# Patient Record
Sex: FEMALE | Race: WHITE | NOT HISPANIC OR LATINO | Employment: OTHER | URBAN - METROPOLITAN AREA
[De-identification: names, ages, dates, MRNs, and addresses within clinical notes are randomized per-mention and may not be internally consistent; named-entity substitution may affect disease eponyms.]

---

## 2017-01-09 ENCOUNTER — HOSPITAL ENCOUNTER (OUTPATIENT)
Dept: LAB | Facility: MEDICAL CENTER | Age: 72
End: 2017-01-09
Attending: INTERNAL MEDICINE
Payer: MEDICARE

## 2017-01-09 ENCOUNTER — OFFICE VISIT (OUTPATIENT)
Dept: CARDIOLOGY | Facility: MEDICAL CENTER | Age: 72
End: 2017-01-09
Payer: MEDICARE

## 2017-01-09 VITALS
WEIGHT: 140 LBS | OXYGEN SATURATION: 96 % | HEART RATE: 74 BPM | SYSTOLIC BLOOD PRESSURE: 140 MMHG | BODY MASS INDEX: 21.92 KG/M2 | DIASTOLIC BLOOD PRESSURE: 82 MMHG

## 2017-01-09 DIAGNOSIS — D64.89 ANEMIA DUE TO OTHER CAUSE: ICD-10-CM

## 2017-01-09 DIAGNOSIS — N18.4 CKD (CHRONIC KIDNEY DISEASE), STAGE IV (HCC): ICD-10-CM

## 2017-01-09 DIAGNOSIS — J42 CHRONIC BRONCHITIS, UNSPECIFIED CHRONIC BRONCHITIS TYPE (HCC): ICD-10-CM

## 2017-01-09 DIAGNOSIS — I34.0 SEVERE MITRAL REGURGITATION: ICD-10-CM

## 2017-01-09 DIAGNOSIS — I73.9 PAD (PERIPHERAL ARTERY DISEASE) (HCC): ICD-10-CM

## 2017-01-09 LAB
ERYTHROCYTE [DISTWIDTH] IN BLOOD BY AUTOMATED COUNT: 60.6 FL (ref 35.9–50)
HCT VFR BLD AUTO: 37.5 % (ref 37–47)
HGB BLD-MCNC: 12.2 G/DL (ref 12–16)
MCH RBC QN AUTO: 29.2 PG (ref 27–33)
MCHC RBC AUTO-ENTMCNC: 32.5 G/DL (ref 33.6–35)
MCV RBC AUTO: 89.7 FL (ref 81.4–97.8)
PLATELET # BLD AUTO: 170 K/UL (ref 164–446)
PMV BLD AUTO: 11.3 FL (ref 9–12.9)
RBC # BLD AUTO: 4.18 M/UL (ref 4.2–5.4)
WBC # BLD AUTO: 7.1 K/UL (ref 4.8–10.8)

## 2017-01-09 PROCEDURE — G8419 CALC BMI OUT NRM PARAM NOF/U: HCPCS | Performed by: INTERNAL MEDICINE

## 2017-01-09 PROCEDURE — 99214 OFFICE O/P EST MOD 30 MIN: CPT | Performed by: INTERNAL MEDICINE

## 2017-01-09 PROCEDURE — 36415 COLL VENOUS BLD VENIPUNCTURE: CPT

## 2017-01-09 PROCEDURE — 85027 COMPLETE CBC AUTOMATED: CPT

## 2017-01-09 PROCEDURE — 3017F COLORECTAL CA SCREEN DOC REV: CPT | Performed by: INTERNAL MEDICINE

## 2017-01-09 PROCEDURE — G8427 DOCREV CUR MEDS BY ELIG CLIN: HCPCS | Performed by: INTERNAL MEDICINE

## 2017-01-09 PROCEDURE — 4004F PT TOBACCO SCREEN RCVD TLK: CPT | Performed by: INTERNAL MEDICINE

## 2017-01-09 NOTE — MR AVS SNAPSHOT
Kristyn Gillespie   2017 2:30 PM   Office Visit   MRN: [id number]    Department:  [facility]   Dept Phone:  [phone]    Description:  Female : 1945   Provider:  Triston Abdul M.D.           Reason for Visit     Follow-Up  here to go over medications      Allergies as of 2017     No Known Allergies      You were diagnosed with     CKD (chronic kidney disease), stage IV ([facility])   [934690]       Chronic bronchitis, unspecified chronic bronchitis type ([facility])   [5691854]       PAD (peripheral artery disease) ([facility])   [122699]       Severe mitral regurgitation   [451164]       Anemia due to other cause   [1886582]         Vital Signs     Blood Pressure Pulse Weight Oxygen Saturation Smoking Status       140/82 mmHg 74 63.504 kg (140 lb) 96% Current Every Day Smoker       Basic Information     Date Of Birth Sex Race Ethnicity Preferred Language    1945 Female White Non- English      Your appointments     2017 10:00 AM   Follow Up Visit with Saulo Garza M.D.   [facility] ([address] [place] [address])    [address] [place] [address], [address]  [facility] [address]   [phone]           You will be receiving a confirmation call a few days before your appointment from our automated call confirmation system.            2017  4:40 PM   Established Patient with Rosa LACEY M.D.   [facility] (--)    [address] [facility] [address] [phone]           You will be receiving a confirmation call a few days before your appointment from our automated call confirmation system.            Mar 20, 2017  3:00 PM   FOLLOW UP with Triston Abdul M.D.   [facility] (--)    [address] [facility] [address] [phone]              Problem List              ICD-10-CM Priority Class Noted - Resolved    Essential hypertension I10 Low  2009 - Present    Peripheral edema R60.9   9/18/2009 - Present    Migraines, neuralgic G44.009   9/18/2009 - Present    Osteoarthritis M19.90   9/18/2009 - Present    PAD (peripheral artery disease)-saw a vascular surgeon several years ago who indicated stents would be needed I73.9   10/21/2009 - Present    Tobacco dependence F17.200 Low  11/12/2015 - Present    HLD (hyperlipidemia) E78.5   11/12/2015 - Present    Vitamin D deficiency E55.9   11/12/2015 - Present    Renal insufficiency N28.9   8/5/2016 - Present    GIB (gastrointestinal bleeding) K92.2 Medium  11/14/2016 - Present    Acute blood loss anemia D62 High  11/14/2016 - Present    Acute on chronic renal failure (HCC) N17.9, N18.9 Medium  11/14/2016 - Present    Metabolic acidosis E87.2 Medium  11/14/2016 - Present    COPD (chronic obstructive pulmonary disease) ([facility]) J44.9 Low  11/15/2016 - Present    Chronic respiratory failure with hypoxia ([facility]) J96.11 Medium  11/15/2016 - Present    Acute pulmonary edema ([facility]) J81.0 Medium  11/15/2016 - Present    Anemia D64.9   11/16/2016 - Present    Respiratory failure ([facility]) J96.90 High  11/16/2016 - Present    Anemia D64.9 Medium  11/16/2016 - Present    CKD (chronic kidney disease), stage IV ([facility]) N18.4 Low  11/16/2016 - Present    ELSY (acute kidney injury) ([facility]) N17.9 High  11/16/2016 - Present    HLD (hyperlipidemia) E78.5 Low  11/16/2016 - Present    Gastritis K29.70 Low  11/16/2016 - Present    Non-rheumatic mitral regurgitation I34.0   11/30/2016 - Present      Health Maintenance        Date Due Completion Dates    IMM DTaP/Tdap/Td Vaccine (1 - Tdap) 1/23/1963 ---    PAP SMEAR 1/23/1965 ---    IMM ZOSTER VACCINE 1/23/2004 ---    IMM PNEUMOCOCCAL 65+ (ADULT) HIGH/HIGHEST RISK SERIES (2 of 2 - PPSV23) 1/7/2016 11/12/2015    MAMMOGRAM 6/18/2016 6/18/2015 (Declined)    Override on 6/18/2015: Patient Declined    BONE DENSITY 5/11/2020 5/11/2015 (Declined)    Override on 5/11/2015: Patient Declined    COLONOSCOPY 9/1/2024 9/1/2014  (Done)    Override on 9/1/2014: Done (at [facility])            Current Immunizations     13-VALENT PCV PREVNAR 11/12/2015    Influenza Vaccine Adult HD 11/17/2016  5:37 AM, 11/12/2015      Below and/or attached are the medications your provider expects you to take. Review all of your home medications and newly ordered medications with your provider and/or pharmacist. Follow medication instructions as directed by your provider and/or pharmacist. Please keep your medication list with you and share with your provider. Update the information when medications are discontinued, doses are changed, or new medications (including over-the-counter products) are added; and carry medication information at all times in the event of emergency situations     Allergies:  No Known Allergies          Medications  Valid as of: January 09, 2017 -  3:07 PM    Generic Name Brand Name Tablet Size Instructions for use    Albuterol Sulfate (Aero Soln) albuterol 108 (90 BASE) MCG/ACT Inhale 2 Puffs by mouth every 6 hours as needed for Shortness of Breath.        Ascorbic Acid (Tab) VITAMIN C 500 MG Take 1 Tab by mouth 3 times a day.        Calcium Carbonate Antacid (Chew Tab) TUMS 500 MG Take 1 Tab by mouth 2 Times a Day.        Cyanocobalamin (Tab) B-12 100 MCG Take 100 mcg by mouth every day.        Ergocalciferol (Cap) DRISDOL 21844 UNITS Take 1 Cap by mouth every 7 days.        Ferrous Sulfate (Tab) ferrous sulfate 325 (65 FE) MG Take 1 Tab by mouth 3 times a day, with meals.        Furosemide (Tab) LASIX 40 MG Take 1 Tab by mouth 2 Times a Day.        HydrALAZINE HCl (Tab) APRESOLINE 100 MG Take 1 Tab by mouth every 8 hours.        Ipratropium-Albuterol (Solution) DUONEB 0.5-2.5 (3) MG/3ML 3 mL by Nebulization route every 6 hours as needed for Shortness of Breath.        Isosorbide Mononitrate (TABLET SR 24 HR) IMDUR 120 MG Take 1 Tab by mouth every day.        Lovastatin (Tab) MEVACOR 10 MG Take 1 Tab by mouth every day.         Multiple Vitamin (Tab) THERAGRAN  Take 1 Tab by mouth every day.        Omeprazole (CAPSULE DELAYED RELEASE) PRILOSEC 40 MG Take 1 Cap by mouth 2 Times a Day for 60 days.        Sodium Bicarbonate (Tab) SODIUM BICARBONATE 650 MG Take 1 Tab by mouth 3 times a day.        TraMADol HCl (Tab) ULTRAM 50 MG Take 1 Tab by mouth every 8 hours as needed.        .                 Medicines prescribed today were sent to:     [address] [address]    Phone: [phone] Fax: [phone]    Open 24 Hours?: No      Medication refill instructions:       If your prescription bottle indicates you have medication refills left, it is not necessary to call your provider’s office. Please contact your pharmacy and they will refill your medication.    If your prescription bottle indicates you do not have any refills left, you may request refills at any time through one of the following ways: The online [employer] system (except Urgent Care), by calling your provider’s office, or by asking your pharmacy to contact your provider’s office with a refill request. Medication refills are processed only during regular business hours and may not be available until the next business day. Your provider may request additional information or to have a follow-up visit with you prior to refilling your medication.   *Please Note: Medication refills are assigned a new Rx number when refilled electronically. Your pharmacy may indicate that no refills were authorized even though a new prescription for the same medication is available at the pharmacy. Please request the medicine by name with the pharmacy before contacting your provider for a refill.        Your To Do List     Future Labs/Procedures Complete By Expires    CBC WITHOUT DIFFERENTIAL  As directed 7/12/2017    ECHOCARDIOGRAM COMP W/O CONT  As directed 1/9/2018         [employer] Status: Patient Declined

## 2017-01-10 ENCOUNTER — TELEPHONE (OUTPATIENT)
Dept: CARDIOLOGY | Facility: MEDICAL CENTER | Age: 72
End: 2017-01-10

## 2017-01-10 DIAGNOSIS — I34.0 NON-RHEUMATIC MITRAL REGURGITATION: ICD-10-CM

## 2017-01-10 DIAGNOSIS — N18.4 CKD (CHRONIC KIDNEY DISEASE), STAGE IV (HCC): ICD-10-CM

## 2017-01-10 DIAGNOSIS — I10 ESSENTIAL HYPERTENSION: ICD-10-CM

## 2017-01-10 DIAGNOSIS — J81.0 ACUTE PULMONARY EDEMA (HCC): ICD-10-CM

## 2017-01-10 NOTE — PROGRESS NOTES
Subjective:   Kristyn Gillespie is a 71 y.o. female who presents today for hospital follow-up after diagnosis of severe mitral regurgitation. She was admitted for severe symptomatic anemia and noted to have incidental severe mitral regurgitation. Transesophageal echocardiogram showed a flail leaflet and severe MR. She is relatively asymptomatic although very inactive due to her other chronic medical conditions. Her ejection fraction was preserved. She feels well, much better than during her hospital stay. She is ambulatory.    Denies any other cardiovascular symptoms including chest pain, shortness of breath, dyspnea on exertion, lightheadedness, syncope or presyncope, lower extremity edema, PND, orthopnea or palpitations.      Past Medical History   Diagnosis Date   • Unspecified essential hypertension 9/18/2009   • Peripheral edema 9/18/2009   • Tobacco use disorder 9/18/2009   • Migraines, neuralgic 9/18/2009   • COPD (chronic obstructive pulmonary disease) ([facility]) 9/18/2009   • Epilepsy ([facility]) 9/18/2009   • Osteoarthritis 9/18/2009   • Renal insufficiency 8/5/2016   • Anemia 11/11/2016     Past Surgical History   Procedure Laterality Date   • Abdominal hysterectomy total       endometriosis   • Gastroscopy with biopsy  11/15/2016     Procedure: GASTROSCOPY WITH BIOPSY;  Surgeon: Guzman Olson M.D.;  Location: ENDOSCOPY [facility];  Service:      Family History   Problem Relation Age of Onset   • Cancer Mother      breast   • Heart Disease Father      History   Smoking status   • Current Every Day Smoker -- 1.00 packs/day for 40 years   • Types: Cigarettes   Smokeless tobacco   • Never Used     No Known Allergies  Outpatient Encounter Prescriptions as of 1/9/2017   Medication Sig Dispense Refill   • furosemide (LASIX) 40 MG Tab Take 1 Tab by mouth 2 Times a Day. 60 Tab 0   • vitamin D, Ergocalciferol, (DRISDOL) 07028 UNITS Cap capsule Take 1 Cap by mouth every 7 days. 8 Cap 0   • ascorbic acid (VITAMIN C)  500 MG tablet Take 1 Tab by mouth 3 times a day. 90 Tab 3   • omeprazole (PRILOSEC) 40 MG delayed-release capsule Take 1 Cap by mouth 2 Times a Day for 60 days. 120 Cap 0   • multivitamin (THERAGRAN) Tab Take 1 Tab by mouth every day. 90 Tab 0   • calcium carbonate (TUMS) 500 MG Chew Tab Take 1 Tab by mouth 2 Times a Day. 60 Tab 3   • sodium bicarbonate (SODIUM BICARBONATE) 650 MG Tab Take 1 Tab by mouth 3 times a day. 90 Tab 3   • isosorbide mononitrate SR (IMDUR) 120 MG CR tablet Take 1 Tab by mouth every day. 30 Tab 0   • hydrALAZINE (APRESOLINE) 100 MG tablet Take 1 Tab by mouth every 8 hours. 90 Tab 3   • cyanocobalamin 100 MCG Tab Take 100 mcg by mouth every day. 30 Tab 3   • ferrous sulfate 325 (65 FE) MG tablet Take 1 Tab by mouth 3 times a day, with meals. 90 Tab 3   • ipratropium-albuterol (DUONEB) 0.5-2.5 (3) MG/3ML nebulizer solution 3 mL by Nebulization route every 6 hours as needed for Shortness of Breath. 120 Vial 3   • tramadol (ULTRAM) 50 MG Tab Take 1 Tab by mouth every 8 hours as needed. 50 Tab 0   • albuterol (PROVENTIL HFA) 108 (90 BASE) MCG/ACT Aero Soln inhalation aerosol Inhale 2 Puffs by mouth every 6 hours as needed for Shortness of Breath. 8.5 g 1   • lovastatin (MEVACOR) 10 MG tablet Take 1 Tab by mouth every day. 90 Tab 1     No facility-administered encounter medications on file as of 1/9/2017.     Review of Systems   All other systems reviewed and are negative.       Objective:   /82 mmHg  Pulse 74  Wt 63.504 kg (140 lb)  SpO2 96%    Physical Exam   Constitutional: She is oriented to person, place, and time. She appears well-developed and well-nourished. No distress.   Elderly, nasal cannula oxygen   HENT:   Head: Normocephalic and atraumatic.   Mouth/Throat: Oropharynx is clear and moist. No oropharyngeal exudate.   Eyes: Conjunctivae are normal. Pupils are equal, round, and reactive to light. No scleral icterus.   Neck: Normal range of motion. Neck supple. No JVD present. No  thyromegaly present.   Cardiovascular: Normal rate, regular rhythm and intact distal pulses.  Exam reveals no gallop and no friction rub.    Murmur (harsh 4/6 systolic murmur at the apex) heard.  Pulses:       Carotid pulses are 2+ on the right side, and 2+ on the left side.       Radial pulses are 2+ on the right side, and 2+ on the left side.        Popliteal pulses are 2+ on the right side, and 2+ on the left side.        Dorsalis pedis pulses are 2+ on the right side, and 2+ on the left side.        Posterior tibial pulses are 2+ on the right side, and 2+ on the left side.   Pulmonary/Chest: Effort normal and breath sounds normal. She has no wheezes. She has no rales.   Abdominal: Soft. Bowel sounds are normal. She exhibits no distension. There is no tenderness.   Musculoskeletal: She exhibits no edema or tenderness.   Neurological: She is alert and oriented to person, place, and time. No cranial nerve deficit.   Skin: Skin is warm and dry. No rash noted. She is not diaphoretic. No erythema.   Psychiatric: She has a normal mood and affect. Her behavior is normal.   Vitals reviewed.    Transesophageal echo CONCLUSIONS (11/18/2016):  Severe eccentric mitral regurgitation (Coanda jet) related to anterior   leaflet prolapse at the A2/P1 junction with as evidenced by systolic   flow reversal in the right-sided pulmonary veins.    Normal left ventricular size, wall thickness, and systolic function.   Small PFO seen without significant flow.      Assessment:     1. CKD (chronic kidney disease), stage IV ([facility])     2. Chronic bronchitis, unspecified chronic bronchitis type ([facility])     3. PAD (peripheral artery disease)-saw a vascular surgeon several years ago who indicated stents would be needed     4. Severe mitral regurgitation  ECHOCARDIOGRAM COMP W/O CONT   5. Anemia due to other cause  CBC WITHOUT DIFFERENTIAL       Medical Decision Making:  Today's Assessment / Status / Plan:     Her severe mitral regurgitation will  require surgical repair. At this time as she is asymptomatic and does not have any significant pulmonary hypertension or other indications for asymptomatic MR repair, she would like to wait. She is much interested in mitral clip, I recommend we initiate evaluation for this now so that it is ready to go should she develop refractory symptoms. We discussed that the prolapse is not likely to resolve on its own.    1. Refer for consideration of mitral clip  2. Repeat echocardiogram in 3-4 months with a follow-up visit in the interim  3. Suggested cardiac thoracic surgical evaluation in [place] and she has declined at this time. Her daughter is present.

## 2017-01-10 NOTE — TELEPHONE ENCOUNTER
Dr. Abdul saw patient in clinic yesterday.  He is requesting a referral to [facility] for Mitral valve clip consult.   Referral made to see Dr. Michael Awad at [place].  epi

## 2017-01-11 NOTE — TELEPHONE ENCOUNTER
"Rico Demian AYON - Referral to [place] >','<< Less Detail',event)\" href=\"javascript:;\">More Detail >>     Referral to [place]       Demian NANY Rico       Sent: Tue January 10, 2017  3:52 PM       To: KHURRAM Thurman.P.N.              Message        The referral tot [place], has been sent to the [place] [facility] Office.       The contact number is [phone]      Noted    "

## 2017-01-13 ENCOUNTER — APPOINTMENT (OUTPATIENT)
Dept: NEPHROLOGY | Facility: MEDICAL CENTER | Age: 72
End: 2017-01-13
Payer: MEDICARE

## 2017-01-13 ENCOUNTER — TELEPHONE (OUTPATIENT)
Dept: CARDIOLOGY | Facility: MEDICAL CENTER | Age: 72
End: 2017-01-13

## 2017-01-14 NOTE — TELEPHONE ENCOUNTER
----- Message from Triston Abdul M.D. sent at 1/12/2017  2:30 PM PST -----  CBC recovering well as expected.    ----- Message -----     From: Davon Messer L.P.N.     Sent: 1/10/2017   5:37 PM       To: Triston Abdul M.D.    F/u 3/20/17 with TW        Patients daughter is notified of CBC results and thankful for the call. epi

## 2017-01-27 ENCOUNTER — OFFICE VISIT (OUTPATIENT)
Dept: NEPHROLOGY | Facility: MEDICAL CENTER | Age: 72
End: 2017-01-27
Payer: MEDICARE

## 2017-01-27 VITALS
SYSTOLIC BLOOD PRESSURE: 164 MMHG | OXYGEN SATURATION: 90 % | DIASTOLIC BLOOD PRESSURE: 64 MMHG | HEIGHT: 61 IN | RESPIRATION RATE: 16 BRPM | HEART RATE: 82 BPM | BODY MASS INDEX: 26.43 KG/M2 | TEMPERATURE: 97.9 F | WEIGHT: 140 LBS

## 2017-01-27 DIAGNOSIS — N18.4 CKD (CHRONIC KIDNEY DISEASE), STAGE IV (HCC): ICD-10-CM

## 2017-01-27 DIAGNOSIS — I10 ESSENTIAL HYPERTENSION: ICD-10-CM

## 2017-01-27 PROCEDURE — 4040F PNEUMOC VAC/ADMIN/RCVD: CPT | Performed by: INTERNAL MEDICINE

## 2017-01-27 PROCEDURE — 99213 OFFICE O/P EST LOW 20 MIN: CPT | Performed by: INTERNAL MEDICINE

## 2017-01-27 PROCEDURE — 3014F SCREEN MAMMO DOC REV: CPT | Performed by: INTERNAL MEDICINE

## 2017-01-27 PROCEDURE — G8420 CALC BMI NORM PARAMETERS: HCPCS | Performed by: INTERNAL MEDICINE

## 2017-01-27 PROCEDURE — 3017F COLORECTAL CA SCREEN DOC REV: CPT | Performed by: INTERNAL MEDICINE

## 2017-01-27 PROCEDURE — G8482 FLU IMMUNIZE ORDER/ADMIN: HCPCS | Performed by: INTERNAL MEDICINE

## 2017-01-27 PROCEDURE — G8432 DEP SCR NOT DOC, RNG: HCPCS | Performed by: INTERNAL MEDICINE

## 2017-01-27 PROCEDURE — 4004F PT TOBACCO SCREEN RCVD TLK: CPT | Performed by: INTERNAL MEDICINE

## 2017-01-27 PROCEDURE — 1101F PT FALLS ASSESS-DOCD LE1/YR: CPT | Performed by: INTERNAL MEDICINE

## 2017-01-27 NOTE — MR AVS SNAPSHOT
"        Kristyn Gillespie   2017 4:30 PM   Office Visit   MRN: [id number]    Department:  [facility]   Dept Phone:  [phone]    Description:  Female : 1945   Provider:  Saulo Garza M.D.           Reason for Visit     Follow-Up           Allergies as of 2017     No Known Allergies      You were diagnosed with     CKD (chronic kidney disease), stage IV (CMS-HCC)   [209585]       Essential hypertension   [2074253]         Vital Signs     Blood Pressure Pulse Temperature Respirations Height Weight    164/64 mmHg 82 36.6 °C (97.9 °F) 16 1.549 m (5' 0.98\") 63.504 kg (140 lb)    Body Mass Index Oxygen Saturation Smoking Status             26.47 kg/m2 90% Current Every Day Smoker         Basic Information     Date Of Birth Sex Race Ethnicity Preferred Language    1945 Female White Non- English      Your appointments     2017 10:00 AM   Established Patient with ALDEN Mai   [facility] (--)    [address] [facility] [address] [phone]           You will be receiving a confirmation call a few days before your appointment from our automated call confirmation system.            Mar 03, 2017  2:15 PM   Follow Up Visit with Saulo Garza M.D.   [facility] ([address])    [address] [place] [address], [address]  [facility] [id number] [phone]           You will be receiving a confirmation call a few days before your appointment from our automated call confirmation system.            Mar 20, 2017  3:00 PM   FOLLOW UP with Triston Abdul M.D.   [facility] ([facility]    [address] [facility] [address] [phone]              Problem List              ICD-10-CM Priority Class Noted - Resolved    Essential hypertension I10 Low  2009 - Present    Peripheral edema R60.9   2009 - Present    Migraines, neuralgic G44.009   " 9/18/2009 - Present    Osteoarthritis M19.90   9/18/2009 - Present    PAD (peripheral artery disease)-saw a vascular surgeon several years ago who indicated stents would be needed I73.9   10/21/2009 - Present    Tobacco dependence F17.200 Low  11/12/2015 - Present    HLD (hyperlipidemia) E78.5   11/12/2015 - Present    Vitamin D deficiency E55.9   11/12/2015 - Present    Renal insufficiency N28.9   8/5/2016 - Present    GIB (gastrointestinal bleeding) K92.2 Medium  11/14/2016 - Present    Acute blood loss anemia D62 High  11/14/2016 - Present    Acute on chronic renal failure (CMS-HCC) N17.9, N18.9 Medium  11/14/2016 - Present    Metabolic acidosis E87.2 Medium  11/14/2016 - Present    COPD (chronic obstructive pulmonary disease) (CMS-HCC) J44.9 Low  11/15/2016 - Present    Chronic respiratory failure with hypoxia (CMS-HCC) J96.11 Medium  11/15/2016 - Present    Acute pulmonary edema (CMS-HCC) J81.0 Medium  11/15/2016 - Present    Anemia D64.9   11/16/2016 - Present    Respiratory failure (CMS-HCC) J96.90 High  11/16/2016 - Present    Anemia D64.9 Medium  11/16/2016 - Present    CKD (chronic kidney disease), stage IV (CMS-HCC) N18.4 Low  11/16/2016 - Present    ELSY (acute kidney injury) (CMS-HCC) N17.9 High  11/16/2016 - Present    HLD (hyperlipidemia) E78.5 Low  11/16/2016 - Present    Gastritis K29.70 Low  11/16/2016 - Present    Non-rheumatic mitral regurgitation I34.0   11/30/2016 - Present      Health Maintenance        Date Due Completion Dates    IMM DTaP/Tdap/Td Vaccine (1 - Tdap) 1/23/1963 ---    PAP SMEAR 1/23/1965 ---    IMM ZOSTER VACCINE 1/23/2004 ---    IMM PNEUMOCOCCAL 65+ (ADULT) HIGH/HIGHEST RISK SERIES (2 of 2 - PPSV23) 1/7/2016 11/12/2015    MAMMOGRAM 6/18/2016 6/18/2015 (Declined)    Override on 6/18/2015: Patient Declined    BONE DENSITY 5/11/2020 5/11/2015 (Declined)    Override on 5/11/2015: Patient Declined    COLONOSCOPY 9/1/2024 9/1/2014 (Done)    Override on 9/1/2014: Done (at [facility],  ervin rodriguez)            Current Immunizations     13-VALENT PCV PREVNAR 11/12/2015    Influenza Vaccine Adult HD 11/17/2016  5:37 AM, 11/12/2015      Below and/or attached are the medications your provider expects you to take. Review all of your home medications and newly ordered medications with your provider and/or pharmacist. Follow medication instructions as directed by your provider and/or pharmacist. Please keep your medication list with you and share with your provider. Update the information when medications are discontinued, doses are changed, or new medications (including over-the-counter products) are added; and carry medication information at all times in the event of emergency situations     Allergies:  No Known Allergies          Medications  Valid as of: January 27, 2017 -  4:52 PM    Generic Name Brand Name Tablet Size Instructions for use    Albuterol Sulfate (Aero Soln) albuterol 108 (90 BASE) MCG/ACT Inhale 2 Puffs by mouth every 6 hours as needed for Shortness of Breath.        Ascorbic Acid (Tab) VITAMIN C 500 MG Take 1 Tab by mouth 3 times a day.        Calcium Carbonate Antacid (Chew Tab) TUMS 500 MG Take 1 Tab by mouth 2 Times a Day.        Cyanocobalamin (Tab) B-12 100 MCG Take 100 mcg by mouth every day.        Ergocalciferol (Cap) DRISDOL 06558 UNITS Take 1 Cap by mouth every 7 days.        Ferrous Sulfate (Tab) ferrous sulfate 325 (65 FE) MG Take 1 Tab by mouth 3 times a day, with meals.        Furosemide (Tab) LASIX 40 MG Take 1 Tab by mouth 2 Times a Day.        HydrALAZINE HCl (Tab) APRESOLINE 100 MG Take 1 Tab by mouth every 8 hours.        Ipratropium-Albuterol (Solution) DUONEB 0.5-2.5 (3) MG/3ML 3 mL by Nebulization route every 6 hours as needed for Shortness of Breath.        Isosorbide Mononitrate (TABLET SR 24 HR) IMDUR 120 MG Take 1 Tab by mouth every day.        Lovastatin (Tab) MEVACOR 10 MG Take 1 Tab by mouth every day.        Multiple Vitamin (Tab) THERAGRAN  Take 1 Tab by  mouth every day.        Sodium Bicarbonate (Tab) SODIUM BICARBONATE 650 MG Take 1 Tab by mouth 3 times a day.        TraMADol HCl (Tab) ULTRAM 50 MG Take 1 Tab by mouth every 8 hours as needed.        .                 Medicines prescribed today were sent to:     [facility] [address] [place] [address] [address] [facility]    [address] [place] [address] [place] [address]    Phone: [phone] Fax: [phone]    Open 24 Hours?: No      Medication refill instructions:       If your prescription bottle indicates you have medication refills left, it is not necessary to call your provider’s office. Please contact your pharmacy and they will refill your medication.    If your prescription bottle indicates you do not have any refills left, you may request refills at any time through one of the following ways: The online [employer] system (except Urgent Care), by calling your provider’s office, or by asking your pharmacy to contact your provider’s office with a refill request. Medication refills are processed only during regular business hours and may not be available until the next business day. Your provider may request additional information or to have a follow-up visit with you prior to refilling your medication.   *Please Note: Medication refills are assigned a new Rx number when refilled electronically. Your pharmacy may indicate that no refills were authorized even though a new prescription for the same medication is available at the pharmacy. Please request the medicine by name with the pharmacy before contacting your provider for a refill.        Your To Do List     Future Labs/Procedures Complete By Expires    BASIC METABOLIC PANEL  As directed 7/27/2017    CBC WITHOUT DIFFERENTIAL  As directed 7/27/2017    MICROALBUMIN CREAT RATIO URINE  As directed 7/29/2017    PTH INTACT (PTH ONLY)  As directed 1/28/2018    US-RENAL  As directed 7/27/2017    VITAMIN D,25 HYDROXY  As directed 7/30/2017         [employer] Access Code: Activation  code not generated  Current MyChart Status: Active

## 2017-01-30 ENCOUNTER — HOSPITAL ENCOUNTER (OUTPATIENT)
Dept: RADIOLOGY | Facility: MEDICAL CENTER | Age: 72
End: 2017-01-30
Attending: INTERNAL MEDICINE
Payer: MEDICARE

## 2017-01-30 DIAGNOSIS — N18.4 CKD (CHRONIC KIDNEY DISEASE), STAGE IV (HCC): ICD-10-CM

## 2017-01-30 PROCEDURE — 76775 US EXAM ABDO BACK WALL LIM: CPT

## 2017-02-06 RX ORDER — FUROSEMIDE 40 MG/1
TABLET ORAL
Qty: 60 TAB | Refills: 0 | Status: SHIPPED | OUTPATIENT
Start: 2017-02-06 | End: 2017-03-25 | Stop reason: SDUPTHER

## 2017-02-08 DIAGNOSIS — E78.00 PURE HYPERCHOLESTEROLEMIA: ICD-10-CM

## 2017-02-08 RX ORDER — TRAMADOL HYDROCHLORIDE 50 MG/1
50 TABLET ORAL EVERY 8 HOURS PRN
Qty: 50 TAB | Refills: 0 | OUTPATIENT
Start: 2017-02-08

## 2017-02-08 RX ORDER — FERROUS SULFATE 325(65) MG
325 TABLET ORAL
Qty: 90 TAB | Refills: 0 | Status: SHIPPED | OUTPATIENT
Start: 2017-02-08 | End: 2018-10-21

## 2017-02-08 RX ORDER — LOVASTATIN 10 MG/1
10 TABLET ORAL DAILY
Qty: 90 TAB | Refills: 0 | Status: SHIPPED | OUTPATIENT
Start: 2017-02-08 | End: 2017-06-12 | Stop reason: SDUPTHER

## 2017-02-08 RX ORDER — ERGOCALCIFEROL 1.25 MG/1
50000 CAPSULE ORAL
Qty: 8 CAP | Refills: 0 | Status: SHIPPED | OUTPATIENT
Start: 2017-02-08 | End: 2017-05-29 | Stop reason: SDUPTHER

## 2017-02-08 RX ORDER — ISOSORBIDE MONONITRATE 120 MG/1
120 TABLET, EXTENDED RELEASE ORAL DAILY
Qty: 90 TAB | Refills: 0 | Status: SHIPPED | OUTPATIENT
Start: 2017-02-08 | End: 2017-05-31 | Stop reason: SDUPTHER

## 2017-02-09 NOTE — TELEPHONE ENCOUNTER
Notified Pt's daughter that Rx were fill and sent to [facility] pharmacy, notified her that tramadol will not be filled with out and OV. Daughter stated she will call back or come in to make an appointment.

## 2017-03-03 ENCOUNTER — APPOINTMENT (OUTPATIENT)
Dept: NEPHROLOGY | Facility: MEDICAL CENTER | Age: 72
End: 2017-03-03
Payer: MEDICARE

## 2017-03-10 ASSESSMENT — ENCOUNTER SYMPTOMS
CHILLS: 0
PALPITATIONS: 0
FEVER: 0

## 2017-03-10 NOTE — PROGRESS NOTES
"Subjective:      Kristyn Gillespie is a 72 y.o. female who presents with CKD IV, Renal cysts Follow-Up            HPI  72 year old with CKD IV, previously seen in Hospital in November 2016. The patient has a baseline serum creatinine around 2.5, however when seen in the hospital peaked to 3. Previously he had not seen a nephrologist. We manage the patient conservatively in the hospital, however recommended close renal follow-up. She is here for close renal follow-up. Review of chart noted that previously she has had renal cysts. Seems to be doing relatively well otherwise. No uremic symptoms. Taking her medications as prescribed. Blood pressure as documented was elevated, however she does not at this point have any home readings.    Review of Systems   Constitutional: Negative for fever and chills.   Cardiovascular: Negative for chest pain and palpitations.          Objective:     /64 mmHg  Pulse 82  Temp(Src) 36.6 °C (97.9 °F)  Resp 16  Ht 1.549 m (5' 0.98\")  Wt 63.504 kg (140 lb)  BMI 26.47 kg/m2  SpO2 90%     Physical Exam   Constitutional: She is oriented to person, place, and time. She appears well-developed and well-nourished.   Cardiovascular: Normal rate and regular rhythm.    Pulmonary/Chest: Effort normal and breath sounds normal.   Neurological: She is alert and oriented to person, place, and time.   Skin: Skin is warm and dry.   Psychiatric: She has a normal mood and affect. Her behavior is normal.               Assessment/Plan:     1. CKD (chronic kidney disease), stage IV (CMS-[facility])  Check baseline chronic kidney disease labs, including renal ultrasound given her history of renal cysts in the past. Suggest referral to kidney education. Information is provided. We will follow-up the patient and discuss a treatment plan.  - BASIC METABOLIC PANEL; Future  - CBC WITHOUT DIFFERENTIAL; Future  - PTH INTACT (PTH ONLY); Future  - VITAMIN D,25 HYDROXY; Future  - US-RENAL; Future  - MICROALBUMIN " CREAT RATIO URINE; Future    2. Essential hypertension  Her blood pressure is elevated today, will ask her to check home readings. If it remains elevated she'll need titration of her antihypertensives.

## 2017-03-14 ENCOUNTER — HOSPITAL ENCOUNTER (OUTPATIENT)
Dept: LAB | Facility: MEDICAL CENTER | Age: 72
End: 2017-03-14
Attending: INTERNAL MEDICINE
Payer: MEDICARE

## 2017-03-14 DIAGNOSIS — N18.4 CKD (CHRONIC KIDNEY DISEASE), STAGE IV (HCC): ICD-10-CM

## 2017-03-14 LAB
25(OH)D3 SERPL-MCNC: 53 NG/ML (ref 30–100)
ANION GAP SERPL CALC-SCNC: 10 MMOL/L (ref 0–11.9)
BUN SERPL-MCNC: 68 MG/DL (ref 8–22)
CALCIUM SERPL-MCNC: 9.8 MG/DL (ref 8.5–10.5)
CHLORIDE SERPL-SCNC: 102 MMOL/L (ref 96–112)
CO2 SERPL-SCNC: 25 MMOL/L (ref 20–33)
CREAT SERPL-MCNC: 2.71 MG/DL (ref 0.5–1.4)
ERYTHROCYTE [DISTWIDTH] IN BLOOD BY AUTOMATED COUNT: 48.1 FL (ref 35.9–50)
GLUCOSE SERPL-MCNC: 93 MG/DL (ref 65–99)
HCT VFR BLD AUTO: 34.4 % (ref 37–47)
HGB BLD-MCNC: 11.5 G/DL (ref 12–16)
MCH RBC QN AUTO: 31.5 PG (ref 27–33)
MCHC RBC AUTO-ENTMCNC: 33.4 G/DL (ref 33.6–35)
MCV RBC AUTO: 94.2 FL (ref 81.4–97.8)
PLATELET # BLD AUTO: 159 K/UL (ref 164–446)
PMV BLD AUTO: 11.9 FL (ref 9–12.9)
POTASSIUM SERPL-SCNC: 4.7 MMOL/L (ref 3.6–5.5)
PTH-INTACT SERPL-MCNC: 301.1 PG/ML (ref 14–72)
RBC # BLD AUTO: 3.65 M/UL (ref 4.2–5.4)
SODIUM SERPL-SCNC: 137 MMOL/L (ref 135–145)
WBC # BLD AUTO: 5.7 K/UL (ref 4.8–10.8)

## 2017-03-14 PROCEDURE — 85027 COMPLETE CBC AUTOMATED: CPT

## 2017-03-14 PROCEDURE — 82306 VITAMIN D 25 HYDROXY: CPT

## 2017-03-14 PROCEDURE — 36415 COLL VENOUS BLD VENIPUNCTURE: CPT

## 2017-03-14 PROCEDURE — 80048 BASIC METABOLIC PNL TOTAL CA: CPT

## 2017-03-14 PROCEDURE — 83970 ASSAY OF PARATHORMONE: CPT

## 2017-03-15 ENCOUNTER — HOSPITAL ENCOUNTER (OUTPATIENT)
Facility: MEDICAL CENTER | Age: 72
End: 2017-03-15
Attending: INTERNAL MEDICINE
Payer: MEDICARE

## 2017-03-15 LAB
CREAT UR-MCNC: 34.1 MG/DL
MICROALBUMIN UR-MCNC: 6.2 MG/DL
MICROALBUMIN/CREAT UR: 182 MG/G (ref 0–30)

## 2017-03-15 PROCEDURE — 82570 ASSAY OF URINE CREATININE: CPT

## 2017-03-15 PROCEDURE — 82043 UR ALBUMIN QUANTITATIVE: CPT

## 2017-03-20 ENCOUNTER — OFFICE VISIT (OUTPATIENT)
Dept: CARDIOLOGY | Facility: PHYSICIAN GROUP | Age: 72
End: 2017-03-20
Payer: MEDICARE

## 2017-03-20 VITALS
HEIGHT: 67 IN | SYSTOLIC BLOOD PRESSURE: 106 MMHG | HEART RATE: 79 BPM | DIASTOLIC BLOOD PRESSURE: 70 MMHG | BODY MASS INDEX: 22.91 KG/M2 | WEIGHT: 146 LBS | OXYGEN SATURATION: 87 %

## 2017-03-20 DIAGNOSIS — I10 ESSENTIAL HYPERTENSION: ICD-10-CM

## 2017-03-20 DIAGNOSIS — G44.019 EPISODIC CLUSTER HEADACHE, NOT INTRACTABLE: ICD-10-CM

## 2017-03-20 DIAGNOSIS — I34.0 SEVERE MITRAL REGURGITATION: ICD-10-CM

## 2017-03-20 DIAGNOSIS — J42 CHRONIC BRONCHITIS, UNSPECIFIED CHRONIC BRONCHITIS TYPE (HCC): ICD-10-CM

## 2017-03-20 DIAGNOSIS — I73.9 PAD (PERIPHERAL ARTERY DISEASE) (HCC): ICD-10-CM

## 2017-03-20 DIAGNOSIS — N18.4 CKD (CHRONIC KIDNEY DISEASE), STAGE IV (HCC): ICD-10-CM

## 2017-03-20 DIAGNOSIS — M19.90 OSTEOARTHRITIS, UNSPECIFIED OSTEOARTHRITIS TYPE, UNSPECIFIED SITE: ICD-10-CM

## 2017-03-20 DIAGNOSIS — E78.00 PURE HYPERCHOLESTEROLEMIA: ICD-10-CM

## 2017-03-20 PROCEDURE — 4040F PNEUMOC VAC/ADMIN/RCVD: CPT | Performed by: INTERNAL MEDICINE

## 2017-03-20 PROCEDURE — G8482 FLU IMMUNIZE ORDER/ADMIN: HCPCS | Performed by: INTERNAL MEDICINE

## 2017-03-20 PROCEDURE — 3017F COLORECTAL CA SCREEN DOC REV: CPT | Performed by: INTERNAL MEDICINE

## 2017-03-20 PROCEDURE — 3014F SCREEN MAMMO DOC REV: CPT | Performed by: INTERNAL MEDICINE

## 2017-03-20 PROCEDURE — G8432 DEP SCR NOT DOC, RNG: HCPCS | Performed by: INTERNAL MEDICINE

## 2017-03-20 PROCEDURE — 99214 OFFICE O/P EST MOD 30 MIN: CPT | Performed by: INTERNAL MEDICINE

## 2017-03-20 PROCEDURE — G8419 CALC BMI OUT NRM PARAM NOF/U: HCPCS | Performed by: INTERNAL MEDICINE

## 2017-03-20 PROCEDURE — 4004F PT TOBACCO SCREEN RCVD TLK: CPT | Performed by: INTERNAL MEDICINE

## 2017-03-20 PROCEDURE — 1101F PT FALLS ASSESS-DOCD LE1/YR: CPT | Performed by: INTERNAL MEDICINE

## 2017-03-20 RX ORDER — TRAMADOL HYDROCHLORIDE 50 MG/1
50 TABLET ORAL EVERY 8 HOURS PRN
Qty: 30 TAB | Refills: 0 | Status: SHIPPED | OUTPATIENT
Start: 2017-03-20 | End: 2017-05-31 | Stop reason: SDUPTHER

## 2017-03-20 NOTE — PROGRESS NOTES
Subjective:   Kristyn Gillespie is a 72 y.o. female who presents today follow-up after diagnosis of severe mitral regurgitation. She was admitted for severe symptomatic anemia and noted to have incidental severe mitral regurgitation. Transesophageal echocardiogram showed a flail leaflet and severe MR. She is relatively asymptomatic although very inactive due to her other chronic medical conditions. Her ejection fraction was preserved. She feels well, much better than during her hospital stay. She is ambulatory.    Referred to [place] for Mitral Clip and h=per her report has been accepted. She is delaying due to illness of a family member which is reasonable as she remains minimally symptomatic.    Denies any other cardiovascular symptoms including chest pain, shortness of breath, dyspnea on exertion, lightheadedness, syncope or presyncope, lower extremity edema, PND, orthopnea or palpitations.      Past Medical History   Diagnosis Date   • Unspecified essential hypertension 9/18/2009   • Peripheral edema 9/18/2009   • Tobacco use disorder 9/18/2009   • Migraines, neuralgic 9/18/2009   • COPD (chronic obstructive pulmonary disease) (CMS-HCC) 9/18/2009   • Epilepsy (CMS-HCC) 9/18/2009   • Osteoarthritis 9/18/2009   • Renal insufficiency 8/5/2016   • Anemia 11/11/2016     Past Surgical History   Procedure Laterality Date   • Abdominal hysterectomy total       endometriosis   • Gastroscopy with biopsy  11/15/2016     Procedure: GASTROSCOPY WITH BIOPSY;  Surgeon: Guzman Olson M.D.;  Location: ENDOSCOPY [facility];  Service:      Family History   Problem Relation Age of Onset   • Cancer Mother      breast   • Heart Disease Father      History   Smoking status   • Current Every Day Smoker -- 1.00 packs/day for 40 years   • Types: Cigarettes   Smokeless tobacco   • Never Used     No Known Allergies  Outpatient Encounter Prescriptions as of 3/20/2017   Medication Sig Dispense Refill   • tramadol (ULTRAM) 50 MG Tab  "Take 1 Tab by mouth every 8 hours as needed. 30 Tab 0   • lovastatin (MEVACOR) 10 MG tablet Take 1 Tab by mouth every day. 90 Tab 0   • vitamin D, Ergocalciferol, (DRISDOL) 06826 UNITS Cap capsule Take 1 Cap by mouth every 7 days. 8 Cap 0   • isosorbide mononitrate (IMDUR) 120 MG CR tablet Take 1 Tab by mouth every day. 90 Tab 0   • ferrous sulfate 325 (65 FE) MG tablet Take 1 Tab by mouth 3 times a day, with meals. 90 Tab 0   • furosemide (LASIX) 40 MG Tab TAKE ONE TABLET BY MOUTH TWICE DAILY 60 Tab 0   • ascorbic acid (VITAMIN C) 500 MG tablet Take 1 Tab by mouth 3 times a day. 90 Tab 3   • multivitamin (THERAGRAN) Tab Take 1 Tab by mouth every day. 90 Tab 0   • calcium carbonate (TUMS) 500 MG Chew Tab Take 1 Tab by mouth 2 Times a Day. 60 Tab 3   • sodium bicarbonate (SODIUM BICARBONATE) 650 MG Tab Take 1 Tab by mouth 3 times a day. 90 Tab 3   • hydrALAZINE (APRESOLINE) 100 MG tablet Take 1 Tab by mouth every 8 hours. 90 Tab 3   • cyanocobalamin 100 MCG Tab Take 100 mcg by mouth every day. 30 Tab 3   • ipratropium-albuterol (DUONEB) 0.5-2.5 (3) MG/3ML nebulizer solution 3 mL by Nebulization route every 6 hours as needed for Shortness of Breath. 120 Vial 3   • albuterol (PROVENTIL HFA) 108 (90 BASE) MCG/ACT Aero Soln inhalation aerosol Inhale 2 Puffs by mouth every 6 hours as needed for Shortness of Breath. 8.5 g 1   • [DISCONTINUED] tramadol (ULTRAM) 50 MG Tab Take 1 Tab by mouth every 8 hours as needed. 50 Tab 0     No facility-administered encounter medications on file as of 3/20/2017.     Review of Systems   All other systems reviewed and are negative.       Objective:   /70 mmHg  Pulse 79  Ht 1.702 m (5' 7\")  Wt 66.225 kg (146 lb)  BMI 22.86 kg/m2  SpO2 87%    Physical Exam   Constitutional: She is oriented to person, place, and time. She appears well-developed and well-nourished. No distress.   Elderly, nasal cannula oxygen   HENT:   Head: Normocephalic and atraumatic.   Mouth/Throat: Oropharynx " is clear and moist. No oropharyngeal exudate.   Eyes: Conjunctivae are normal. Pupils are equal, round, and reactive to light. No scleral icterus.   Neck: Normal range of motion. Neck supple. No JVD present. No thyromegaly present.   Cardiovascular: Normal rate, regular rhythm and intact distal pulses.  Exam reveals no gallop and no friction rub.    Murmur (harsh 4/6 systolic murmur at the apex) heard.  Pulses:       Carotid pulses are 2+ on the right side, and 2+ on the left side.       Radial pulses are 2+ on the right side, and 2+ on the left side.        Popliteal pulses are 2+ on the right side, and 2+ on the left side.        Dorsalis pedis pulses are 2+ on the right side, and 2+ on the left side.        Posterior tibial pulses are 2+ on the right side, and 2+ on the left side.   Pulmonary/Chest: Effort normal and breath sounds normal. She has no wheezes. She has no rales.   Abdominal: Soft. Bowel sounds are normal. She exhibits no distension. There is no tenderness.   Musculoskeletal: She exhibits no edema or tenderness.   Neurological: She is alert and oriented to person, place, and time. No cranial nerve deficit.   Skin: Skin is warm and dry. No rash noted. She is not diaphoretic. No erythema.   Psychiatric: She has a normal mood and affect. Her behavior is normal.   Vitals reviewed.    Transesophageal echo CONCLUSIONS (11/18/2016):  Severe eccentric mitral regurgitation (Coanda jet) related to anterior   leaflet prolapse at the A2/P1 junction with as evidenced by systolic   flow reversal in the right-sided pulmonary veins.    Normal left ventricular size, wall thickness, and systolic function.   Small PFO seen without significant flow.      Assessment:     1. Severe mitral regurgitation     2. Episodic cluster headache, not intractable  tramadol (ULTRAM) 50 MG Tab   3. Osteoarthritis, unspecified osteoarthritis type, unspecified site  tramadol (ULTRAM) 50 MG Tab   4. Pure hypercholesterolemia     5. PAD  (peripheral artery disease)-saw a vascular surgeon several years ago who indicated stents would be needed     6. CKD (chronic kidney disease), stage IV (CMS-[facility])     7. Chronic bronchitis, unspecified chronic bronchitis type (CMS-[facility])     8. Essential hypertension         Medical Decision Making:  Today's Assessment / Status / Plan:     Feeling well. One time pain medication refill and she will then f/u with Dr. Miguel for further refills. Narcotic check completed and only one prescribe identified.     1. Continue to mitral clip    2. Continue other medical therapy    FU6M

## 2017-03-28 RX ORDER — FUROSEMIDE 40 MG/1
TABLET ORAL
Qty: 60 TAB | Refills: 0 | Status: SHIPPED | OUTPATIENT
Start: 2017-03-28 | End: 2017-06-12 | Stop reason: SDUPTHER

## 2017-05-12 ENCOUNTER — HOSPITAL ENCOUNTER (OUTPATIENT)
Dept: LAB | Facility: MEDICAL CENTER | Age: 72
End: 2017-05-12
Attending: INTERNAL MEDICINE
Payer: MEDICARE

## 2017-05-12 LAB
ANION GAP SERPL CALC-SCNC: 8 MMOL/L (ref 0–11.9)
BASOPHILS # BLD AUTO: 1.1 % (ref 0–1.8)
BASOPHILS # BLD: 0.06 K/UL (ref 0–0.12)
BUN SERPL-MCNC: 73 MG/DL (ref 8–22)
CALCIUM SERPL-MCNC: 9.3 MG/DL (ref 8.5–10.5)
CHLORIDE SERPL-SCNC: 110 MMOL/L (ref 96–112)
CO2 SERPL-SCNC: 19 MMOL/L (ref 20–33)
CREAT SERPL-MCNC: 3.14 MG/DL (ref 0.5–1.4)
EOSINOPHIL # BLD AUTO: 0.28 K/UL (ref 0–0.51)
EOSINOPHIL NFR BLD: 5.3 % (ref 0–6.9)
ERYTHROCYTE [DISTWIDTH] IN BLOOD BY AUTOMATED COUNT: 45.9 FL (ref 35.9–50)
GFR SERPL CREATININE-BSD FRML MDRD: 15 ML/MIN/1.73 M 2
GLUCOSE SERPL-MCNC: 84 MG/DL (ref 65–99)
HCT VFR BLD AUTO: 35.1 % (ref 37–47)
HGB BLD-MCNC: 11.2 G/DL (ref 12–16)
IMM GRANULOCYTES # BLD AUTO: 0.01 K/UL (ref 0–0.11)
IMM GRANULOCYTES NFR BLD AUTO: 0.2 % (ref 0–0.9)
LYMPHOCYTES # BLD AUTO: 1 K/UL (ref 1–4.8)
LYMPHOCYTES NFR BLD: 19 % (ref 22–41)
MCH RBC QN AUTO: 30.9 PG (ref 27–33)
MCHC RBC AUTO-ENTMCNC: 31.9 G/DL (ref 33.6–35)
MCV RBC AUTO: 97 FL (ref 81.4–97.8)
MONOCYTES # BLD AUTO: 0.57 K/UL (ref 0–0.85)
MONOCYTES NFR BLD AUTO: 10.8 % (ref 0–13.4)
NEUTROPHILS # BLD AUTO: 3.34 K/UL (ref 2–7.15)
NEUTROPHILS NFR BLD: 63.6 % (ref 44–72)
NRBC # BLD AUTO: 0 K/UL
NRBC BLD AUTO-RTO: 0 /100 WBC
PLATELET # BLD AUTO: 160 K/UL (ref 164–446)
PMV BLD AUTO: 12.4 FL (ref 9–12.9)
POTASSIUM SERPL-SCNC: 5.7 MMOL/L (ref 3.6–5.5)
RBC # BLD AUTO: 3.62 M/UL (ref 4.2–5.4)
SODIUM SERPL-SCNC: 137 MMOL/L (ref 135–145)
WBC # BLD AUTO: 5.3 K/UL (ref 4.8–10.8)

## 2017-05-12 PROCEDURE — 85025 COMPLETE CBC W/AUTO DIFF WBC: CPT

## 2017-05-12 PROCEDURE — 36415 COLL VENOUS BLD VENIPUNCTURE: CPT

## 2017-05-12 PROCEDURE — 80048 BASIC METABOLIC PNL TOTAL CA: CPT

## 2017-05-24 ENCOUNTER — TELEPHONE (OUTPATIENT)
Dept: MEDICAL GROUP | Facility: PHYSICIAN GROUP | Age: 72
End: 2017-05-24

## 2017-05-24 NOTE — TELEPHONE ENCOUNTER
1. Caller Name: Dr. Pope                   Call Back Number: [phone]    2. Message: Pt was seen at [place] and her lab results showed low potassium. They would like for you to order another lab test to check this value please.     3. Patient approves office to leave a detailed voicemail/MyChart message: yes

## 2017-05-25 ENCOUNTER — HOSPITAL ENCOUNTER (OUTPATIENT)
Dept: LAB | Facility: MEDICAL CENTER | Age: 72
End: 2017-05-25
Attending: INTERNAL MEDICINE
Payer: MEDICARE

## 2017-05-25 LAB
ANION GAP SERPL CALC-SCNC: 11 MMOL/L (ref 0–11.9)
BUN SERPL-MCNC: 79 MG/DL (ref 8–22)
CALCIUM SERPL-MCNC: 9.2 MG/DL (ref 8.5–10.5)
CHLORIDE SERPL-SCNC: 111 MMOL/L (ref 96–112)
CO2 SERPL-SCNC: 17 MMOL/L (ref 20–33)
CREAT SERPL-MCNC: 2.64 MG/DL (ref 0.5–1.4)
GFR SERPL CREATININE-BSD FRML MDRD: 18 ML/MIN/1.73 M 2
GLUCOSE SERPL-MCNC: 119 MG/DL (ref 65–99)
POTASSIUM SERPL-SCNC: 5 MMOL/L (ref 3.6–5.5)
SODIUM SERPL-SCNC: 139 MMOL/L (ref 135–145)

## 2017-05-25 PROCEDURE — 36415 COLL VENOUS BLD VENIPUNCTURE: CPT

## 2017-05-25 PROCEDURE — 80048 BASIC METABOLIC PNL TOTAL CA: CPT

## 2017-05-26 ENCOUNTER — TELEPHONE (OUTPATIENT)
Dept: CARDIOLOGY | Facility: MEDICAL CENTER | Age: 72
End: 2017-05-26

## 2017-05-26 NOTE — TELEPHONE ENCOUNTER
----- Message from Evelyn Buchanan sent at 5/26/2017 11:01 AM PDT -----  Regarding: [place] @ [employer] calling with critical lab results   TW/Jan,    [place] @ [employer] is calling with critical lab results. She can be reached at [phone] for call back     Labs reviewed by  and faxed to nephrologistHuan willson

## 2017-05-26 NOTE — TELEPHONE ENCOUNTER
Please inform patient that the potassium is now normal, she needs to keep follow up with Dr Garza, nephrology.

## 2017-05-31 ENCOUNTER — TELEPHONE (OUTPATIENT)
Dept: NEPHROLOGY | Facility: MEDICAL CENTER | Age: 72
End: 2017-05-31

## 2017-05-31 ENCOUNTER — OFFICE VISIT (OUTPATIENT)
Dept: MEDICAL GROUP | Facility: PHYSICIAN GROUP | Age: 72
End: 2017-05-31
Payer: MEDICARE

## 2017-05-31 VITALS
TEMPERATURE: 98.6 F | OXYGEN SATURATION: 96 % | HEIGHT: 67 IN | HEART RATE: 76 BPM | DIASTOLIC BLOOD PRESSURE: 82 MMHG | BODY MASS INDEX: 22.91 KG/M2 | RESPIRATION RATE: 16 BRPM | WEIGHT: 146 LBS | SYSTOLIC BLOOD PRESSURE: 126 MMHG

## 2017-05-31 DIAGNOSIS — D64.9 ANEMIA, UNSPECIFIED TYPE: ICD-10-CM

## 2017-05-31 DIAGNOSIS — D62 ACUTE BLOOD LOSS ANEMIA: ICD-10-CM

## 2017-05-31 DIAGNOSIS — I34.0 NON-RHEUMATIC MITRAL REGURGITATION: ICD-10-CM

## 2017-05-31 DIAGNOSIS — G44.019 EPISODIC CLUSTER HEADACHE, NOT INTRACTABLE: ICD-10-CM

## 2017-05-31 DIAGNOSIS — N18.4 CKD (CHRONIC KIDNEY DISEASE), STAGE IV (HCC): ICD-10-CM

## 2017-05-31 DIAGNOSIS — N28.9 RENAL INSUFFICIENCY: ICD-10-CM

## 2017-05-31 DIAGNOSIS — M19.90 OSTEOARTHRITIS, UNSPECIFIED OSTEOARTHRITIS TYPE, UNSPECIFIED SITE: ICD-10-CM

## 2017-05-31 PROCEDURE — 3014F SCREEN MAMMO DOC REV: CPT | Performed by: INTERNAL MEDICINE

## 2017-05-31 PROCEDURE — G8420 CALC BMI NORM PARAMETERS: HCPCS | Performed by: INTERNAL MEDICINE

## 2017-05-31 PROCEDURE — 4040F PNEUMOC VAC/ADMIN/RCVD: CPT | Performed by: INTERNAL MEDICINE

## 2017-05-31 PROCEDURE — G8432 DEP SCR NOT DOC, RNG: HCPCS | Performed by: INTERNAL MEDICINE

## 2017-05-31 PROCEDURE — 99214 OFFICE O/P EST MOD 30 MIN: CPT | Performed by: INTERNAL MEDICINE

## 2017-05-31 PROCEDURE — 1101F PT FALLS ASSESS-DOCD LE1/YR: CPT | Performed by: INTERNAL MEDICINE

## 2017-05-31 PROCEDURE — 4004F PT TOBACCO SCREEN RCVD TLK: CPT | Performed by: INTERNAL MEDICINE

## 2017-05-31 RX ORDER — ISOSORBIDE MONONITRATE 120 MG/1
120 TABLET, EXTENDED RELEASE ORAL DAILY
Qty: 90 TAB | Refills: 3 | Status: SHIPPED | OUTPATIENT
Start: 2017-05-31 | End: 2018-08-02 | Stop reason: SDUPTHER

## 2017-05-31 RX ORDER — TRAMADOL HYDROCHLORIDE 50 MG/1
50 TABLET ORAL EVERY 8 HOURS PRN
Qty: 90 TAB | Refills: 0 | Status: ON HOLD | OUTPATIENT
Start: 2017-05-31 | End: 2017-12-08

## 2017-05-31 ASSESSMENT — PATIENT HEALTH QUESTIONNAIRE - PHQ9: CLINICAL INTERPRETATION OF PHQ2 SCORE: 0

## 2017-05-31 NOTE — ASSESSMENT & PLAN NOTE
Patient has seen Dr Flores x 1 but has not scheduled follow up, trouble with daughter being able to take her to the appointments. Missed 2 appointments during the winter

## 2017-05-31 NOTE — PROGRESS NOTES
Chief Complaint   Patient presents with   • Follow-Up     [facility]       HISTORY OF PRESENT ILLNESS: Patient is a 72 y.o. female established patient who presents today to discuss the medical issues below.    Non-rheumatic mitral regurgitation  Patient had mitral surgery at [facility], states she feels about the same.  No chest pain or sob.      Renal insufficiency  Patient has seen Dr Sandra lynn 1 but has not scheduled follow up, trouble with daughter being able to take her to the appointments. Missed 2 appointments during the winter    Acute blood loss anemia  Patient reports she has not been seen by GI since her discharge in November. No clear etiology was found. She has not had a colonoscopy recently and continues to decline. She has not noticed any particular blood in her stool she is continuing iron and does have black stools. She has not had additional iron workup and has not seen nephrology either. Patient states she feels at baseline no change in energy, no chest pain palpitations no edema.    CKD (chronic kidney disease), stage IV (CMS-HCC)  Patient has missed a couple of follow-up appointments with nephrology.      Patient Active Problem List    Diagnosis Date Noted   • Respiratory failure (CMS-[facility]) 11/16/2016     Priority: High   • CKD (chronic kidney disease), stage IV (CMS-[facility]) 11/16/2016     Priority: High   • ELSY (acute kidney injury) (CMS-[facility]) 11/16/2016     Priority: High   • Acute blood loss anemia 11/14/2016     Priority: High   • Anemia 11/16/2016     Priority: Medium   • Chronic respiratory failure with hypoxia (CMS-[facility]) 11/15/2016     Priority: Medium   • Acute pulmonary edema (CMS-[facility]) 11/15/2016     Priority: Medium   • GIB (gastrointestinal bleeding) 11/14/2016     Priority: Medium   • Acute on chronic renal failure (CMS-[facility]) 11/14/2016     Priority: Medium   • Metabolic acidosis 11/14/2016     Priority: Medium   • HLD (hyperlipidemia) 11/16/2016     Priority: Low   • Gastritis  11/16/2016     Priority: Low   • COPD (chronic obstructive pulmonary disease) (CMS-[facility]) 11/15/2016     Priority: Low   • Tobacco dependence 11/12/2015     Priority: Low   • Essential hypertension 09/18/2009     Priority: Low   • Non-rheumatic mitral regurgitation 11/30/2016   • Anemia 11/16/2016   • Renal insufficiency 08/05/2016   • HLD (hyperlipidemia) 11/12/2015   • Vitamin D deficiency 11/12/2015   • PAD (peripheral artery disease)-saw a vascular surgeon several years ago who indicated stents would be needed 10/21/2009   • Peripheral edema 09/18/2009   • Migraines, neuralgic 09/18/2009   • Osteoarthritis 09/18/2009       Allergies:Review of patient's allergies indicates no known allergies.    Current Outpatient Prescriptions   Medication Sig Dispense Refill   • tramadol (ULTRAM) 50 MG Tab Take 1 Tab by mouth every 8 hours as needed. 90 Tab 0   • furosemide (LASIX) 40 MG Tab TAKE ONE TABLET BY MOUTH TWICE DAILY 60 Tab 0   • vitamin D, Ergocalciferol, (DRISDOL) 15077 UNITS Cap capsule Take 1 Cap by mouth every 7 days. 8 Cap 0   • ascorbic acid (VITAMIN C) 500 MG tablet Take 1 Tab by mouth 3 times a day. 90 Tab 3   • calcium carbonate (TUMS) 500 MG Chew Tab Take 1 Tab by mouth 2 Times a Day. 60 Tab 3   • lovastatin (MEVACOR) 10 MG tablet Take 1 Tab by mouth every day. 90 Tab 0   • isosorbide mononitrate (IMDUR) 120 MG CR tablet Take 1 Tab by mouth every day. 90 Tab 0   • ferrous sulfate 325 (65 FE) MG tablet Take 1 Tab by mouth 3 times a day, with meals. 90 Tab 0   • multivitamin (THERAGRAN) Tab Take 1 Tab by mouth every day. 90 Tab 0   • sodium bicarbonate (SODIUM BICARBONATE) 650 MG Tab Take 1 Tab by mouth 3 times a day. 90 Tab 3   • hydrALAZINE (APRESOLINE) 100 MG tablet Take 1 Tab by mouth every 8 hours. 90 Tab 3   • cyanocobalamin 100 MCG Tab Take 100 mcg by mouth every day. 30 Tab 3   • ipratropium-albuterol (DUONEB) 0.5-2.5 (3) MG/3ML nebulizer solution 3 mL by Nebulization route every 6 hours as needed for  "Shortness of Breath. 120 Vial 3   • albuterol (PROVENTIL HFA) 108 (90 BASE) MCG/ACT Aero Soln inhalation aerosol Inhale 2 Puffs by mouth every 6 hours as needed for Shortness of Breath. 8.5 g 1     No current facility-administered medications for this visit.         Past Medical History   Diagnosis Date   • Unspecified essential hypertension 2009   • Peripheral edema 2009   • Tobacco use disorder 2009   • Migraines, neuralgic 2009   • COPD (chronic obstructive pulmonary disease) (CMS-HCC) 2009   • Epilepsy (CMS-HCC) 2009   • Osteoarthritis 2009   • Renal insufficiency 2016   • Anemia 2016       Social History   Substance Use Topics   • Smoking status: Current Every Day Smoker -- 1.00 packs/day for 40 years     Types: Cigarettes   • Smokeless tobacco: Never Used   • Alcohol Use: No       Family Status   Relation Status Death Age   • Mother     • Father     • Sister Alive      lumpectomy     Family History   Problem Relation Age of Onset   • Cancer Mother      breast   • Heart Disease Father        ROS:    Respiratory: Negative for cough, sputum production, wheezing.    Cardiovascular: Negative for chest pain, palpitations, orthopnea, dyspnea with exertion or edema.   Gastrointestinal: Negative for GI upset, nausea, vomiting, abdominal pain, constipation or diarrhea.   Genitourinary: Negative for dysuria, urgency, hesitancy or frequency.       Exam:    Blood pressure 126/82, pulse 76, temperature 37 °C (98.6 °F), resp. rate 16, height 1.702 m (5' 7.01\"), weight 66.225 kg (146 lb), SpO2 96 %.  General:  Well nourished, well developed female in NAD.  HENT: Normocephalic, bilateral TMs are intact, nasal and oral mucosa with no lesions,   Neck: Supple without bruit. Thyroid is not enlarged.  Pulmonary: Clear to ausculation and percussion.  Normal effort. No rales, rhonchi, or wheezing.  Cardiovascular: Regular rate and rhythm distant 1/6 systolic ejection " murmur  Abdomen: Normal bowel sounds soft and nontender no palpable liver spleen bladder mass.  Extremities: No LE edema noted.  Neuro: Grossly nonfocal.  Psych: Alert and oriented to person, place, and time. Appropriate mood and conversation.    LABS: Results reviewed and discussed with the patient, questions answered.      This dictation was created using voice recognition software. I have made reasonable attempts to correct errors, however, errors of grammar and content may exist.          Assessment/Plan:    1. Non-rheumatic mitral regurgitation  Status post surgical intervention clinically stable following with cardiology    2. Renal insufficiency  GFR improved substantially since labs postop. Discussed with daughter and patient renal insufficiency at baseline with a GFR around 18 recommend early follow-up with nephrology as we've missed 2 appointments in working on the mitral valve. Reviewed again a complete avoidance of nonsteroidal anti-inflammatories. Potassium has normalized she is slightly acidotic.  - COMP METABOLIC PANEL; Future  - REFERRAL TO NEPHROLOGY    3. Episodic cluster headache, not intractable  Reviewed absolute contraindication to nonsteroidals continuing on tramadol refill written no evidence of adverse reaction or abuse  - tramadol (ULTRAM) 50 MG Tab; Take 1 Tab by mouth every 8 hours as needed.  Dispense: 90 Tab; Refill: 0    4. Osteoarthritis, unspecified osteoarthritis type, unspecified site  Requiring management by pain meds as above  - tramadol (ULTRAM) 50 MG Tab; Take 1 Tab by mouth every 8 hours as needed.  Dispense: 90 Tab; Refill: 0    5. Anemia, unspecified type  No CBC done recently her H&H has been stable in the 11 and 35 range. No recent iron reticulocyte count evaluations. We'll go ahead and schedule that recheck stool cards. Differential continues to include GI blood loss with iron deficiency anemia versus anemia of chronic disease.  - CBC WITH DIFFERENTIAL; Future  -  IRON/TOTAL IRON BIND; Future  - RETICULOCYTES COUNT; Future  - FERRITIN; Future    6. Acute blood loss anemia  At hospitalization in November quite positive stools reviewed with daughter and mom. She has not seen GI since discharge in November. Check stool cards. Patient is still very reticent to undergo colonoscopy. Will hold off on referral back to nephrology pending above    7. CKD (chronic kidney disease), stage IV (CMS-HCC)  Need for fairly frequent follow-up with nephrology.    Patient was seen for  30 minutes face to face of which more than 50% of the time was spent in counseling and coordination of care regarding the above problems.

## 2017-05-31 NOTE — PATIENT INSTRUCTIONS
Keep follow-up plan with Dr. Olivarez for your heart    You're scheduled to see Dr. Hollingsworth for your heart here in September    Continue taking her iron  Submit another stool card  Labs and follow-up office visit in about 6-8 weeks    Make an appointment to see Dr. Poole for follow-up on renal.

## 2017-05-31 NOTE — TELEPHONE ENCOUNTER
1. Caller Name: Anahy Thurston-daughter                      Call Back Number: [phone]    2. Message: Pt daughter called and would like to know if you want more labs to be done before her appt in July. Please advise.     3. Patient approves office to leave a detailed voicemail/MyChart message: yes

## 2017-05-31 NOTE — ASSESSMENT & PLAN NOTE
Patient reports she has not been seen by GI since her discharge in November. No clear etiology was found. She has not had a colonoscopy recently and continues to decline. She has not noticed any particular blood in her stool she is continuing iron and does have black stools. She has not had additional iron workup and has not seen nephrology either. Patient states she feels at baseline no change in energy, no chest pain palpitations no edema.

## 2017-06-09 DIAGNOSIS — N18.4 CKD (CHRONIC KIDNEY DISEASE), STAGE IV (HCC): ICD-10-CM

## 2017-06-14 RX ORDER — LOVASTATIN 10 MG/1
TABLET ORAL
Qty: 90 TAB | Refills: 1 | Status: SHIPPED | OUTPATIENT
Start: 2017-06-14 | End: 2018-01-24 | Stop reason: SDUPTHER

## 2017-06-14 NOTE — TELEPHONE ENCOUNTER
Pt has had OV within the 12 month protocol and lipid panel is current. 6 month supply sent to pharmacy.   Lab Results   Component Value Date/Time    CHOLESTEROL, 10/05/2016 07:25 AM    LDL 58 10/05/2016 07:25 AM    HDL 34* 10/05/2016 07:25 AM    TRIGLYCERIDES 74 10/05/2016 07:25 AM       Lab Results   Component Value Date/Time    SODIUM 139 05/25/2017 10:46 AM    POTASSIUM 5.0 05/25/2017 10:46 AM    CHLORIDE 111 05/25/2017 10:46 AM    CO2 17* 05/25/2017 10:46 AM    GLUCOSE 119* 05/25/2017 10:46 AM    BUN 79* 05/25/2017 10:46 AM    CREATININE 2.64* 05/25/2017 10:46 AM     Lab Results   Component Value Date/Time    ALKALINE PHOSPHATASE 109* 11/17/2016 02:59 AM    AST(SGOT) 10* 11/17/2016 02:59 AM    ALT(SGPT) 6 11/17/2016 02:59 AM    TOTAL BILIRUBIN 0.5 11/17/2016 02:59 AM

## 2017-07-06 ENCOUNTER — HOSPITAL ENCOUNTER (OUTPATIENT)
Dept: LAB | Facility: MEDICAL CENTER | Age: 72
End: 2017-07-06
Attending: INTERNAL MEDICINE
Payer: MEDICARE

## 2017-07-06 DIAGNOSIS — N18.4 CKD (CHRONIC KIDNEY DISEASE), STAGE IV (HCC): ICD-10-CM

## 2017-07-06 LAB
25(OH)D3 SERPL-MCNC: 41 NG/ML (ref 30–100)
ANION GAP SERPL CALC-SCNC: 12 MMOL/L (ref 0–11.9)
BUN SERPL-MCNC: 68 MG/DL (ref 8–22)
CALCIUM SERPL-MCNC: 9.2 MG/DL (ref 8.5–10.5)
CHLORIDE SERPL-SCNC: 110 MMOL/L (ref 96–112)
CO2 SERPL-SCNC: 16 MMOL/L (ref 20–33)
CREAT SERPL-MCNC: 3.31 MG/DL (ref 0.5–1.4)
CREAT UR-MCNC: 77.6 MG/DL
ERYTHROCYTE [DISTWIDTH] IN BLOOD BY AUTOMATED COUNT: 51.5 FL (ref 35.9–50)
GFR SERPL CREATININE-BSD FRML MDRD: 14 ML/MIN/1.73 M 2
GLUCOSE SERPL-MCNC: 83 MG/DL (ref 65–99)
HCT VFR BLD AUTO: 34.5 % (ref 37–47)
HGB BLD-MCNC: 10.8 G/DL (ref 12–16)
MCH RBC QN AUTO: 31.7 PG (ref 27–33)
MCHC RBC AUTO-ENTMCNC: 31.3 G/DL (ref 33.6–35)
MCV RBC AUTO: 101.2 FL (ref 81.4–97.8)
MICROALBUMIN UR-MCNC: 12.1 MG/DL
MICROALBUMIN/CREAT UR: 156 MG/G (ref 0–30)
PLATELET # BLD AUTO: 189 K/UL (ref 164–446)
PMV BLD AUTO: 11.1 FL (ref 9–12.9)
POTASSIUM SERPL-SCNC: 5.2 MMOL/L (ref 3.6–5.5)
PTH-INTACT SERPL-MCNC: 388.6 PG/ML (ref 14–72)
RBC # BLD AUTO: 3.41 M/UL (ref 4.2–5.4)
SODIUM SERPL-SCNC: 138 MMOL/L (ref 135–145)
WBC # BLD AUTO: 6.5 K/UL (ref 4.8–10.8)

## 2017-07-06 PROCEDURE — 83970 ASSAY OF PARATHORMONE: CPT

## 2017-07-06 PROCEDURE — 80048 BASIC METABOLIC PNL TOTAL CA: CPT

## 2017-07-06 PROCEDURE — 82043 UR ALBUMIN QUANTITATIVE: CPT

## 2017-07-06 PROCEDURE — 82306 VITAMIN D 25 HYDROXY: CPT

## 2017-07-06 PROCEDURE — 85027 COMPLETE CBC AUTOMATED: CPT

## 2017-07-06 PROCEDURE — 36415 COLL VENOUS BLD VENIPUNCTURE: CPT

## 2017-07-06 PROCEDURE — 82570 ASSAY OF URINE CREATININE: CPT

## 2017-07-07 ENCOUNTER — OFFICE VISIT (OUTPATIENT)
Dept: NEPHROLOGY | Facility: MEDICAL CENTER | Age: 72
End: 2017-07-07
Payer: MEDICARE

## 2017-07-07 VITALS
DIASTOLIC BLOOD PRESSURE: 78 MMHG | HEIGHT: 67 IN | TEMPERATURE: 98.4 F | RESPIRATION RATE: 14 BRPM | HEART RATE: 70 BPM | WEIGHT: 150 LBS | BODY MASS INDEX: 23.54 KG/M2 | SYSTOLIC BLOOD PRESSURE: 140 MMHG

## 2017-07-07 DIAGNOSIS — D63.1 ANEMIA IN CKD (CHRONIC KIDNEY DISEASE): ICD-10-CM

## 2017-07-07 DIAGNOSIS — N18.5 STAGE 5 CHRONIC KIDNEY DISEASE (HCC): ICD-10-CM

## 2017-07-07 DIAGNOSIS — N18.9 ANEMIA IN CKD (CHRONIC KIDNEY DISEASE): ICD-10-CM

## 2017-07-07 DIAGNOSIS — I10 ESSENTIAL HYPERTENSION: ICD-10-CM

## 2017-07-07 PROCEDURE — 99214 OFFICE O/P EST MOD 30 MIN: CPT | Performed by: INTERNAL MEDICINE

## 2017-07-07 RX ORDER — SODIUM BICARBONATE 650 MG/1
650 TABLET ORAL 3 TIMES DAILY
Qty: 90 TAB | Refills: 3 | Status: ON HOLD | OUTPATIENT
Start: 2017-07-07 | End: 2017-12-08

## 2017-07-07 RX ORDER — CALCITRIOL 0.25 UG/1
0.25 CAPSULE, LIQUID FILLED ORAL DAILY
Qty: 90 CAP | Refills: 3 | Status: SHIPPED | OUTPATIENT
Start: 2017-07-07 | End: 2017-12-22 | Stop reason: SDUPTHER

## 2017-07-07 ASSESSMENT — ENCOUNTER SYMPTOMS
SHORTNESS OF BREATH: 0
VOMITING: 0
NAUSEA: 1
WEAKNESS: 1

## 2017-07-07 NOTE — PROGRESS NOTES
"Subjective:      Kristyn Gillespie is a 72 y.o. female who presents with CKD 5  Follow-Up            HPI  72 year old with CKD IV, previously seen in Hospital in November 2016. The patient has a baseline serum creatinine around 2.5, however when seen in the hospital peaked to 3. Previously he had not seen a nephrologist. We manage the patient conservatively in the hospital, however recommended close renal follow-up. She is here for close renal follow-up. Review of chart noted that previously she has had renal cysts. Seems to be doing relatively well otherwise. No uremic symptoms. Taking her medications as prescribed. Blood pressure as documented was elevated, however she does not at this point have any home readings.    1. CKD  5 - Cr up, having sx of uremia, d/w patient, interested in HD in [place], [place], which would be HD. Progressing rapidly  2. HTN - BP has been stable at home, usually 120-130s at home. Taking medications as prescribed  3. Secondary HPTH - PTH above goal, has been taking VIt D      Review of Systems   Constitutional: Positive for malaise/fatigue.   Respiratory: Negative for shortness of breath.    Cardiovascular: Negative for chest pain.   Gastrointestinal: Positive for nausea. Negative for vomiting.   Neurological: Positive for weakness.          Objective:     /78 mmHg  Pulse 70  Temp(Src) 36.9 °C (98.4 °F) (Temporal)  Resp 14  Ht 1.702 m (5' 7\")  Wt 68.04 kg (150 lb)  BMI 23.49 kg/m2     Physical Exam   Constitutional: She is oriented to person, place, and time. She appears well-developed and well-nourished.   HENT:   Head: Normocephalic and atraumatic.   Cardiovascular: Normal rate and regular rhythm.    Pulmonary/Chest: Effort normal and breath sounds normal.   Neurological: She is alert and oriented to person, place, and time.   Skin: Skin is warm and dry.   Psychiatric: She has a normal mood and affect. Her behavior is normal.               Assessment/Plan:     1. Stage 5 " chronic kidney disease (CMS-HCC)  Advanced. Refer to kidney education. Get AVF placement, referred. Suspect will need HD relatively shortly.    - BASIC METABOLIC PANEL; Future  - CBC WITHOUT DIFFERENTIAL; Future  - PTH INTACT (PTH ONLY); Future  - VITAMIN D,25 HYDROXY; Future  - MICROALBUMIN CREAT RATIO URINE; Future  - REFERRAL TO VASCULAR SURGERY    2. Essential hypertension  BP at goal      3. Anemia in CKD (chronic kidney disease)  Not at a level to tx    4. Secondary HPTH   Start rocaltrol 0.25

## 2017-07-07 NOTE — MR AVS SNAPSHOT
"        Kristyn Gillespie   2017 3:00 PM   Office Visit   MRN: [id number]    Department:  [facility]   Dept Phone:  [phone]    Description:  Female : 1945   Provider:  Saulo Garza M.D.           Reason for Visit     Follow-Up           Allergies as of 2017     No Known Allergies      You were diagnosed with     Stage 5 chronic kidney disease (CMS-HCC)   [2979764]       Essential hypertension   [4216436]       Anemia in CKD (chronic kidney disease)   [692120]         Vital Signs     Blood Pressure Pulse Temperature Respirations Height Weight    140/78 mmHg 70 36.9 °C (98.4 °F) (Temporal) 14 1.702 m (5' 7\") 68.04 kg (150 lb)    Body Mass Index Smoking Status                23.49 kg/m2 Current Every Day Smoker          Basic Information     Date Of Birth Sex Race Ethnicity Preferred Language    1945 Female White Non- English      Your appointments     Aug 18, 2017  9:00 AM   Follow Up Visit with Saulo Garza M.D.   [facility] ([address] [place] [address])    [address] [place] [address], [address]  [facility] [id number] [phone]           You will be receiving a confirmation call a few days before your appointment from our automated call confirmation system.            Sep 01, 2017 10:30 AM   Established Patient with Rosa LACEY M.D.   [facility] (--)    [address] [facility] [address] [phone]           You will be receiving a confirmation call a few days before your appointment from our automated call confirmation system.            Sep 21, 2017  2:20 PM   FOLLOW UP with Triston Abdul M.D.   [facility] (--)    [address] [facility] [address] [phone]              Problem List              ICD-10-CM Priority Class Noted - Resolved    Essential hypertension I10 Low  2009 - Present    Peripheral edema R60.9   2009 - Present   " Migraines, neuralgic G44.009   9/18/2009 - Present    Osteoarthritis M19.90   9/18/2009 - Present    PAD (peripheral artery disease)-saw a vascular surgeon several years ago who indicated stents would be needed I73.9   10/21/2009 - Present    Tobacco dependence F17.200 Low  11/12/2015 - Present    HLD (hyperlipidemia) E78.5   11/12/2015 - Present    Vitamin D deficiency E55.9   11/12/2015 - Present    Renal insufficiency N28.9   8/5/2016 - Present    GIB (gastrointestinal bleeding) K92.2 Medium  11/14/2016 - Present    Acute blood loss anemia D62 High  11/14/2016 - Present    Acute on chronic renal failure (CMS-HCC) N17.9, N18.9 Medium  11/14/2016 - Present    Metabolic acidosis E87.2 Medium  11/14/2016 - Present    COPD (chronic obstructive pulmonary disease) (CMS-HCC) J44.9 Low  11/15/2016 - Present    Chronic respiratory failure with hypoxia (CMS-HCC) J96.11 Medium  11/15/2016 - Present    Acute pulmonary edema (CMS-HCC) J81.0 Medium  11/15/2016 - Present    Anemia D64.9   11/16/2016 - Present    Respiratory failure (CMS-HCC) J96.90 High  11/16/2016 - Present    Anemia D64.9 Medium  11/16/2016 - Present    ELSY (acute kidney injury) (CMS-HCC) N17.9 High  11/16/2016 - Present    HLD (hyperlipidemia) E78.5 Low  11/16/2016 - Present    Gastritis K29.70 Low  11/16/2016 - Present    Non-rheumatic mitral regurgitation I34.0   11/30/2016 - Present    Stage 5 chronic kidney disease (CMS-HCC) N18.5   7/7/2017 - Present    Anemia in CKD (chronic kidney disease) N18.9, D63.1   7/7/2017 - Present      Health Maintenance        Date Due Completion Dates    IMM DTaP/Tdap/Td Vaccine (1 - Tdap) 1/23/1963 ---    PAP SMEAR 1/23/1965 ---    IMM ZOSTER VACCINE 1/23/2004 ---    IMM PNEUMOCOCCAL 65+ (ADULT) HIGH/HIGHEST RISK SERIES (2 of 2 - PPSV23) 1/7/2016 11/12/2015    MAMMOGRAM 6/18/2016 6/18/2015 (Declined)    Override on 6/18/2015: Patient Declined    IMM INFLUENZA (1) 9/1/2017 11/17/2016, 11/12/2015    BONE DENSITY 5/11/2020  5/11/2015 (Declined)    Override on 5/11/2015: Patient Declined    COLONOSCOPY 9/1/2024 9/1/2014 (Done)    Override on 9/1/2014: Done (at [facility])            Current Immunizations     13-VALENT PCV PREVNAR 11/12/2015    Influenza Vaccine Adult HD 11/17/2016  5:37 AM, 11/12/2015      Below and/or attached are the medications your provider expects you to take. Review all of your home medications and newly ordered medications with your provider and/or pharmacist. Follow medication instructions as directed by your provider and/or pharmacist. Please keep your medication list with you and share with your provider. Update the information when medications are discontinued, doses are changed, or new medications (including over-the-counter products) are added; and carry medication information at all times in the event of emergency situations     Allergies:  No Known Allergies          Medications  Valid as of: July 07, 2017 -  3:32 PM    Generic Name Brand Name Tablet Size Instructions for use    Albuterol Sulfate (Aero Soln) albuterol 108 (90 BASE) MCG/ACT Inhale 2 Puffs by mouth every 6 hours as needed for Shortness of Breath.        Ascorbic Acid (Tab) VITAMIN C 500 MG Take 1 Tab by mouth 3 times a day.        Calcitriol (Cap) ROCALTROL 0.25 MCG Take 1 Cap by mouth every day.        Calcium Carbonate Antacid (Chew Tab) TUMS 500 MG Take 1 Tab by mouth 2 Times a Day.        Cyanocobalamin (Tab) B-12 100 MCG Take 100 mcg by mouth every day.        Ergocalciferol (Cap) DRISDOL 50094 UNITS TAKE ONE CAPSULE BY MOUTH ONCE A WEEK        Ferrous Sulfate (Tab) ferrous sulfate 325 (65 FE) MG Take 1 Tab by mouth 3 times a day, with meals.        Furosemide (Tab) LASIX 40 MG TAKE ONE TABLET BY MOUTH TWICE DAILY        HydrALAZINE HCl (Tab) APRESOLINE 100 MG Take 1 Tab by mouth every 8 hours.        Ipratropium-Albuterol (Solution) DUONEB 0.5-2.5 (3) MG/3ML 3 mL by Nebulization route every 6 hours as needed for Shortness  of Breath.        Isosorbide Mononitrate (TABLET SR 24 HR) IMDUR 120 MG Take 1 Tab by mouth every day.        Lisinopril (Tab) PRINIVIL 5 MG TAKE ONE TABLET BY MOUTH ONCE DAILY        Lovastatin (Tab) MEVACOR 10 MG TAKE ONE TABLET BY MOUTH ONCE DAILY        Multiple Vitamin (Tab) THERAGRAN  Take 1 Tab by mouth every day.        Sodium Bicarbonate (Tab) SODIUM BICARBONATE 650 MG Take 1 Tab by mouth 3 times a day.        TraMADol HCl (Tab) ULTRAM 50 MG Take 1 Tab by mouth every 8 hours as needed.        .                 Medicines prescribed today were sent to:     [facility] [address] [place] [address] [address] [address] [address]    Phone: [phone] Fax: [phone]    Open 24 Hours?: No      Medication refill instructions:       If your prescription bottle indicates you have medication refills left, it is not necessary to call your provider’s office. Please contact your pharmacy and they will refill your medication.    If your prescription bottle indicates you do not have any refills left, you may request refills at any time through one of the following ways: The online KitLocate system (except Urgent Care), by calling your provider’s office, or by asking your pharmacy to contact your provider’s office with a refill request. Medication refills are processed only during regular business hours and may not be available until the next business day. Your provider may request additional information or to have a follow-up visit with you prior to refilling your medication.   *Please Note: Medication refills are assigned a new Rx number when refilled electronically. Your pharmacy may indicate that no refills were authorized even though a new prescription for the same medication is available at the pharmacy. Please request the medicine by name with the pharmacy before contacting your provider for a refill.        Your To Do List     Future Labs/Procedures Complete By Expires    BASIC METABOLIC  PANEL  As directed 1/4/2018    CBC WITHOUT DIFFERENTIAL  As directed 1/4/2018    MICROALBUMIN CREAT RATIO URINE  As directed 1/6/2018    PTH INTACT (PTH ONLY)  As directed 7/8/2018    VITAMIN D,25 HYDROXY  As directed 1/7/2018      Referral     A referral request has been sent to our patient care coordination department. Please allow 3-5 business days for us to process this request and contact you either by phone or mail. If you do not hear from us by the 5th business day, please call us at [phone].           Bubbleball Access Code: Activation code not generated  Current [employer] Status: Active          Quit Tobacco Information     Do you want to quit using tobacco?    Quitting tobacco decreases risks of cancer, heart and lung disease, increases life expectancy, improves sense of taste and smell, and increases spending money, among other benefits.    If you are thinking about quitting, we can help.  • [facility] Quit Tobacco Program: [phone]  o Program occurs weekly for four weeks and includes pharmacist consultation on products to support quitting smoking or chewing tobacco. A provider referral is needed for pharmacist consultation.  • Tobacco Users Help Hotline: 5-644-QUIT-NOW (558-4055) or https://[place].quitlogix.org/  o Free, confidential telephone and online coaching for [place] residents. Sessions are designed on a schedule that is convenient for you. Eligible clients receive free nicotine replacement therapy.  • Nationally: www.smokefree.gov  o Information and professional assistance to support both immediate and long-term needs as you become, and remain, a non-smoker. Smokefree.gov allows you to choose the help that best fits your needs.

## 2017-07-14 ENCOUNTER — TELEPHONE (OUTPATIENT)
Dept: NEPHROLOGY | Facility: MEDICAL CENTER | Age: 72
End: 2017-07-14

## 2017-07-14 NOTE — TELEPHONE ENCOUNTER
1. Caller Name: Anahy Thurston (daughter)                      Call Back Number: [phone]    2. Message: Pt daughter called and LM to let Dr Garza know that Ms. Gillespie has had increased fatigue and decreased appetite. She is requesting a call back to discuss.    3. Patient approves office to leave a detailed voicemail/MyChart message: no

## 2017-08-18 ENCOUNTER — OFFICE VISIT (OUTPATIENT)
Dept: NEPHROLOGY | Facility: MEDICAL CENTER | Age: 72
End: 2017-08-18
Payer: MEDICARE

## 2017-08-18 VITALS
HEIGHT: 67 IN | SYSTOLIC BLOOD PRESSURE: 130 MMHG | DIASTOLIC BLOOD PRESSURE: 74 MMHG | HEART RATE: 90 BPM | WEIGHT: 148 LBS | BODY MASS INDEX: 23.23 KG/M2 | RESPIRATION RATE: 14 BRPM | TEMPERATURE: 97.4 F

## 2017-08-18 DIAGNOSIS — I10 ESSENTIAL HYPERTENSION: ICD-10-CM

## 2017-08-18 DIAGNOSIS — N18.5 STAGE 5 CHRONIC KIDNEY DISEASE (HCC): ICD-10-CM

## 2017-08-18 DIAGNOSIS — N18.9 ANEMIA IN CKD (CHRONIC KIDNEY DISEASE): ICD-10-CM

## 2017-08-18 DIAGNOSIS — D63.1 ANEMIA IN CKD (CHRONIC KIDNEY DISEASE): ICD-10-CM

## 2017-08-18 PROCEDURE — 99214 OFFICE O/P EST MOD 30 MIN: CPT | Performed by: INTERNAL MEDICINE

## 2017-08-18 ASSESSMENT — ENCOUNTER SYMPTOMS
FEVER: 0
NAUSEA: 1
PALPITATIONS: 0
CHILLS: 0

## 2017-08-18 NOTE — PROGRESS NOTES
"Subjective:      Kristyn Gillespie is a 72 y.o. female who presents with CKD IV Follow-Up            HPI  72 year old with CKD IV, previously seen in Hospital in November 2016. The patient has a baseline serum creatinine around 2.5, however when seen in the hospital peaked to 3. Cr has been creeping up and having symptoms of uremia.    1. CKD  5 - patient's serum creatinine is creeping up. Creatinine is up to 3.31. Noting some nausea in the mornings. Occasional confusion. Overall feeling tired. Has been referred to vascular surgery, though not seen at this point.  2. HTN -blood pressures currently at goal. Taking medications as prescribed.  3. Secondary HPTH -taking Rocaltrol and vitamin D      Review of Systems   Constitutional: Positive for malaise/fatigue. Negative for fever and chills.   Cardiovascular: Negative for chest pain and palpitations.   Gastrointestinal: Positive for nausea.          Objective:     /74 mmHg  Pulse 90  Temp(Src) 36.3 °C (97.4 °F) (Temporal)  Resp 14  Ht 1.702 m (5' 7\")  Wt 67.132 kg (148 lb)  BMI 23.17 kg/m2     Physical Exam   Constitutional: She is oriented to person, place, and time. She appears well-developed and well-nourished.   HENT:   Head: Normocephalic and atraumatic.   Cardiovascular: Normal rate and regular rhythm.    Pulmonary/Chest: Effort normal and breath sounds normal.   Neurological: She is alert and oriented to person, place, and time.   Skin: Skin is warm and dry.   Psychiatric: She has a normal mood and affect. Her behavior is normal.               Assessment/Plan:     1. Anemia in CKD (chronic kidney disease)  Hemoglobin is 10.8, since the hemoglobin is greater than 10 will not start Epogen.    2. Stage 5 chronic kidney disease (CMS-HCC)  Patient's serum creatinine is progressive. She is having symptoms of uremia. Discussed at length with the patient with regards to the various modalities of treatment. The 2 modalities she is considering include " peritoneal dialysis and hemodialysis. Have called the perineal dialysis [facility] in [facility]. Have asked for expedited education. We'll contact vascular surgery to ensure that she has AV fistula placement.    - BASIC METABOLIC PANEL; Future  - CBC WITHOUT DIFFERENTIAL; Future  - PTH INTACT (PTH ONLY); Future  - VITAMIN D,25 HYDROXY; Future  - MICROALBUMIN CREAT RATIO URINE; Future    3. Essential hypertension  Continue to monitor blood pressure       -We'll have AV fistula placement  -Educated on PD versus HD  -Referred education  -I expect that the patient will be on dialysis in the next few months. Given her symptoms. She was educated about the symptoms

## 2017-08-18 NOTE — MR AVS SNAPSHOT
"        Kristyn Gillespie   2017 9:00 AM   Office Visit   MRN: [id number]    Department:  [facility]   Dept Phone:  [phone]    Description:  Female : 1945   Provider:  Saulo Garza M.D.           Reason for Visit     Follow-Up           Allergies as of 2017     No Known Allergies      You were diagnosed with     Anemia in CKD (chronic kidney disease)   [043135]       Stage 5 chronic kidney disease (CMS-[facility])   [3482394]       Essential hypertension   [1401012]         Vital Signs     Blood Pressure Pulse Temperature Respirations Height Weight    130/74 mmHg 90 36.3 °C (97.4 °F) (Temporal) 14 1.702 m (5' 7\") 67.132 kg (148 lb)    Body Mass Index Smoking Status                23.17 kg/m2 Current Every Day Smoker          Basic Information     Date Of Birth Sex Race Ethnicity Preferred Language    1945 Female White Non- English      Your appointments     Sep 01, 2017 10:30 AM   Established Patient with Rosa LACEY M.D.   [facility] (--)    [address] [facility] [address] [phone]           You will be receiving a confirmation call a few days before your appointment from our automated call confirmation system.            Sep 15, 2017  9:00 AM   Follow Up Visit with Saulo Garza M.D.   [facility] ([address])    [address] [place] [address], [address]  [facility] [id number] [phone]           You will be receiving a confirmation call a few days before your appointment from our automated call confirmation system.            Sep 21, 2017  2:20 PM   FOLLOW UP with Triston Abdul M.D.   [facility] (--)    [address] [facility] [address] [phone]              Problem List              ICD-10-CM Priority Class Noted - Resolved    Essential hypertension I10 Low  2009 - Present    Peripheral edema R60.9   2009 - Present   " Migraines, neuralgic G44.009   9/18/2009 - Present    Osteoarthritis M19.90   9/18/2009 - Present    PAD (peripheral artery disease)-saw a vascular surgeon several years ago who indicated stents would be needed I73.9   10/21/2009 - Present    Tobacco dependence F17.200 Low  11/12/2015 - Present    HLD (hyperlipidemia) E78.5   11/12/2015 - Present    Vitamin D deficiency E55.9   11/12/2015 - Present    Renal insufficiency N28.9   8/5/2016 - Present    GIB (gastrointestinal bleeding) K92.2 Medium  11/14/2016 - Present    Acute blood loss anemia D62 High  11/14/2016 - Present    Acute on chronic renal failure (CMS-HCC) N17.9, N18.9 Medium  11/14/2016 - Present    Metabolic acidosis E87.2 Medium  11/14/2016 - Present    COPD (chronic obstructive pulmonary disease) (CMS-HCC) J44.9 Low  11/15/2016 - Present    Chronic respiratory failure with hypoxia (CMS-HCC) J96.11 Medium  11/15/2016 - Present    Acute pulmonary edema (CMS-HCC) J81.0 Medium  11/15/2016 - Present    Anemia D64.9   11/16/2016 - Present    Respiratory failure (CMS-HCC) J96.90 High  11/16/2016 - Present    Anemia D64.9 Medium  11/16/2016 - Present    ELSY (acute kidney injury) (CMS-HCC) N17.9 High  11/16/2016 - Present    HLD (hyperlipidemia) E78.5 Low  11/16/2016 - Present    Gastritis K29.70 Low  11/16/2016 - Present    Non-rheumatic mitral regurgitation I34.0   11/30/2016 - Present    Anemia in CKD (chronic kidney disease) N18.9, D63.1   7/7/2017 - Present    Stage 5 chronic kidney disease (CMS-HCC) N18.5   8/18/2017 - Present      Health Maintenance        Date Due Completion Dates    IMM DTaP/Tdap/Td Vaccine (1 - Tdap) 1/23/1963 ---    PAP SMEAR 1/23/1965 ---    IMM ZOSTER VACCINE 1/23/2004 ---    IMM PNEUMOCOCCAL 65+ (ADULT) HIGH/HIGHEST RISK SERIES (2 of 2 - PPSV23) 1/7/2016 11/12/2015    MAMMOGRAM 6/18/2016 6/18/2015 (Declined)    Override on 6/18/2015: Patient Declined    IMM INFLUENZA (1) 9/1/2017 11/17/2016, 11/12/2015    BONE DENSITY 5/11/2020  5/11/2015 (Declined)    Override on 5/11/2015: Patient Declined    COLONOSCOPY 9/1/2024 9/1/2014 (Done)    Override on 9/1/2014: Done (at [facility])            Current Immunizations     13-VALENT PCV PREVNAR 11/12/2015    Influenza Vaccine Adult HD 11/17/2016  5:37 AM, 11/12/2015      Below and/or attached are the medications your provider expects you to take. Review all of your home medications and newly ordered medications with your provider and/or pharmacist. Follow medication instructions as directed by your provider and/or pharmacist. Please keep your medication list with you and share with your provider. Update the information when medications are discontinued, doses are changed, or new medications (including over-the-counter products) are added; and carry medication information at all times in the event of emergency situations     Allergies:  No Known Allergies          Medications  Valid as of: August 18, 2017 -  9:44 AM    Generic Name Brand Name Tablet Size Instructions for use    Albuterol Sulfate (Aero Soln) albuterol 108 (90 Base) MCG/ACT Inhale 2 Puffs by mouth every 6 hours as needed for Shortness of Breath.        Ascorbic Acid (Tab) VITAMIN C 500 MG Take 1 Tab by mouth 3 times a day.        Calcitriol (Cap) ROCALTROL 0.25 MCG Take 1 Cap by mouth every day.        Calcium Carbonate Antacid (Chew Tab) TUMS 500 MG Take 1 Tab by mouth 2 Times a Day.        Cyanocobalamin (Tab) B-12 100 MCG Take 100 mcg by mouth every day.        Ergocalciferol (Cap) DRISDOL 09611 units TAKE ONE CAPSULE BY MOUTH ONCE A WEEK        Ferrous Sulfate (Tab) ferrous sulfate 325 (65 Fe) MG Take 1 Tab by mouth 3 times a day, with meals.        Furosemide (Tab) LASIX 40 MG TAKE ONE TABLET BY MOUTH TWICE DAILY        HydrALAZINE HCl (Tab) APRESOLINE 100 MG Take 1 Tab by mouth every 8 hours.        Ipratropium-Albuterol (Solution) DUONEB 0.5-2.5 (3) MG/3ML 3 mL by Nebulization route every 6 hours as needed for  Shortness of Breath.        Isosorbide Mononitrate (TABLET SR 24 HR) IMDUR 120 MG Take 1 Tab by mouth every day.        Lisinopril (Tab) PRINIVIL 5 MG TAKE ONE TABLET BY MOUTH ONCE DAILY        Lovastatin (Tab) MEVACOR 10 MG TAKE ONE TABLET BY MOUTH ONCE DAILY        Multiple Vitamin (Tab) THERAGRAN  Take 1 Tab by mouth every day.        Sodium Bicarbonate (Tab) SODIUM BICARBONATE 650 MG Take 1 Tab by mouth 3 times a day.        TraMADol HCl (Tab) ULTRAM 50 MG Take 1 Tab by mouth every 8 hours as needed.        .                 Medicines prescribed today were sent to:     [facility] [address] [place] [address] [address] [address]    Phone: [phone] Fax: [phone]    Open 24 Hours?: No      Medication refill instructions:       If your prescription bottle indicates you have medication refills left, it is not necessary to call your provider’s office. Please contact your pharmacy and they will refill your medication.    If your prescription bottle indicates you do not have any refills left, you may request refills at any time through one of the following ways: The online Feeligo system (except Urgent Care), by calling your provider’s office, or by asking your pharmacy to contact your provider’s office with a refill request. Medication refills are processed only during regular business hours and may not be available until the next business day. Your provider may request additional information or to have a follow-up visit with you prior to refilling your medication.   *Please Note: Medication refills are assigned a new Rx number when refilled electronically. Your pharmacy may indicate that no refills were authorized even though a new prescription for the same medication is available at the pharmacy. Please request the medicine by name with the pharmacy before contacting your provider for a refill.        Your To Do List     Future Labs/Procedures Complete By Expires    BASIC  METABOLIC PANEL  As directed 2/15/2018    CBC WITHOUT DIFFERENTIAL  As directed 2/15/2018    MICROALBUMIN CREAT RATIO URINE  As directed 2/17/2018    PTH INTACT (PTH ONLY)  As directed 8/19/2018    VITAMIN D,25 HYDROXY  As directed 2/18/2018         MyChart Access Code: Activation code not generated  Current MyChart Status: Active          Quit Tobacco Information     Do you want to quit using tobacco?    Quitting tobacco decreases risks of cancer, heart and lung disease, increases life expectancy, improves sense of taste and smell, and increases spending money, among other benefits.    If you are thinking about quitting, we can help.  • [facility] Quit Tobacco Program: [phone]  o Program occurs weekly for four weeks and includes pharmacist consultation on products to support quitting smoking or chewing tobacco. A provider referral is needed for pharmacist consultation.  • Tobacco Users Help Hotline: 5-921-QUITNOW (212-3820) or https://[place].quitlogix.org/  o Free, confidential telephone and online coaching for [place] residents. Sessions are designed on a schedule that is convenient for you. Eligible clients receive free nicotine replacement therapy.  • Nationally: www.smokefree.gov  o Information and professional assistance to support both immediate and long-term needs as you become, and remain, a non-smoker. Smokefree.gov allows you to choose the help that best fits your needs.

## 2017-09-01 ENCOUNTER — OFFICE VISIT (OUTPATIENT)
Dept: MEDICAL GROUP | Facility: PHYSICIAN GROUP | Age: 72
End: 2017-09-01
Payer: MEDICARE

## 2017-09-01 VITALS
SYSTOLIC BLOOD PRESSURE: 148 MMHG | RESPIRATION RATE: 16 BRPM | WEIGHT: 150 LBS | HEIGHT: 62 IN | HEART RATE: 77 BPM | OXYGEN SATURATION: 90 % | BODY MASS INDEX: 27.6 KG/M2 | TEMPERATURE: 98 F | DIASTOLIC BLOOD PRESSURE: 80 MMHG

## 2017-09-01 DIAGNOSIS — N18.5 STAGE 5 CHRONIC KIDNEY DISEASE (HCC): ICD-10-CM

## 2017-09-01 DIAGNOSIS — F17.200 TOBACCO DEPENDENCE: ICD-10-CM

## 2017-09-01 DIAGNOSIS — D63.1 ANEMIA IN CKD (CHRONIC KIDNEY DISEASE): ICD-10-CM

## 2017-09-01 DIAGNOSIS — F17.201 TOBACCO ABUSE, IN REMISSION: ICD-10-CM

## 2017-09-01 DIAGNOSIS — D62 ACUTE BLOOD LOSS ANEMIA: ICD-10-CM

## 2017-09-01 DIAGNOSIS — Z23 NEEDS FLU SHOT: ICD-10-CM

## 2017-09-01 DIAGNOSIS — N18.9 ANEMIA IN CKD (CHRONIC KIDNEY DISEASE): ICD-10-CM

## 2017-09-01 DIAGNOSIS — J42 CHRONIC BRONCHITIS, UNSPECIFIED CHRONIC BRONCHITIS TYPE (HCC): ICD-10-CM

## 2017-09-01 PROCEDURE — 99214 OFFICE O/P EST MOD 30 MIN: CPT | Mod: 25 | Performed by: INTERNAL MEDICINE

## 2017-09-01 PROCEDURE — G0008 ADMIN INFLUENZA VIRUS VAC: HCPCS | Performed by: INTERNAL MEDICINE

## 2017-09-01 PROCEDURE — 90662 IIV NO PRSV INCREASED AG IM: CPT | Performed by: INTERNAL MEDICINE

## 2017-09-01 ASSESSMENT — PAIN SCALES - GENERAL: PAINLEVEL: NO PAIN

## 2017-09-01 NOTE — ASSESSMENT & PLAN NOTE
Patient has substantial smoking history, has not had screening CT.  she currently is not a smoker.

## 2017-09-01 NOTE — PROGRESS NOTES
Chief Complaint   Patient presents with   • Other     Dialysis        HISTORY OF PRESENT ILLNESS: Patient is a 72 y.o. female established patient who presents today to discuss the medical issues below.    Tobacco dependence  Patient has substantial smoking history, has not had screening CT.  she currently is not a smoker.    COPD (chronic obstructive pulmonary disease) (CMS-HCC)  Denying any complaints of cough chest pain shortness of breath. No night sweats.    Acute blood loss anemia  Currently following with Dr. Dang Khalil anticipating dialysis. She does have fatigue.    Anemia in CKD (chronic kidney disease)  Patient is currently following with nephrology. He has recommended dialysis. H&H repeat is remaining stable since hospitalization for acute blood loss.      Patient Active Problem List    Diagnosis Date Noted   • Stage 5 chronic kidney disease (CMS-HCC) 08/18/2017     Priority: High   • Anemia in CKD (chronic kidney disease) 07/07/2017     Priority: High   • Respiratory failure (CMS-HCC) 11/16/2016     Priority: High   • Chronic respiratory failure with hypoxia (CMS-HCC) 11/15/2016     Priority: Medium   • GIB (gastrointestinal bleeding) 11/14/2016     Priority: Medium   • Metabolic acidosis 11/14/2016     Priority: Medium   • HLD (hyperlipidemia) 11/16/2016     Priority: Low   • Gastritis 11/16/2016     Priority: Low   • COPD (chronic obstructive pulmonary disease) (CMS-HCC) 11/15/2016     Priority: Low   • Tobacco dependence 11/12/2015     Priority: Low   • Essential hypertension 09/18/2009     Priority: Low   • Non-rheumatic mitral regurgitation 11/30/2016   • HLD (hyperlipidemia) 11/12/2015   • Vitamin D deficiency 11/12/2015   • PAD (peripheral artery disease)-saw a vascular surgeon several years ago who indicated stents would be needed 10/21/2009   • Peripheral edema 09/18/2009   • Migraines, neuralgic 09/18/2009   • Osteoarthritis 09/18/2009       Allergies:Review of patient's allergies  indicates no known allergies.    Current Outpatient Prescriptions   Medication Sig Dispense Refill   • sodium bicarbonate (SODIUM BICARBONATE) 650 MG Tab Take 1 Tab by mouth 3 times a day. 90 Tab 3   • calcitRIOL (ROCALTROL) 0.25 MCG Cap Take 1 Cap by mouth every day. 90 Cap 3   • lisinopril (PRINIVIL) 5 MG Tab TAKE ONE TABLET BY MOUTH ONCE DAILY 90 Tab 1   • lovastatin (MEVACOR) 10 MG tablet TAKE ONE TABLET BY MOUTH ONCE DAILY 90 Tab 1   • furosemide (LASIX) 40 MG Tab TAKE ONE TABLET BY MOUTH TWICE DAILY 60 Tab 5   • vitamin D, Ergocalciferol, (DRISDOL) 18530 UNITS Cap capsule TAKE ONE CAPSULE BY MOUTH ONCE A WEEK 8 Cap 1   • isosorbide mononitrate (IMDUR) 120 MG CR tablet Take 1 Tab by mouth every day. 90 Tab 3   • tramadol (ULTRAM) 50 MG Tab Take 1 Tab by mouth every 8 hours as needed. 90 Tab 0   • ferrous sulfate 325 (65 FE) MG tablet Take 1 Tab by mouth 3 times a day, with meals. 90 Tab 0   • ascorbic acid (VITAMIN C) 500 MG tablet Take 1 Tab by mouth 3 times a day. 90 Tab 3   • multivitamin (THERAGRAN) Tab Take 1 Tab by mouth every day. 90 Tab 0   • calcium carbonate (TUMS) 500 MG Chew Tab Take 1 Tab by mouth 2 Times a Day. 60 Tab 3   • hydrALAZINE (APRESOLINE) 100 MG tablet Take 1 Tab by mouth every 8 hours. 90 Tab 3   • cyanocobalamin 100 MCG Tab Take 100 mcg by mouth every day. 30 Tab 3   • ipratropium-albuterol (DUONEB) 0.5-2.5 (3) MG/3ML nebulizer solution 3 mL by Nebulization route every 6 hours as needed for Shortness of Breath. 120 Vial 3   • albuterol (PROVENTIL HFA) 108 (90 BASE) MCG/ACT Aero Soln inhalation aerosol Inhale 2 Puffs by mouth every 6 hours as needed for Shortness of Breath. 8.5 g 1     No current facility-administered medications for this visit.          Past Medical History:   Diagnosis Date   • Anemia 11/11/2016   • Renal insufficiency 8/5/2016   • Unspecified essential hypertension 9/18/2009   • Peripheral edema 9/18/2009   • Tobacco use disorder 9/18/2009   • Migraines, neuralgic  "2009   • COPD (chronic obstructive pulmonary disease) (CMS-HCC) 2009   • Epilepsy (CMS-HCC) 2009   • Osteoarthritis 2009       Social History   Substance Use Topics   • Smoking status: Former Smoker     Packs/day: 1.00     Years: 40.00     Types: Cigarettes     Quit date: 2016   • Smokeless tobacco: Never Used   • Alcohol use No       Family Status   Relation Status   • Mother    • Father    • Sister Alive    lumpectomy     Family History   Problem Relation Age of Onset   • Cancer Mother      breast   • Heart Disease Father        ROS:    Respiratory: Negative for cough, sputum production, shortness of breath or wheezing.    Cardiovascular: Negative for chest pain, palpitations, or edema.   Gastrointestinal: Negative for GI upset, nausea, vomiting, abdominal pain, constipation or diarrhea.   Genitourinary: Negative for dysuria, urgency, hesitancy or frequency.       Exam:    Blood pressure 148/80, pulse 77, temperature 36.7 °C (98 °F), resp. rate 16, height 1.562 m (5' 1.5\"), weight 68 kg (150 lb), SpO2 90 %.  General:  Well nourished, well developed female in NAD.  Pulmonary: Clear to ausculation and percussion.  Normal effort. No rales, rhonchi, or wheezing.  Cardiovascular: Regular rate and rhythm without murmur.   Abdomen: Normal bowel sounds soft and nontender no palpable liver spleen bladder mass.  Extremities: No LE edema noted.  Neuro: Grossly nonfocal.  Psych: Alert and oriented to person, place, and time. Appropriate mood and conversation.    LABS: Results reviewed and discussed with the patient, questions answered.      This dictation was created using voice recognition software. I have made reasonable attempts to correct errors, however, errors of grammar and content may exist.          Assessment/Plan:    1. Tobacco abuse, in remission  Patient is currently not smoking however does have substantial pack history. Discussed lung cancer screening program referral " made patient agrees.  - REFERRAL TO LUNG CANCER SCREENING PROGRAM    2. Stage 5 chronic kidney disease (CMS-HCC)  Patient is established with nephrology. She has been recommended to start dialysis. She is continuing to consider peritoneal dialysis versus hemodialysis. Patient is being scheduled for fistula placement. Long discussion held regarding pros and cons etc. support monitor follow with nephrology.    3. Needs flu shot  - INFLUENZA VACCINE, HIGH DOSE (65+ ONLY)    4. Chronic bronchitis, unspecified chronic bronchitis type (CMS-[facility])  Good air motion chronic currently not smoking clinically with good air motion at baseline.    5. Acute blood loss anemia  No further evidence of bleeding    6. Anemia in CKD (chronic kidney disease)  Anemia now consistent with chronic disease following with nephrology.       Patient was seen for  25 minutes face to face of which more than 50% of the time was spent in counseling and coordination of care regarding the above problems.

## 2017-09-01 NOTE — ASSESSMENT & PLAN NOTE
Patient is currently following with nephrology. He has recommended dialysis. H&H repeat is remaining stable since hospitalization for acute blood loss.

## 2017-09-05 ENCOUNTER — TELEPHONE (OUTPATIENT)
Dept: HEMATOLOGY ONCOLOGY | Facility: MEDICAL CENTER | Age: 72
End: 2017-09-05

## 2017-09-05 NOTE — TELEPHONE ENCOUNTER
Received referral to lung cancer screening program.  Chart review to assess for lung cancer screening program eligibility.   1. Age 55-77 yrs of age? Yes 72 y.o.  2. 30 pack year hx of smoking, or greater? Yes 1  ppdx40 yrs=  40 pkyr hx  3. Current smoker or if quit, has pt quit within last 15 yrs?Yes  Quit 11/2016   4. Any signs or symptoms of lung cancer? None noted- COPD  5. Previous history of lung cancer? None noted  6. Chest CT within past 12 mos.? None noted  Patient does meet eligibility criteria. [facility] scheduling notified to schedule the shared decision making visit.

## 2017-09-11 ENCOUNTER — HOSPITAL ENCOUNTER (OUTPATIENT)
Dept: LAB | Facility: MEDICAL CENTER | Age: 72
End: 2017-09-11
Attending: INTERNAL MEDICINE
Payer: MEDICARE

## 2017-09-11 ENCOUNTER — TELEPHONE (OUTPATIENT)
Dept: HEMATOLOGY ONCOLOGY | Facility: MEDICAL CENTER | Age: 72
End: 2017-09-11

## 2017-09-11 DIAGNOSIS — N18.5 STAGE 5 CHRONIC KIDNEY DISEASE (HCC): ICD-10-CM

## 2017-09-11 LAB
25(OH)D3 SERPL-MCNC: 39 NG/ML (ref 30–100)
ANION GAP SERPL CALC-SCNC: 10 MMOL/L (ref 0–11.9)
BUN SERPL-MCNC: 52 MG/DL (ref 8–22)
CALCIUM SERPL-MCNC: 9.9 MG/DL (ref 8.5–10.5)
CHLORIDE SERPL-SCNC: 107 MMOL/L (ref 96–112)
CO2 SERPL-SCNC: 20 MMOL/L (ref 20–33)
CREAT SERPL-MCNC: 2.35 MG/DL (ref 0.5–1.4)
CREAT UR-MCNC: 71.8 MG/DL
ERYTHROCYTE [DISTWIDTH] IN BLOOD BY AUTOMATED COUNT: 43.9 FL (ref 35.9–50)
GFR SERPL CREATININE-BSD FRML MDRD: 20 ML/MIN/1.73 M 2
GLUCOSE SERPL-MCNC: 115 MG/DL (ref 65–99)
HCT VFR BLD AUTO: 39.4 % (ref 37–47)
HGB BLD-MCNC: 12.9 G/DL (ref 12–16)
MCH RBC QN AUTO: 30.8 PG (ref 27–33)
MCHC RBC AUTO-ENTMCNC: 32.7 G/DL (ref 33.6–35)
MCV RBC AUTO: 94 FL (ref 81.4–97.8)
MICROALBUMIN UR-MCNC: 43.1 MG/DL
MICROALBUMIN/CREAT UR: 600 MG/G (ref 0–30)
PLATELET # BLD AUTO: 169 K/UL (ref 164–446)
PMV BLD AUTO: 12.1 FL (ref 9–12.9)
POTASSIUM SERPL-SCNC: 4.3 MMOL/L (ref 3.6–5.5)
PTH-INTACT SERPL-MCNC: 224.8 PG/ML (ref 14–72)
RBC # BLD AUTO: 4.19 M/UL (ref 4.2–5.4)
SODIUM SERPL-SCNC: 137 MMOL/L (ref 135–145)
WBC # BLD AUTO: 6.1 K/UL (ref 4.8–10.8)

## 2017-09-11 PROCEDURE — 36415 COLL VENOUS BLD VENIPUNCTURE: CPT

## 2017-09-11 PROCEDURE — 82043 UR ALBUMIN QUANTITATIVE: CPT

## 2017-09-11 PROCEDURE — 85027 COMPLETE CBC AUTOMATED: CPT

## 2017-09-11 PROCEDURE — 80048 BASIC METABOLIC PNL TOTAL CA: CPT

## 2017-09-11 PROCEDURE — 83970 ASSAY OF PARATHORMONE: CPT

## 2017-09-11 PROCEDURE — 82570 ASSAY OF URINE CREATININE: CPT

## 2017-09-11 PROCEDURE — 82306 VITAMIN D 25 HYDROXY: CPT

## 2017-09-15 ENCOUNTER — OFFICE VISIT (OUTPATIENT)
Dept: NEPHROLOGY | Facility: MEDICAL CENTER | Age: 72
End: 2017-09-15
Payer: MEDICARE

## 2017-09-15 VITALS
DIASTOLIC BLOOD PRESSURE: 80 MMHG | HEART RATE: 76 BPM | SYSTOLIC BLOOD PRESSURE: 160 MMHG | BODY MASS INDEX: 28.07 KG/M2 | WEIGHT: 151 LBS | RESPIRATION RATE: 14 BRPM | OXYGEN SATURATION: 84 % | TEMPERATURE: 97.7 F

## 2017-09-15 DIAGNOSIS — N18.5 STAGE 5 CHRONIC KIDNEY DISEASE (HCC): ICD-10-CM

## 2017-09-15 DIAGNOSIS — I10 ESSENTIAL HYPERTENSION: ICD-10-CM

## 2017-09-15 DIAGNOSIS — N25.81 SECONDARY HYPERPARATHYROIDISM (HCC): ICD-10-CM

## 2017-09-15 DIAGNOSIS — R60.9 PERIPHERAL EDEMA: ICD-10-CM

## 2017-09-15 PROCEDURE — 99214 OFFICE O/P EST MOD 30 MIN: CPT | Performed by: INTERNAL MEDICINE

## 2017-09-15 ASSESSMENT — ENCOUNTER SYMPTOMS
FEVER: 0
CHILLS: 0
PALPITATIONS: 0

## 2017-09-15 NOTE — PROGRESS NOTES
Subjective:      Kristyn Gillespie is a 72 y.o. female who presents with Follow-Up            HPI  72 year old with CKD IV, previously seen in Hospital in November 2016. The patient has a baseline serum creatinine around 2.5, however when seen in the hospital peaked to 3.     1. CKD  5 - Cr down a bit, still slightly uremic with symptoms. Occasional nausea.   2. HTN -blood pressures a bit elevated, no HA, taking medications for BP, usually controlled  3. Secondary HPTH -taking Rocaltrol and vitamin D     Review of Systems   Constitutional: Negative for chills and fever.   Cardiovascular: Negative for chest pain and palpitations.   Genitourinary: Negative for dysuria and urgency.          Objective:     /80   Pulse 76   Temp 36.5 °C (97.7 °F)   Resp 14   Wt 68.5 kg (151 lb)   SpO2 (!) 84%   BMI 28.07 kg/m²      Physical Exam   Constitutional: She is oriented to person, place, and time. She appears well-developed and well-nourished.   Cardiovascular: Normal rate and regular rhythm.    Pulmonary/Chest: Effort normal and breath sounds normal.   Neurological: She is alert and oriented to person, place, and time.   Skin: Skin is warm and dry.   Psychiatric: She has a normal mood and affect. Her behavior is normal.               Assessment/Plan:     1. Stage 5 chronic kidney disease (CMS-HCC)  Await vascular surgery eval, refer to education    - BASIC METABOLIC PANEL; Future  - CBC WITHOUT DIFFERENTIAL; Future  - PTH INTACT (PTH ONLY); Future  - VITAMIN D,25 HYDROXY; Future  - MICROALBUMIN CREAT RATIO URINE; Future    2. Peripheral edema  Stable    3. Essential hypertension  Home BP better, measure at home and monitor    4. Secondary hyperparathyroidism (CMS-HCC)  Continue rocaltrol 0.25mcg

## 2017-09-21 NOTE — TELEPHONE ENCOUNTER
2nd attempt to schedule a shared decision making appointment with Citlaly Holder for the lung cancer screening. Was able to leave message on VM for pt to call to schedule  an appoinmtnet  Ref: Rosa Miguel Dx: Hx of Tobacco dependence

## 2017-10-05 ENCOUNTER — OFFICE VISIT (OUTPATIENT)
Dept: CARDIOLOGY | Facility: PHYSICIAN GROUP | Age: 72
End: 2017-10-05
Payer: MEDICARE

## 2017-10-05 VITALS
OXYGEN SATURATION: 95 % | HEIGHT: 62 IN | SYSTOLIC BLOOD PRESSURE: 150 MMHG | WEIGHT: 149 LBS | BODY MASS INDEX: 27.42 KG/M2 | DIASTOLIC BLOOD PRESSURE: 96 MMHG | HEART RATE: 74 BPM

## 2017-10-05 DIAGNOSIS — I10 ESSENTIAL HYPERTENSION: ICD-10-CM

## 2017-10-05 DIAGNOSIS — I34.0 MITRAL VALVE INSUFFICIENCY, UNSPECIFIED ETIOLOGY: ICD-10-CM

## 2017-10-05 DIAGNOSIS — J42 CHRONIC BRONCHITIS, UNSPECIFIED CHRONIC BRONCHITIS TYPE (HCC): ICD-10-CM

## 2017-10-05 DIAGNOSIS — F17.201 TOBACCO ABUSE, IN REMISSION: ICD-10-CM

## 2017-10-05 DIAGNOSIS — N18.5 STAGE 5 CHRONIC KIDNEY DISEASE (HCC): ICD-10-CM

## 2017-10-05 PROCEDURE — 99214 OFFICE O/P EST MOD 30 MIN: CPT | Performed by: INTERNAL MEDICINE

## 2017-10-05 NOTE — PROGRESS NOTES
Subjective:   Kristyn Gillespie is a 72 y.o. female who presents today follow-up after diagnosis of severe mitral regurgitation. She was admitted for severe symptomatic anemia and noted to have incidental severe mitral regurgitation. Transesophageal echocardiogram showed a flail leaflet and severe MR. She is relatively asymptomatic although very inactive due to her other chronic medical conditions. Her ejection fraction was preserved. She feels well, much better than during her hospital stay. She is ambulatory.    Returns for FU after MitraClip. Successful procedure with mild to moderate residual MR. Feeling better.    Denies any other cardiovascular symptoms including chest pain, shortness of breath, dyspnea on exertion, lightheadedness, syncope or presyncope, lower extremity edema, PND, orthopnea or palpitations.      Past Medical History:   Diagnosis Date   • Anemia 11/11/2016   • Renal insufficiency 8/5/2016   • Unspecified essential hypertension 9/18/2009   • Peripheral edema 9/18/2009   • Tobacco use disorder 9/18/2009   • Migraines, neuralgic 9/18/2009   • COPD (chronic obstructive pulmonary disease) (CMS-HCC) 9/18/2009   • Epilepsy (CMS-HCC) 9/18/2009   • Osteoarthritis 9/18/2009     Past Surgical History:   Procedure Laterality Date   • GASTROSCOPY WITH BIOPSY  11/15/2016    Procedure: GASTROSCOPY WITH BIOPSY;  Surgeon: Guzman Olson M.D.;  Location: [facility];  Service:    • ABDOMINAL HYSTERECTOMY TOTAL      endometriosis     Family History   Problem Relation Age of Onset   • Cancer Mother      breast   • Heart Disease Father      History   Smoking Status   • Former Smoker   • Packs/day: 1.00   • Years: 40.00   • Types: Cigarettes   • Quit date: 11/24/2016   Smokeless Tobacco   • Never Used     No Known Allergies  Outpatient Encounter Prescriptions as of 10/5/2017   Medication Sig Dispense Refill   • sodium bicarbonate (SODIUM BICARBONATE) 650 MG Tab Take 1 Tab by mouth 3 times a day.  "90 Tab 3   • calcitRIOL (ROCALTROL) 0.25 MCG Cap Take 1 Cap by mouth every day. 90 Cap 3   • lovastatin (MEVACOR) 10 MG tablet TAKE ONE TABLET BY MOUTH ONCE DAILY 90 Tab 1   • furosemide (LASIX) 40 MG Tab TAKE ONE TABLET BY MOUTH TWICE DAILY 60 Tab 5   • vitamin D, Ergocalciferol, (DRISDOL) 99668 UNITS Cap capsule TAKE ONE CAPSULE BY MOUTH ONCE A WEEK 8 Cap 1   • isosorbide mononitrate (IMDUR) 120 MG CR tablet Take 1 Tab by mouth every day. 90 Tab 3   • tramadol (ULTRAM) 50 MG Tab Take 1 Tab by mouth every 8 hours as needed. 90 Tab 0   • ferrous sulfate 325 (65 FE) MG tablet Take 1 Tab by mouth 3 times a day, with meals. 90 Tab 0   • ascorbic acid (VITAMIN C) 500 MG tablet Take 1 Tab by mouth 3 times a day. 90 Tab 3   • multivitamin (THERAGRAN) Tab Take 1 Tab by mouth every day. 90 Tab 0   • calcium carbonate (TUMS) 500 MG Chew Tab Take 1 Tab by mouth 2 Times a Day. 60 Tab 3   • hydrALAZINE (APRESOLINE) 100 MG tablet Take 1 Tab by mouth every 8 hours. 90 Tab 3   • cyanocobalamin 100 MCG Tab Take 100 mcg by mouth every day. 30 Tab 3   • ipratropium-albuterol (DUONEB) 0.5-2.5 (3) MG/3ML nebulizer solution 3 mL by Nebulization route every 6 hours as needed for Shortness of Breath. 120 Vial 3   • albuterol (PROVENTIL HFA) 108 (90 BASE) MCG/ACT Aero Soln inhalation aerosol Inhale 2 Puffs by mouth every 6 hours as needed for Shortness of Breath. 8.5 g 1   • lisinopril (PRINIVIL) 5 MG Tab TAKE ONE TABLET BY MOUTH ONCE DAILY 90 Tab 1     No facility-administered encounter medications on file as of 10/5/2017.      Review of Systems   All other systems reviewed and are negative.       Objective:   /96   Pulse 74   Ht 1.575 m (5' 2\")   Wt 67.6 kg (149 lb)   SpO2 95%   BMI 27.25 kg/m²     Physical Exam   Constitutional: She is oriented to person, place, and time. She appears well-developed and well-nourished. No distress.   Elderly, nasal cannula oxygen   HENT:   Head: Normocephalic and atraumatic.   Mouth/Throat: " Oropharynx is clear and moist. No oropharyngeal exudate.   Eyes: Conjunctivae are normal. Pupils are equal, round, and reactive to light. No scleral icterus.   Neck: Normal range of motion. Neck supple. No JVD present. No thyromegaly present.   Cardiovascular: Normal rate, regular rhythm and intact distal pulses.  Exam reveals no gallop and no friction rub.    Murmur (harsh 4/6 systolic murmur at the apex) heard.  Pulses:       Carotid pulses are 2+ on the right side, and 2+ on the left side.       Radial pulses are 2+ on the right side, and 2+ on the left side.        Popliteal pulses are 2+ on the right side, and 2+ on the left side.        Dorsalis pedis pulses are 2+ on the right side, and 2+ on the left side.        Posterior tibial pulses are 2+ on the right side, and 2+ on the left side.   Pulmonary/Chest: Effort normal and breath sounds normal. She has no wheezes. She has no rales.   Abdominal: Soft. Bowel sounds are normal. She exhibits no distension. There is no tenderness.   Musculoskeletal: She exhibits no edema or tenderness.   Neurological: She is alert and oriented to person, place, and time. No cranial nerve deficit.   Skin: Skin is warm and dry. No rash noted. She is not diaphoretic. No erythema.   Psychiatric: She has a normal mood and affect. Her behavior is normal.   Vitals reviewed.    Transesophageal echo CONCLUSIONS (11/18/2016):  Severe eccentric mitral regurgitation (Coanda jet) related to anterior   leaflet prolapse at the A2/P1 junction with as evidenced by systolic   flow reversal in the right-sided pulmonary veins.    Normal left ventricular size, wall thickness, and systolic function.   Small PFO seen without significant flow.      Assessment:     1. Mitral valve insufficiency s/p MitraClip 6/2017 Reuben     2. Essential hypertension     3. Tobacco abuse, in remission     4. Chronic bronchitis, unspecified chronic bronchitis type (CMS-HCC)     5. Stage 5 chronic kidney disease  (CMS-HCC)         Medical Decision Making:  Today's Assessment / Status / Plan:     Feeling well. Doing well post MitraClip. Routine FU 6M. Continue medical Rx.      FU6M

## 2017-10-12 ENCOUNTER — APPOINTMENT (OUTPATIENT)
Dept: HEMATOLOGY ONCOLOGY | Facility: MEDICAL CENTER | Age: 72
End: 2017-10-12
Payer: MEDICARE

## 2017-10-16 ENCOUNTER — TELEPHONE (OUTPATIENT)
Dept: NEPHROLOGY | Facility: MEDICAL CENTER | Age: 72
End: 2017-10-16

## 2017-10-16 NOTE — TELEPHONE ENCOUNTER
Pt called she was at the hospital she home now but she has a question about new medications they want her to take   Her number is [phone] thank you

## 2017-10-17 ENCOUNTER — OFFICE VISIT (OUTPATIENT)
Dept: HEMATOLOGY ONCOLOGY | Facility: MEDICAL CENTER | Age: 72
End: 2017-10-17
Payer: MEDICARE

## 2017-10-17 VITALS
RESPIRATION RATE: 14 BRPM | WEIGHT: 156.64 LBS | HEIGHT: 62 IN | BODY MASS INDEX: 28.82 KG/M2 | HEART RATE: 73 BPM | DIASTOLIC BLOOD PRESSURE: 64 MMHG | SYSTOLIC BLOOD PRESSURE: 122 MMHG | TEMPERATURE: 98.4 F | OXYGEN SATURATION: 90 %

## 2017-10-17 DIAGNOSIS — Z87.891 PERSONAL HISTORY OF TOBACCO USE, PRESENTING HAZARDS TO HEALTH: ICD-10-CM

## 2017-10-17 PROCEDURE — G0296 VISIT TO DETERM LDCT ELIG: HCPCS | Performed by: NURSE PRACTITIONER

## 2017-10-17 ASSESSMENT — PAIN SCALES - GENERAL: PAINLEVEL: 6=MODERATE PAIN

## 2017-10-17 ASSESSMENT — ENCOUNTER SYMPTOMS
COUGH: 1
WEIGHT LOSS: 0
HEMOPTYSIS: 0
SHORTNESS OF BREATH: 0
WHEEZING: 1
SPUTUM PRODUCTION: 0

## 2017-10-17 NOTE — PROGRESS NOTES
Subjective:      Kristyn Gillespie is a 72 y.o. female who presents for Lung Cancer Screening Program Prescreen (Ref: Vazquez Miguel Dx: Tobacco dependence), for lung cancer screening shared decision making visit.         HPI   Patient seen today for initial lung cancer screening visit. Patient referred by PCP, Dr. Rosa Miguel.     The patient meets eligibility criteria including age, smoking history (30+ pack years), if former smoker, quit in the last 15 years, and absence of signs or symptoms of lung cancer.    - Age - 72  - Smoking history - Patient has smoked for 40 years at an average of 1 ppd = 40 pack year smoking history.  - Current smoking status - Former smoker, quit 11/2016  - No symptoms of lung cancer and no previous history of lung cancer     She was hospitalized last week, at [facility], for exacerbation of CHF and COPD. Reports only chest x-rays, no chest CT completed.      No Known Allergies      Current Outpatient Prescriptions:   •  sodium bicarbonate (SODIUM BICARBONATE) 650 MG Tab, Take 1 Tab by mouth 3 times a day., Disp: 90 Tab, Rfl: 3  •  calcitRIOL (ROCALTROL) 0.25 MCG Cap, Take 1 Cap by mouth every day., Disp: 90 Cap, Rfl: 3  •  lovastatin (MEVACOR) 10 MG tablet, TAKE ONE TABLET BY MOUTH ONCE DAILY, Disp: 90 Tab, Rfl: 1  •  furosemide (LASIX) 40 MG Tab, TAKE ONE TABLET BY MOUTH TWICE DAILY, Disp: 60 Tab, Rfl: 5  •  vitamin D, Ergocalciferol, (DRISDOL) 34302 UNITS Cap capsule, TAKE ONE CAPSULE BY MOUTH ONCE A WEEK, Disp: 8 Cap, Rfl: 1  •  isosorbide mononitrate (IMDUR) 120 MG CR tablet, Take 1 Tab by mouth every day., Disp: 90 Tab, Rfl: 3  •  tramadol (ULTRAM) 50 MG Tab, Take 1 Tab by mouth every 8 hours as needed., Disp: 90 Tab, Rfl: 0  •  ferrous sulfate 325 (65 FE) MG tablet, Take 1 Tab by mouth 3 times a day, with meals., Disp: 90 Tab, Rfl: 0  •  ascorbic acid (VITAMIN C) 500 MG tablet, Take 1 Tab by mouth 3 times a day., Disp: 90 Tab, Rfl: 3  •  multivitamin (THERAGRAN) Tab, Take  "1 Tab by mouth every day., Disp: 90 Tab, Rfl: 0  •  calcium carbonate (TUMS) 500 MG Chew Tab, Take 1 Tab by mouth 2 Times a Day., Disp: 60 Tab, Rfl: 3  •  hydrALAZINE (APRESOLINE) 100 MG tablet, Take 1 Tab by mouth every 8 hours., Disp: 90 Tab, Rfl: 3  •  cyanocobalamin 100 MCG Tab, Take 100 mcg by mouth every day., Disp: 30 Tab, Rfl: 3  •  ipratropium-albuterol (DUONEB) 0.5-2.5 (3) MG/3ML nebulizer solution, 3 mL by Nebulization route every 6 hours as needed for Shortness of Breath., Disp: 120 Vial, Rfl: 3  •  albuterol (PROVENTIL HFA) 108 (90 BASE) MCG/ACT Aero Soln inhalation aerosol, Inhale 2 Puffs by mouth every 6 hours as needed for Shortness of Breath., Disp: 8.5 g, Rfl: 1  •  lisinopril (PRINIVIL) 5 MG Tab, TAKE ONE TABLET BY MOUTH ONCE DAILY, Disp: 90 Tab, Rfl: 1      Review of Systems   Constitutional: Positive for malaise/fatigue (Baeline fatigue). Negative for weight loss.   Respiratory: Positive for cough (Dry cough - intermittent) and wheezing (ATC & PRN inhalers). Negative for hemoptysis, sputum production and shortness of breath.         2L Continuous O2          Objective:     /64   Pulse 73   Temp 36.9 °C (98.4 °F)   Resp 14   Ht 1.575 m (5' 2\")   Wt 71.1 kg (156 lb 10.2 oz)   SpO2 90%   BMI 28.65 kg/m²      Physical Exam   Constitutional: She is oriented to person, place, and time. She appears well-developed and well-nourished. No distress.   Cardiovascular: Normal rate, regular rhythm and normal heart sounds.  Exam reveals no gallop and no friction rub.    No murmur heard.  Pulmonary/Chest: Effort normal. No respiratory distress. She has no wheezes.   Diminished throughout; Continuous O2 at 2 lpm   Musculoskeletal: Normal range of motion.   Neurological: She is alert and oriented to person, place, and time.   Skin: Skin is warm and dry. She is not diaphoretic.   Vitals reviewed.              Assessment/Plan:     1. Personal history of tobacco use, presenting hazards to health  " CT-LUNG CANCER-SCREENING       We conducted a shared decision-making process using a decision aid. We reviewed benefits and harms of screening, including false positives and potential need for additional diagnostic testing, the possibility of over diagnosis, and total radiation exposure.    We discussed the importance of adhering to annual LDCT screening. We also discussed the impact of comorbities on the patient's the ability or willingness to undergo diagnostic procedure(s) and treatment.    Counseling on the importance of maintaining cigarette smoking abstinence if former smoker; or the importance of smoking cessation if current smoker and, if appropriate, furnishing of information about tobacco cessation interventions.    Based on our discussion, we have decided to begin annual lung cancer screening starting now.

## 2017-10-30 ENCOUNTER — HOSPITAL ENCOUNTER (OUTPATIENT)
Dept: RADIOLOGY | Facility: MEDICAL CENTER | Age: 72
End: 2017-10-30
Attending: SURGERY
Payer: MEDICARE

## 2017-10-30 DIAGNOSIS — N18.5 CHRONIC KIDNEY DISEASE, STAGE V (HCC): ICD-10-CM

## 2017-10-30 PROCEDURE — 93930 UPPER EXTREMITY STUDY: CPT | Mod: 26 | Performed by: SURGERY

## 2017-10-30 PROCEDURE — 93930 UPPER EXTREMITY STUDY: CPT

## 2017-10-30 PROCEDURE — 93970 EXTREMITY STUDY: CPT | Mod: 26 | Performed by: SURGERY

## 2017-10-30 PROCEDURE — 93970 EXTREMITY STUDY: CPT

## 2017-11-10 ENCOUNTER — HOSPITAL ENCOUNTER (OUTPATIENT)
Dept: LAB | Facility: MEDICAL CENTER | Age: 72
End: 2017-11-10
Attending: INTERNAL MEDICINE
Payer: MEDICARE

## 2017-11-10 ENCOUNTER — HOSPITAL ENCOUNTER (OUTPATIENT)
Dept: RADIOLOGY | Facility: MEDICAL CENTER | Age: 72
End: 2017-11-10
Attending: INTERNAL MEDICINE
Payer: MEDICARE

## 2017-11-10 ENCOUNTER — OFFICE VISIT (OUTPATIENT)
Dept: NEPHROLOGY | Facility: MEDICAL CENTER | Age: 72
End: 2017-11-10
Payer: MEDICARE

## 2017-11-10 VITALS
WEIGHT: 152 LBS | OXYGEN SATURATION: 90 % | HEIGHT: 62 IN | SYSTOLIC BLOOD PRESSURE: 118 MMHG | HEART RATE: 64 BPM | TEMPERATURE: 98 F | DIASTOLIC BLOOD PRESSURE: 60 MMHG | BODY MASS INDEX: 27.97 KG/M2

## 2017-11-10 DIAGNOSIS — R06.02 SOB (SHORTNESS OF BREATH): ICD-10-CM

## 2017-11-10 DIAGNOSIS — N18.5 STAGE 5 CHRONIC KIDNEY DISEASE (HCC): ICD-10-CM

## 2017-11-10 DIAGNOSIS — N25.81 SECONDARY HYPERPARATHYROIDISM (HCC): ICD-10-CM

## 2017-11-10 DIAGNOSIS — D63.1 ANEMIA IN STAGE 5 CHRONIC KIDNEY DISEASE, NOT ON CHRONIC DIALYSIS (HCC): ICD-10-CM

## 2017-11-10 DIAGNOSIS — N18.5 ANEMIA IN STAGE 5 CHRONIC KIDNEY DISEASE, NOT ON CHRONIC DIALYSIS (HCC): ICD-10-CM

## 2017-11-10 LAB
25(OH)D3 SERPL-MCNC: 25 NG/ML (ref 30–100)
ANION GAP SERPL CALC-SCNC: 17 MMOL/L (ref 0–11.9)
BUN SERPL-MCNC: 78 MG/DL (ref 8–22)
CALCIUM SERPL-MCNC: 9.8 MG/DL (ref 8.5–10.5)
CHLORIDE SERPL-SCNC: 97 MMOL/L (ref 96–112)
CO2 SERPL-SCNC: 25 MMOL/L (ref 20–33)
CREAT SERPL-MCNC: 3.37 MG/DL (ref 0.5–1.4)
ERYTHROCYTE [DISTWIDTH] IN BLOOD BY AUTOMATED COUNT: 47.4 FL (ref 35.9–50)
FERRITIN SERPL-MCNC: 131 NG/ML (ref 10–291)
GFR SERPL CREATININE-BSD FRML MDRD: 13 ML/MIN/1.73 M 2
GLUCOSE SERPL-MCNC: 122 MG/DL (ref 65–99)
HCT VFR BLD AUTO: 38.4 % (ref 37–47)
HGB BLD-MCNC: 12.5 G/DL (ref 12–16)
MCH RBC QN AUTO: 30.4 PG (ref 27–33)
MCHC RBC AUTO-ENTMCNC: 32.6 G/DL (ref 33.6–35)
MCV RBC AUTO: 93.4 FL (ref 81.4–97.8)
PLATELET # BLD AUTO: 206 K/UL (ref 164–446)
PMV BLD AUTO: 12.1 FL (ref 9–12.9)
POTASSIUM SERPL-SCNC: 3.8 MMOL/L (ref 3.6–5.5)
PTH-INTACT SERPL-MCNC: 555.4 PG/ML (ref 14–72)
RBC # BLD AUTO: 4.11 M/UL (ref 4.2–5.4)
SODIUM SERPL-SCNC: 139 MMOL/L (ref 135–145)
WBC # BLD AUTO: 6.2 K/UL (ref 4.8–10.8)

## 2017-11-10 PROCEDURE — 36415 COLL VENOUS BLD VENIPUNCTURE: CPT

## 2017-11-10 PROCEDURE — 99214 OFFICE O/P EST MOD 30 MIN: CPT | Performed by: INTERNAL MEDICINE

## 2017-11-10 PROCEDURE — 85027 COMPLETE CBC AUTOMATED: CPT

## 2017-11-10 PROCEDURE — 82728 ASSAY OF FERRITIN: CPT

## 2017-11-10 PROCEDURE — 80048 BASIC METABOLIC PNL TOTAL CA: CPT

## 2017-11-10 PROCEDURE — 82306 VITAMIN D 25 HYDROXY: CPT

## 2017-11-10 PROCEDURE — 83970 ASSAY OF PARATHORMONE: CPT

## 2017-11-10 PROCEDURE — 71020 DX-CHEST-2 VIEWS: CPT

## 2017-11-10 RX ORDER — METOLAZONE 5 MG/1
5 TABLET ORAL DAILY
COMMUNITY
End: 2017-12-07

## 2017-11-10 ASSESSMENT — ENCOUNTER SYMPTOMS
SHORTNESS OF BREATH: 1
WEAKNESS: 0

## 2017-11-10 ASSESSMENT — PATIENT HEALTH QUESTIONNAIRE - PHQ9: CLINICAL INTERPRETATION OF PHQ2 SCORE: 0

## 2017-11-10 NOTE — PROGRESS NOTES
"Subjective:      Kristyn Gillespie is a 72 y.o. female who presents with Follow-Up (2 Month FV w/Labs, Discuss meds/Hospital visit)  CKD Stage V          HPI    72 year old with CKD V, was in hospital 3 weeks ago. She was hospitalized at [facility].    1. CKD  5 - no recent Cr, though now on metolazone. Still with some SOB. Has AVF scheduled in Dec.  2. HTN - BP controlled  3. Secondary HPTH -taking Rocaltrol and vitamin D, check PTH  4. SOB - SOB is stable from discharge from the hospital         Review of Systems   Constitutional: Positive for malaise/fatigue.   Respiratory: Positive for shortness of breath.    Genitourinary: Negative for dysuria and frequency.   Neurological: Negative for weakness.          Objective:     /60   Pulse 64   Temp 36.7 °C (98 °F)   Ht 1.575 m (5' 2\")   Wt 68.9 kg (152 lb)   SpO2 90%   BMI 27.80 kg/m²      Physical Exam   Constitutional: She appears well-developed and well-nourished.   HENT:   Head: Normocephalic and atraumatic.   Cardiovascular: Normal rate and regular rhythm.    Murmur heard.  Pulmonary/Chest: Effort normal. She has rales.   Musculoskeletal: She exhibits no edema or deformity.   Skin: Skin is warm and dry.   Psychiatric: She has a normal mood and affect. Her behavior is normal.               Assessment/Plan:     1. Stage 5 chronic kidney disease (CMS-HCC)  Check labs, with diuretics, expect Cr to be up. At this point, sx appear stable, has AVF scheduled.    - BASIC METABOLIC PANEL; Future  - CBC WITHOUT DIFFERENTIAL; Future  - PTH INTACT (PTH ONLY); Future  - VITAMIN D,25 HYDROXY; Future  - FERRITIN; Future    2. Anemia in stage 5 chronic kidney disease, not on chronic dialysis (CMS-HCC)  Check Hgb, has anemia of CKD, and SOB may be related    3. Secondary hyperparathyroidism (CMS-HCC)  Follow PTH, continue rocaltrol for now    4. SOB (shortness of breath)  Check CXR to see if more diuresis is needed    - DX-CHEST-2 VIEWS; Future      "

## 2017-11-17 ENCOUNTER — TELEPHONE (OUTPATIENT)
Dept: NEPHROLOGY | Facility: MEDICAL CENTER | Age: 72
End: 2017-11-17

## 2017-12-07 ENCOUNTER — HOSPITAL ENCOUNTER (OUTPATIENT)
Dept: RADIOLOGY | Facility: MEDICAL CENTER | Age: 72
End: 2017-12-07
Attending: SURGERY | Admitting: SURGERY
Payer: MEDICARE

## 2017-12-07 DIAGNOSIS — Z01.810 PRE-OPERATIVE CARDIOVASCULAR EXAMINATION: ICD-10-CM

## 2017-12-07 DIAGNOSIS — Z01.811 PRE-OPERATIVE RESPIRATORY EXAMINATION: ICD-10-CM

## 2017-12-07 DIAGNOSIS — Z01.812 PRE-OPERATIVE LABORATORY EXAMINATION: ICD-10-CM

## 2017-12-07 LAB
ANION GAP SERPL CALC-SCNC: 14 MMOL/L (ref 0–11.9)
BASOPHILS # BLD AUTO: 0.9 % (ref 0–1.8)
BASOPHILS # BLD: 0.07 K/UL (ref 0–0.12)
BUN SERPL-MCNC: 96 MG/DL (ref 8–22)
CALCIUM SERPL-MCNC: 10.1 MG/DL (ref 8.5–10.5)
CHLORIDE SERPL-SCNC: 95 MMOL/L (ref 96–112)
CO2 SERPL-SCNC: 27 MMOL/L (ref 20–33)
CREAT SERPL-MCNC: 3.38 MG/DL (ref 0.5–1.4)
EKG IMPRESSION: NORMAL
EOSINOPHIL # BLD AUTO: 0.34 K/UL (ref 0–0.51)
EOSINOPHIL NFR BLD: 4.5 % (ref 0–6.9)
ERYTHROCYTE [DISTWIDTH] IN BLOOD BY AUTOMATED COUNT: 44.6 FL (ref 35.9–50)
GFR SERPL CREATININE-BSD FRML MDRD: 13 ML/MIN/1.73 M 2
GLUCOSE SERPL-MCNC: 108 MG/DL (ref 65–99)
HCT VFR BLD AUTO: 32.1 % (ref 37–47)
HGB BLD-MCNC: 10.4 G/DL (ref 12–16)
IMM GRANULOCYTES # BLD AUTO: 0.04 K/UL (ref 0–0.11)
IMM GRANULOCYTES NFR BLD AUTO: 0.5 % (ref 0–0.9)
LYMPHOCYTES # BLD AUTO: 1.3 K/UL (ref 1–4.8)
LYMPHOCYTES NFR BLD: 17.2 % (ref 22–41)
MCH RBC QN AUTO: 29.8 PG (ref 27–33)
MCHC RBC AUTO-ENTMCNC: 32.4 G/DL (ref 33.6–35)
MCV RBC AUTO: 92 FL (ref 81.4–97.8)
MONOCYTES # BLD AUTO: 0.77 K/UL (ref 0–0.85)
MONOCYTES NFR BLD AUTO: 10.2 % (ref 0–13.4)
NEUTROPHILS # BLD AUTO: 5.03 K/UL (ref 2–7.15)
NEUTROPHILS NFR BLD: 66.7 % (ref 44–72)
NRBC # BLD AUTO: 0 K/UL
NRBC BLD AUTO-RTO: 0 /100 WBC
PLATELET # BLD AUTO: 203 K/UL (ref 164–446)
PMV BLD AUTO: 11.2 FL (ref 9–12.9)
POTASSIUM SERPL-SCNC: 3.2 MMOL/L (ref 3.6–5.5)
RBC # BLD AUTO: 3.49 M/UL (ref 4.2–5.4)
SODIUM SERPL-SCNC: 136 MMOL/L (ref 135–145)
WBC # BLD AUTO: 7.6 K/UL (ref 4.8–10.8)

## 2017-12-07 PROCEDURE — 93005 ELECTROCARDIOGRAM TRACING: CPT

## 2017-12-07 PROCEDURE — 85025 COMPLETE CBC W/AUTO DIFF WBC: CPT

## 2017-12-07 PROCEDURE — 93010 ELECTROCARDIOGRAM REPORT: CPT | Performed by: INTERNAL MEDICINE

## 2017-12-07 PROCEDURE — 80048 BASIC METABOLIC PNL TOTAL CA: CPT

## 2017-12-07 PROCEDURE — 71010 DX-CHEST-LIMITED (1 VIEW): CPT

## 2017-12-07 PROCEDURE — 36415 COLL VENOUS BLD VENIPUNCTURE: CPT

## 2017-12-08 ENCOUNTER — HOSPITAL ENCOUNTER (OUTPATIENT)
Facility: MEDICAL CENTER | Age: 72
End: 2017-12-08
Attending: SURGERY | Admitting: SURGERY
Payer: MEDICARE

## 2017-12-08 VITALS
OXYGEN SATURATION: 99 % | HEIGHT: 61 IN | HEART RATE: 72 BPM | BODY MASS INDEX: 28.72 KG/M2 | RESPIRATION RATE: 14 BRPM | DIASTOLIC BLOOD PRESSURE: 49 MMHG | SYSTOLIC BLOOD PRESSURE: 119 MMHG | TEMPERATURE: 97.8 F | WEIGHT: 152.12 LBS

## 2017-12-08 LAB — POTASSIUM SERPL-SCNC: 3.4 MMOL/L (ref 3.6–5.5)

## 2017-12-08 PROCEDURE — 500700 HCHG HEMOCLIP, SMALL (RED): Performed by: SURGERY

## 2017-12-08 PROCEDURE — 500800 HCHG LAPAROSCOPIC J/L HOOK: Performed by: SURGERY

## 2017-12-08 PROCEDURE — 110454 HCHG SHELL REV 250: Performed by: SURGERY

## 2017-12-08 PROCEDURE — 500698 HCHG HEMOCLIP, MEDIUM: Performed by: SURGERY

## 2017-12-08 PROCEDURE — 700101 HCHG RX REV CODE 250

## 2017-12-08 PROCEDURE — A6402 STERILE GAUZE <= 16 SQ IN: HCPCS | Performed by: SURGERY

## 2017-12-08 PROCEDURE — 160046 HCHG PACU - 1ST 60 MINS PHASE II: Performed by: SURGERY

## 2017-12-08 PROCEDURE — 501330 HCHG SET, CYSTO IRRIG TUBING: Performed by: SURGERY

## 2017-12-08 PROCEDURE — 501837 HCHG SUTURE CV: Performed by: SURGERY

## 2017-12-08 PROCEDURE — 501568 HCHG TROCAR, BLUNTPORT 12MM: Performed by: SURGERY

## 2017-12-08 PROCEDURE — 501570 HCHG TROCAR, SEPARATOR: Performed by: SURGERY

## 2017-12-08 PROCEDURE — 160041 HCHG SURGERY MINUTES - EA ADDL 1 MIN LEVEL 4: Performed by: SURGERY

## 2017-12-08 PROCEDURE — 160035 HCHG PACU - 1ST 60 MINS PHASE I: Performed by: SURGERY

## 2017-12-08 PROCEDURE — 160002 HCHG RECOVERY MINUTES (STAT): Performed by: SURGERY

## 2017-12-08 PROCEDURE — 160029 HCHG SURGERY MINUTES - 1ST 30 MINS LEVEL 4: Performed by: SURGERY

## 2017-12-08 PROCEDURE — 700111 HCHG RX REV CODE 636 W/ 250 OVERRIDE (IP)

## 2017-12-08 PROCEDURE — 500382 HCHG DRAIN, PENROSE 1X18: Performed by: SURGERY

## 2017-12-08 PROCEDURE — 84132 ASSAY OF SERUM POTASSIUM: CPT

## 2017-12-08 PROCEDURE — 501838 HCHG SUTURE GENERAL: Performed by: SURGERY

## 2017-12-08 PROCEDURE — 160048 HCHG OR STATISTICAL LEVEL 1-5: Performed by: SURGERY

## 2017-12-08 PROCEDURE — 160025 RECOVERY II MINUTES (STATS): Performed by: SURGERY

## 2017-12-08 PROCEDURE — 160009 HCHG ANES TIME/MIN: Performed by: SURGERY

## 2017-12-08 PROCEDURE — 501582 HCHG TROCAR, THRD BLADED: Performed by: SURGERY

## 2017-12-08 RX ORDER — HEPARIN SODIUM,PORCINE 1000/ML
VIAL (ML) INJECTION
Status: DISCONTINUED | OUTPATIENT
Start: 2017-12-08 | End: 2017-12-08 | Stop reason: HOSPADM

## 2017-12-08 RX ORDER — SODIUM CHLORIDE 9 MG/ML
INJECTION, SOLUTION INTRAVENOUS CONTINUOUS
Status: DISCONTINUED | OUTPATIENT
Start: 2017-12-08 | End: 2017-12-08 | Stop reason: HOSPADM

## 2017-12-08 RX ORDER — LIDOCAINE HYDROCHLORIDE 10 MG/ML
0.5 INJECTION, SOLUTION INFILTRATION; PERINEURAL
Status: DISCONTINUED | OUTPATIENT
Start: 2017-12-08 | End: 2017-12-08 | Stop reason: HOSPADM

## 2017-12-08 RX ORDER — LIDOCAINE AND PRILOCAINE 25; 25 MG/G; MG/G
1 CREAM TOPICAL
Status: DISCONTINUED | OUTPATIENT
Start: 2017-12-08 | End: 2017-12-08 | Stop reason: HOSPADM

## 2017-12-08 RX ORDER — LIDOCAINE HYDROCHLORIDE 10 MG/ML
INJECTION, SOLUTION INFILTRATION; PERINEURAL
Status: COMPLETED
Start: 2017-12-08 | End: 2017-12-08

## 2017-12-08 RX ORDER — HYDRALAZINE HYDROCHLORIDE 100 MG/1
100 TABLET, FILM COATED ORAL DAILY
COMMUNITY
End: 2018-11-21

## 2017-12-08 RX ORDER — BUPIVACAINE HYDROCHLORIDE 5 MG/ML
INJECTION, SOLUTION PERINEURAL
Status: DISCONTINUED | OUTPATIENT
Start: 2017-12-08 | End: 2017-12-08 | Stop reason: HOSPADM

## 2017-12-08 RX ORDER — ERGOCALCIFEROL 1.25 MG/1
50000 CAPSULE ORAL
COMMUNITY
End: 2018-10-21

## 2017-12-08 RX ORDER — HYDROCODONE BITARTRATE AND ACETAMINOPHEN 5; 325 MG/1; MG/1
1-2 TABLET ORAL EVERY 4 HOURS PRN
Qty: 20 TAB | Refills: 0 | Status: SHIPPED | OUTPATIENT
Start: 2017-12-08 | End: 2018-04-12

## 2017-12-08 RX ORDER — SODIUM BICARBONATE 650 MG/1
650 TABLET ORAL DAILY
COMMUNITY
End: 2018-01-26

## 2017-12-08 RX ADMIN — LIDOCAINE HYDROCHLORIDE: 10 INJECTION, SOLUTION INFILTRATION; PERINEURAL at 11:30

## 2017-12-08 RX ADMIN — SODIUM CHLORIDE: 9 INJECTION, SOLUTION INTRAVENOUS at 11:45

## 2017-12-08 ASSESSMENT — PAIN SCALES - GENERAL
PAINLEVEL_OUTOF10: 0

## 2017-12-08 NOTE — OR SURGEON
Immediate Post OP Note    PreOp Diagnosis:  Chronic Renal insufficiency    PostOp Diagnosis: Same    Procedure(s):  AV FISTULA CREATION- DISTAL RADIAL CEPHALIC FOREARM - Wound Class: Clean    Surgeon(s):  Fidel Romero M.D.    Anesthesiologist/Type of Anesthesia:  Anesthesiologist: Mary Araya M.D./General    Surgical Staff:  Circulator: Demian Marcus RISRA  Relief Circulator: Dylan Castano RHuanNHuan  Relief Scrub: Les Sanders  Scrub Person: Cecilia Bermudez  Count [place]: Boni Ramirez    Specimens:  * No specimens in log *    Estimated Blood Loss: , 30mls    Findings: Deep vein and normal artery.  Good thrill at completion.    Complications: None apparent        12/8/2017 3:35 PM Fidel Romero

## 2017-12-08 NOTE — PROGRESS NOTES
The Medication Reconciliation process has been completed by interviewing the patient    Allergies have been reviewed  Antibiotic use in 30 days - none    Home Pharmacy:  [facility] - [place]

## 2017-12-09 NOTE — OP REPORT
DATE OF SERVICE:  12/08/2017    PREOPERATIVE DIAGNOSIS:  Chronic renal insufficiency.    POSTOPERATIVE DIAGNOSIS:  Chronic renal insufficiency.    OPERATIONS:  Distal radiocephalic right upper extremity AV fistula creation.    SURGEON:  Fidel Romero MD    ANESTHESIA:  General.    ANESTHESIOLOGIST:  Mary Araya MD    DESCRIPTION OF PROCEDURE:  After obtaining informed consent, the patient was   placed supine on the operating table.  The plan was to create a distal right   radiocephalic AV fistula and do a delayed transposition since her vein was   deep.  With ultrasound, I identified the vein and marked it on the right   forearm.  There was a dorsal wrist branch, which I also marked on the skin.    Between the 2 vessels, I injected local anesthesia with 0.5% Marcaine.  I made   a longitudinal incision.  The fatty tissues were divided and I cut away some   of the abundant fat that was present.  I dissected over to the dorsum of the   wrist and progressively dissected out the cephalic vein and its dorsal wrist   branch and its small extension beyond this branch point.  I divided between   4-0 silk ligatures branches.  I then dissected medially through the fascia and   identified the radial artery.  It went into spasm, but it was of good size.    I dissected out approximately 1.5 cm of length.  The patient was given 4000   units of heparin.  To bring over the vein close enough to the radial artery   for the anastomosis, I had to ligate and divide it distally.  It was then   brought into proximity of the radial artery.  I made a longitudinal radial   arteriotomy approximately a centimeter long.  I made a corresponding venotomy.    With 7-0 Prolene, I did an end-to-side anastomosis.  I did inject some   papaverine into the vein and artery to try to reduce some of the vasospasm.  A   2 mm dilator was passed retrograde into the radial artery and the cephalic   vein before I pulled the sutures up to tie them.   Once the sutures were tied   and flow was established, there was an excellent thrill.  There was one   bleeding point at the heel of the anastomosis and I repaired this with a 7-0   Prolene stitch.  I used Surgiflo for hemostasis as well as cautery.  When I   felt hemostasis was adequate and after irrigating the wound, I closed in 2   layers of running 4-0 Vicryl.  Blood loss was less than 30 mL.  Counts were   reported to be correct.  The patient was taken to the recovery room.    PLAN:  Follow up in the office and when mature enough perform a delayed   transposition.       ____________________________________     MD RUDY Raines / ROLAND    DD:  12/08/2017 15:42:24  DT:  12/08/2017 16:04:03    D#:  [id number]  Job#:  [id number]

## 2017-12-09 NOTE — OR NURSING
Pt to recovery, sleeping, rouses to voice. Denies pain or nausea in PACU. Declines analgesics at this time. Dressing over new R lower arm fistula with some oozing under dressing. Dr. Romero visualized dressing in PACU and unconcerned at this time. +bruit and faint thrill. Strong radial pulse palpated. VSS

## 2017-12-09 NOTE — DISCHARGE INSTRUCTIONS
ACTIVITY: Rest and take it easy for the first 24 hours.  A responsible adult is recommended to remain with you during that time.  It is normal to feel sleepy.  We encourage you to not do anything that requires balance, judgment or coordination.    MILD FLU-LIKE SYMPTOMS ARE NORMAL. YOU MAY EXPERIENCE GENERALIZED MUSCLE ACHES, THROAT IRRITATION, HEADACHE AND/OR SOME NAUSEA.    FOR 24 HOURS DO NOT:  Drive, operate machinery or run household appliances.  Drink beer or alcoholic beverages.   Make important decisions or sign legal documents.    SPECIAL INSTRUCTIONS:   Elevate arm at home tonight, and keep warm with blanket.  Keep dressings on for 2 days, then keep incision clean and dry.  No pushing, pulling, or heavy lifting greater than 10 pounds with the right arm for 2 weeks.  OK to shower after 2 days, no baths for 10 days.  No IV's, blood pressures, or lab draws on the right arm.  Follow up with Dr. Romero in 2-3 weeks.    DIET: To avoid nausea, slowly advance diet as tolerated, avoiding spicy or greasy foods for the first day.  Add more substantial food to your diet according to your physician's instructions.  Babies can be fed formula or breast milk as soon as they are hungry.  INCREASE FLUIDS AND FIBER TO AVOID CONSTIPATION.    SURGICAL DRESSING/BATHING:   OK to shower after 2 days, no baths for 10 days.  Keep dressings on for 2 days, then keep incision clean and dry.    FOLLOW-UP APPOINTMENT:  A follow-up appointment should be arranged with your doctor in 2-3 weeks call to schedule.    You should CALL YOUR PHYSICIAN if you develop:  Fever greater than 101 degrees F.  Pain not relieved by medication, or persistent nausea or vomiting.  Excessive bleeding (blood soaking through dressing) or unexpected drainage from the wound.  Extreme redness or swelling around the incision site, drainage of pus or foul smelling drainage.  Inability to urinate or empty your bladder within 8 hours.  Problems with breathing or  chest pain.    You should call 911 if you develop problems with breathing or chest pain.  If you are unable to contact your doctor or surgical center, you should go to the nearest emergency room or urgent care center.  Physician's telephone #: [phone].    If any questions arise, call your doctor.  If your doctor is not available, please feel free to call the Surgical Center at [phone].  The Center is open Monday through Friday from 7AM to 7PM.  You can also call the HEALTH HOTLINE open 24 hours/day, 7 days/week and speak to a nurse at [phone], or toll free at [phone].    A registered nurse may call you a few days after your surgery to see how you are doing after your procedure.    MEDICATIONS: Resume taking daily medication.  Take prescribed pain medication with food.  If no medication is prescribed, you may take non-aspirin pain medication if needed.  PAIN MEDICATION CAN BE VERY CONSTIPATING.  Take a stool softener or laxative such as senokot, pericolace, or milk of magnesia if needed.    Prescription given for [place].      If your physician has prescribed pain medication that includes Acetaminophen (Tylenol), do not take additional Acetaminophen (Tylenol) while taking the prescribed medication.    Depression / Suicide Risk    As you are discharged from this [facility] facility, it is important to learn how to keep safe from harming yourself.    Recognize the warning signs:  · Abrupt changes in personality, positive or negative- including increase in energy   · Giving away possessions  · Change in eating patterns- significant weight changes-  positive or negative  · Change in sleeping patterns- unable to sleep or sleeping all the time   · Unwillingness or inability to communicate  · Depression  · Unusual sadness, discouragement and loneliness  · Talk of wanting to die  · Neglect of personal appearance   · Rebelliousness- reckless behavior  · Withdrawal from people/activities they  love  · Confusion- inability to concentrate     If you or a loved one observes any of these behaviors or has concerns about self-harm, here's what you can do:  · Talk about it- your feelings and reasons for harming yourself  · Remove any means that you might use to hurt yourself (examples: pills, rope, extension cords, firearm)  · Get professional help from the community (Mental Health, Substance Abuse, psychological counseling)  · Do not be alone:Call your Safe Contact- someone whom you trust who will be there for you.  · Call your local CRISIS HOTLINE 649-8227 or 449-049-8893  · Call your local Children's Mobile Crisis Response Team [place] (152) 845-7273 or www.Jobyal  · Call the toll free National Suicide Prevention Hotlines   · National Suicide Prevention Lifeline 622-451-WZAT (4982)  · National Hope Line Network 800-SUICIDE (103-7112)

## 2017-12-09 NOTE — OR NURSING
Per Dr. Romero's orders, fistula site re-dressed in PACU. No active bleeding visualized when old dressing removed. New sterile gauze and tegaderm dressing applied.

## 2017-12-21 ENCOUNTER — HOSPITAL ENCOUNTER (OUTPATIENT)
Dept: LAB | Facility: MEDICAL CENTER | Age: 72
End: 2017-12-21
Attending: INTERNAL MEDICINE
Payer: MEDICARE

## 2017-12-21 DIAGNOSIS — N18.5 STAGE 5 CHRONIC KIDNEY DISEASE (HCC): ICD-10-CM

## 2017-12-21 LAB
25(OH)D3 SERPL-MCNC: 34 NG/ML (ref 30–100)
ANION GAP SERPL CALC-SCNC: 14 MMOL/L (ref 0–11.9)
BUN SERPL-MCNC: 43 MG/DL (ref 8–22)
CALCIUM SERPL-MCNC: 9.7 MG/DL (ref 8.5–10.5)
CHLORIDE SERPL-SCNC: 105 MMOL/L (ref 96–112)
CO2 SERPL-SCNC: 22 MMOL/L (ref 20–33)
CREAT SERPL-MCNC: 2.83 MG/DL (ref 0.5–1.4)
ERYTHROCYTE [DISTWIDTH] IN BLOOD BY AUTOMATED COUNT: 49.7 FL (ref 35.9–50)
GFR SERPL CREATININE-BSD FRML MDRD: 16 ML/MIN/1.73 M 2
GLUCOSE SERPL-MCNC: 98 MG/DL (ref 65–99)
HCT VFR BLD AUTO: 31.7 % (ref 37–47)
HGB BLD-MCNC: 9.9 G/DL (ref 12–16)
MCH RBC QN AUTO: 30.5 PG (ref 27–33)
MCHC RBC AUTO-ENTMCNC: 31.2 G/DL (ref 33.6–35)
MCV RBC AUTO: 97.5 FL (ref 81.4–97.8)
PLATELET # BLD AUTO: 175 K/UL (ref 164–446)
PMV BLD AUTO: 11.4 FL (ref 9–12.9)
POTASSIUM SERPL-SCNC: 4.5 MMOL/L (ref 3.6–5.5)
PTH-INTACT SERPL-MCNC: 386.4 PG/ML (ref 14–72)
RBC # BLD AUTO: 3.25 M/UL (ref 4.2–5.4)
SODIUM SERPL-SCNC: 141 MMOL/L (ref 135–145)
WBC # BLD AUTO: 5.2 K/UL (ref 4.8–10.8)

## 2017-12-21 PROCEDURE — 36415 COLL VENOUS BLD VENIPUNCTURE: CPT

## 2017-12-21 PROCEDURE — 83970 ASSAY OF PARATHORMONE: CPT

## 2017-12-21 PROCEDURE — 85027 COMPLETE CBC AUTOMATED: CPT

## 2017-12-21 PROCEDURE — 82306 VITAMIN D 25 HYDROXY: CPT

## 2017-12-21 PROCEDURE — 80048 BASIC METABOLIC PNL TOTAL CA: CPT

## 2017-12-22 ENCOUNTER — OFFICE VISIT (OUTPATIENT)
Dept: NEPHROLOGY | Facility: MEDICAL CENTER | Age: 72
End: 2017-12-22
Payer: MEDICARE

## 2017-12-22 VITALS
WEIGHT: 154 LBS | DIASTOLIC BLOOD PRESSURE: 74 MMHG | OXYGEN SATURATION: 86 % | HEART RATE: 72 BPM | SYSTOLIC BLOOD PRESSURE: 132 MMHG | HEIGHT: 62 IN | RESPIRATION RATE: 16 BRPM | BODY MASS INDEX: 28.34 KG/M2 | TEMPERATURE: 97.6 F

## 2017-12-22 DIAGNOSIS — D63.1 ANEMIA IN STAGE 5 CHRONIC KIDNEY DISEASE, NOT ON CHRONIC DIALYSIS (HCC): ICD-10-CM

## 2017-12-22 DIAGNOSIS — R60.9 PERIPHERAL EDEMA: ICD-10-CM

## 2017-12-22 DIAGNOSIS — N25.81 SECONDARY HYPERPARATHYROIDISM (HCC): ICD-10-CM

## 2017-12-22 DIAGNOSIS — N18.5 STAGE 5 CHRONIC KIDNEY DISEASE (HCC): ICD-10-CM

## 2017-12-22 DIAGNOSIS — N18.5 ANEMIA IN STAGE 5 CHRONIC KIDNEY DISEASE, NOT ON CHRONIC DIALYSIS (HCC): ICD-10-CM

## 2017-12-22 PROCEDURE — 99214 OFFICE O/P EST MOD 30 MIN: CPT | Performed by: INTERNAL MEDICINE

## 2017-12-22 RX ORDER — FUROSEMIDE 40 MG/1
80 TABLET ORAL 2 TIMES DAILY
Qty: 360 TAB | Refills: 5 | Status: SHIPPED | OUTPATIENT
Start: 2017-12-22 | End: 2018-10-21

## 2017-12-22 RX ORDER — CALCITRIOL 0.25 UG/1
0.5 CAPSULE, LIQUID FILLED ORAL DAILY
Qty: 90 CAP | Refills: 3 | Status: SHIPPED | OUTPATIENT
Start: 2017-12-22 | End: 2017-12-31 | Stop reason: SDUPTHER

## 2017-12-22 ASSESSMENT — ENCOUNTER SYMPTOMS
FEVER: 0
SHORTNESS OF BREATH: 1
CHILLS: 0

## 2017-12-22 NOTE — PROGRESS NOTES
"Subjective:      Kristyn Gillespie is a 72 y.o. female who presents with Follow-Up            HPI  72 year old with CKD V, was in hospital 3 weeks ago. She was hospitalized at [facility].    1. CKD  5 - Cr down, looks overloaded.  2. HTN - BP controlled  3. Secondary HPTH -PTH elevated, on rocaltrol  4. SOB - Worse, changed to lasix 80 BID    Review of Systems   Constitutional: Negative for chills and fever.   Respiratory: Positive for shortness of breath.    Cardiovascular: Positive for leg swelling.          Objective:     /74   Pulse 72   Temp 36.4 °C (97.6 °F)   Resp 16   Ht 1.575 m (5' 2\")   Wt 69.9 kg (154 lb)   SpO2 (!) 86%   BMI 28.17 kg/m²      Physical Exam   Constitutional: She is oriented to person, place, and time. She appears well-developed and well-nourished.   Cardiovascular: Normal rate and regular rhythm.    Pulmonary/Chest: Effort normal and breath sounds normal. She has no wheezes. She has no rales.   Musculoskeletal: She exhibits edema. She exhibits no deformity.   Neurological: She is alert and oriented to person, place, and time.   Psychiatric: She has a normal mood and affect. Her behavior is normal.               Assessment/Plan:     1. Stage 5 chronic kidney disease (CMS-HCC)  Cr down, looks wet, increased Lasix    2. Anemia in stage 5 chronic kidney disease, not on chronic dialysis (CMS-[facility])  Hgb < 10, continue iron, discussed possibility of epogen    3. Secondary hyperparathyroidism (CMS-[facility])  Increase rocaltrol 0.5 daily    4. Peripheral edema  Improving      "

## 2017-12-27 ENCOUNTER — TELEPHONE (OUTPATIENT)
Dept: NEPHROLOGY | Facility: MEDICAL CENTER | Age: 72
End: 2017-12-27

## 2017-12-27 DIAGNOSIS — N25.81 SECONDARY HYPERPARATHYROIDISM (HCC): ICD-10-CM

## 2017-12-27 NOTE — TELEPHONE ENCOUNTER
Was the patient seen in the last year in this department? Yes     Does patient have an active prescription for medications requested? Yes     Received Request Via: Patient       Pt move pharmacy needs to be send to [facility] [place]

## 2017-12-31 RX ORDER — CALCITRIOL 0.25 UG/1
0.5 CAPSULE, LIQUID FILLED ORAL DAILY
Qty: 90 CAP | Refills: 3 | Status: SHIPPED | OUTPATIENT
Start: 2017-12-31 | End: 2018-01-26 | Stop reason: SDUPTHER

## 2018-01-05 ENCOUNTER — HOSPITAL ENCOUNTER (OUTPATIENT)
Dept: LAB | Facility: MEDICAL CENTER | Age: 73
End: 2018-01-05
Attending: INTERNAL MEDICINE
Payer: MEDICARE

## 2018-01-05 DIAGNOSIS — D64.9 ANEMIA, UNSPECIFIED TYPE: ICD-10-CM

## 2018-01-05 DIAGNOSIS — N28.9 RENAL INSUFFICIENCY: ICD-10-CM

## 2018-01-05 LAB
ALBUMIN SERPL BCP-MCNC: 3.9 G/DL (ref 3.2–4.9)
ALBUMIN/GLOB SERPL: 1.3 G/DL
ALP SERPL-CCNC: 128 U/L (ref 30–99)
ALT SERPL-CCNC: 8 U/L (ref 2–50)
ANION GAP SERPL CALC-SCNC: 12 MMOL/L (ref 0–11.9)
AST SERPL-CCNC: 20 U/L (ref 12–45)
BASOPHILS # BLD AUTO: 0.7 % (ref 0–1.8)
BASOPHILS # BLD: 0.04 K/UL (ref 0–0.12)
BILIRUB SERPL-MCNC: 0.5 MG/DL (ref 0.1–1.5)
BUN SERPL-MCNC: 61 MG/DL (ref 8–22)
CALCIUM SERPL-MCNC: 10.1 MG/DL (ref 8.5–10.5)
CHLORIDE SERPL-SCNC: 95 MMOL/L (ref 96–112)
CO2 SERPL-SCNC: 31 MMOL/L (ref 20–33)
CREAT SERPL-MCNC: 3.37 MG/DL (ref 0.5–1.4)
EOSINOPHIL # BLD AUTO: 0.15 K/UL (ref 0–0.51)
EOSINOPHIL NFR BLD: 2.5 % (ref 0–6.9)
ERYTHROCYTE [DISTWIDTH] IN BLOOD BY AUTOMATED COUNT: 42 FL (ref 35.9–50)
FERRITIN SERPL-MCNC: 46.2 NG/ML (ref 10–291)
GFR SERPL CREATININE-BSD FRML MDRD: 13 ML/MIN/1.73 M 2
GLOBULIN SER CALC-MCNC: 3.1 G/DL (ref 1.9–3.5)
GLUCOSE SERPL-MCNC: 98 MG/DL (ref 65–99)
HCT VFR BLD AUTO: 35.7 % (ref 37–47)
HGB BLD-MCNC: 11.6 G/DL (ref 12–16)
HGB RETIC QN AUTO: 30.6 PG/CELL (ref 29–35)
IMM GRANULOCYTES # BLD AUTO: 0.03 K/UL (ref 0–0.11)
IMM GRANULOCYTES NFR BLD AUTO: 0.5 % (ref 0–0.9)
IMM RETICS NFR: 8.1 % (ref 9.3–17.4)
IRON SATN MFR SERPL: 17 % (ref 15–55)
IRON SERPL-MCNC: 50 UG/DL (ref 40–170)
LYMPHOCYTES # BLD AUTO: 0.77 K/UL (ref 1–4.8)
LYMPHOCYTES NFR BLD: 13 % (ref 22–41)
MCH RBC QN AUTO: 30.3 PG (ref 27–33)
MCHC RBC AUTO-ENTMCNC: 32.5 G/DL (ref 33.6–35)
MCV RBC AUTO: 93.2 FL (ref 81.4–97.8)
MONOCYTES # BLD AUTO: 0.66 K/UL (ref 0–0.85)
MONOCYTES NFR BLD AUTO: 11.2 % (ref 0–13.4)
NEUTROPHILS # BLD AUTO: 4.26 K/UL (ref 2–7.15)
NEUTROPHILS NFR BLD: 72.1 % (ref 44–72)
NRBC # BLD AUTO: 0 K/UL
NRBC BLD-RTO: 0 /100 WBC
PLATELET # BLD AUTO: 200 K/UL (ref 164–446)
PMV BLD AUTO: 11.6 FL (ref 9–12.9)
POTASSIUM SERPL-SCNC: 4 MMOL/L (ref 3.6–5.5)
PROT SERPL-MCNC: 7 G/DL (ref 6–8.2)
RBC # BLD AUTO: 3.83 M/UL (ref 4.2–5.4)
RETICS # AUTO: 0.05 M/UL (ref 0.04–0.06)
RETICS/RBC NFR: 1.2 % (ref 0.8–2.1)
SODIUM SERPL-SCNC: 138 MMOL/L (ref 135–145)
TIBC SERPL-MCNC: 291 UG/DL (ref 250–450)
WBC # BLD AUTO: 5.9 K/UL (ref 4.8–10.8)

## 2018-01-05 PROCEDURE — 83550 IRON BINDING TEST: CPT

## 2018-01-05 PROCEDURE — 82728 ASSAY OF FERRITIN: CPT

## 2018-01-05 PROCEDURE — 85046 RETICYTE/HGB CONCENTRATE: CPT

## 2018-01-05 PROCEDURE — 80053 COMPREHEN METABOLIC PANEL: CPT

## 2018-01-05 PROCEDURE — 36415 COLL VENOUS BLD VENIPUNCTURE: CPT

## 2018-01-05 PROCEDURE — 83540 ASSAY OF IRON: CPT

## 2018-01-05 PROCEDURE — 85025 COMPLETE CBC W/AUTO DIFF WBC: CPT

## 2018-01-10 ENCOUNTER — HOSPITAL ENCOUNTER (OUTPATIENT)
Dept: LAB | Facility: MEDICAL CENTER | Age: 73
End: 2018-01-10
Attending: INTERNAL MEDICINE
Payer: MEDICARE

## 2018-01-10 ENCOUNTER — OFFICE VISIT (OUTPATIENT)
Dept: MEDICAL GROUP | Facility: PHYSICIAN GROUP | Age: 73
End: 2018-01-10
Payer: MEDICARE

## 2018-01-10 VITALS
SYSTOLIC BLOOD PRESSURE: 138 MMHG | HEIGHT: 62 IN | OXYGEN SATURATION: 91 % | HEART RATE: 85 BPM | WEIGHT: 152 LBS | TEMPERATURE: 98 F | RESPIRATION RATE: 20 BRPM | DIASTOLIC BLOOD PRESSURE: 70 MMHG | BODY MASS INDEX: 27.97 KG/M2

## 2018-01-10 DIAGNOSIS — K29.70 GASTRITIS, PRESENCE OF BLEEDING UNSPECIFIED, UNSPECIFIED CHRONICITY, UNSPECIFIED GASTRITIS TYPE: ICD-10-CM

## 2018-01-10 DIAGNOSIS — J42 CHRONIC BRONCHITIS, UNSPECIFIED CHRONIC BRONCHITIS TYPE (HCC): ICD-10-CM

## 2018-01-10 DIAGNOSIS — N18.5 ANEMIA IN STAGE 5 CHRONIC KIDNEY DISEASE, NOT ON CHRONIC DIALYSIS (HCC): ICD-10-CM

## 2018-01-10 DIAGNOSIS — J96.11 CHRONIC RESPIRATORY FAILURE WITH HYPOXIA (HCC): ICD-10-CM

## 2018-01-10 DIAGNOSIS — D63.1 ANEMIA IN STAGE 5 CHRONIC KIDNEY DISEASE, NOT ON CHRONIC DIALYSIS (HCC): ICD-10-CM

## 2018-01-10 DIAGNOSIS — N18.5 STAGE 5 CHRONIC KIDNEY DISEASE (HCC): ICD-10-CM

## 2018-01-10 PROCEDURE — 99214 OFFICE O/P EST MOD 30 MIN: CPT | Performed by: INTERNAL MEDICINE

## 2018-01-10 NOTE — ASSESSMENT & PLAN NOTE
Patient wonders about taking the prilosec, she has heard on TV concern for prilosec causing renal failure.  We do not show an rx since 2016, she states she is taking this, she is taking the iron as well, black stools.

## 2018-01-10 NOTE — PROGRESS NOTES
Chief Complaint   Patient presents with   • Shortness of Breath     FV SOB        HISTORY OF PRESENT ILLNESS: Patient is a 72 y.o. female established patient who presents today to discuss the medical issues below.    Gastritis  Patient wonders about taking the prilosec, she has heard on TV concern for prilosec causing renal failure.  We do not show an rx since 2016, she states she is taking this, she is taking the iron as well, black stools.      Anemia in CKD (chronic kidney disease)  Patient continues with nephrology consideration for Epogen has been made. She is taking iron supplementation.    Stage 5 chronic kidney disease (CMS-HCC)  Patient does have fistula in the right arm is waiting for maturation following with nephrology, recent increase in diuretic patient states she's not noticed any improvement in her dyspnea with exertion. She feels a bit short of breath with sitting as well she is utilizing her oxygen continuously here in the office she is at 91%.    COPD (chronic obstructive pulmonary disease) (CMS-HCC)  Patient does complain of dyspnea with exertion and a bit of dyspnea even while sitting no chest pain or palpitations. She is utilizing her DuoNeb 4 times a day.      Patient Active Problem List    Diagnosis Date Noted   • Stage 5 chronic kidney disease (CMS-HCC) 08/18/2017     Priority: High   • Anemia in CKD (chronic kidney disease) 07/07/2017     Priority: High   • Respiratory failure (CMS-HCC) 11/16/2016     Priority: High   • Chronic respiratory failure with hypoxia (CMS-HCC) 11/15/2016     Priority: Medium   • GIB (gastrointestinal bleeding) 11/14/2016     Priority: Medium   • Metabolic acidosis 11/14/2016     Priority: Medium   • HLD (hyperlipidemia) 11/16/2016     Priority: Low   • Gastritis 11/16/2016     Priority: Low   • COPD (chronic obstructive pulmonary disease) (CMS-HCC) 11/15/2016     Priority: Low   • Tobacco abuse, in remission 11/12/2015     Priority: Low   • Essential  hypertension 09/18/2009     Priority: Low   • SOB (shortness of breath) 11/10/2017   • Secondary hyperparathyroidism (CMS-HCC) 09/15/2017   • Non-rheumatic mitral regurgitation 11/30/2016   • HLD (hyperlipidemia) 11/12/2015   • Vitamin D deficiency 11/12/2015   • PAD (peripheral artery disease)-saw a vascular surgeon several years ago who indicated stents would be needed 10/21/2009   • Peripheral edema 09/18/2009   • Migraines, neuralgic 09/18/2009   • Osteoarthritis 09/18/2009       Allergies:Patient has no known allergies.    Current Outpatient Prescriptions   Medication Sig Dispense Refill   • calcitRIOL (ROCALTROL) 0.25 MCG Cap Take 2 Caps by mouth every day. 90 Cap 3   • furosemide (LASIX) 40 MG Tab Take 2 Tabs by mouth 2 Times a Day. TWICE DAILY 360 Tab 5   • hydrALAZINE (APRESOLINE) 100 MG tablet Take 100 mg by mouth every day.     • sodium bicarbonate (SODIUM BICARBONATE) 650 MG Tab Take 650 mg by mouth every day.     • vitamin D, Ergocalciferol, (DRISDOL) 60819 units Cap capsule Take 50,000 Units by mouth every Sunday.     • hydrocodone-acetaminophen (NORCO) 5-325 MG Tab per tablet Take 1-2 Tabs by mouth every four hours as needed (severe pain). 20 Tab 0   • lovastatin (MEVACOR) 10 MG tablet TAKE ONE TABLET BY MOUTH ONCE DAILY 90 Tab 1   • isosorbide mononitrate (IMDUR) 120 MG CR tablet Take 1 Tab by mouth every day. 90 Tab 3   • ferrous sulfate 325 (65 FE) MG tablet Take 1 Tab by mouth 3 times a day, with meals. 90 Tab 0   • ascorbic acid (VITAMIN C) 500 MG tablet Take 1 Tab by mouth 3 times a day. 90 Tab 3   • multivitamin (THERAGRAN) Tab Take 1 Tab by mouth every day. 90 Tab 0   • calcium carbonate (TUMS) 500 MG Chew Tab Take 1 Tab by mouth 2 Times a Day. 60 Tab 3   • cyanocobalamin 100 MCG Tab Take 100 mcg by mouth every day. 30 Tab 3   • ipratropium-albuterol (DUONEB) 0.5-2.5 (3) MG/3ML nebulizer solution 3 mL by Nebulization route every 6 hours as needed for Shortness of Breath. 120 Vial 3     No  "current facility-administered medications for this visit.          Past Medical History:   Diagnosis Date   • Anemia 2016   • Arthritis     osteo-knees   • Breath shortness     02 2 LTR cont   • Bronchitis    • COPD (chronic obstructive pulmonary disease) (CMS-HCC) 2009   • Dental disorder     missing teeth   • Emphysema of lung (CMS-HCC)    • Epilepsy (CMS-HCC) 2009   • Heart valve disease     mitral valve clip   • Jaundice     at birth   • Migraines, neuralgic 2009   • Osteoarthritis 2009   • Oxygen dependent     2 LTR cont   • Peripheral edema 2009   • Renal insufficiency 2016    ESRD   • Stroke (CMS-HCC)    • Tobacco use disorder 2009   • Unspecified essential hypertension 2009       Social History   Substance Use Topics   • Smoking status: Former Smoker     Packs/day: 1.00     Years: 40.00     Types: Cigarettes     Quit date: 2016   • Smokeless tobacco: Never Used   • Alcohol use No       Family Status   Relation Status   • Mother    • Father    • Sister Alive    lumpectomy     Family History   Problem Relation Age of Onset   • Cancer Mother      breast   • Heart Disease Father        ROS:    Respiratory: Negative for cough, sputum production,   Cardiovascular: Negative for chest pain, palpitations, orthopnea, dyspnea with exertion or edema.   Gastrointestinal: Negative for GI upset, nausea, vomiting, abdominal pain, constipation or diarrhea.   Genitourinary: Negative for dysuria, urgency, hesitancy or frequency.       Exam:    Blood pressure 138/70, pulse 85, temperature 36.7 °C (98 °F), resp. rate 20, height 1.562 m (5' 1.5\"), weight 68.9 kg (152 lb), SpO2 91 %.  General:  Well nourished, well developed female in NAD.  HENT: Normocephalic, bilateral TMs are intact, nasal and oral mucosa with no lesions,   Neck: Supple without bruit. Thyroid is not enlarged.  Pulmonary: Minimal distant crackles bilateral bases clear after deep inspiration or " rhonchi rales or wheezes   Cardiovascular: Regular rate and rhythm without murmur.   Abdomen: Normal bowel sounds soft and nontender no palpable liver spleen bladder mass.  Extremities: No LE edema noted.  Neuro: Grossly nonfocal.  Psych: Alert and oriented to person, place, and time. Appropriate mood and conversation.    LABS: Results reviewed and discussed with the patient, questions answered.      This dictation was created using voice recognition software. I have made reasonable attempts to correct errors, however, errors of grammar and content may exist.          Assessment/Plan:    1. Gastritis, presence of bleeding unspecified, unspecified chronicity, unspecified gastritis type  Patient did have GI workup done in 2016 without any specific etiology of the anemia. She was felt to be anemia of chronic disease although she was placed on iron supplementation. Today her iron saturation is at 17%. I've encouraged her to continue the Prilosec. On clear what the TV is talking about in terms of risk as patient does not have interstitial nephritis. Defer to nephrology    2. Anemia in stage 5 chronic kidney disease, not on chronic dialysis (CMS-[facility])  Consideration for seepage and although H&H currently is 11 and 35 with the increase in diuresis.    3. Stage 5 chronic kidney disease (CMS-HCC)  Fistula gradually maturing following with nephrology monthly no change to diuretics currently she is clinically compensated. GFR unfortunately down to 13, she has follow-up with nephrology in 2 weeks. Reviewed signs and symptoms which would precipitate recommendation for ER. Patient is complaining of urticaria bilateral lower legs. Reviewed implications in renal failure.    4. Chronic bronchitis, unspecified chronic bronchitis type (CMS-HCC)  Air motion is good O2 sats are good. Continuing on nebulizers 4 times a day. Patient continuous oxygen, episodes of hypoxia with out the oxygen in place so patient is benefiting and needs to  continue on oxygen.      Patient was seen for  25 minutes face to face of which more than 50% of the time was spent in counseling and coordination of care regarding the above problems.

## 2018-01-10 NOTE — ASSESSMENT & PLAN NOTE
Patient does complain of dyspnea with exertion and a bit of dyspnea even while sitting no chest pain or palpitations. She is utilizing her DuoNeb 4 times a day.

## 2018-01-10 NOTE — ASSESSMENT & PLAN NOTE
Patient continues with nephrology consideration for Epogen has been made. She is taking iron supplementation.

## 2018-01-10 NOTE — ASSESSMENT & PLAN NOTE
Patient does have fistula in the right arm is waiting for maturation following with nephrology, recent increase in diuretic patient states she's not noticed any improvement in her dyspnea with exertion. She feels a bit short of breath with sitting as well she is utilizing her oxygen continuously here in the office she is at 91%.

## 2018-01-24 ENCOUNTER — HOSPITAL ENCOUNTER (OUTPATIENT)
Dept: LAB | Facility: MEDICAL CENTER | Age: 73
End: 2018-01-24
Attending: INTERNAL MEDICINE
Payer: MEDICARE

## 2018-01-24 DIAGNOSIS — N18.5 STAGE 5 CHRONIC KIDNEY DISEASE (HCC): ICD-10-CM

## 2018-01-24 LAB
25(OH)D3 SERPL-MCNC: 28 NG/ML (ref 30–100)
CREAT UR-MCNC: 48.2 MG/DL
ERYTHROCYTE [DISTWIDTH] IN BLOOD BY AUTOMATED COUNT: 41.7 FL (ref 35.9–50)
HCT VFR BLD AUTO: 38.2 % (ref 37–47)
HGB BLD-MCNC: 12 G/DL (ref 12–16)
MCH RBC QN AUTO: 28.8 PG (ref 27–33)
MCHC RBC AUTO-ENTMCNC: 31.4 G/DL (ref 33.6–35)
MCV RBC AUTO: 91.8 FL (ref 81.4–97.8)
MICROALBUMIN UR-MCNC: 12.4 MG/DL
MICROALBUMIN/CREAT UR: 257 MG/G (ref 0–30)
PLATELET # BLD AUTO: 205 K/UL (ref 164–446)
PMV BLD AUTO: 11.7 FL (ref 9–12.9)
PTH-INTACT SERPL-MCNC: 312.5 PG/ML (ref 14–72)
RBC # BLD AUTO: 4.16 M/UL (ref 4.2–5.4)
WBC # BLD AUTO: 6.8 K/UL (ref 4.8–10.8)

## 2018-01-24 PROCEDURE — 83970 ASSAY OF PARATHORMONE: CPT

## 2018-01-24 PROCEDURE — 82043 UR ALBUMIN QUANTITATIVE: CPT

## 2018-01-24 PROCEDURE — 36415 COLL VENOUS BLD VENIPUNCTURE: CPT

## 2018-01-24 PROCEDURE — 82306 VITAMIN D 25 HYDROXY: CPT

## 2018-01-24 PROCEDURE — 85027 COMPLETE CBC AUTOMATED: CPT

## 2018-01-24 PROCEDURE — 82570 ASSAY OF URINE CREATININE: CPT

## 2018-01-24 PROCEDURE — 80048 BASIC METABOLIC PNL TOTAL CA: CPT

## 2018-01-25 LAB
ANION GAP SERPL CALC-SCNC: 12 MMOL/L (ref 0–11.9)
BUN SERPL-MCNC: 62 MG/DL (ref 8–22)
CALCIUM SERPL-MCNC: 10 MG/DL (ref 8.4–10.2)
CHLORIDE SERPL-SCNC: 101 MMOL/L (ref 96–112)
CO2 SERPL-SCNC: 26 MMOL/L (ref 20–33)
CREAT SERPL-MCNC: 3.8 MG/DL (ref 0.5–1.4)
GLUCOSE SERPL-MCNC: 143 MG/DL (ref 65–99)
POTASSIUM SERPL-SCNC: 3.9 MMOL/L (ref 3.6–5.5)
SODIUM SERPL-SCNC: 139 MMOL/L (ref 135–145)

## 2018-01-25 RX ORDER — LOVASTATIN 10 MG/1
TABLET ORAL
Qty: 90 TAB | Refills: 0 | Status: SHIPPED | OUTPATIENT
Start: 2018-01-25 | End: 2018-03-30 | Stop reason: SDUPTHER

## 2018-01-25 NOTE — TELEPHONE ENCOUNTER
Was the patient seen in the last year in this department? Yes     Does patient have an active prescription for medications requested? No     Received Request Via: Pharmacy      Pt met protocol?: Yes, OV last week   Lab Results   Component Value Date/Time    CHOLSTRLTOT 107 10/05/2016 07:25 AM    LDL 58 10/05/2016 07:25 AM    HDL 34 (A) 10/05/2016 07:25 AM    TRIGLYCERIDE 74 10/05/2016 07:25 AM       Lab Results   Component Value Date/Time    SODIUM 138 01/05/2018 08:15 AM    POTASSIUM 4.0 01/05/2018 08:15 AM    CHLORIDE 95 (L) 01/05/2018 08:15 AM    CO2 31 01/05/2018 08:15 AM    GLUCOSE 98 01/05/2018 08:15 AM    BUN 61 (H) 01/05/2018 08:15 AM    CREATININE 3.37 (H) 01/05/2018 08:15 AM     Lab Results   Component Value Date/Time    ALKPHOSPHAT 128 (H) 01/05/2018 08:15 AM    ASTSGOT 20 01/05/2018 08:15 AM    ALTSGPT 8 01/05/2018 08:15 AM    TBILIRUBIN 0.5 01/05/2018 08:15 AM

## 2018-01-26 ENCOUNTER — OFFICE VISIT (OUTPATIENT)
Dept: NEPHROLOGY | Facility: MEDICAL CENTER | Age: 73
End: 2018-01-26
Payer: MEDICARE

## 2018-01-26 VITALS
SYSTOLIC BLOOD PRESSURE: 132 MMHG | HEIGHT: 62 IN | TEMPERATURE: 97.8 F | OXYGEN SATURATION: 93 % | WEIGHT: 155 LBS | HEART RATE: 83 BPM | RESPIRATION RATE: 14 BRPM | BODY MASS INDEX: 28.52 KG/M2 | DIASTOLIC BLOOD PRESSURE: 76 MMHG

## 2018-01-26 DIAGNOSIS — N18.5 STAGE 5 CHRONIC KIDNEY DISEASE (HCC): ICD-10-CM

## 2018-01-26 DIAGNOSIS — D63.1 ANEMIA IN STAGE 5 CHRONIC KIDNEY DISEASE, NOT ON CHRONIC DIALYSIS (HCC): ICD-10-CM

## 2018-01-26 DIAGNOSIS — N25.81 SECONDARY HYPERPARATHYROIDISM (HCC): ICD-10-CM

## 2018-01-26 DIAGNOSIS — N18.5 ANEMIA IN STAGE 5 CHRONIC KIDNEY DISEASE, NOT ON CHRONIC DIALYSIS (HCC): ICD-10-CM

## 2018-01-26 PROCEDURE — 99214 OFFICE O/P EST MOD 30 MIN: CPT | Performed by: INTERNAL MEDICINE

## 2018-01-26 RX ORDER — CALCITRIOL 0.25 UG/1
0.75 CAPSULE, LIQUID FILLED ORAL DAILY
Qty: 90 CAP | Refills: 11 | Status: SHIPPED | OUTPATIENT
Start: 2018-01-26 | End: 2018-10-21

## 2018-01-26 ASSESSMENT — ENCOUNTER SYMPTOMS
CHILLS: 0
PALPITATIONS: 0
FEVER: 0

## 2018-01-26 NOTE — PROGRESS NOTES
"Subjective:      Kristyn Gillespie is a 73 y.o. female who presents with CKD 5 Follow-Up            HPI  72 year old with CKD V, has an AVF that is maturing    1. CKD  5 - Cr 3.8, no nausea or vomiting, though does feel significant pruritis  2. HTN - BP controlled  3. Secondary HPTH -PTH elevated, on rocaltrol  4. SOB - stable    Review of Systems   Constitutional: Negative for chills and fever.   Cardiovascular: Negative for chest pain and palpitations.          Objective:     /76   Pulse 83   Temp 36.6 °C (97.8 °F)   Resp 14   Ht 1.575 m (5' 2\")   Wt 70.3 kg (155 lb)   SpO2 93%   BMI 28.35 kg/m²      Physical Exam   Constitutional: She is oriented to person, place, and time. She appears well-developed and well-nourished.   Cardiovascular: Normal rate and regular rhythm.    Pulmonary/Chest: Breath sounds normal. She has no wheezes.   On O2   Neurological: She is alert and oriented to person, place, and time.   Skin: Skin is warm and dry.               Assessment/Plan:     1. Stage 5 chronic kidney disease (CMS-HCC)  Check CKD labs. Progressing though no need for dialysis. Refer to kidney education.    - CREATININE CLEARANCE; Future  - MICROALBUMIN CREAT RATIO URINE; Future  - PHOSPHORUS; Future  - BASIC METABOLIC PANEL; Future  - PTH INTACT (PTH ONLY); Future  - CBC WITHOUT DIFFERENTIAL; Future    2. Anemia in stage 5 chronic kidney disease, not on chronic dialysis (CMS-HCC)  No need for epogen.    3. Secondary hyperparathyroidism (CMS-HCC)  Increase calcitriol to 3 tabs daily    - calcitRIOL (ROCALTROL) 0.25 MCG Cap; Take 3 Caps by mouth every day.  Dispense: 90 Cap; Refill: 11      "

## 2018-02-21 ENCOUNTER — TELEPHONE (OUTPATIENT)
Dept: MEDICAL GROUP | Facility: PHYSICIAN GROUP | Age: 73
End: 2018-02-21

## 2018-02-21 NOTE — TELEPHONE ENCOUNTER
1. Caller Name: Michael                      Call Back Number: [phone]    2. Message: Michael wanted to know if the patients OV notes from 1/10/18 can be amended to say that she is using and benefiting from her oxygen?  Please advise     3. Patient approves office to leave a detailed voicemail/MyChart message: N\A

## 2018-03-15 ENCOUNTER — HOSPITAL ENCOUNTER (OUTPATIENT)
Dept: LAB | Facility: MEDICAL CENTER | Age: 73
End: 2018-03-15
Attending: INTERNAL MEDICINE
Payer: MEDICARE

## 2018-03-15 DIAGNOSIS — N18.5 STAGE 5 CHRONIC KIDNEY DISEASE (HCC): ICD-10-CM

## 2018-03-15 LAB
ANION GAP SERPL CALC-SCNC: 15 MMOL/L (ref 0–11.9)
BUN SERPL-MCNC: 71 MG/DL (ref 8–22)
CALCIUM SERPL-MCNC: 10.7 MG/DL (ref 8.5–10.5)
CHLORIDE SERPL-SCNC: 99 MMOL/L (ref 96–112)
CO2 SERPL-SCNC: 27 MMOL/L (ref 20–33)
CREAT SERPL-MCNC: 4.59 MG/DL (ref 0.5–1.4)
CREAT UR-MCNC: 49.3 MG/DL
ERYTHROCYTE [DISTWIDTH] IN BLOOD BY AUTOMATED COUNT: 46.8 FL (ref 35.9–50)
GLUCOSE SERPL-MCNC: 88 MG/DL (ref 65–99)
HCT VFR BLD AUTO: 34.3 % (ref 37–47)
HGB BLD-MCNC: 11.3 G/DL (ref 12–16)
MCH RBC QN AUTO: 30 PG (ref 27–33)
MCHC RBC AUTO-ENTMCNC: 32.9 G/DL (ref 33.6–35)
MCV RBC AUTO: 91 FL (ref 81.4–97.8)
MICROALBUMIN UR-MCNC: 9.3 MG/DL
MICROALBUMIN/CREAT UR: 189 MG/G (ref 0–30)
PHOSPHATE SERPL-MCNC: 4.3 MG/DL (ref 2.5–4.5)
PLATELET # BLD AUTO: 194 K/UL (ref 164–446)
PMV BLD AUTO: 11.5 FL (ref 9–12.9)
POTASSIUM SERPL-SCNC: 3.5 MMOL/L (ref 3.6–5.5)
PTH-INTACT SERPL-MCNC: 124.2 PG/ML (ref 14–72)
RBC # BLD AUTO: 3.77 M/UL (ref 4.2–5.4)
SODIUM SERPL-SCNC: 141 MMOL/L (ref 135–145)
WBC # BLD AUTO: 5.9 K/UL (ref 4.8–10.8)

## 2018-03-15 PROCEDURE — 83970 ASSAY OF PARATHORMONE: CPT

## 2018-03-15 PROCEDURE — 82570 ASSAY OF URINE CREATININE: CPT

## 2018-03-15 PROCEDURE — 80048 BASIC METABOLIC PNL TOTAL CA: CPT

## 2018-03-15 PROCEDURE — 84100 ASSAY OF PHOSPHORUS: CPT

## 2018-03-15 PROCEDURE — 36415 COLL VENOUS BLD VENIPUNCTURE: CPT

## 2018-03-15 PROCEDURE — 82043 UR ALBUMIN QUANTITATIVE: CPT

## 2018-03-15 PROCEDURE — 85027 COMPLETE CBC AUTOMATED: CPT

## 2018-03-16 ENCOUNTER — TELEPHONE (OUTPATIENT)
Dept: NEPHROLOGY | Facility: MEDICAL CENTER | Age: 73
End: 2018-03-16

## 2018-03-16 ENCOUNTER — APPOINTMENT (OUTPATIENT)
Dept: NEPHROLOGY | Facility: MEDICAL CENTER | Age: 73
End: 2018-03-16
Payer: MEDICARE

## 2018-03-23 DIAGNOSIS — N25.81 SECONDARY HYPERPARATHYROIDISM (HCC): ICD-10-CM

## 2018-03-27 RX ORDER — CALCITRIOL 0.25 UG/1
CAPSULE, LIQUID FILLED ORAL
Qty: 180 CAP | Refills: 3 | Status: SHIPPED | OUTPATIENT
Start: 2018-03-27 | End: 2018-04-12

## 2018-03-30 DIAGNOSIS — E78.5 HYPERLIPIDEMIA, UNSPECIFIED HYPERLIPIDEMIA TYPE: ICD-10-CM

## 2018-03-30 RX ORDER — LOVASTATIN 10 MG/1
TABLET ORAL
Qty: 90 TAB | Refills: 0 | Status: SHIPPED | OUTPATIENT
Start: 2018-03-30 | End: 2018-08-02 | Stop reason: SDUPTHER

## 2018-03-30 NOTE — TELEPHONE ENCOUNTER
Was the patient seen in the last year in this department? Yes     Does patient have an active prescription for medications requested? No     Received Request Via: Pharmacy      Pt met protocol?: Yes    OV 1/18

## 2018-04-05 RX ORDER — IPRATROPIUM BROMIDE AND ALBUTEROL SULFATE 2.5; .5 MG/3ML; MG/3ML
3 SOLUTION RESPIRATORY (INHALATION) EVERY 6 HOURS PRN
Qty: 120 VIAL | Refills: 0 | Status: SHIPPED | OUTPATIENT
Start: 2018-04-05 | End: 2018-12-27

## 2018-04-05 NOTE — TELEPHONE ENCOUNTER
Anahy momin came in the office and states that they normally get this medication through the mail order pharmacy, how ever she only has a few dose's left and will be out before the shipment comes in the mail. They are wondering if they can get another rx sent to the [facility] pharmacy here in [place] so she does not run out.    [phone] is a good phone number

## 2018-04-12 ENCOUNTER — OFFICE VISIT (OUTPATIENT)
Dept: MEDICAL GROUP | Facility: PHYSICIAN GROUP | Age: 73
End: 2018-04-12
Payer: MEDICARE

## 2018-04-12 VITALS
SYSTOLIC BLOOD PRESSURE: 128 MMHG | RESPIRATION RATE: 18 BRPM | WEIGHT: 151 LBS | DIASTOLIC BLOOD PRESSURE: 64 MMHG | BODY MASS INDEX: 27.79 KG/M2 | OXYGEN SATURATION: 91 % | HEART RATE: 86 BPM | HEIGHT: 62 IN | TEMPERATURE: 98.8 F

## 2018-04-12 DIAGNOSIS — I34.0 NON-RHEUMATIC MITRAL REGURGITATION: ICD-10-CM

## 2018-04-12 DIAGNOSIS — I10 ESSENTIAL HYPERTENSION: ICD-10-CM

## 2018-04-12 DIAGNOSIS — N18.5 ANEMIA IN STAGE 5 CHRONIC KIDNEY DISEASE, NOT ON CHRONIC DIALYSIS (HCC): ICD-10-CM

## 2018-04-12 DIAGNOSIS — N18.5 STAGE 5 CHRONIC KIDNEY DISEASE (HCC): ICD-10-CM

## 2018-04-12 DIAGNOSIS — J42 CHRONIC BRONCHITIS, UNSPECIFIED CHRONIC BRONCHITIS TYPE (HCC): ICD-10-CM

## 2018-04-12 DIAGNOSIS — N25.81 SECONDARY HYPERPARATHYROIDISM (HCC): ICD-10-CM

## 2018-04-12 DIAGNOSIS — D63.1 ANEMIA IN STAGE 5 CHRONIC KIDNEY DISEASE, NOT ON CHRONIC DIALYSIS (HCC): ICD-10-CM

## 2018-04-12 PROCEDURE — 99214 OFFICE O/P EST MOD 30 MIN: CPT | Performed by: INTERNAL MEDICINE

## 2018-04-12 NOTE — ASSESSMENT & PLAN NOTE
Patient continues with dyspnea on exertion, states at baseline, she reports she quit smoking about 1.5 years ago.  On 2 lit NC continuous.

## 2018-04-12 NOTE — PROGRESS NOTES
Chief Complaint   Patient presents with   • Hypertension     Labs       HISTORY OF PRESENT ILLNESS: Patient is a 73 y.o. female established patient who presents today to discuss the medical issues below.    Anemia in CKD (chronic kidney disease)  At baseline, following with nephrology, no current need for epogen.     Secondary hyperparathyroidism (CMS-HCC)  Continues with nephrology.      Non-rheumatic mitral regurgitation  Saw [facility] who has set her up for prn follow up felt stable.      COPD (chronic obstructive pulmonary disease) (CMS-HCC)  Patient continues with dyspnea on exertion, states at baseline, she reports she quit smoking about 1.5 years ago.  On 2 lit NC continuous.      Stage 5 chronic kidney disease (CMS-HCC)  Patient reports she missed her last nephrology appointment in march due to transportation issues, has appointment in May.  Complains of urticaria, appetite is ok, some weight loss.  Minimal edema.      Essential hypertension  Patient stable on the hydralazine and isosorbide.        Patient Active Problem List    Diagnosis Date Noted   • Stage 5 chronic kidney disease (CMS-HCC) 08/18/2017     Priority: High   • Anemia in CKD (chronic kidney disease) 07/07/2017     Priority: High   • Respiratory failure (CMS-HCC) 11/16/2016     Priority: High   • Chronic respiratory failure with hypoxia (CMS-HCC) 11/15/2016     Priority: Medium   • GIB (gastrointestinal bleeding) 11/14/2016     Priority: Medium   • Metabolic acidosis 11/14/2016     Priority: Medium   • HLD (hyperlipidemia) 11/16/2016     Priority: Low   • Gastritis 11/16/2016     Priority: Low   • COPD (chronic obstructive pulmonary disease) (CMS-HCC) 11/15/2016     Priority: Low   • Tobacco abuse, in remission 11/12/2015     Priority: Low   • Essential hypertension 09/18/2009     Priority: Low   • SOB (shortness of breath) 11/10/2017   • Secondary hyperparathyroidism (CMS-HCC) 09/15/2017   • Non-rheumatic mitral regurgitation  11/30/2016   • HLD (hyperlipidemia) 11/12/2015   • Vitamin D deficiency 11/12/2015   • PAD (peripheral artery disease)-saw a vascular surgeon several years ago who indicated stents would be needed 10/21/2009   • Peripheral edema 09/18/2009   • Migraines, neuralgic 09/18/2009   • Osteoarthritis 09/18/2009       Allergies:Patient has no known allergies.    Current Outpatient Prescriptions   Medication Sig Dispense Refill   • ipratropium-albuterol (DUONEB) 0.5-2.5 (3) MG/3ML nebulizer solution 3 mL by Nebulization route every 6 hours as needed for Shortness of Breath. 120 Vial 0   • lovastatin (MEVACOR) 10 MG tablet TAKE 1 TABLET BY MOUTH ONCE DAILY 90 Tab 0   • calcitRIOL (ROCALTROL) 0.25 MCG Cap Take 3 Caps by mouth every day. 90 Cap 11   • furosemide (LASIX) 40 MG Tab Take 2 Tabs by mouth 2 Times a Day. TWICE DAILY 360 Tab 5   • hydrALAZINE (APRESOLINE) 100 MG tablet Take 100 mg by mouth every day.     • vitamin D, Ergocalciferol, (DRISDOL) 61554 units Cap capsule Take 50,000 Units by mouth every Sunday.     • isosorbide mononitrate (IMDUR) 120 MG CR tablet Take 1 Tab by mouth every day. 90 Tab 3   • ferrous sulfate 325 (65 FE) MG tablet Take 1 Tab by mouth 3 times a day, with meals. 90 Tab 0   • ascorbic acid (VITAMIN C) 500 MG tablet Take 1 Tab by mouth 3 times a day. 90 Tab 3   • multivitamin (THERAGRAN) Tab Take 1 Tab by mouth every day. 90 Tab 0   • calcium carbonate (TUMS) 500 MG Chew Tab Take 1 Tab by mouth 2 Times a Day. 60 Tab 3   • cyanocobalamin 100 MCG Tab Take 100 mcg by mouth every day. 30 Tab 3     No current facility-administered medications for this visit.          Past Medical History:   Diagnosis Date   • Anemia 11/11/2016   • Arthritis     osteo-knees   • Breath shortness     02 2 LTR cont   • Bronchitis    • COPD (chronic obstructive pulmonary disease) (CMS-HCC) 9/18/2009   • Dental disorder     missing teeth   • Emphysema of lung (CMS-HCC)    • Epilepsy (CMS-HCC) 9/18/2009   • Heart valve  "disease     mitral valve clip   • Jaundice     at birth   • Migraines, neuralgic 2009   • Osteoarthritis 2009   • Oxygen dependent     2 LTR cont   • Peripheral edema 2009   • Renal insufficiency 2016    ESRD   • Stroke (CMS-HCC)    • Tobacco use disorder 2009   • Unspecified essential hypertension 2009       Social History   Substance Use Topics   • Smoking status: Former Smoker     Packs/day: 1.00     Years: 40.00     Types: Cigarettes     Quit date: 2016   • Smokeless tobacco: Never Used   • Alcohol use No       Family Status   Relation Status   • Mother    • Father    • Sister Alive    lumpectomy     Family History   Problem Relation Age of Onset   • Cancer Mother      breast   • Heart Disease Father        ROS:    Respiratory: Negative for cough, sputum production, or wheezing.    Cardiovascular: Negative for chest pain, palpitations, orthopnea, dyspnea with exertion or edema.   Gastrointestinal: Negative for GI upset, nausea, vomiting, abdominal pain, constipation or diarrhea.   Genitourinary: Negative for dysuria, urgency, hesitancy or frequency.       Exam:    Blood pressure 128/64, pulse 86, temperature 37.1 °C (98.8 °F), resp. rate 18, height 1.562 m (5' 1.5\"), weight 68.5 kg (151 lb), SpO2 91 %.  General:  Well nourished, well developed female in NAD.  HENT: Normocephalic, bilateral TMs are intact, nasal and oral mucosa with no lesions,   Neck: Supple without bruit. Thyroid is not enlarged.  Pulmonary: Vesicular breath sounds decreased at the bases bilaterally but no rhonchi rales wheezes  Cardiovascular: Regular rate and rhythm 1/6 systolic early murmur right sternal margin  Abdomen: Normal bowel sounds soft and nontender no palpable liver spleen bladder mass.  Extremities: Trace edema. Right arm fistula with moderate bruit   Neuro: Grossly nonfocal.  Psych: Alert and oriented to person, place, and time. Appropriate mood and conversation.    LABS: " Results reviewed and discussed with the patient, questions answered.      This dictation was created using voice recognition software. I have made reasonable attempts to correct errors, however, errors of grammar and content may exist.          Assessment/Plan:    1. Anemia in stage 5 chronic kidney disease, not on chronic dialysis (CMS-HCC)  H&H is remaining stable, nephrology does not feel E Catherine indicated at this time. Ongoing monitoring    2. Secondary hyperparathyroidism (CMS-HCC)  Elevated PTH clinically stable    3. Non-rheumatic mitral regurgitation  Murmur stable cardiology continued follow-up     4. Chronic bronchitis, unspecified chronic bronchitis type (CMS-HCC)  Continuous oxygen therapy she is utilizing her duo nebs when necessary at baseline ongoing monitoring    5. Stage 5 chronic kidney disease (CMS-HCC)  GFR of 9 clinically no overt congestive failure symptoms. She is pretty uncomfortable with the urticaria but oxygen saturation is maintaining well. She does have a follow-up with nephrology next month. Her fistula is well formed.    6. Essential hypertension  Well-controlled, continues to follow with nephrology as well.       Patient was seen for  25 minutes face to face of which more than 50% of the time was spent in counseling and coordination of care regarding the above problems.

## 2018-04-12 NOTE — ASSESSMENT & PLAN NOTE
Patient reports she missed her last nephrology appointment in march due to transportation issues, has appointment in May.  Complains of urticaria, appetite is ok, some weight loss.  Minimal edema.

## 2018-04-19 ENCOUNTER — TELEPHONE (OUTPATIENT)
Dept: NEPHROLOGY | Facility: MEDICAL CENTER | Age: 73
End: 2018-04-19

## 2018-04-19 NOTE — TELEPHONE ENCOUNTER
Pt called and has an appt for May 4th and wants to know if she needs new labs to do last one were March 15,2018  Thank you

## 2018-04-20 DIAGNOSIS — N18.5 STAGE 5 CHRONIC KIDNEY DISEASE (HCC): ICD-10-CM

## 2018-04-27 ENCOUNTER — HOSPITAL ENCOUNTER (OUTPATIENT)
Dept: LAB | Facility: MEDICAL CENTER | Age: 73
End: 2018-04-27
Attending: INTERNAL MEDICINE
Payer: MEDICARE

## 2018-04-27 DIAGNOSIS — N18.5 STAGE 5 CHRONIC KIDNEY DISEASE (HCC): ICD-10-CM

## 2018-04-27 LAB
25(OH)D3 SERPL-MCNC: 28 NG/ML (ref 30–100)
ANION GAP SERPL CALC-SCNC: 14 MMOL/L (ref 0–11.9)
BUN SERPL-MCNC: 67 MG/DL (ref 8–22)
CALCIUM SERPL-MCNC: 10.5 MG/DL (ref 8.5–10.5)
CHLORIDE SERPL-SCNC: 98 MMOL/L (ref 96–112)
CO2 SERPL-SCNC: 25 MMOL/L (ref 20–33)
CREAT SERPL-MCNC: 4.08 MG/DL (ref 0.5–1.4)
ERYTHROCYTE [DISTWIDTH] IN BLOOD BY AUTOMATED COUNT: 45.9 FL (ref 35.9–50)
GLUCOSE SERPL-MCNC: 87 MG/DL (ref 65–99)
HCT VFR BLD AUTO: 35.4 % (ref 37–47)
HGB BLD-MCNC: 11.8 G/DL (ref 12–16)
MCH RBC QN AUTO: 29.9 PG (ref 27–33)
MCHC RBC AUTO-ENTMCNC: 33.3 G/DL (ref 33.6–35)
MCV RBC AUTO: 89.8 FL (ref 81.4–97.8)
PHOSPHATE SERPL-MCNC: 3.7 MG/DL (ref 2.5–4.5)
PLATELET # BLD AUTO: 192 K/UL (ref 164–446)
PMV BLD AUTO: 11.9 FL (ref 9–12.9)
POTASSIUM SERPL-SCNC: 3.1 MMOL/L (ref 3.6–5.5)
PTH-INTACT SERPL-MCNC: 163.1 PG/ML (ref 14–72)
RBC # BLD AUTO: 3.94 M/UL (ref 4.2–5.4)
SODIUM SERPL-SCNC: 137 MMOL/L (ref 135–145)
WBC # BLD AUTO: 6.2 K/UL (ref 4.8–10.8)

## 2018-04-27 PROCEDURE — 83970 ASSAY OF PARATHORMONE: CPT

## 2018-04-27 PROCEDURE — 84100 ASSAY OF PHOSPHORUS: CPT

## 2018-04-27 PROCEDURE — 36415 COLL VENOUS BLD VENIPUNCTURE: CPT

## 2018-04-27 PROCEDURE — 82306 VITAMIN D 25 HYDROXY: CPT

## 2018-04-27 PROCEDURE — 85027 COMPLETE CBC AUTOMATED: CPT

## 2018-04-27 PROCEDURE — 80048 BASIC METABOLIC PNL TOTAL CA: CPT

## 2018-04-30 ENCOUNTER — HOSPITAL ENCOUNTER (OUTPATIENT)
Facility: MEDICAL CENTER | Age: 73
End: 2018-04-30
Attending: INTERNAL MEDICINE
Payer: MEDICARE

## 2018-04-30 ENCOUNTER — HOSPITAL ENCOUNTER (OUTPATIENT)
Dept: LAB | Facility: MEDICAL CENTER | Age: 73
End: 2018-04-30
Attending: INTERNAL MEDICINE
Payer: MEDICARE

## 2018-04-30 LAB
CREAT UR-MCNC: 71.8 MG/DL
MICROALBUMIN UR-MCNC: 12.7 MG/DL
MICROALBUMIN/CREAT UR: 177 MG/G (ref 0–30)

## 2018-04-30 PROCEDURE — 82043 UR ALBUMIN QUANTITATIVE: CPT

## 2018-04-30 PROCEDURE — 82570 ASSAY OF URINE CREATININE: CPT

## 2018-05-04 ENCOUNTER — OFFICE VISIT (OUTPATIENT)
Dept: NEPHROLOGY | Facility: MEDICAL CENTER | Age: 73
End: 2018-05-04
Payer: MEDICARE

## 2018-05-04 VITALS
RESPIRATION RATE: 14 BRPM | HEIGHT: 62 IN | SYSTOLIC BLOOD PRESSURE: 108 MMHG | DIASTOLIC BLOOD PRESSURE: 58 MMHG | BODY MASS INDEX: 27.6 KG/M2 | HEART RATE: 78 BPM | OXYGEN SATURATION: 87 % | WEIGHT: 150 LBS | TEMPERATURE: 98.6 F

## 2018-05-04 DIAGNOSIS — D63.1 ANEMIA IN STAGE 5 CHRONIC KIDNEY DISEASE, NOT ON CHRONIC DIALYSIS (HCC): ICD-10-CM

## 2018-05-04 DIAGNOSIS — E87.6 HYPOKALEMIA: ICD-10-CM

## 2018-05-04 DIAGNOSIS — N18.5 ANEMIA IN STAGE 5 CHRONIC KIDNEY DISEASE, NOT ON CHRONIC DIALYSIS (HCC): ICD-10-CM

## 2018-05-04 DIAGNOSIS — N18.5 STAGE 5 CHRONIC KIDNEY DISEASE (HCC): ICD-10-CM

## 2018-05-04 PROCEDURE — 99213 OFFICE O/P EST LOW 20 MIN: CPT | Performed by: INTERNAL MEDICINE

## 2018-05-04 ASSESSMENT — ENCOUNTER SYMPTOMS
CHILLS: 0
FEVER: 0
PALPITATIONS: 0

## 2018-05-04 NOTE — PROGRESS NOTES
"Subjective:      Kristyn Gillespie is a 73 y.o. female who presents with CKD 5 Follow-Up            HPI  72 year old with CKD V, has an AVF that is maturing    1. CKD  5 - Cr stable, feeling a bit tired, otherwise no other issues.  2. HTN - BP controlled  3. Secondary HPTH -PTH elevated, on rocaltrol  4. SOB - stable    Review of Systems   Constitutional: Negative for chills and fever.   Cardiovascular: Negative for chest pain and palpitations.   All other systems reviewed and are negative.         Objective:     /58   Pulse 78   Temp 37 °C (98.6 °F)   Resp 14   Ht 1.575 m (5' 2\")   Wt 68 kg (150 lb)   SpO2 (!) 87%   BMI 27.44 kg/m²      Physical Exam   Constitutional: She is oriented to person, place, and time. She appears well-developed and well-nourished.   HENT:   Head: Normocephalic and atraumatic.   Cardiovascular: Normal rate and regular rhythm.    Pulmonary/Chest: Effort normal and breath sounds normal.   Neurological: She is alert and oriented to person, place, and time.   Skin: Skin is warm and dry.   Psychiatric: She has a normal mood and affect. Her behavior is normal.               Assessment/Plan:     1. Stage 5 chronic kidney disease (HCC)  Check labs, will refer to Dr. Romero regarding access    - MICROALBUMIN CREAT RATIO URINE; Future  - PHOSPHORUS; Future  - PTH INTACT (PTH ONLY); Future  - BASIC METABOLIC PANEL; Future  - CBC WITHOUT DIFFERENTIAL; Future    2. Anemia in stage 5 chronic kidney disease, not on chronic dialysis (HCC)  Hgb 11.8      3. Hypokalemia  Stop K restriction in the diet      "

## 2018-08-02 DIAGNOSIS — I34.0 NON-RHEUMATIC MITRAL REGURGITATION: ICD-10-CM

## 2018-08-02 DIAGNOSIS — I73.9 PAD (PERIPHERAL ARTERY DISEASE) (HCC): ICD-10-CM

## 2018-08-02 DIAGNOSIS — E78.5 HYPERLIPIDEMIA, UNSPECIFIED HYPERLIPIDEMIA TYPE: ICD-10-CM

## 2018-08-02 RX ORDER — LOVASTATIN 10 MG/1
TABLET ORAL
Qty: 90 TAB | Refills: 3 | Status: SHIPPED | OUTPATIENT
Start: 2018-08-02 | End: 2018-11-30 | Stop reason: CLARIF

## 2018-08-02 RX ORDER — ISOSORBIDE MONONITRATE 120 MG/1
TABLET, EXTENDED RELEASE ORAL
Qty: 90 TAB | Refills: 3 | Status: SHIPPED | OUTPATIENT
Start: 2018-08-02 | End: 2018-11-21

## 2018-08-02 NOTE — TELEPHONE ENCOUNTER
Was the patient seen in the last year in this department? Yes    Does patient have an active prescription for medications requested? No     Received Request Via: Pharmacy     Last OV 4/12/1/8, last labs 4/27/18

## 2018-10-21 ENCOUNTER — APPOINTMENT (OUTPATIENT)
Dept: RADIOLOGY | Facility: MEDICAL CENTER | Age: 73
DRG: 673 | End: 2018-10-21
Attending: EMERGENCY MEDICINE
Payer: MEDICARE

## 2018-10-21 ENCOUNTER — HOSPITAL ENCOUNTER (INPATIENT)
Facility: MEDICAL CENTER | Age: 73
LOS: 4 days | DRG: 673 | End: 2018-10-25
Attending: EMERGENCY MEDICINE | Admitting: HOSPITALIST
Payer: MEDICARE

## 2018-10-21 DIAGNOSIS — N18.5 STAGE 5 CHRONIC KIDNEY DISEASE (HCC): ICD-10-CM

## 2018-10-21 DIAGNOSIS — L03.116 BILATERAL CELLULITIS OF LOWER LEG: ICD-10-CM

## 2018-10-21 DIAGNOSIS — J81.0 ACUTE PULMONARY EDEMA (HCC): ICD-10-CM

## 2018-10-21 DIAGNOSIS — N18.9 CHRONIC KIDNEY DISEASE, UNSPECIFIED CKD STAGE: ICD-10-CM

## 2018-10-21 DIAGNOSIS — L03.115 BILATERAL CELLULITIS OF LOWER LEG: ICD-10-CM

## 2018-10-21 DIAGNOSIS — E83.52 HYPERCALCEMIA: ICD-10-CM

## 2018-10-21 DIAGNOSIS — E87.6 HYPOKALEMIA: ICD-10-CM

## 2018-10-21 PROBLEM — L53.9 LEG ERYTHEMA: Status: ACTIVE | Noted: 2018-10-21

## 2018-10-21 PROBLEM — J96.21 ACUTE ON CHRONIC RESPIRATORY FAILURE WITH HYPOXIA (HCC): Status: ACTIVE | Noted: 2018-10-21

## 2018-10-21 LAB
ALBUMIN SERPL BCP-MCNC: 4.1 G/DL (ref 3.2–4.9)
ALBUMIN/GLOB SERPL: 1.1 G/DL
ALP SERPL-CCNC: 97 U/L (ref 30–99)
ALT SERPL-CCNC: 13 U/L (ref 2–50)
ANION GAP SERPL CALC-SCNC: 18 MMOL/L (ref 0–11.9)
AST SERPL-CCNC: 23 U/L (ref 12–45)
BASOPHILS # BLD AUTO: 0.5 % (ref 0–1.8)
BASOPHILS # BLD AUTO: 0.7 % (ref 0–1.8)
BASOPHILS # BLD: 0.04 K/UL (ref 0–0.12)
BASOPHILS # BLD: 0.06 K/UL (ref 0–0.12)
BILIRUB SERPL-MCNC: 0.5 MG/DL (ref 0.1–1.5)
BNP SERPL-MCNC: 896 PG/ML (ref 0–100)
BUN SERPL-MCNC: 61 MG/DL (ref 8–22)
CALCIUM SERPL-MCNC: 12.5 MG/DL (ref 8.5–10.5)
CHLORIDE SERPL-SCNC: 91 MMOL/L (ref 96–112)
CO2 SERPL-SCNC: 26 MMOL/L (ref 20–33)
CREAT SERPL-MCNC: 4.92 MG/DL (ref 0.5–1.4)
EKG IMPRESSION: NORMAL
EOSINOPHIL # BLD AUTO: 0.17 K/UL (ref 0–0.51)
EOSINOPHIL # BLD AUTO: 0.19 K/UL (ref 0–0.51)
EOSINOPHIL NFR BLD: 2 % (ref 0–6.9)
EOSINOPHIL NFR BLD: 2.4 % (ref 0–6.9)
ERYTHROCYTE [DISTWIDTH] IN BLOOD BY AUTOMATED COUNT: 45.7 FL (ref 35.9–50)
ERYTHROCYTE [DISTWIDTH] IN BLOOD BY AUTOMATED COUNT: 45.9 FL (ref 35.9–50)
GLOBULIN SER CALC-MCNC: 3.7 G/DL (ref 1.9–3.5)
GLUCOSE SERPL-MCNC: 141 MG/DL (ref 65–99)
HCT VFR BLD AUTO: 32.7 % (ref 37–47)
HCT VFR BLD AUTO: 36.7 % (ref 37–47)
HGB BLD-MCNC: 11.2 G/DL (ref 12–16)
HGB BLD-MCNC: 12.9 G/DL (ref 12–16)
IMM GRANULOCYTES # BLD AUTO: 0.03 K/UL (ref 0–0.11)
IMM GRANULOCYTES # BLD AUTO: 0.03 K/UL (ref 0–0.11)
IMM GRANULOCYTES NFR BLD AUTO: 0.4 % (ref 0–0.9)
IMM GRANULOCYTES NFR BLD AUTO: 0.4 % (ref 0–0.9)
LYMPHOCYTES # BLD AUTO: 1.39 K/UL (ref 1–4.8)
LYMPHOCYTES # BLD AUTO: 1.76 K/UL (ref 1–4.8)
LYMPHOCYTES NFR BLD: 17.4 % (ref 22–41)
LYMPHOCYTES NFR BLD: 21 % (ref 22–41)
MCH RBC QN AUTO: 30.7 PG (ref 27–33)
MCH RBC QN AUTO: 31.2 PG (ref 27–33)
MCHC RBC AUTO-ENTMCNC: 34.3 G/DL (ref 33.6–35)
MCHC RBC AUTO-ENTMCNC: 35.1 G/DL (ref 33.6–35)
MCV RBC AUTO: 88.9 FL (ref 81.4–97.8)
MCV RBC AUTO: 89.6 FL (ref 81.4–97.8)
MONOCYTES # BLD AUTO: 0.92 K/UL (ref 0–0.85)
MONOCYTES # BLD AUTO: 1.01 K/UL (ref 0–0.85)
MONOCYTES NFR BLD AUTO: 11.5 % (ref 0–13.4)
MONOCYTES NFR BLD AUTO: 12.1 % (ref 0–13.4)
NEUTROPHILS # BLD AUTO: 5.34 K/UL (ref 2–7.15)
NEUTROPHILS # BLD AUTO: 5.44 K/UL (ref 2–7.15)
NEUTROPHILS NFR BLD: 63.8 % (ref 44–72)
NEUTROPHILS NFR BLD: 67.8 % (ref 44–72)
NRBC # BLD AUTO: 0 K/UL
NRBC # BLD AUTO: 0 K/UL
NRBC BLD-RTO: 0 /100 WBC
NRBC BLD-RTO: 0 /100 WBC
PLATELET # BLD AUTO: 182 K/UL (ref 164–446)
PLATELET # BLD AUTO: 208 K/UL (ref 164–446)
PMV BLD AUTO: 10.5 FL (ref 9–12.9)
PMV BLD AUTO: 10.9 FL (ref 9–12.9)
POTASSIUM SERPL-SCNC: 2.5 MMOL/L (ref 3.6–5.5)
PROT SERPL-MCNC: 7.8 G/DL (ref 6–8.2)
RBC # BLD AUTO: 3.65 M/UL (ref 4.2–5.4)
RBC # BLD AUTO: 4.13 M/UL (ref 4.2–5.4)
SODIUM SERPL-SCNC: 135 MMOL/L (ref 135–145)
TROPONIN I SERPL-MCNC: 0.27 NG/ML (ref 0–0.04)
WBC # BLD AUTO: 8 K/UL (ref 4.8–10.8)
WBC # BLD AUTO: 8.4 K/UL (ref 4.8–10.8)

## 2018-10-21 PROCEDURE — 84484 ASSAY OF TROPONIN QUANT: CPT

## 2018-10-21 PROCEDURE — A9270 NON-COVERED ITEM OR SERVICE: HCPCS | Performed by: HOSPITALIST

## 2018-10-21 PROCEDURE — A9270 NON-COVERED ITEM OR SERVICE: HCPCS | Performed by: FAMILY MEDICINE

## 2018-10-21 PROCEDURE — 700111 HCHG RX REV CODE 636 W/ 250 OVERRIDE (IP): Performed by: HOSPITALIST

## 2018-10-21 PROCEDURE — 93005 ELECTROCARDIOGRAM TRACING: CPT | Performed by: EMERGENCY MEDICINE

## 2018-10-21 PROCEDURE — 700102 HCHG RX REV CODE 250 W/ 637 OVERRIDE(OP): Performed by: HOSPITALIST

## 2018-10-21 PROCEDURE — 304561 HCHG STAT O2

## 2018-10-21 PROCEDURE — 36415 COLL VENOUS BLD VENIPUNCTURE: CPT

## 2018-10-21 PROCEDURE — 80048 BASIC METABOLIC PNL TOTAL CA: CPT

## 2018-10-21 PROCEDURE — 83880 ASSAY OF NATRIURETIC PEPTIDE: CPT

## 2018-10-21 PROCEDURE — 700102 HCHG RX REV CODE 250 W/ 637 OVERRIDE(OP): Performed by: FAMILY MEDICINE

## 2018-10-21 PROCEDURE — 94760 N-INVAS EAR/PLS OXIMETRY 1: CPT

## 2018-10-21 PROCEDURE — 770020 HCHG ROOM/CARE - TELE (206)

## 2018-10-21 PROCEDURE — 99285 EMERGENCY DEPT VISIT HI MDM: CPT

## 2018-10-21 PROCEDURE — 700111 HCHG RX REV CODE 636 W/ 250 OVERRIDE (IP): Performed by: EMERGENCY MEDICINE

## 2018-10-21 PROCEDURE — 85025 COMPLETE CBC W/AUTO DIFF WBC: CPT | Mod: 91

## 2018-10-21 PROCEDURE — 80053 COMPREHEN METABOLIC PANEL: CPT

## 2018-10-21 PROCEDURE — 87040 BLOOD CULTURE FOR BACTERIA: CPT | Mod: 91

## 2018-10-21 PROCEDURE — 99223 1ST HOSP IP/OBS HIGH 75: CPT | Mod: AI | Performed by: HOSPITALIST

## 2018-10-21 PROCEDURE — 71045 X-RAY EXAM CHEST 1 VIEW: CPT

## 2018-10-21 RX ORDER — FUROSEMIDE 10 MG/ML
80 INJECTION INTRAMUSCULAR; INTRAVENOUS
Status: DISCONTINUED | OUTPATIENT
Start: 2018-10-22 | End: 2018-10-23

## 2018-10-21 RX ORDER — CALCIUM CARBONATE 500 MG/1
500 TABLET, CHEWABLE ORAL 2 TIMES DAILY
Status: DISCONTINUED | OUTPATIENT
Start: 2018-10-21 | End: 2018-10-21

## 2018-10-21 RX ORDER — FERROUS SULFATE 325(65) MG
325 TABLET ORAL DAILY
Status: ON HOLD | COMMUNITY
End: 2019-01-04

## 2018-10-21 RX ORDER — CALCITRIOL 0.25 UG/1
0.5 CAPSULE, LIQUID FILLED ORAL DAILY
COMMUNITY
End: 2018-11-07

## 2018-10-21 RX ORDER — ONDANSETRON 2 MG/ML
4 INJECTION INTRAMUSCULAR; INTRAVENOUS EVERY 4 HOURS PRN
Status: DISCONTINUED | OUTPATIENT
Start: 2018-10-21 | End: 2018-10-22

## 2018-10-21 RX ORDER — SODIUM CHLORIDE 9 MG/ML
INJECTION, SOLUTION INTRAVENOUS
Status: ACTIVE
Start: 2018-10-21 | End: 2018-10-22

## 2018-10-21 RX ORDER — UBIDECARENONE 75 MG
100 CAPSULE ORAL DAILY
Status: DISCONTINUED | OUTPATIENT
Start: 2018-10-22 | End: 2018-10-25 | Stop reason: HOSPADM

## 2018-10-21 RX ORDER — POTASSIUM CHLORIDE 7.45 MG/ML
10 INJECTION INTRAVENOUS
Status: DISPENSED | OUTPATIENT
Start: 2018-10-21 | End: 2018-10-21

## 2018-10-21 RX ORDER — LOVASTATIN 20 MG/1
10 TABLET ORAL
Status: DISCONTINUED | OUTPATIENT
Start: 2018-10-21 | End: 2018-10-25 | Stop reason: HOSPADM

## 2018-10-21 RX ORDER — HEPARIN SODIUM 5000 [USP'U]/ML
5000 INJECTION, SOLUTION INTRAVENOUS; SUBCUTANEOUS EVERY 8 HOURS
Status: DISCONTINUED | OUTPATIENT
Start: 2018-10-21 | End: 2018-10-22

## 2018-10-21 RX ORDER — ACETAMINOPHEN 325 MG/1
650 TABLET ORAL EVERY 6 HOURS PRN
Status: DISCONTINUED | OUTPATIENT
Start: 2018-10-21 | End: 2018-10-25 | Stop reason: HOSPADM

## 2018-10-21 RX ORDER — FERROUS SULFATE 325(65) MG
325 TABLET ORAL DAILY
Status: DISCONTINUED | OUTPATIENT
Start: 2018-10-22 | End: 2018-10-25 | Stop reason: HOSPADM

## 2018-10-21 RX ORDER — ONDANSETRON 4 MG/1
4 TABLET, ORALLY DISINTEGRATING ORAL EVERY 4 HOURS PRN
Status: DISCONTINUED | OUTPATIENT
Start: 2018-10-21 | End: 2018-10-22

## 2018-10-21 RX ORDER — IPRATROPIUM BROMIDE AND ALBUTEROL SULFATE 2.5; .5 MG/3ML; MG/3ML
3 SOLUTION RESPIRATORY (INHALATION) EVERY 6 HOURS PRN
Status: DISCONTINUED | OUTPATIENT
Start: 2018-10-21 | End: 2018-10-25 | Stop reason: HOSPADM

## 2018-10-21 RX ORDER — POLYETHYLENE GLYCOL 3350 17 G/17G
1 POWDER, FOR SOLUTION ORAL
Status: DISCONTINUED | OUTPATIENT
Start: 2018-10-21 | End: 2018-10-25 | Stop reason: HOSPADM

## 2018-10-21 RX ORDER — ISOSORBIDE MONONITRATE 60 MG/1
120 TABLET, EXTENDED RELEASE ORAL DAILY
Status: DISCONTINUED | OUTPATIENT
Start: 2018-10-22 | End: 2018-10-25 | Stop reason: HOSPADM

## 2018-10-21 RX ORDER — BISACODYL 10 MG
10 SUPPOSITORY, RECTAL RECTAL
Status: DISCONTINUED | OUTPATIENT
Start: 2018-10-21 | End: 2018-10-25 | Stop reason: HOSPADM

## 2018-10-21 RX ORDER — AMOXICILLIN 250 MG
2 CAPSULE ORAL 2 TIMES DAILY
Status: DISCONTINUED | OUTPATIENT
Start: 2018-10-21 | End: 2018-10-25 | Stop reason: HOSPADM

## 2018-10-21 RX ORDER — HYDRALAZINE HYDROCHLORIDE 50 MG/1
100 TABLET, FILM COATED ORAL DAILY
Status: DISCONTINUED | OUTPATIENT
Start: 2018-10-22 | End: 2018-10-25 | Stop reason: HOSPADM

## 2018-10-21 RX ORDER — FUROSEMIDE 40 MG/1
40-80 TABLET ORAL 3 TIMES DAILY
Status: ON HOLD | COMMUNITY
End: 2018-10-25

## 2018-10-21 RX ORDER — DIPHENHYDRAMINE HCL 25 MG
25 TABLET ORAL ONCE
Status: COMPLETED | OUTPATIENT
Start: 2018-10-21 | End: 2018-10-21

## 2018-10-21 RX ORDER — FUROSEMIDE 10 MG/ML
80 INJECTION INTRAMUSCULAR; INTRAVENOUS ONCE
Status: COMPLETED | OUTPATIENT
Start: 2018-10-21 | End: 2018-10-21

## 2018-10-21 RX ORDER — CALCITRIOL 0.5 UG/1
0.5 CAPSULE, LIQUID FILLED ORAL DAILY
Status: DISCONTINUED | OUTPATIENT
Start: 2018-10-22 | End: 2018-10-23

## 2018-10-21 RX ADMIN — LOVASTATIN 10 MG: 20 TABLET ORAL at 10:00

## 2018-10-21 RX ADMIN — POTASSIUM CHLORIDE 10 MEQ: 7.46 INJECTION, SOLUTION INTRAVENOUS at 21:12

## 2018-10-21 RX ADMIN — HEPARIN SODIUM 5000 UNITS: 5000 INJECTION, SOLUTION INTRAVENOUS; SUBCUTANEOUS at 23:15

## 2018-10-21 RX ADMIN — DIPHENHYDRAMINE HCL 25 MG: 25 TABLET ORAL at 23:14

## 2018-10-21 RX ADMIN — FUROSEMIDE 80 MG: 10 INJECTION, SOLUTION INTRAMUSCULAR; INTRAVENOUS at 21:11

## 2018-10-21 ASSESSMENT — ENCOUNTER SYMPTOMS
PSYCHIATRIC NEGATIVE: 1
SHORTNESS OF BREATH: 1
CARDIOVASCULAR NEGATIVE: 1
MUSCULOSKELETAL NEGATIVE: 1
NEUROLOGICAL NEGATIVE: 1
EYES NEGATIVE: 1
NAUSEA: 1

## 2018-10-21 ASSESSMENT — PATIENT HEALTH QUESTIONNAIRE - PHQ9
SUM OF ALL RESPONSES TO PHQ9 QUESTIONS 1 AND 2: 0
1. LITTLE INTEREST OR PLEASURE IN DOING THINGS: NOT AT ALL
SUM OF ALL RESPONSES TO PHQ9 QUESTIONS 1 AND 2: 0
2. FEELING DOWN, DEPRESSED, IRRITABLE, OR HOPELESS: NOT AT ALL
1. LITTLE INTEREST OR PLEASURE IN DOING THINGS: NOT AT ALL
2. FEELING DOWN, DEPRESSED, IRRITABLE, OR HOPELESS: NOT AT ALL

## 2018-10-21 ASSESSMENT — COGNITIVE AND FUNCTIONAL STATUS - GENERAL
SUGGESTED CMS G CODE MODIFIER MOBILITY: CH
DAILY ACTIVITIY SCORE: 24
MOBILITY SCORE: 24
SUGGESTED CMS G CODE MODIFIER DAILY ACTIVITY: CH

## 2018-10-21 ASSESSMENT — PAIN SCALES - GENERAL
PAINLEVEL_OUTOF10: 0
PAINLEVEL_OUTOF10: 0

## 2018-10-21 ASSESSMENT — COPD QUESTIONNAIRES
DO YOU EVER COUGH UP ANY MUCUS OR PHLEGM?: NO/ONLY WITH OCCASIONAL COLDS OR INFECTIONS
HAVE YOU SMOKED AT LEAST 100 CIGARETTES IN YOUR ENTIRE LIFE: YES
DURING THE PAST 4 WEEKS HOW MUCH DID YOU FEEL SHORT OF BREATH: SOME OF THE TIME
COPD SCREENING SCORE: 6
IN THE PAST 12 MONTHS DO YOU DO LESS THAN YOU USED TO BECAUSE OF YOUR BREATHING PROBLEMS: AGREE

## 2018-10-21 ASSESSMENT — LIFESTYLE VARIABLES: ALCOHOL_USE: NO

## 2018-10-21 NOTE — ED TRIAGE NOTES
"Chief Complaint   Patient presents with   • N/V   • Itching     Pt reports nausea/vomiting and itching. Pt reports Hx Kidney failure. States that she has yet to receive dialysis but has a fistula. Pt reports increased SOB x 1 week.   Blood pressure 116/56, pulse 65, temperature 36.5 °C (97.7 °F), resp. rate 20, height 1.702 m (5' 7\").    Pt informed of wait times. Educated on triage process.  Asked to return to triage RN for any new or worsening of symptoms. Thanked for patience.        "

## 2018-10-22 ENCOUNTER — APPOINTMENT (OUTPATIENT)
Dept: RADIOLOGY | Facility: MEDICAL CENTER | Age: 73
DRG: 673 | End: 2018-10-22
Attending: INTERNAL MEDICINE
Payer: MEDICARE

## 2018-10-22 LAB
ANION GAP SERPL CALC-SCNC: 12 MMOL/L (ref 0–11.9)
ANION GAP SERPL CALC-SCNC: 14 MMOL/L (ref 0–11.9)
BUN SERPL-MCNC: 59 MG/DL (ref 8–22)
BUN SERPL-MCNC: 60 MG/DL (ref 8–22)
CALCIUM SERPL-MCNC: 11.5 MG/DL (ref 8.5–10.5)
CALCIUM SERPL-MCNC: 11.6 MG/DL (ref 8.5–10.5)
CHLORIDE SERPL-SCNC: 93 MMOL/L (ref 96–112)
CHLORIDE SERPL-SCNC: 94 MMOL/L (ref 96–112)
CO2 SERPL-SCNC: 28 MMOL/L (ref 20–33)
CO2 SERPL-SCNC: 29 MMOL/L (ref 20–33)
CREAT SERPL-MCNC: 4.85 MG/DL (ref 0.5–1.4)
CREAT SERPL-MCNC: 4.96 MG/DL (ref 0.5–1.4)
EKG IMPRESSION: NORMAL
GLUCOSE SERPL-MCNC: 99 MG/DL (ref 65–99)
GLUCOSE SERPL-MCNC: 99 MG/DL (ref 65–99)
HAV IGM SERPL QL IA: NEGATIVE
HBV CORE IGM SER QL: NEGATIVE
HBV SURFACE AB SERPL IA-ACNC: <3.1 MIU/ML (ref 0–10)
HBV SURFACE AB SERPL IA-ACNC: <3.1 MIU/ML (ref 0–10)
HBV SURFACE AG SER QL: NEGATIVE
HBV SURFACE AG SER QL: NEGATIVE
HCV AB SER QL: NEGATIVE
INR PPP: 1.08 (ref 0.87–1.13)
POTASSIUM SERPL-SCNC: 2.4 MMOL/L (ref 3.6–5.5)
POTASSIUM SERPL-SCNC: 3.3 MMOL/L (ref 3.6–5.5)
PROTHROMBIN TIME: 14.2 SEC (ref 12–14.6)
SODIUM SERPL-SCNC: 135 MMOL/L (ref 135–145)
SODIUM SERPL-SCNC: 135 MMOL/L (ref 135–145)

## 2018-10-22 PROCEDURE — A9270 NON-COVERED ITEM OR SERVICE: HCPCS | Performed by: HOSPITALIST

## 2018-10-22 PROCEDURE — 99153 MOD SED SAME PHYS/QHP EA: CPT

## 2018-10-22 PROCEDURE — 80048 BASIC METABOLIC PNL TOTAL CA: CPT

## 2018-10-22 PROCEDURE — 700111 HCHG RX REV CODE 636 W/ 250 OVERRIDE (IP): Performed by: HOSPITALIST

## 2018-10-22 PROCEDURE — 700102 HCHG RX REV CODE 250 W/ 637 OVERRIDE(OP): Performed by: FAMILY MEDICINE

## 2018-10-22 PROCEDURE — A9270 NON-COVERED ITEM OR SERVICE: HCPCS | Performed by: FAMILY MEDICINE

## 2018-10-22 PROCEDURE — 700102 HCHG RX REV CODE 250 W/ 637 OVERRIDE(OP): Performed by: HOSPITALIST

## 2018-10-22 PROCEDURE — 86480 TB TEST CELL IMMUN MEASURE: CPT

## 2018-10-22 PROCEDURE — 02HV33Z INSERTION OF INFUSION DEVICE INTO SUPERIOR VENA CAVA, PERCUTANEOUS APPROACH: ICD-10-PCS | Performed by: RADIOLOGY

## 2018-10-22 PROCEDURE — B5181ZA FLUOROSCOPY OF SUPERIOR VENA CAVA USING LOW OSMOLAR CONTRAST, GUIDANCE: ICD-10-PCS | Performed by: RADIOLOGY

## 2018-10-22 PROCEDURE — 86706 HEP B SURFACE ANTIBODY: CPT

## 2018-10-22 PROCEDURE — 700111 HCHG RX REV CODE 636 W/ 250 OVERRIDE (IP)

## 2018-10-22 PROCEDURE — 85610 PROTHROMBIN TIME: CPT

## 2018-10-22 PROCEDURE — 87340 HEPATITIS B SURFACE AG IA: CPT

## 2018-10-22 PROCEDURE — 80074 ACUTE HEPATITIS PANEL: CPT

## 2018-10-22 PROCEDURE — 93005 ELECTROCARDIOGRAM TRACING: CPT | Performed by: HOSPITALIST

## 2018-10-22 PROCEDURE — B548ZZA ULTRASONOGRAPHY OF SUPERIOR VENA CAVA, GUIDANCE: ICD-10-PCS | Performed by: RADIOLOGY

## 2018-10-22 PROCEDURE — 99233 SBSQ HOSP IP/OBS HIGH 50: CPT | Performed by: INTERNAL MEDICINE

## 2018-10-22 PROCEDURE — 93010 ELECTROCARDIOGRAM REPORT: CPT | Performed by: INTERNAL MEDICINE

## 2018-10-22 PROCEDURE — 99232 SBSQ HOSP IP/OBS MODERATE 35: CPT | Performed by: HOSPITALIST

## 2018-10-22 PROCEDURE — 770020 HCHG ROOM/CARE - TELE (206)

## 2018-10-22 PROCEDURE — 700101 HCHG RX REV CODE 250

## 2018-10-22 PROCEDURE — 36415 COLL VENOUS BLD VENIPUNCTURE: CPT

## 2018-10-22 PROCEDURE — 0JH63XZ INSERTION OF TUNNELED VASCULAR ACCESS DEVICE INTO CHEST SUBCUTANEOUS TISSUE AND FASCIA, PERCUTANEOUS APPROACH: ICD-10-PCS | Performed by: RADIOLOGY

## 2018-10-22 RX ORDER — PROMETHAZINE HYDROCHLORIDE 25 MG/1
25 SUPPOSITORY RECTAL EVERY 6 HOURS PRN
Status: DISCONTINUED | OUTPATIENT
Start: 2018-10-22 | End: 2018-10-25 | Stop reason: HOSPADM

## 2018-10-22 RX ORDER — ONDANSETRON 2 MG/ML
4 INJECTION INTRAMUSCULAR; INTRAVENOUS PRN
Status: ACTIVE | OUTPATIENT
Start: 2018-10-22 | End: 2018-10-22

## 2018-10-22 RX ORDER — POTASSIUM CHLORIDE 20 MEQ/1
40 TABLET, EXTENDED RELEASE ORAL ONCE
Status: COMPLETED | OUTPATIENT
Start: 2018-10-22 | End: 2018-10-22

## 2018-10-22 RX ORDER — SODIUM CHLORIDE 9 MG/ML
500 INJECTION, SOLUTION INTRAVENOUS
Status: ACTIVE | OUTPATIENT
Start: 2018-10-22 | End: 2018-10-22

## 2018-10-22 RX ORDER — POTASSIUM CHLORIDE 20 MEQ/1
40 TABLET, EXTENDED RELEASE ORAL 2 TIMES DAILY
Status: COMPLETED | OUTPATIENT
Start: 2018-10-22 | End: 2018-10-22

## 2018-10-22 RX ORDER — PROCHLORPERAZINE MALEATE 10 MG
10 TABLET ORAL EVERY 6 HOURS PRN
Status: DISCONTINUED | OUTPATIENT
Start: 2018-10-22 | End: 2018-10-25 | Stop reason: HOSPADM

## 2018-10-22 RX ORDER — MIDAZOLAM HYDROCHLORIDE 1 MG/ML
.5-2 INJECTION INTRAMUSCULAR; INTRAVENOUS PRN
Status: ACTIVE | OUTPATIENT
Start: 2018-10-22 | End: 2018-10-22

## 2018-10-22 RX ORDER — PROMETHAZINE HYDROCHLORIDE 25 MG/1
25 TABLET ORAL EVERY 6 HOURS PRN
Status: DISCONTINUED | OUTPATIENT
Start: 2018-10-22 | End: 2018-10-25 | Stop reason: HOSPADM

## 2018-10-22 RX ORDER — MIDAZOLAM HYDROCHLORIDE 1 MG/ML
INJECTION INTRAMUSCULAR; INTRAVENOUS
Status: COMPLETED
Start: 2018-10-22 | End: 2018-10-22

## 2018-10-22 RX ORDER — LIDOCAINE HYDROCHLORIDE AND EPINEPHRINE BITARTRATE 20; .01 MG/ML; MG/ML
INJECTION, SOLUTION SUBCUTANEOUS
Status: COMPLETED
Start: 2018-10-22 | End: 2018-10-22

## 2018-10-22 RX ADMIN — HYDRALAZINE HYDROCHLORIDE 100 MG: 50 TABLET, FILM COATED ORAL at 05:17

## 2018-10-22 RX ADMIN — FUROSEMIDE 80 MG: 10 INJECTION, SOLUTION INTRAMUSCULAR; INTRAVENOUS at 16:58

## 2018-10-22 RX ADMIN — ACETAMINOPHEN 650 MG: 325 TABLET, FILM COATED ORAL at 03:55

## 2018-10-22 RX ADMIN — FENTANYL CITRATE 25 MCG: 50 INJECTION, SOLUTION INTRAMUSCULAR; INTRAVENOUS at 17:36

## 2018-10-22 RX ADMIN — MIDAZOLAM HYDROCHLORIDE 2 MG: 1 INJECTION INTRAMUSCULAR; INTRAVENOUS at 17:36

## 2018-10-22 RX ADMIN — LOVASTATIN 10 MG: 20 TABLET ORAL at 20:32

## 2018-10-22 RX ADMIN — ISOSORBIDE MONONITRATE 120 MG: 60 TABLET, EXTENDED RELEASE ORAL at 05:18

## 2018-10-22 RX ADMIN — HEPARIN 500 UNITS: 100 SYRINGE at 17:46

## 2018-10-22 RX ADMIN — FERROUS SULFATE TAB 325 MG (65 MG ELEMENTAL FE) 325 MG: 325 (65 FE) TAB at 05:16

## 2018-10-22 RX ADMIN — VITAMIN B12 0.1 MG ORAL TABLET 100 MCG: 0.1 TABLET ORAL at 05:17

## 2018-10-22 RX ADMIN — SENNOSIDES AND DOCUSATE SODIUM 2 TABLET: 8.6; 5 TABLET ORAL at 05:16

## 2018-10-22 RX ADMIN — FUROSEMIDE 80 MG: 10 INJECTION, SOLUTION INTRAMUSCULAR; INTRAVENOUS at 05:17

## 2018-10-22 RX ADMIN — POTASSIUM CHLORIDE 40 MEQ: 1500 TABLET, EXTENDED RELEASE ORAL at 17:00

## 2018-10-22 RX ADMIN — POTASSIUM CHLORIDE 40 MEQ: 1500 TABLET, EXTENDED RELEASE ORAL at 09:20

## 2018-10-22 RX ADMIN — HEPARIN SODIUM 5000 UNITS: 5000 INJECTION, SOLUTION INTRAVENOUS; SUBCUTANEOUS at 05:17

## 2018-10-22 RX ADMIN — POTASSIUM CHLORIDE 40 MEQ: 1500 TABLET, EXTENDED RELEASE ORAL at 00:45

## 2018-10-22 RX ADMIN — MIDAZOLAM 2 MG: 1 INJECTION INTRAMUSCULAR; INTRAVENOUS at 17:36

## 2018-10-22 RX ADMIN — PROCHLORPERAZINE EDISYLATE 10 MG: 5 INJECTION INTRAMUSCULAR; INTRAVENOUS at 10:13

## 2018-10-22 RX ADMIN — LIDOCAINE HYDROCHLORIDE AND EPINEPHRINE: 20; 10 INJECTION, SOLUTION INFILTRATION; PERINEURAL at 17:46

## 2018-10-22 ASSESSMENT — PAIN SCALES - GENERAL
PAINLEVEL_OUTOF10: 0
PAINLEVEL_OUTOF10: 5

## 2018-10-22 ASSESSMENT — PATIENT HEALTH QUESTIONNAIRE - PHQ9
SUM OF ALL RESPONSES TO PHQ9 QUESTIONS 1 AND 2: 0
1. LITTLE INTEREST OR PLEASURE IN DOING THINGS: NOT AT ALL
2. FEELING DOWN, DEPRESSED, IRRITABLE, OR HOPELESS: NOT AT ALL

## 2018-10-22 ASSESSMENT — ENCOUNTER SYMPTOMS
SHORTNESS OF BREATH: 0
SHORTNESS OF BREATH: 1
HEADACHES: 0
FEVER: 0
DIARRHEA: 0
WHEEZING: 0
COUGH: 0
CONSTIPATION: 0
NAUSEA: 1
VOMITING: 0
CHILLS: 0

## 2018-10-22 NOTE — ASSESSMENT & PLAN NOTE
With evident pulmonary edema in the setting of stage V CKD.    Nephrology was consulted  Will likely hold loop diuretics now that HD initiated, will defer to nephro.

## 2018-10-22 NOTE — PROGRESS NOTES
2100 2RN Admission skin check, pt noted to have itching to trunk and extremities w/ associated redness, admits to vigorous itching. Sacrum w/ blanchable redness noted. RDonoghue<RN

## 2018-10-22 NOTE — PROGRESS NOTES
Report received at bedside from night shift RN, pt care assumed, tele box on. Pt aaox4, on 2L O2 via NC, no signs of distress noted at this time. Patient resting comfortably in bed. POC discussed with pt and verbalizes no questions. Pt requests Tylenol for pain when available. Pt denies any additional needs at this time. Bed in lowest position, pt educated on fall risk and verbalized understanding, call light within reach, will continue to monitor.

## 2018-10-22 NOTE — PROGRESS NOTES
2100 @ RN skin Check. Pt noted to have generalized itchness to trunk and extremities, pt admits to vigorous itching. Blanchable redness to sacral area,

## 2018-10-22 NOTE — ED NOTES
Bartolo from Lab called with critical result of Ca 12.5 and K+ 2.5 at 1829. Critical lab result read back to Bartolo. Dr. Sharif notified of critical lab result at 1832.  Critical lab result read back by Dr. Sharif.

## 2018-10-22 NOTE — PROGRESS NOTES
2100 Pt to -2 via stretcher on Zoll w/ o2 at 2l. Oriented to room and call bell system, bed locked and low, call bell in reach. Tele shows SR 86. Family at bedside. Traci

## 2018-10-22 NOTE — RESPIRATORY CARE
COPD EDUCATION by COPD CLINICAL EDUCATOR  10/22/2018 at 6:38 AM by Alexia Tanner     Patient reviewed by COPD education team. Patient does not qualify for COPD program.

## 2018-10-22 NOTE — ED PROVIDER NOTES
ED Provider Note    CHIEF COMPLAINT  Chief Complaint   Patient presents with   • N/V   • Itching       HPI  Kristyn Gillespie is a 73 y.o. female who presents short of breath, worsening over the past 3 days.  She has nausea vomiting and general pruritus.  She has been itching in her lower legs and has developed redness and swelling to both lower feet.  They do state the, the patient and her family member that there is some chronic redness of the lower extremities however admit current condition is worse.  No fever or chills.  Patient had AV fistula placed in January under the anticipation of possible need for dialysis later.  She is followed by Dr. Flores with nephrology.  Thus far she has been taking daily Lasix, 40 mg twice a day without potassium supplement secondary to her kidney problems she states.  Patient states she is been taking her medications as prescribed.  She states thus far she has not required dialysis.    REVIEW OF SYSTEMS    Constitutional: General malaise, pruritus, no fever  Respiratory: Shortness of breath, no cough  Cardiac: No chest pain or syncope  Gastrointestinal: No abdominal pain  Musculoskeletal: No acute neck or back pain  Neurologic: Denies headache  Skin: Peripheral swelling, redness to the lower extremities  Renal: Chronic kidney failure.  Patient states she still makes urine     All other systems are negative.       PAST MEDICAL HISTORY  Past Medical History:   Diagnosis Date   • Anemia 11/11/2016   • Arthritis     osteo-knees   • Breath shortness     02 2 LTR cont   • Bronchitis    • COPD (chronic obstructive pulmonary disease) ([facility]) 9/18/2009   • Dental disorder     missing teeth   • Emphysema of lung ([facility])    • Epilepsy ([facility]) 9/18/2009   • Heart valve disease     mitral valve clip   • Jaundice     at birth   • Migraines, neuralgic 9/18/2009   • Osteoarthritis 9/18/2009   • Oxygen dependent     2 LTR cont   • Peripheral edema 9/18/2009   • Renal insufficiency 8/5/2016    ESRD    • Stroke (HCC)    • Tobacco use disorder 9/18/2009   • Unspecified essential hypertension 9/18/2009       FAMILY HISTORY  Family History   Problem Relation Age of Onset   • Cancer Mother         breast   • Heart Disease Father    • Heart Failure Father        SOCIAL HISTORY  Social History     Social History   • Marital status:      Spouse name: N/A   • Number of children: N/A   • Years of education: N/A     Social History Main Topics   • Smoking status: Former Smoker     Packs/day: 1.00     Years: 40.00     Types: Cigarettes     Quit date: 11/24/2016   • Smokeless tobacco: Never Used   • Alcohol use No   • Drug use: No   • Sexual activity: No      Comment:      Other Topics Concern   • Not on file     Social History Narrative   • No narrative on file       SURGICAL HISTORY  Past Surgical History:   Procedure Laterality Date   • AV FISTULA CREATION Right 12/8/2017    Procedure: AV FISTULA CREATION- DISTAL RADIAL CEPHALIC FOREARM;  Surgeon: Fidel Romero M.D.;  Location: SURGERY [facility];  Service: General   • OTHER CARDIAC SURGERY  06/2017    mitral valve clip   • GASTROSCOPY WITH BIOPSY  11/15/2016    Procedure: GASTROSCOPY WITH BIOPSY;  Surgeon: Guzman Olson M.D.;  Location: ENDOSCOPY [facility];  Service:    • ABDOMINAL HYSTERECTOMY TOTAL      endometriosis       CURRENT MEDICATIONS  Home Medications     Reviewed by Marixa Saravia R.N. (Registered Nurse) on 10/21/18 at 2217  Med List Status: Complete   Medication Last Dose Status   calcitRIOL (ROCALTROL) 0.25 MCG Cap 10/21/2018 Active   cyanocobalamin 100 MCG Tab 10/21/2018 Active   ferrous sulfate 325 (65 Fe) MG tablet 10/21/2018 Active   furosemide (LASIX) 40 MG Tab 10/21/2018 Active   hydrALAZINE (APRESOLINE) 100 MG tablet 10/21/2018 Active   ipratropium-albuterol (DUONEB) 0.5-2.5 (3) MG/3ML nebulizer solution 10/21/2018 Active   isosorbide mononitrate (IMDUR) 120 MG CR tablet 10/21/2018 Active   lovastatin (MEVACOR)  "10 MG tablet 10/21/2018 Active   multivitamin (THERAGRAN) Tab 10/21/2018 Active   vitamin D (CHOLECALCIFEROL) 1000 UNIT Tab 10/21/2018 Active                ALLERGIES  No Known Allergies    PHYSICAL EXAM  VITAL SIGNS: /82   Pulse 86   Temp 36.5 °C (97.7 °F)   Resp (!) 24   Ht 1.575 m (5' 2\")   Wt 63.5 kg (139 lb 15.9 oz)   SpO2 92%   Breastfeeding? No   BMI 25.60 kg/m²   Constitutional: Ill, dyspneic appearance.   HENT: No facial swelling, no epistaxis  Eyes: Anicteric, no conjunctivitis.  Pupils are equal  Cardiovascular: Normal heart rate, Normal rhythm  Pulmonary:  No wheezing, diminished breath sounds both lung bases  Gastrointestinal: Soft, No tenderness, No distention  Skin: Warm, Dry, No cyanosis.  Mild peripheral edema  Neurologic: Speech is clear, follows commands, facial expression is symmetrical.  Psychiatric: Mildly anxious, affect is flat.  Patient  cooperative  Musculoskeletal: Flanks nontender    EKG/Labs  Results for orders placed or performed during the hospital encounter of 10/21/18   CBC WITH DIFFERENTIAL   Result Value Ref Range    WBC 8.0 4.8 - 10.8 K/uL    RBC 4.13 (L) 4.20 - 5.40 M/uL    Hemoglobin 12.9 12.0 - 16.0 g/dL    Hematocrit 36.7 (L) 37.0 - 47.0 %    MCV 88.9 81.4 - 97.8 fL    MCH 31.2 27.0 - 33.0 pg    MCHC 35.1 (H) 33.6 - 35.0 g/dL    RDW 45.9 35.9 - 50.0 fL    Platelet Count 208 164 - 446 K/uL    MPV 10.5 9.0 - 12.9 fL    Neutrophils-Polys 67.80 44.00 - 72.00 %    Lymphocytes 17.40 (L) 22.00 - 41.00 %    Monocytes 11.50 0.00 - 13.40 %    Eosinophils 2.40 0.00 - 6.90 %    Basophils 0.50 0.00 - 1.80 %    Immature Granulocytes 0.40 0.00 - 0.90 %    Nucleated RBC 0.00 /100 WBC    Neutrophils (Absolute) 5.44 2.00 - 7.15 K/uL    Lymphs (Absolute) 1.39 1.00 - 4.80 K/uL    Monos (Absolute) 0.92 (H) 0.00 - 0.85 K/uL    Eos (Absolute) 0.19 0.00 - 0.51 K/uL    Baso (Absolute) 0.04 0.00 - 0.12 K/uL    Immature Granulocytes (abs) 0.03 0.00 - 0.11 K/uL    NRBC (Absolute) 0.00 K/uL "   COMP METABOLIC PANEL   Result Value Ref Range    Sodium 135 135 - 145 mmol/L    Potassium 2.5 (LL) 3.6 - 5.5 mmol/L    Chloride 91 (L) 96 - 112 mmol/L    Co2 26 20 - 33 mmol/L    Anion Gap 18.0 (H) 0.0 - 11.9    Glucose 141 (H) 65 - 99 mg/dL    Bun 61 (H) 8 - 22 mg/dL    Creatinine 4.92 (H) 0.50 - 1.40 mg/dL    Calcium 12.5 (HH) 8.5 - 10.5 mg/dL    AST(SGOT) 23 12 - 45 U/L    ALT(SGPT) 13 2 - 50 U/L    Alkaline Phosphatase 97 30 - 99 U/L    Total Bilirubin 0.5 0.1 - 1.5 mg/dL    Albumin 4.1 3.2 - 4.9 g/dL    Total Protein 7.8 6.0 - 8.2 g/dL    Globulin 3.7 (H) 1.9 - 3.5 g/dL    A-G Ratio 1.1 g/dL   TROPONIN   Result Value Ref Range    Troponin I 0.27 (H) 0.00 - 0.04 ng/mL   BTYPE NATRIURETIC PEPTIDE   Result Value Ref Range    B Natriuretic Peptide 896 (H) 0 - 100 pg/mL   ESTIMATED GFR   Result Value Ref Range    GFR If African American 10 (A) >60 mL/min/1.73 m 2    GFR If Non African American 9 (A) >60 mL/min/1.73 m 2   EKG (NOW)   Result Value Ref Range    Report       [facility] Emergency Dept.    Test Date:  2018-10-21  Pt Name:    VICENTE FRANCO           Department: ER  MRN:        [id number]                      Room:        12  Gender:     Female                       Technician: [id number]  :        [id number]                   Requested By:SALLIE PALOMARES  Order #:    [id number]                    Reading MD: SALLIE PALOMARES MD    Measurements  Intervals                                Axis  Rate:       78                           P:          26  WY:         236                          QRS:        -35  QRSD:       138                          T:          126  QT:         440  QTc:        502    Interpretive Statements  SINUS RHYTHM  SINUS PAUSE/ARREST WITH ATRIAL ESCAPE  FIRST DEGREE AV BLOCK  LEFT BUNDLE BRANCH BLOCK  Compared to ECG 2017 11:18:36  Sinus pause or arrest now present  First degree AV block now present    Electronically Signed On 10- 19:15:49 PDT by  KURT PALOMARES MD           RADIOLOGY/PROCEDURES  DX-CHEST-PORTABLE (1 VIEW)   Final Result      Stable prominence of the cardiomediastinal silhouette.      Atherosclerotic plaque.      Interstitial prominence may be chronic.               COURSE & MEDICAL DECISION MAKING  Pertinent Labs & Imaging studies reviewed. (See chart for details)  Leg redness and swelling differential includes dermatitis, cellulitis, venous insufficiency.  I discussed the case with admitting hospitalist, antibiotics held at this time pending admitter's reevaluation.  This patient is not septic  Dr. Mueller was consulted regarding the patient's care, possible need for dialysis.  He has agreed to consultation.  He has advised 80 mg of IV Lasix twice a day.  I discussed with pharmacist potassium replacement in the setting of chronic kidney failure, they have recommended initial one-time dose of 20 mEq of potassium IV.  Patient has elevated calcium, likely secondary to supplementation.  Regarding the patient's shortness of breath, there appears to be some element of pulmonary edema as well as elevation of BNP.  Her troponin has mild elevation, possibly secondary to cardiac event or elevation secondary to a renal insufficiency.  Patient is chest pain-free, is admitted for ongoing workup and stabilization    FINAL IMPRESSION     1. Chronic kidney disease, unspecified CKD stage    2. Hypokalemia    3. Acute pulmonary edema (HCC)    4. Hypercalcemia    5. Bilateral cellulitis of lower leg                    Electronically signed by: Kurt Palomares, 10/21/2018 10:38 PM

## 2018-10-22 NOTE — CARE PLAN
Problem: Safety  Goal: Will remain free from injury  Outcome: PROGRESSING AS EXPECTED  Pt using call light to summon assistance to [facility].

## 2018-10-22 NOTE — ED NOTES
PT admitted to T711-02. VSS on 3L NC. Bedside report given. Transported by RN on monitor and O2. All belongings accounted for.

## 2018-10-22 NOTE — PROGRESS NOTES
2230 Call placed to Hospitalist RE; K+ 2.5 and only 10 MEQ potassium chl ordered IV. Repeat BMP ordered. RDOnoghue<RN

## 2018-10-22 NOTE — ASSESSMENT & PLAN NOTE
Bilateral and patchy, with pruritis ?complication of renal failure versus due to chronic edema.   No other evidence of cellulitis, no significant inflammatory changes on laboratory workup.   Defer antibiotics

## 2018-10-22 NOTE — H&P
Hospital Medicine History & Physical Note    Date of Service  10/21/2018    Primary Care Physician  Rosa LACEY M.D.    Consultants  Nephrology Dr. Najjar    Code Status  Full code     Chief Complaint  Shortness of breath, weakness     History of Presenting Illness  73 y.o. Female with history of chronic kidney disease stage V, with dialysis access but not on hemodialysis, essential hypertension which is controlled, and chronic obstructive pulmonary disease, was in her usual state of health until approximately 3 days prior to admission.  She reports the onset of nausea, weakness, and increasing shortness of breath.  She reports shortness of breath with any amount of activity, including walking short distances.  This has gradually worsened over the last several days.  She reports compliance with her medications, she has no fever or chills, no other complaints.  Her shortness is of breath is exacerbated by activity, and is unrelieved.    Review of Systems  Review of Systems   Constitutional: Positive for malaise/fatigue.   HENT: Negative.    Eyes: Negative.    Respiratory: Positive for shortness of breath.    Cardiovascular: Negative.    Gastrointestinal: Positive for nausea.   Genitourinary: Negative.    Musculoskeletal: Negative.    Skin: Negative.    Neurological: Negative.    Endo/Heme/Allergies: Negative.    Psychiatric/Behavioral: Negative.        Past Medical History   has a past medical history of Anemia (11/11/2016); Arthritis; Breath shortness; Bronchitis; COPD (chronic obstructive pulmonary disease) ([facility]) (9/18/2009); Dental disorder; Emphysema of lung ([facility]); Epilepsy ([facility]) (9/18/2009); Heart valve disease; Jaundice; Migraines, neuralgic (9/18/2009); Osteoarthritis (9/18/2009); Oxygen dependent; Peripheral edema (9/18/2009); Renal insufficiency (8/5/2016); Stroke ([facility]); Tobacco use disorder (9/18/2009); and Unspecified essential hypertension (9/18/2009).    Surgical History   has a past surgical  history that includes abdominal hysterectomy total; gastroscopy with biopsy (11/15/2016); other cardiac surgery (06/2017); and av fistula creation (Right, 12/8/2017).     Family History  family history includes Cancer in her mother; Heart Disease in her father; Heart Failure in her father.     Social History   reports that she quit smoking about 22 months ago. Her smoking use included Cigarettes. She has a 40.00 pack-year smoking history. She has never used smokeless tobacco. She reports that she does not drink alcohol or use drugs.    Allergies  No Known Allergies    Medications  Prior to Admission Medications   Prescriptions Last Dose Informant Patient Reported? Taking?   ascorbic acid (VITAMIN C) 500 MG tablet 5/4/2018 at am  No No   Sig: Take 1 Tab by mouth 3 times a day.   calcitRIOL (ROCALTROL) 0.25 MCG Cap 5/4/2018  No No   Sig: Take 3 Caps by mouth every day.   calcium carbonate (TUMS) 500 MG Chew Tab 5/4/2018 at Unknown time  No No   Sig: Take 1 Tab by mouth 2 Times a Day.   cyanocobalamin 100 MCG Tab 5/4/2018 at Unknown time  No No   Sig: Take 100 mcg by mouth every day.   ferrous sulfate 325 (65 FE) MG tablet 5/4/2018 at am  No No   Sig: Take 1 Tab by mouth 3 times a day, with meals.   furosemide (LASIX) 40 MG Tab 5/4/2018  No No   Sig: Take 2 Tabs by mouth 2 Times a Day. TWICE DAILY   hydrALAZINE (APRESOLINE) 100 MG tablet 5/4/2018 at am  Yes No   Sig: Take 100 mg by mouth every day.   ipratropium-albuterol (DUONEB) 0.5-2.5 (3) MG/3ML nebulizer solution 5/4/2018  No No   Sig: 3 mL by Nebulization route every 6 hours as needed for Shortness of Breath.   isosorbide mononitrate (IMDUR) 120 MG CR tablet   No No   Sig: TAKE ONE TABLET BY MOUTH ONCE DAILY   lovastatin (MEVACOR) 10 MG tablet   No No   Sig: TAKE 1 TABLET BY MOUTH ONCE DAILY   multivitamin (THERAGRAN) Tab 5/4/2018 at Unknown time  No No   Sig: Take 1 Tab by mouth every day.   vitamin D, Ergocalciferol, (DRISDOL) 12367 units Cap capsule 5/4/2018  at am  Yes No   Sig: Take 50,000 Units by mouth every Sunday.      Facility-Administered Medications: None       Physical Exam  Temp:  [36.5 °C (97.7 °F)] 36.5 °C (97.7 °F)  Pulse:  [65-85] 85  Resp:  [20-29] 29  BP: (116)/(56) 116/56    Physical Exam   Constitutional: She is oriented to person, place, and time. She appears well-developed and well-nourished. No distress.   HENT:   Head: Normocephalic and atraumatic.   Eyes: Pupils are equal, round, and reactive to light. Conjunctivae are normal.   Neck: Normal range of motion. Neck supple. No tracheal deviation present. No thyromegaly present.   Cardiovascular: Normal rate, regular rhythm and normal heart sounds.  Exam reveals no gallop and no friction rub.    No murmur heard.  Pulmonary/Chest: Effort normal and breath sounds normal. No respiratory distress. She has no wheezes. She has no rales.   Abdominal: Soft. Bowel sounds are normal. She exhibits no distension. There is no tenderness. There is no rebound.   Musculoskeletal: Normal range of motion. She exhibits edema and tenderness.   Erythema noted to bilateral lower extremities, this is patchy, associated with evidence of excoriation   Lymphadenopathy:     She has no cervical adenopathy.   Neurological: She is alert and oriented to person, place, and time. No cranial nerve deficit.   Skin: Skin is warm and dry. Rash noted. She is not diaphoretic. There is erythema.   Psychiatric: She has a normal mood and affect.   Nursing note and vitals reviewed.      Laboratory:  Recent Labs      10/21/18   1220   WBC  8.0   RBC  4.13*   HEMOGLOBIN  12.9   HEMATOCRIT  36.7*   MCV  88.9   MCH  31.2   MCHC  35.1*   RDW  45.9   PLATELETCT  208   MPV  10.5     Recent Labs      10/21/18   1220   SODIUM  135   POTASSIUM  2.5*   CHLORIDE  91*   CO2  26   GLUCOSE  141*   BUN  61*   CREATININE  4.92*   CALCIUM  12.5*     Recent Labs      10/21/18   1220   ALTSGPT  13   ASTSGOT  23   ALKPHOSPHAT  97   TBILIRUBIN  0.5   GLUCOSE  141*          Recent Labs      10/21/18   1220   BNPBTYPENAT  896*         Recent Labs      10/21/18   1220   TROPONINI  0.27*       Urinalysis:    No results found     Imaging:  DX-CHEST-PORTABLE (1 VIEW)   Final Result      Stable prominence of the cardiomediastinal silhouette.      Atherosclerotic plaque.      Interstitial prominence may be chronic.               Assessment/Plan:  I anticipate this patient will require at least two midnights for appropriate medical management, necessitating inpatient admission.    * Acute on chronic respiratory failure with hypoxia (HCC)   Assessment & Plan    With evident pulmonary edema in the setting of stage V CKD.  Nephrology was consulted, plan for high dose lasix therapy 80mg IV BID.  Monitor I/O.  Oxygen supplementation as needed.         Stage 5 chronic kidney disease (HCC)- (present on admission)   Assessment & Plan    Pending decision for need of hemodialysis.  Appreciate nephrology consultation.  Dr. Najjar consulted.          Leg erythema   Assessment & Plan    Bilateral and patchy, with pruritis ?complication of renal failure versus due to chronic edema. No other evidence of cellulitis, no significant inflammatory changes on laboratory workup.  Monitor off antibiotic therapy.          Hypokalemia- (present on admission)   Assessment & Plan    Unclear etiology, as patient does have underlying renal failure.  Plan to cautiously replace, prior to lasix therapy         Essential hypertension- (present on admission)   Assessment & Plan    Controlled with current regimen.  Monitor.             VTE prophylaxis: SCD, heparin

## 2018-10-22 NOTE — PROGRESS NOTES
Patient assisted to the bedside commode, now sitting up in bed watching TV. Pt remains on 2L of O2 via NC. No additional needs at this time. Call light within reach, bed in lowest position and locked. Will continue to monitor.

## 2018-10-22 NOTE — CONSULTS
Consults   Nephrology Inpatient Consultation    Date of Service  10/22/2018    Reason for Consultation  CKD V, uremia    History of Presenting Illness  73 y.o. female admitted 10/21/2018 with chronic kidney disease 5 who I followed in the outpatient chronic kidney disease clinic.  Her serum creatinine has been creeping up and she has been referred to vascular surgery and had a fistula placed.  This fistula was still maturing in January when I saw her in the office.  She was referred for chronic kidney disease education and was felt to need dialysis in the near future.  She also had secondary hyperparathyroidism for which her calcitriol was increased.  She however missed her March appointment due to transportation issues.    Subsequently, the patient presented to the hospital with shortness of breath and weakness as well as nausea.  Basically, she has jaida uremic symptoms.  On further review with the patient she has had these symptoms for some period of time.  However with marked worsening over the last few days.    Referring Physician  Jules Olivarez M.D.    Consulting Physician  aSulo Garza M.D.    Review of Systems  Review of Systems   Constitutional: Positive for malaise/fatigue.   Respiratory: Positive for shortness of breath.    Cardiovascular: Negative for chest pain.   All other systems reviewed and are negative.     Past Medical History  Past Medical History:   Diagnosis Date   • Anemia 11/11/2016   • Renal insufficiency 8/5/2016    ESRD   • Unspecified essential hypertension 9/18/2009   • Peripheral edema 9/18/2009   • Tobacco use disorder 9/18/2009   • Migraines, neuralgic 9/18/2009   • COPD (chronic obstructive pulmonary disease) ([facility]) 9/18/2009   • Osteoarthritis 9/18/2009   • Epilepsy ([facility]) 9/18/2009   • Arthritis     osteo-knees   • Breath shortness     02 2 LTR cont   • Bronchitis    • Dental disorder     missing teeth   • Emphysema of lung ([facility])    • Heart valve disease     mitral valve clip   •  Jaundice     at birth   • Oxygen dependent     2 LTR cont   • Stroke (HCC)        Surgical History  Past Surgical History:   Procedure Laterality Date   • AV FISTULA CREATION Right 2017    Procedure: AV FISTULA CREATION- DISTAL RADIAL CEPHALIC FOREARM;  Surgeon: Fidel Romero M.D.;  Location: SURGERY [facility];  Service: General   • OTHER CARDIAC SURGERY  2017    mitral valve clip   • GASTROSCOPY WITH BIOPSY  11/15/2016    Procedure: GASTROSCOPY WITH BIOPSY;  Surgeon: Guzman Olson M.D.;  Location: ENDOSCOPY [facility];  Service:    • ABDOMINAL HYSTERECTOMY TOTAL      endometriosis       Medications  No current facility-administered medications on file prior to encounter.      Current Outpatient Prescriptions on File Prior to Encounter   Medication Sig Dispense Refill   • isosorbide mononitrate (IMDUR) 120 MG CR tablet TAKE ONE TABLET BY MOUTH ONCE DAILY 90 Tab 3   • lovastatin (MEVACOR) 10 MG tablet TAKE 1 TABLET BY MOUTH ONCE DAILY 90 Tab 3   • ipratropium-albuterol (DUONEB) 0.5-2.5 (3) MG/3ML nebulizer solution 3 mL by Nebulization route every 6 hours as needed for Shortness of Breath. 120 Vial 0   • hydrALAZINE (APRESOLINE) 100 MG tablet Take 100 mg by mouth every day.     • multivitamin (THERAGRAN) Tab Take 1 Tab by mouth every day. 90 Tab 0   • cyanocobalamin 100 MCG Tab Take 100 mcg by mouth every day. 30 Tab 3       Family History  family history includes Cancer in her mother; Heart Disease in her father; Heart Failure in her father.    Social History  Social History   Substance Use Topics   • Smoking status: Former Smoker     Packs/day: 1.00     Years: 40.00     Types: Cigarettes     Quit date: 2016   • Smokeless tobacco: Never Used   • Alcohol use No       Allergies  No Known Allergies     Physical Exam  Laboratory/Imaging   Hemodynamics  Temp (24hrs), Av.5 °C (97.7 °F), Min:35.9 °C (96.6 °F), Max:37.1 °C (98.7 °F)   Temperature: 36.2 °C (97.1 °F)  Pulse  Av.1  Min:  65  Max: 98 Heart Rate (Monitored): 93  Blood Pressure : 117/74, NIBP: 124/81      Respiratory      Respiration: 17, Pulse Oximetry: 93 %     Work Of Breathing / Effort: Mild       Fluids  Date 10/22/18 0700 - 10/23/18 0659   Shift 1303-5386 0816-5168 4979-9279 24 Hour Total   I  N  T  A  K  E   Shift Total       O  U  T  P  U  T   Urine 200   200    Shift Total 200   200   Weight (kg) 63.5 63.5 63.5 63.5         Nutrition  Orders Placed This Encounter   Procedures   • Diet Order Renal     Standing Status:   Standing     Number of Occurrences:   1     Order Specific Question:   Diet:     Answer:   Renal [8]       Physical Exam   Constitutional: She is oriented to person, place, and time. She appears well-developed and well-nourished.   HENT:   Head: Normocephalic and atraumatic.   Cardiovascular: Normal rate and regular rhythm.    Pulmonary/Chest: Effort normal and breath sounds normal.   Abdominal: Soft. Bowel sounds are normal.   Musculoskeletal: She exhibits no edema or tenderness.   Jackeline fistula   Neurological: She is alert and oriented to person, place, and time.   Skin: Skin is warm and dry.       Recent Labs      10/21/18   1220  10/21/18   2335   WBC  8.0  8.4   RBC  4.13*  3.65*   HEMOGLOBIN  12.9  11.2*   HEMATOCRIT  36.7*  32.7*   MCV  88.9  89.6   MCH  31.2  30.7   MCHC  35.1*  34.3   RDW  45.9  45.7   PLATELETCT  208  182   MPV  10.5  10.9     Recent Labs      10/21/18   1220  10/21/18   2335  10/22/18   0557   SODIUM  135  135  135   POTASSIUM  2.5*  2.4*  3.3*   CHLORIDE  91*  93*  94*   CO2  26  28  29   GLUCOSE  141*  99  99   BUN  61*  59*  60*   CREATININE  4.92*  4.85*  4.96*   CALCIUM  12.5*  11.6*  11.5*         Recent Labs      10/21/18   1220   BNPBTYPENAT  896*              Assessment/Plan     1.  Acute kidney injury on top of chronic kidney disease stage 5 -the patient is clearly uremic at this time.  She has therefore advanced to end-stage renal disease and will need dialysis.   Discussions have been had previously and she is amenable to hemodialysis at the current time.  She has a fistula in place though I do not believe that this will tolerate chronic access and will need to have a PermCath placed while the fistula matures.  2.  Volume overload-a symptom of her renal failure.  UF as tolerated on dialysis when she has access.  3.  Hypercalcemia-she is on a high dose of Rocaltrol.  At this time we will discontinue and monitor her.  She may benefit from Sensipar.  4.  Urticaria-this is been a long-standing complaint of the patient.  Likely due to hyperphosphatemia.  Should improve with the initiation of dialysis.  5.  Access-PermCath for now may need intervention on her AV fistula to aid with maturation.

## 2018-10-22 NOTE — PROGRESS NOTES
[facility] Medicine Daily Progress Note    Date of Service  10/22/2018    Chief Complaint  73 y.o. female admitted 10/21/2018 with volume overload    Interval Problem Update  10/22: Getting dialysis catheter placed by IR. Giving high dose lasix in the meantime.    Disposition  Pending improvement of volume overload    Review of Systems  Review of Systems   Constitutional: Negative for chills and fever.   Respiratory: Negative for cough, shortness of breath and wheezing.    Cardiovascular: Negative for chest pain.   Gastrointestinal: Positive for nausea. Negative for constipation, diarrhea and vomiting.   Genitourinary: Negative for dysuria.   Neurological: Negative for headaches.        Physical Exam  Temp:  [35.9 °C (96.6 °F)-37.1 °C (98.7 °F)] 36.3 °C (97.3 °F)  Pulse:  [65-98] 80  Resp:  [17-29] 18  BP: (116-160)/(56-82) 139/67    Physical Exam   Constitutional: She appears well-developed.   HENT:   Head: Normocephalic.   Eyes: Conjunctivae are normal.   Cardiovascular: Normal rate.  Exam reveals no gallop.    Pulmonary/Chest: No respiratory distress. She has no wheezes.   Abdominal: She exhibits no distension. There is no tenderness.   Neurological: She is alert.       Fluids    Intake/Output Summary (Last 24 hours) at 10/22/18 1445  Last data filed at 10/22/18 1153   Gross per 24 hour   Intake                0 ml   Output              850 ml   Net             -850 ml       Laboratory  Recent Labs      10/21/18   1220  10/21/18   2335   WBC  8.0  8.4   RBC  4.13*  3.65*   HEMOGLOBIN  12.9  11.2*   HEMATOCRIT  36.7*  32.7*   MCV  88.9  89.6   MCH  31.2  30.7   MCHC  35.1*  34.3   RDW  45.9  45.7   PLATELETCT  208  182   MPV  10.5  10.9     Recent Labs      10/21/18   1220  10/21/18   2335  10/22/18   0557   SODIUM  135  135  135   POTASSIUM  2.5*  2.4*  3.3*   CHLORIDE  91*  93*  94*   CO2  26  28  29   GLUCOSE  141*  99  99   BUN  61*  59*  60*   CREATININE  4.92*  4.85*  4.96*   CALCIUM  12.5*  11.6*  11.5*      Recent Labs      10/22/18   1337   INR  1.08     Recent Labs      10/21/18   1220   BNPBTYPENAT  896*           Imaging  DX-CHEST-PORTABLE (1 VIEW)   Final Result      Stable prominence of the cardiomediastinal silhouette.      Atherosclerotic plaque.      Interstitial prominence may be chronic.         IR-CVC TUNNEL W/O PORT REMOVAL    (Results Pending)        Assessment/Plan  * Acute on chronic respiratory failure with hypoxia (HCC)   Assessment & Plan    With evident pulmonary edema in the setting of stage V CKD.    Nephrology was consulted  Dialysis vs high dose lasix therapy 80mg IV BID        Stage 5 chronic kidney disease (HCC)- (present on admission)   Assessment & Plan    Nephro Dr. Najjar consulted.    Monitoring dialysis         Leg erythema   Assessment & Plan    Bilateral and patchy, with pruritis ?complication of renal failure versus due to chronic edema.   No other evidence of cellulitis, no significant inflammatory changes on laboratory workup.   Defer antibiotics         Hypokalemia- (present on admission)   Assessment & Plan    Unclear etiology, as patient does have underlying renal failure  repleting as needed         Essential hypertension- (present on admission)   Assessment & Plan    Controlled with current regimen.  Monitor.

## 2018-10-22 NOTE — ED NOTES
Med rec updated and complete.  Allergies reviewed.  Pt denies antibiotic use in last 30 days at home.    Removed medications that pt is no longer taking.

## 2018-10-23 LAB
ALBUMIN SERPL BCP-MCNC: 3.5 G/DL (ref 3.2–4.9)
BUN SERPL-MCNC: 57 MG/DL (ref 8–22)
CALCIUM SERPL-MCNC: 11.1 MG/DL (ref 8.5–10.5)
CHLORIDE SERPL-SCNC: 97 MMOL/L (ref 96–112)
CO2 SERPL-SCNC: 25 MMOL/L (ref 20–33)
CREAT SERPL-MCNC: 4.53 MG/DL (ref 0.5–1.4)
ERYTHROCYTE [DISTWIDTH] IN BLOOD BY AUTOMATED COUNT: 50.1 FL (ref 35.9–50)
GLUCOSE SERPL-MCNC: 85 MG/DL (ref 65–99)
HCT VFR BLD AUTO: 32.8 % (ref 37–47)
HGB BLD-MCNC: 10.7 G/DL (ref 12–16)
MCH RBC QN AUTO: 30.3 PG (ref 27–33)
MCHC RBC AUTO-ENTMCNC: 32.6 G/DL (ref 33.6–35)
MCV RBC AUTO: 92.9 FL (ref 81.4–97.8)
PHOSPHATE SERPL-MCNC: 4 MG/DL (ref 2.5–4.5)
PLATELET # BLD AUTO: 152 K/UL (ref 164–446)
PMV BLD AUTO: 11.1 FL (ref 9–12.9)
POTASSIUM SERPL-SCNC: 3.7 MMOL/L (ref 3.6–5.5)
RBC # BLD AUTO: 3.53 M/UL (ref 4.2–5.4)
SODIUM SERPL-SCNC: 135 MMOL/L (ref 135–145)
WBC # BLD AUTO: 6.5 K/UL (ref 4.8–10.8)

## 2018-10-23 PROCEDURE — 700111 HCHG RX REV CODE 636 W/ 250 OVERRIDE (IP): Performed by: INTERNAL MEDICINE

## 2018-10-23 PROCEDURE — 700111 HCHG RX REV CODE 636 W/ 250 OVERRIDE (IP): Performed by: HOSPITALIST

## 2018-10-23 PROCEDURE — 80069 RENAL FUNCTION PANEL: CPT

## 2018-10-23 PROCEDURE — 90471 IMMUNIZATION ADMIN: CPT

## 2018-10-23 PROCEDURE — 700111 HCHG RX REV CODE 636 W/ 250 OVERRIDE (IP)

## 2018-10-23 PROCEDURE — 770020 HCHG ROOM/CARE - TELE (206)

## 2018-10-23 PROCEDURE — A9270 NON-COVERED ITEM OR SERVICE: HCPCS | Performed by: HOSPITALIST

## 2018-10-23 PROCEDURE — 5A1D70Z PERFORMANCE OF URINARY FILTRATION, INTERMITTENT, LESS THAN 6 HOURS PER DAY: ICD-10-PCS | Performed by: INTERNAL MEDICINE

## 2018-10-23 PROCEDURE — 36415 COLL VENOUS BLD VENIPUNCTURE: CPT

## 2018-10-23 PROCEDURE — 99232 SBSQ HOSP IP/OBS MODERATE 35: CPT | Performed by: INTERNAL MEDICINE

## 2018-10-23 PROCEDURE — 700102 HCHG RX REV CODE 250 W/ 637 OVERRIDE(OP): Performed by: HOSPITALIST

## 2018-10-23 PROCEDURE — 85027 COMPLETE CBC AUTOMATED: CPT

## 2018-10-23 PROCEDURE — 99233 SBSQ HOSP IP/OBS HIGH 50: CPT | Performed by: HOSPITALIST

## 2018-10-23 PROCEDURE — 3E02340 INTRODUCTION OF INFLUENZA VACCINE INTO MUSCLE, PERCUTANEOUS APPROACH: ICD-10-PCS | Performed by: HOSPITALIST

## 2018-10-23 PROCEDURE — 90662 IIV NO PRSV INCREASED AG IM: CPT | Performed by: HOSPITALIST

## 2018-10-23 PROCEDURE — 90935 HEMODIALYSIS ONE EVALUATION: CPT

## 2018-10-23 RX ORDER — HEPARIN SODIUM 1000 [USP'U]/ML
INJECTION, SOLUTION INTRAVENOUS; SUBCUTANEOUS
Status: COMPLETED
Start: 2018-10-23 | End: 2018-10-23

## 2018-10-23 RX ORDER — HEPARIN SODIUM 1000 [USP'U]/ML
2500 INJECTION, SOLUTION INTRAVENOUS; SUBCUTANEOUS
Status: DISCONTINUED | OUTPATIENT
Start: 2018-10-23 | End: 2018-10-25 | Stop reason: HOSPADM

## 2018-10-23 RX ORDER — HEPARIN SODIUM 1000 [USP'U]/ML
3700 INJECTION, SOLUTION INTRAVENOUS; SUBCUTANEOUS
Status: DISCONTINUED | OUTPATIENT
Start: 2018-10-23 | End: 2018-10-25 | Stop reason: HOSPADM

## 2018-10-23 RX ORDER — HEPARIN SODIUM 1000 [USP'U]/ML
2000 INJECTION, SOLUTION INTRAVENOUS; SUBCUTANEOUS ONCE
Status: DISCONTINUED | OUTPATIENT
Start: 2018-10-23 | End: 2018-10-23

## 2018-10-23 RX ADMIN — ISOSORBIDE MONONITRATE 120 MG: 60 TABLET, EXTENDED RELEASE ORAL at 04:59

## 2018-10-23 RX ADMIN — SENNOSIDES AND DOCUSATE SODIUM 2 TABLET: 8.6; 5 TABLET ORAL at 05:00

## 2018-10-23 RX ADMIN — ACETAMINOPHEN 650 MG: 325 TABLET, FILM COATED ORAL at 19:34

## 2018-10-23 RX ADMIN — HEPARIN SODIUM 3700 UNITS: 1000 INJECTION, SOLUTION INTRAVENOUS; SUBCUTANEOUS at 11:25

## 2018-10-23 RX ADMIN — INFLUENZA A VIRUS A/MICHIGAN/45/2015 X-275 (H1N1) ANTIGEN (FORMALDEHYDE INACTIVATED), INFLUENZA A VIRUS A/SINGAPORE/INFIMH-16-0019/2016 IVR-186 (H3N2) ANTIGEN (FORMALDEHYDE INACTIVATED), AND INFLUENZA B VIRUS B/MARYLAND/15/2016 BX-69A (A B/COLORADO/6/2017-LIKE VIRUS) ANTIGEN (FORMALDEHYDE INACTIVATED) 0.5 ML: 60; 60; 60 INJECTION, SUSPENSION INTRAMUSCULAR at 05:03

## 2018-10-23 RX ADMIN — LOVASTATIN 10 MG: 20 TABLET ORAL at 19:34

## 2018-10-23 RX ADMIN — FERROUS SULFATE TAB 325 MG (65 MG ELEMENTAL FE) 325 MG: 325 (65 FE) TAB at 05:00

## 2018-10-23 RX ADMIN — VITAMIN B12 0.1 MG ORAL TABLET 100 MCG: 0.1 TABLET ORAL at 05:00

## 2018-10-23 RX ADMIN — HEPARIN SODIUM 2500 UNITS: 1000 INJECTION, SOLUTION INTRAVENOUS; SUBCUTANEOUS at 08:10

## 2018-10-23 RX ADMIN — FUROSEMIDE 80 MG: 10 INJECTION, SOLUTION INTRAMUSCULAR; INTRAVENOUS at 04:59

## 2018-10-23 RX ADMIN — HYDRALAZINE HYDROCHLORIDE 100 MG: 50 TABLET, FILM COATED ORAL at 05:00

## 2018-10-23 ASSESSMENT — ENCOUNTER SYMPTOMS
DIARRHEA: 0
HEADACHES: 0
VOMITING: 0
COUGH: 0
PALPITATIONS: 0
SHORTNESS OF BREATH: 0
FEVER: 0
NAUSEA: 0
CHILLS: 0
WHEEZING: 0
CONSTIPATION: 0

## 2018-10-23 ASSESSMENT — PAIN SCALES - GENERAL
PAINLEVEL_OUTOF10: 2
PAINLEVEL_OUTOF10: 0
PAINLEVEL_OUTOF10: 5

## 2018-10-23 NOTE — PROGRESS NOTES
IR RN note:    Site Confirmed with MD, patient and RN pre procedure   Tunneled Hemodialysis catheter placement  by MD Pickard assisted by RT Silva,Right chest and IJ access site;  End Tidal CO2 range 18-29 during procedure   HD Catheter placed   BARD hemoSplit long term hemodialysis catheter 14.5 fr double lumen 23 cm REF# [id number] LOT# [id number]   Patient tolerated procedure, hemodynamically stable; pt awake and talking post procedure; report given to SHA Higuera;   patient transported to room via IR RN monitored then transferred care to report RN

## 2018-10-23 NOTE — DISCHARGE PLANNING
Outpatient Dialysis Placement Note:    Received notification for outpatient dialysis placement from Dr. Garza for new ESRD     Met with patient bedside and confirmed demographic and insurance information.  Provided patient with dialysis education materials and list of HD centers. Per request referral initiated to:    [facility],   [address] [place] [address]    unfortunately clinic is at capacity.     New referral sent to :    [facility]   [id number]  [place] [address]  [phone]      Pending medical and financial clearance.    Wendy Dunlap [phone]  [facility]/ Dialysis Coordinator

## 2018-10-23 NOTE — PROGRESS NOTES
Received report from night shift RNMarixa. Discussed plan of care, updated white board. Pt is resting in bed, alert and oriented. Pt denies any pain or distress at this time. Assisted up to commode without difficulty. Fall precautions in place. All needs met. Bed in lowest position. Call light within reach. Will continue to monitor.

## 2018-10-23 NOTE — OR SURGEON
Immediate Post- Operative Note        PostOp Diagnosis: CKD      Procedure(s): TDC      Estimated Blood Loss: Less than 5 ml        Complications: None            10/22/2018     5:54 PM     Mustapha Pickard

## 2018-10-23 NOTE — CARE PLAN
Problem: Infection  Goal: Will remain free from infection  Outcome: PROGRESSING AS EXPECTED  Pt w/o s/s infection, Right HD cath site clean and intact, VSS, afebrile.

## 2018-10-23 NOTE — PROGRESS NOTES
Hemodialysis ordered by Dr. Garza. Treatment started at 0810 and ended at 1110. Pt stable, vss, no c/o post tx. See flow sheets for details. Net UF 1.7 L. Repot to SILVIA Ahmadi RN.

## 2018-10-23 NOTE — DISCHARGE PLANNING
Anticipated Discharge Disposition: Home    Action: Pt lives with daughter in [place]. She uses a cane and walker for ambulation.  Pt uses [employer] for O2 and uses 2L 24/7.  Daughter drives and granddaughter cleans.  Pt is independent at home with ADL's.  Pt states that dialysis is trying to get her set up with [facility] in [place].      LVM for [facility] dialysis coordinator [phone].     Barriers to Discharge: dialysis chair time, medical clearance.      Plan: Follow up with dialysis

## 2018-10-23 NOTE — PROGRESS NOTES
Nephrology Daily Progress Note    Date of Service  10/23/2018    Chief Complaint  73 y.o. female admitted 10/21/2018 with CKD V and uremia/volume overload    Interval Problem Update  Has been in negative balance with diuretics, breathing better, though still slightly SOB. Had HD catheter, planned HD today.    Review of Systems  Review of Systems   Constitutional: Negative for chills and fever.   Cardiovascular: Negative for chest pain and palpitations.   All other systems reviewed and are negative.       Physical Exam  Temp:  [35.9 °C (96.6 °F)-37.1 °C (98.7 °F)] 36.3 °C (97.3 °F)  Pulse:  [75-95] 76  Resp:  [14-20] 19  BP: (117-176)/(55-79) 156/76    Physical Exam   Constitutional: She is oriented to person, place, and time. She appears well-developed and well-nourished.   HENT:   Head: Normocephalic and atraumatic.   Cardiovascular: Normal rate and regular rhythm.    Pulmonary/Chest: Effort normal and breath sounds normal.   Abdominal: Soft. Bowel sounds are normal.   Musculoskeletal: She exhibits no edema or tenderness.   Neurological: She is alert and oriented to person, place, and time.   Skin: Skin is warm and dry.       Fluids    Intake/Output Summary (Last 24 hours) at 10/23/18 0725  Last data filed at 10/23/18 0500   Gross per 24 hour   Intake                0 ml   Output             1000 ml   Net            -1000 ml       Laboratory  Recent Labs      10/21/18   1220  10/21/18   2335  10/23/18   0044   WBC  8.0  8.4  6.5   RBC  4.13*  3.65*  3.53*   HEMOGLOBIN  12.9  11.2*  10.7*   HEMATOCRIT  36.7*  32.7*  32.8*   MCV  88.9  89.6  92.9   MCH  31.2  30.7  30.3   MCHC  35.1*  34.3  32.6*   RDW  45.9  45.7  50.1*   PLATELETCT  208  182  152*   MPV  10.5  10.9  11.1     Recent Labs      10/21/18   2335  10/22/18   0557  10/23/18   0044   SODIUM  135  135  135   POTASSIUM  2.4*  3.3*  3.7   CHLORIDE  93*  94*  97   CO2  28  29  25   GLUCOSE  99  99  85   BUN  59*  60*  57*   CREATININE  4.85*  4.96*  4.53*    CALCIUM  11.6*  11.5*  11.1*     Recent Labs      10/22/18   1337   INR  1.08     Recent Labs      10/21/18   1220   BNPBTYPENAT  896*           Imaging  DX-CHEST-PORTABLE (1 VIEW)   Final Result      Stable prominence of the cardiomediastinal silhouette.      Atherosclerotic plaque.      Interstitial prominence may be chronic.         IR-CVC TUNNEL W/O PORT REMOVAL    (Results Pending)        Assessment/Plan  1. New ESRD - 1st HD today, will need outpatient spot  2. Hypercalcemia - stop rocaltrol  3. Anemia - on iron, Hgb at goal    -HD today  -Await outpatient chair

## 2018-10-23 NOTE — PROGRESS NOTES
[facility] Medicine Daily Progress Note    Date of Service  10/23/2018    Chief Complaint  73 y.o. female admitted 10/21/2018 with volume overload, feeling okay after first HD session this morning.      Interval Problem Update  10/22: Getting dialysis catheter placed by IR. Giving high dose lasix in the meantime.  10/23: s/p first HD session, well tolerated.      Disposition  Pending improvement of volume overload    Review of Systems  Review of Systems   Constitutional: Negative for chills and fever.   Respiratory: Negative for cough, shortness of breath and wheezing.    Cardiovascular: Negative for chest pain.   Gastrointestinal: Negative for constipation, diarrhea, nausea and vomiting.   Genitourinary: Negative for dysuria.   Neurological: Negative for headaches.        Physical Exam  Temp:  [35.9 °C (96.6 °F)-37.1 °C (98.7 °F)] 36.6 °C (97.9 °F)  Pulse:  [75-95] 89  Resp:  [14-20] 16  BP: (125-176)/(55-79) 130/65    Physical Exam   Constitutional: She appears well-developed.   HENT:   Head: Normocephalic.   Eyes: Conjunctivae are normal.   Cardiovascular: Normal rate.  Exam reveals no gallop.    Pulmonary/Chest: No respiratory distress. She has no wheezes.   Abdominal: She exhibits no distension. There is no tenderness.   Neurological: She is alert.       Fluids    Intake/Output Summary (Last 24 hours) at 10/23/18 1641  Last data filed at 10/23/18 1431   Gross per 24 hour   Intake              618 ml   Output             2850 ml   Net            -2232 ml       Laboratory  Recent Labs      10/21/18   1220  10/21/18   2335  10/23/18   0044   WBC  8.0  8.4  6.5   RBC  4.13*  3.65*  3.53*   HEMOGLOBIN  12.9  11.2*  10.7*   HEMATOCRIT  36.7*  32.7*  32.8*   MCV  88.9  89.6  92.9   MCH  31.2  30.7  30.3   MCHC  35.1*  34.3  32.6*   RDW  45.9  45.7  50.1*   PLATELETCT  208  182  152*   MPV  10.5  10.9  11.1     Recent Labs      10/21/18   2335  10/22/18   0557  10/23/18   0044   SODIUM  135  135  135   POTASSIUM  2.4*   3.3*  3.7   CHLORIDE  93*  94*  97   CO2  28  29  25   GLUCOSE  99  99  85   BUN  59*  60*  57*   CREATININE  4.85*  4.96*  4.53*   CALCIUM  11.6*  11.5*  11.1*     Recent Labs      10/22/18   1337   INR  1.08     Recent Labs      10/21/18   1220   BNPBTYPENAT  896*            Current Facility-Administered Medications:   •  heparin injection 3,700 Units, 3,700 Units, Intravenous, DIALYSIS PRN, Saulo Garza M.D., 3,700 Units at 10/23/18 1125  •  heparin injection 2,500 Units, 2,500 Units, Intravenous, DIALYSIS PRN, Saulo Garza M.D., 2,500 Units at 10/23/18 0810  •  promethazine (PHENERGAN) tablet 25 mg, 25 mg, Oral, Q6HRS PRN, Jules Olivarez M.D.  •  promethazine (PHENERGAN) suppository 25 mg, 25 mg, Rectal, Q6HRS PRN, Jules Olivarez M.D.  •  prochlorperazine (COMPAZINE) injection 10 mg, 10 mg, Intravenous, Q6HRS PRN, Jules Olivarez M.D., 10 mg at 10/22/18 1013  •  prochlorperazine (COMPAZINE) tablet 10 mg, 10 mg, Oral, Q6HRS PRN, Jules Olivarez M.D.  •  lidocaine (XYLOCAINE) 1 % injection 1 mL, 1 mL, Intradermal, PRN, Saulo Garza M.D.  •  Pharmacy Consult Request ...Pain Management Review 1 Each, 1 Each, Other, PRN, Mustapha Pickard M.D.  •  pneumococcal vaccine (PNEUMOVAX-23) injection 25 mcg, 0.5 mL, Intramuscular, Once PRN, Jules Olivarez M.D.  •  cyanocobalamin (VITAMIN B-12) tablet 100 mcg, 100 mcg, Oral, DAILY, Wai Cordon M.D., 100 mcg at 10/23/18 0500  •  ferrous sulfate tablet 325 mg, 325 mg, Oral, DAILY, Wai Cordon M.D., 325 mg at 10/23/18 0500  •  hydrALAZINE (APRESOLINE) tablet 100 mg, 100 mg, Oral, DAILY, Wai Cordon M.D., 100 mg at 10/23/18 0500  •  ipratropium-albuterol (DUONEB) nebulizer solution, 3 mL, Nebulization, Q6HRS PRN, Wai Cordon M.D.  •  isosorbide mononitrate SR (IMDUR) tablet 120 mg, 120 mg, Oral, DAILY, Wai Cordon M.D., 120 mg at 10/23/18 0459  •  lovastatin (MEVACOR) tablet 10 mg, 10 mg, Oral, QHS, Wai Cordon M.D., 10 mg at 10/22/18 2032  •   senna-docusate (PERICOLACE or SENOKOT S) 8.6-50 MG per tablet 2 Tab, 2 Tab, Oral, BID, 2 Tab at 10/23/18 0500 **AND** polyethylene glycol/lytes (MIRALAX) PACKET 1 Packet, 1 Packet, Oral, QDAY PRN **AND** magnesium hydroxide (MILK OF MAGNESIA) suspension 30 mL, 30 mL, Oral, QDAY PRN **AND** bisacodyl (DULCOLAX) suppository 10 mg, 10 mg, Rectal, QDAY PRN, Wai Cordon M.D.  •  acetaminophen (TYLENOL) tablet 650 mg, 650 mg, Oral, Q6HRS PRN, Wai Cordon M.D., 650 mg at 10/22/18 0355      Imaging  IR-CVC TUNNEL W/O PORT REMOVAL   Final Result      Successful image guided tunneled dialysis catheter placement.      Plan: The catheter is available for immediate use.            DX-CHEST-PORTABLE (1 VIEW)   Final Result      Stable prominence of the cardiomediastinal silhouette.      Atherosclerotic plaque.      Interstitial prominence may be chronic.              Assessment/Plan  * Acute on chronic respiratory failure with hypoxia (HCC)   Assessment & Plan    With evident pulmonary edema in the setting of stage V CKD.    Nephrology was consulted  Will likely hold loop diuretics now that HD initiated, will defer to nephro.          Stage 5 chronic kidney disease (HCC)- (present on admission)   Assessment & Plan    Nephro Dr. Najjar consulted.    Monitoring dialysis         Leg erythema   Assessment & Plan    Bilateral and patchy, with pruritis ?complication of renal failure versus due to chronic edema.   No other evidence of cellulitis, no significant inflammatory changes on laboratory workup.   Defer antibiotics         Hypokalemia- (present on admission)   Assessment & Plan    Unclear etiology, as patient does have underlying renal failure  repleting as needed         Essential hypertension- (present on admission)   Assessment & Plan    Controlled with current regimen.  Monitor.          May be stable for discharge within 24-48 hours pending placement of HD chair.

## 2018-10-24 LAB
ALBUMIN SERPL BCP-MCNC: 3.5 G/DL (ref 3.2–4.9)
BASOPHILS # BLD AUTO: 0.5 % (ref 0–1.8)
BASOPHILS # BLD: 0.04 K/UL (ref 0–0.12)
BUN SERPL-MCNC: 25 MG/DL (ref 8–22)
CALCIUM SERPL-MCNC: 10.4 MG/DL (ref 8.5–10.5)
CHLORIDE SERPL-SCNC: 100 MMOL/L (ref 96–112)
CO2 SERPL-SCNC: 28 MMOL/L (ref 20–33)
CREAT SERPL-MCNC: 3.03 MG/DL (ref 0.5–1.4)
EOSINOPHIL # BLD AUTO: 0.28 K/UL (ref 0–0.51)
EOSINOPHIL NFR BLD: 3.8 % (ref 0–6.9)
ERYTHROCYTE [DISTWIDTH] IN BLOOD BY AUTOMATED COUNT: 50.8 FL (ref 35.9–50)
GLUCOSE SERPL-MCNC: 88 MG/DL (ref 65–99)
HCT VFR BLD AUTO: 36 % (ref 37–47)
HGB BLD-MCNC: 11.6 G/DL (ref 12–16)
IMM GRANULOCYTES # BLD AUTO: 0.03 K/UL (ref 0–0.11)
IMM GRANULOCYTES NFR BLD AUTO: 0.4 % (ref 0–0.9)
LYMPHOCYTES # BLD AUTO: 1.09 K/UL (ref 1–4.8)
LYMPHOCYTES NFR BLD: 14.7 % (ref 22–41)
M TB TUBERC IFN-G BLD QL: NEGATIVE
M TB TUBERC IFN-G/MITOGEN IGNF BLD: 0.01
M TB TUBERC IGNF/MITOGEN IGNF CONTROL: 132.29 [IU]/ML
MCH RBC QN AUTO: 30.4 PG (ref 27–33)
MCHC RBC AUTO-ENTMCNC: 32.2 G/DL (ref 33.6–35)
MCV RBC AUTO: 94.5 FL (ref 81.4–97.8)
MITOGEN IGNF BCKGRD COR BLD-ACNC: 0.03 [IU]/ML
MONOCYTES # BLD AUTO: 0.92 K/UL (ref 0–0.85)
MONOCYTES NFR BLD AUTO: 12.4 % (ref 0–13.4)
NEUTROPHILS # BLD AUTO: 5.06 K/UL (ref 2–7.15)
NEUTROPHILS NFR BLD: 68.2 % (ref 44–72)
NRBC # BLD AUTO: 0 K/UL
NRBC BLD-RTO: 0 /100 WBC
PHOSPHATE SERPL-MCNC: 2.3 MG/DL (ref 2.5–4.5)
PLATELET # BLD AUTO: 142 K/UL (ref 164–446)
PMV BLD AUTO: 11.1 FL (ref 9–12.9)
POTASSIUM SERPL-SCNC: 3.9 MMOL/L (ref 3.6–5.5)
RBC # BLD AUTO: 3.81 M/UL (ref 4.2–5.4)
SODIUM SERPL-SCNC: 137 MMOL/L (ref 135–145)
WBC # BLD AUTO: 7.4 K/UL (ref 4.8–10.8)

## 2018-10-24 PROCEDURE — G8988 SELF CARE GOAL STATUS: HCPCS | Mod: CI

## 2018-10-24 PROCEDURE — 99232 SBSQ HOSP IP/OBS MODERATE 35: CPT | Performed by: HOSPITALIST

## 2018-10-24 PROCEDURE — 90935 HEMODIALYSIS ONE EVALUATION: CPT

## 2018-10-24 PROCEDURE — 99232 SBSQ HOSP IP/OBS MODERATE 35: CPT | Performed by: INTERNAL MEDICINE

## 2018-10-24 PROCEDURE — 36415 COLL VENOUS BLD VENIPUNCTURE: CPT

## 2018-10-24 PROCEDURE — 97165 OT EVAL LOW COMPLEX 30 MIN: CPT

## 2018-10-24 PROCEDURE — 5A1D70Z PERFORMANCE OF URINARY FILTRATION, INTERMITTENT, LESS THAN 6 HOURS PER DAY: ICD-10-PCS | Performed by: INTERNAL MEDICINE

## 2018-10-24 PROCEDURE — 700102 HCHG RX REV CODE 250 W/ 637 OVERRIDE(OP): Performed by: HOSPITALIST

## 2018-10-24 PROCEDURE — 85025 COMPLETE CBC W/AUTO DIFF WBC: CPT

## 2018-10-24 PROCEDURE — 700111 HCHG RX REV CODE 636 W/ 250 OVERRIDE (IP)

## 2018-10-24 PROCEDURE — A9270 NON-COVERED ITEM OR SERVICE: HCPCS | Performed by: HOSPITALIST

## 2018-10-24 PROCEDURE — 770020 HCHG ROOM/CARE - TELE (206)

## 2018-10-24 PROCEDURE — 80069 RENAL FUNCTION PANEL: CPT

## 2018-10-24 PROCEDURE — G8987 SELF CARE CURRENT STATUS: HCPCS | Mod: CJ

## 2018-10-24 RX ORDER — HEPARIN SODIUM 1000 [USP'U]/ML
INJECTION, SOLUTION INTRAVENOUS; SUBCUTANEOUS
Status: COMPLETED
Start: 2018-10-24 | End: 2018-10-24

## 2018-10-24 RX ADMIN — FERROUS SULFATE TAB 325 MG (65 MG ELEMENTAL FE) 325 MG: 325 (65 FE) TAB at 05:19

## 2018-10-24 RX ADMIN — HEPARIN SODIUM 2500 UNITS: 1000 INJECTION, SOLUTION INTRAVENOUS; SUBCUTANEOUS at 08:43

## 2018-10-24 RX ADMIN — HYDRALAZINE HYDROCHLORIDE 100 MG: 50 TABLET, FILM COATED ORAL at 05:19

## 2018-10-24 RX ADMIN — VITAMIN B12 0.1 MG ORAL TABLET 100 MCG: 0.1 TABLET ORAL at 05:19

## 2018-10-24 RX ADMIN — LOVASTATIN 10 MG: 20 TABLET ORAL at 19:52

## 2018-10-24 RX ADMIN — HEPARIN SODIUM 3700 UNITS: 1000 INJECTION, SOLUTION INTRAVENOUS; SUBCUTANEOUS at 11:50

## 2018-10-24 RX ADMIN — ACETAMINOPHEN 650 MG: 325 TABLET, FILM COATED ORAL at 19:52

## 2018-10-24 RX ADMIN — ISOSORBIDE MONONITRATE 120 MG: 60 TABLET, EXTENDED RELEASE ORAL at 05:19

## 2018-10-24 ASSESSMENT — PAIN SCALES - GENERAL
PAINLEVEL_OUTOF10: 0
PAINLEVEL_OUTOF10: 3

## 2018-10-24 ASSESSMENT — ACTIVITIES OF DAILY LIVING (ADL): TOILETING: INDEPENDENT

## 2018-10-24 ASSESSMENT — COGNITIVE AND FUNCTIONAL STATUS - GENERAL
TOILETING: A LITTLE
DRESSING REGULAR LOWER BODY CLOTHING: A LITTLE
DAILY ACTIVITIY SCORE: 21
HELP NEEDED FOR BATHING: A LITTLE
SUGGESTED CMS G CODE MODIFIER DAILY ACTIVITY: CJ

## 2018-10-24 ASSESSMENT — ENCOUNTER SYMPTOMS
DIARRHEA: 0
WHEEZING: 0
HEADACHES: 0
SHORTNESS OF BREATH: 0
NAUSEA: 0
CHILLS: 0
CONSTIPATION: 0
COUGH: 0
VOMITING: 0
FEVER: 0

## 2018-10-24 NOTE — PROGRESS NOTES
Nephrology Daily Progress Note    Date of Service  10/24/2018    Chief Complaint  73 y.o. female admitted 10/21/2018 with CKD V and uremia/volume overload    Interval Problem Update  Seems to be more SOB today than prior. Tolerating dialysis. Net -3.3L    Review of Systems  Review of Systems   Constitutional: Negative for chills and fever.   All other systems reviewed and are negative.       Physical Exam  Temp:  [36.2 °C (97.1 °F)-37.3 °C (99.1 °F)] 37.2 °C (98.9 °F)  Pulse:  [79-89] 83  Resp:  [16-20] 16  BP: (121-156)/(65-91) 121/66    Physical Exam   Constitutional: She is oriented to person, place, and time. She appears well-developed and well-nourished.   HENT:   Head: Normocephalic and atraumatic.   Cardiovascular: Normal rate and regular rhythm.    Pulmonary/Chest: She has wheezes.   permcath   Abdominal: Soft. Bowel sounds are normal.   Musculoskeletal: She exhibits no edema or tenderness.   Neurological: She is alert and oriented to person, place, and time.   Skin: Skin is warm and dry.       Fluids    Intake/Output Summary (Last 24 hours) at 10/24/18 1025  Last data filed at 10/24/18 0532   Gross per 24 hour   Intake              886 ml   Output             2650 ml   Net            -1764 ml       Laboratory  Recent Labs      10/21/18   2335  10/23/18   0044  10/24/18   0319   WBC  8.4  6.5  7.4   RBC  3.65*  3.53*  3.81*   HEMOGLOBIN  11.2*  10.7*  11.6*   HEMATOCRIT  32.7*  32.8*  36.0*   MCV  89.6  92.9  94.5   MCH  30.7  30.3  30.4   MCHC  34.3  32.6*  32.2*   RDW  45.7  50.1*  50.8*   PLATELETCT  182  152*  142*   MPV  10.9  11.1  11.1     Recent Labs      10/22/18   0557  10/23/18   0044  10/24/18   0319   SODIUM  135  135  137   POTASSIUM  3.3*  3.7  3.9   CHLORIDE  94*  97  100   CO2  29  25  28   GLUCOSE  99  85  88   BUN  60*  57*  25*   CREATININE  4.96*  4.53*  3.03*   CALCIUM  11.5*  11.1*  10.4     Recent Labs      10/22/18   1337   INR  1.08     Recent Labs      10/21/18   1220   BNPBTYPENAT   896*           Imaging  IR-CVC TUNNEL W/O PORT REMOVAL   Final Result      Successful image guided tunneled dialysis catheter placement.      Plan: The catheter is available for immediate use.            DX-CHEST-PORTABLE (1 VIEW)   Final Result      Stable prominence of the cardiomediastinal silhouette.      Atherosclerotic plaque.      Interstitial prominence may be chronic.              Assessment/Plan  1. New ESRD - HD today, UF as tolerated  2. Hypercalcemia - stop rocaltrol  3. Anemia - iron    -HD today  -Monitor SOB  -Discharge once accepted to HD unit, and SOB improves

## 2018-10-24 NOTE — CARE PLAN
Problem: Communication  Goal: The ability to communicate needs accurately and effectively will improve  Outcome: PROGRESSING AS EXPECTED  Discussed plan of care, reviewed meds with pt, encouraged pt to voice any questions and/or concerns regarding care.       Problem: Safety  Goal: Will remain free from falls  Outcome: PROGRESSING AS EXPECTED  Assessed fall risk, fall precautions initiated. Educated patient on use of call light, no slip socks on, bed lowest position. All needs attended to. Patient verbalized understanding. Pt calls appropriately.      Problem: Respiratory:  Goal: Respiratory status will improve  Outcome: PROGRESSING AS EXPECTED  Patient respiratory status assessed. Patient educated on importance of coughing and deep breathing. Deep breaths are encouraged. Educated on how ambulation and movement can affect respiratory status. Patient indicates understanding.

## 2018-10-24 NOTE — CARE PLAN
Problem: Safety  Goal: Will remain free from falls    Intervention: Implement fall precautions   10/23/18 2000 10/23/18 2141   OTHER   Environmental Precautions Treaded Slipper Socks on Patient;Personal Belongings, Wastebasket, Call Bell etc. in Easy Reach;Transferred to Stronger Side;Report Given to Other Health Care Providers Regarding Fall Risk;Bed in Low Position;Communication Sign for Patients & Families;Mobility Assessed & Appropriate Sign Placed --    Bedrails --  Bedrails Closest to Bathroom Down   Chair/Bed Strip Alarm --  Yes - Alarm On   Bed Alarm --  Yes - Alarm On         Problem: Knowledge Deficit  Goal: Knowledge of disease process/condition, treatment plan, diagnostic tests, and medications will improve    Intervention: Explain information regarding disease process/condition, treatment plan, diagnostic tests, and medications and document in education  Plan of care, tests, medications, pain management, fall prevention, safety, and use of call light/phone discussed; all questions answered.

## 2018-10-24 NOTE — PROGRESS NOTES
Verified with attending and nephrology regarding high dose of lasix. Orders to hold and dc medication at this time.

## 2018-10-24 NOTE — PROGRESS NOTES
Bedside shift report taken from SHA Call. Patient is sitting up in bed, resting. Plan of care reviewed; communication board updated; all questions answered. Patient complaining of pain and shortness of breath; given tylenol as per EMARs, repositioned bed, and increased O2 to 3L. No further needs at this time. Bed is locked and in lowest position. Side rails up x 2. Call light, phone, and personal belongings within reach. Bed alarm is on and in use. Will continue to monitor patient.

## 2018-10-24 NOTE — PROGRESS NOTES
[facility] Medicine Daily Progress Note    Date of Service  10/24/2018    Chief Complaint  73 y.o. female admitted 10/21/2018 with volume overload, feeling okay after first HD session this morning.      Interval Problem Update  10/22: Getting dialysis catheter placed by IR. Giving high dose lasix in the meantime.  10/23: s/p first HD session, well tolerated.      Disposition  Pending improvement of volume overload    Review of Systems  Review of Systems   Constitutional: Negative for chills and fever.   Respiratory: Negative for cough, shortness of breath and wheezing.    Cardiovascular: Negative for chest pain.   Gastrointestinal: Negative for constipation, diarrhea, nausea and vomiting.   Genitourinary: Negative for dysuria.   Neurological: Negative for headaches.        Physical Exam  Temp:  [36.2 °C (97.1 °F)-37.3 °C (99.1 °F)] 36.6 °C (97.8 °F)  Pulse:  [79-94] 94  Resp:  [16-20] 20  BP: (121-156)/(66-91) 134/78    Physical Exam   Constitutional: She appears well-developed.   HENT:   Head: Normocephalic.   Eyes: Conjunctivae are normal.   Cardiovascular: Normal rate.  Exam reveals no gallop.    Pulmonary/Chest: No respiratory distress. She has no wheezes.   Abdominal: She exhibits no distension. There is no tenderness.   Neurological: She is alert.       Fluids    Intake/Output Summary (Last 24 hours) at 10/24/18 1657  Last data filed at 10/24/18 1147   Gross per 24 hour   Intake              768 ml   Output             2850 ml   Net            -2082 ml       Laboratory  Recent Labs      10/21/18   2335  10/23/18   0044  10/24/18   0319   WBC  8.4  6.5  7.4   RBC  3.65*  3.53*  3.81*   HEMOGLOBIN  11.2*  10.7*  11.6*   HEMATOCRIT  32.7*  32.8*  36.0*   MCV  89.6  92.9  94.5   MCH  30.7  30.3  30.4   MCHC  34.3  32.6*  32.2*   RDW  45.7  50.1*  50.8*   PLATELETCT  182  152*  142*   MPV  10.9  11.1  11.1     Recent Labs      10/22/18   0557  10/23/18   0044  10/24/18   0319   SODIUM  135  135  137   POTASSIUM  3.3*   3.7  3.9   CHLORIDE  94*  97  100   CO2  29  25  28   GLUCOSE  99  85  88   BUN  60*  57*  25*   CREATININE  4.96*  4.53*  3.03*   CALCIUM  11.5*  11.1*  10.4     Recent Labs      10/22/18   1337   INR  1.08                Current Facility-Administered Medications:   •  heparin injection 3,700 Units, 3,700 Units, Intravenous, DIALYSIS PRN, Saulo Garza M.D., 3,700 Units at 10/24/18 1150  •  heparin injection 2,500 Units, 2,500 Units, Intravenous, DIALYSIS PRN, Saulo Garza M.D., 2,500 Units at 10/24/18 0843  •  promethazine (PHENERGAN) tablet 25 mg, 25 mg, Oral, Q6HRS PRN, Jules Olivarez M.D.  •  promethazine (PHENERGAN) suppository 25 mg, 25 mg, Rectal, Q6HRS PRN, Jules Olivarez M.D.  •  prochlorperazine (COMPAZINE) injection 10 mg, 10 mg, Intravenous, Q6HRS PRN, Jules Olivarez M.D., 10 mg at 10/22/18 1013  •  prochlorperazine (COMPAZINE) tablet 10 mg, 10 mg, Oral, Q6HRS PRN, Jules Olivarez M.D.  •  lidocaine (XYLOCAINE) 1 % injection 1 mL, 1 mL, Intradermal, PRN, Saulo Garza M.D.  •  Pharmacy Consult Request ...Pain Management Review 1 Each, 1 Each, Other, PRN, Mustapha Pickard M.D.  •  pneumococcal vaccine (PNEUMOVAX-23) injection 25 mcg, 0.5 mL, Intramuscular, Once PRN, Jules Olivarez M.D.  •  cyanocobalamin (VITAMIN B-12) tablet 100 mcg, 100 mcg, Oral, DAILY, Wai Cordon M.D., 100 mcg at 10/24/18 0519  •  ferrous sulfate tablet 325 mg, 325 mg, Oral, DAILY, Wai Cordon M.D., 325 mg at 10/24/18 0519  •  hydrALAZINE (APRESOLINE) tablet 100 mg, 100 mg, Oral, DAILY, Wai Cordon M.D., 100 mg at 10/24/18 0519  •  ipratropium-albuterol (DUONEB) nebulizer solution, 3 mL, Nebulization, Q6HRS PRN, Wai Cordon M.D.  •  isosorbide mononitrate SR (IMDUR) tablet 120 mg, 120 mg, Oral, DAILY, Wai Cordon M.D., 120 mg at 10/24/18 0519  •  lovastatin (MEVACOR) tablet 10 mg, 10 mg, Oral, QHS, Wai Cordon M.D., 10 mg at 10/23/18 1934  •  senna-docusate (PERICOLACE or SENOKOT S) 8.6-50 MG per tablet 2  Tab, 2 Tab, Oral, BID, 2 Tab at 10/23/18 0500 **AND** polyethylene glycol/lytes (MIRALAX) PACKET 1 Packet, 1 Packet, Oral, QDAY PRN **AND** magnesium hydroxide (MILK OF MAGNESIA) suspension 30 mL, 30 mL, Oral, QDAY PRN **AND** bisacodyl (DULCOLAX) suppository 10 mg, 10 mg, Rectal, QDAY PRN, Wai Cordon M.D.  •  acetaminophen (TYLENOL) tablet 650 mg, 650 mg, Oral, Q6HRS PRN, Wai Cordon M.D., 650 mg at 10/23/18 1934      Imaging  IR-CVC TUNNEL W/O PORT REMOVAL   Final Result      Successful image guided tunneled dialysis catheter placement.      Plan: The catheter is available for immediate use.            DX-CHEST-PORTABLE (1 VIEW)   Final Result      Stable prominence of the cardiomediastinal silhouette.      Atherosclerotic plaque.      Interstitial prominence may be chronic.              Assessment/Plan  * Acute on chronic respiratory failure with hypoxia (HCC)   Assessment & Plan    With evident pulmonary edema in the setting of stage V CKD.    Nephrology was consulted  Will likely hold loop diuretics now that HD initiated, will defer to nephro.          Stage 5 chronic kidney disease (HCC)- (present on admission)   Assessment & Plan    Nephro Dr. Najjar consulted.    Monitoring dialysis         Leg erythema   Assessment & Plan    Bilateral and patchy, with pruritis ?complication of renal failure versus due to chronic edema.   No other evidence of cellulitis, no significant inflammatory changes on laboratory workup.   Defer antibiotics         Hypokalemia- (present on admission)   Assessment & Plan    Unclear etiology, as patient does have underlying renal failure  repleting as needed         Essential hypertension- (present on admission)   Assessment & Plan    Controlled with current regimen.  Monitor.          May be stable for discharge in AM  hours pending placement of HD chair.

## 2018-10-24 NOTE — PROGRESS NOTES
Received report from night shift RNAisha. Discussed plan of care, updated white board. Pt is resting in bed, alert and oriented. Pt denies any pain or distress at this time. Fall precautions in place. All needs met. Bed in lowest position. Call light within reach. Will continue to monitor.

## 2018-10-24 NOTE — PROGRESS NOTES
3hr HD started @ 0846 and completed @ 1147,tx well tolerated,net UF 2000ml.RIJ TDC dressing changed,CDI,report given to SILVIA Ahmadi RN.

## 2018-10-24 NOTE — PROGRESS NOTES
Patient is sleeping comfortably in bed. No overt signs of distress noted. Bed is locked and in lowest position. Side rails up x 2. Call light, phone, and personal belongings within reach. Bed alarm is on in use. Will continue to monitor.

## 2018-10-24 NOTE — PROGRESS NOTES
Pt has complaints of SOB, Nephrologist at bedside. Plan to Dialyze today. Updated pt of plan of care.

## 2018-10-24 NOTE — THERAPY
"Occupational Therapy Evaluation completed.   Functional Status:  Pt admitted to [facility] following CKD V, and uremia/volume overload, currently undergoing dialysis. Pt performed bed mobility with sba, LB dressing cga, toileting and toilet t/f with cga, ambulated to BR with cga; required frequent seated/standing restbreaks throughout the session with activity 2/2 moderate sob with light ADLs. Pt's spO2 post session in low 90's with supplemental O2. Pt verbalized once her dialysis is s/u, she's comfortable returning home with intermittent assist from family with IADLs. Pt will benefit from acute skilled OT services while in house and anticipate able to d/c home with assist from family once pt is medically cleared; however pending activity tolerance with therapy as pt will be by herself during daytime.    Plan of Care: Will benefit from Occupational Therapy 3 times per week  Discharge Recommendations:  Equipment: Tub Transfer Bench. Post-acute therapy Discharge to home with outpatient or home health for additional skilled therapy services pending progress and activity tolerance w/ therapy.     See \"Rehab Therapy-Acute\" Patient Summary Report for complete documentation.    "

## 2018-10-25 VITALS
SYSTOLIC BLOOD PRESSURE: 118 MMHG | OXYGEN SATURATION: 95 % | HEIGHT: 62 IN | RESPIRATION RATE: 18 BRPM | TEMPERATURE: 97.8 F | BODY MASS INDEX: 24.5 KG/M2 | DIASTOLIC BLOOD PRESSURE: 63 MMHG | HEART RATE: 87 BPM | WEIGHT: 133.16 LBS

## 2018-10-25 PROBLEM — N18.4 CKD (CHRONIC KIDNEY DISEASE), STAGE IV (HCC): Status: ACTIVE | Noted: 2017-02-24

## 2018-10-25 PROBLEM — E87.6 HYPOKALEMIA: Status: RESOLVED | Noted: 2018-05-04 | Resolved: 2018-10-25

## 2018-10-25 PROBLEM — L53.9 LEG ERYTHEMA: Status: RESOLVED | Noted: 2018-10-21 | Resolved: 2018-10-25

## 2018-10-25 PROBLEM — Z86.73 H/O: CVA (CEREBROVASCULAR ACCIDENT): Status: ACTIVE | Noted: 2017-02-24

## 2018-10-25 PROBLEM — J96.21 ACUTE ON CHRONIC RESPIRATORY FAILURE WITH HYPOXIA (HCC): Status: RESOLVED | Noted: 2018-10-21 | Resolved: 2018-10-25

## 2018-10-25 PROCEDURE — G8978 MOBILITY CURRENT STATUS: HCPCS | Mod: CI

## 2018-10-25 PROCEDURE — 97161 PT EVAL LOW COMPLEX 20 MIN: CPT

## 2018-10-25 PROCEDURE — G8979 MOBILITY GOAL STATUS: HCPCS | Mod: CI

## 2018-10-25 PROCEDURE — 700111 HCHG RX REV CODE 636 W/ 250 OVERRIDE (IP): Performed by: SPECIALIST

## 2018-10-25 PROCEDURE — 700102 HCHG RX REV CODE 250 W/ 637 OVERRIDE(OP): Performed by: HOSPITALIST

## 2018-10-25 PROCEDURE — G8980 MOBILITY D/C STATUS: HCPCS | Mod: CI

## 2018-10-25 PROCEDURE — 99239 HOSP IP/OBS DSCHRG MGMT >30: CPT | Performed by: HOSPITALIST

## 2018-10-25 PROCEDURE — 99232 SBSQ HOSP IP/OBS MODERATE 35: CPT | Performed by: INTERNAL MEDICINE

## 2018-10-25 PROCEDURE — A9270 NON-COVERED ITEM OR SERVICE: HCPCS | Performed by: HOSPITALIST

## 2018-10-25 RX ORDER — DIPHENHYDRAMINE HYDROCHLORIDE 50 MG/ML
25 INJECTION INTRAMUSCULAR; INTRAVENOUS ONCE
Status: COMPLETED | OUTPATIENT
Start: 2018-10-25 | End: 2018-10-25

## 2018-10-25 RX ADMIN — VITAMIN B12 0.1 MG ORAL TABLET 100 MCG: 0.1 TABLET ORAL at 05:25

## 2018-10-25 RX ADMIN — DIPHENHYDRAMINE HYDROCHLORIDE 25 MG: 50 INJECTION INTRAMUSCULAR; INTRAVENOUS at 05:25

## 2018-10-25 RX ADMIN — HYDRALAZINE HYDROCHLORIDE 100 MG: 50 TABLET, FILM COATED ORAL at 05:25

## 2018-10-25 RX ADMIN — ISOSORBIDE MONONITRATE 120 MG: 60 TABLET, EXTENDED RELEASE ORAL at 05:25

## 2018-10-25 RX ADMIN — FERROUS SULFATE TAB 325 MG (65 MG ELEMENTAL FE) 325 MG: 325 (65 FE) TAB at 05:25

## 2018-10-25 ASSESSMENT — COGNITIVE AND FUNCTIONAL STATUS - GENERAL
SUGGESTED CMS G CODE MODIFIER MOBILITY: CH
MOBILITY SCORE: 24

## 2018-10-25 ASSESSMENT — ENCOUNTER SYMPTOMS
PALPITATIONS: 0
CHILLS: 0
FEVER: 0

## 2018-10-25 ASSESSMENT — PAIN SCALES - GENERAL
PAINLEVEL_OUTOF10: 0
PAINLEVEL_OUTOF10: 0

## 2018-10-25 ASSESSMENT — PATIENT HEALTH QUESTIONNAIRE - PHQ9
1. LITTLE INTEREST OR PLEASURE IN DOING THINGS: NOT AT ALL
SUM OF ALL RESPONSES TO PHQ9 QUESTIONS 1 AND 2: 0
2. FEELING DOWN, DEPRESSED, IRRITABLE, OR HOPELESS: NOT AT ALL

## 2018-10-25 ASSESSMENT — GAIT ASSESSMENTS
ASSISTIVE DEVICE: FRONT WHEEL WALKER
DISTANCE (FEET): 125
GAIT LEVEL OF ASSIST: SUPERVISED

## 2018-10-25 NOTE — PROGRESS NOTES
Nephrology Daily Progress Note    Date of Service  10/25/2018    Chief Complaint  73 y.o. female admitted 10/21/2018 with CKD V and uremia/volume overload    Interval Problem Update  Doing much better today.  Outpatient dialysis has been arranged.  She feels comfortable.    Review of Systems  Review of Systems   Constitutional: Negative for chills and fever.   Cardiovascular: Negative for chest pain and palpitations.   All other systems reviewed and are negative.       Physical Exam  Temp:  [36.5 °C (97.7 °F)-36.9 °C (98.4 °F)] 36.6 °C (97.8 °F)  Pulse:  [77-94] 87  Resp:  [16-20] 18  BP: (118-135)/(60-78) 118/63    Physical Exam   Constitutional: She is oriented to person, place, and time. She appears well-developed and well-nourished.   HENT:   Head: Normocephalic and atraumatic.   Cardiovascular: Normal rate and regular rhythm.    Pulmonary/Chest: Effort normal and breath sounds normal.   Abdominal: Soft. Bowel sounds are normal.   Musculoskeletal: She exhibits no edema or tenderness.   Neurological: She is alert and oriented to person, place, and time.   Skin: Skin is warm and dry.       Fluids    Intake/Output Summary (Last 24 hours) at 10/25/18 1049  Last data filed at 10/25/18 0600   Gross per 24 hour   Intake              800 ml   Output             2750 ml   Net            -1950 ml       Laboratory  Recent Labs      10/23/18   0044  10/24/18   0319   WBC  6.5  7.4   RBC  3.53*  3.81*   HEMOGLOBIN  10.7*  11.6*   HEMATOCRIT  32.8*  36.0*   MCV  92.9  94.5   MCH  30.3  30.4   MCHC  32.6*  32.2*   RDW  50.1*  50.8*   PLATELETCT  152*  142*   MPV  11.1  11.1     Recent Labs      10/23/18   0044  10/24/18   0319   SODIUM  135  137   POTASSIUM  3.7  3.9   CHLORIDE  97  100   CO2  25  28   GLUCOSE  85  88   BUN  57*  25*   CREATININE  4.53*  3.03*   CALCIUM  11.1*  10.4     Recent Labs      10/22/18   1337   INR  1.08               Imaging  IR-CVC TUNNEL W/O PORT REMOVAL   Final Result      Successful image guided  tunneled dialysis catheter placement.      Plan: The catheter is available for immediate use.            DX-CHEST-PORTABLE (1 VIEW)   Final Result      Stable prominence of the cardiomediastinal silhouette.      Atherosclerotic plaque.      Interstitial prominence may be chronic.              Assessment/Plan  1. New ESRD -likely discharged today.  Outpatient dialysis has been arranged.  Follow-up there.  2. Hypercalcemia -back to normal.  Off Rocaltrol.  3. Anemia -continue iron    -Discharge today  -Outpatient dialysis follow-up

## 2018-10-25 NOTE — DISCHARGE PLANNING
Outpatient Dialysis Placement Confirmation     Patient has been placed and confirmed at:     [facility]   [id number]  [place] [address]     Phone: [phone]     Schedule: Monday, Wednesday, Friday  Time: 2:30 PM     Patient can start on Friday 10/26/18, and needs to arrive at the facility by 2:00pm for paperwork.     Follow up call to ANDRÉS Brooks ext [phone] and relayed outpatient dialysis confirmation.  Patient has been provided with written schedule and clinic information.         Wendy Dunlap- Dialysis Coordinator  [facility] # [phone]

## 2018-10-25 NOTE — PROGRESS NOTES
Reviewed discharge instruction with pt.  Removed Tele monitor. Pt stated family would be here between 12-1 to pick her up.

## 2018-10-25 NOTE — PROGRESS NOTES
Received report from night shift RN.  Assumed care at 0700. Pt denies any discomfort at this time.  Call light within reach. Bed locked and in lowest position.  Non skid socks on, Belongings within  Reach.  Will continue to monitor hourly.

## 2018-10-25 NOTE — CARE PLAN
Problem: Safety  Goal: Will remain free from falls    Intervention: Implement fall precautions   10/24/18 2000 10/25/18 0042   OTHER   Environmental Precautions Treaded Slipper Socks on Patient;Personal Belongings, Wastebasket, Call Bell etc. in Easy Reach;Transferred to Stronger Side;Report Given to Other Health Care Providers Regarding Fall Risk;Bed in Low Position;Communication Sign for Patients & Families;Mobility Assessed & Appropriate Sign Placed --    Bedrails --  Bedrails Closest to Bathroom Down   Chair/Bed Strip Alarm --  Yes - Alarm On   Bed Alarm --  Yes - Alarm On         Problem: Knowledge Deficit  Goal: Knowledge of disease process/condition, treatment plan, diagnostic tests, and medications will improve    Intervention: Explain information regarding disease process/condition, treatment plan, diagnostic tests, and medications and document in education  Plan of care, discharge planning, medications, fall prevention, safety, and use of call light/phone discussed; all questions answered.

## 2018-10-25 NOTE — DISCHARGE INSTRUCTIONS
Discharge Instructions    Discharged to home by car with relative. Discharged via wheelchair, hospital escort: Yes.  Special equipment needed: Not Applicable    Be sure to schedule a follow-up appointment with your primary care doctor or any specialists as instructed.     Discharge Plan:   Diet Plan: Discussed  Activity Level: Discussed  Confirmed Follow up Appointment: Appointment Scheduled  Confirmed Symptoms Management: Discussed  Medication Reconciliation Updated: Yes  Pneumococcal Vaccine Administered/Refused: Not given - Patient refused pneumococcal vaccine  Influenza Vaccine Indication: Indicated: 65 years and older  Influenza Vaccine Given - only chart on this line when given: Influenza Vaccine Given (See MAR)    I understand that a diet low in cholesterol, fat, and sodium is recommended for good health. Unless I have been given specific instructions below for another diet, I accept this instruction as my diet prescription.   Other diet: renal    Special Instructions: None    · Is patient discharged on Warfarin / Coumadin?   No     Depression / Suicide Risk    As you are discharged from this [facility] facility, it is important to learn how to keep safe from harming yourself.    Recognize the warning signs:  · Abrupt changes in personality, positive or negative- including increase in energy   · Giving away possessions  · Change in eating patterns- significant weight changes-  positive or negative  · Change in sleeping patterns- unable to sleep or sleeping all the time   · Unwillingness or inability to communicate  · Depression  · Unusual sadness, discouragement and loneliness  · Talk of wanting to die  · Neglect of personal appearance   · Rebelliousness- reckless behavior  · Withdrawal from people/activities they love  · Confusion- inability to concentrate     If you or a loved one observes any of these behaviors or has concerns about self-harm, here's what you can do:  · Talk about it- your feelings and  reasons for harming yourself  · Remove any means that you might use to hurt yourself (examples: pills, rope, extension cords, firearm)  · Get professional help from the community (Mental Health, Substance Abuse, psychological counseling)  · Do not be alone:Call your Safe Contact- someone whom you trust who will be there for you.  · Call your local CRISIS HOTLINE 646-4644 or 588-130-9345  · Call your local Children's Mobile Crisis Response Team [place] (712) 947-4462 or wwwImage Searcher  · Call the toll free National Suicide Prevention Hotlines   · National Suicide Prevention Lifeline 761-808-HSHO (4044)  · National Hope Line Network 800-SUICIDE (623-3970)      Dialysis  Dialysis is a procedure that replaces some of the work healthy kidneys do. It is done when you lose about 85-90% of your kidney function. It may also be done earlier if your symptoms may be improved by dialysis. During dialysis, wastes, salt, and extra water are removed from the blood, and the levels of certain chemicals in the blood (such as potassium) are maintained. Dialysis is done in sessions. Dialysis sessions are continued until the kidneys get better. If the kidneys cannot get better, such as in end-stage kidney disease, dialysis is continued for life or until you receive a new kidney (kidney transplant). There are two types of dialysis: hemodialysis and peritoneal dialysis.  What is hemodialysis?  Hemodialysis is a type of dialysis in which a machine called a dialyzer is used to filter the blood. Before beginning hemodialysis, you will have surgery to create a site where blood can be removed from the body and returned to the body (vascular access). There are three types of vascular accesses:  · Arteriovenous fistula. To create this type of access, an artery is connected to a vein (usually in the arm). A fistula takes 1-6 months to develop after surgery. If it develops properly, it usually lasts longer than the other types of  vascular accesses. It is also less likely to become infected and cause blood clots.  · Arteriovenous graft. To create this type of access, an artery and a vein in the arm are connected with a tube. A graft may be used within 2-3 weeks of surgery.  · A venous catheter. To create this type of access, a thin, flexible tube (catheter) is placed in a large vein in your neck, chest, or groin. A catheter may be used right away. It is usually used as a temporary access when dialysis needs to begin immediately.  During hemodialysis, blood leaves the body through your access. It travels through a tube to the dialyzer, where it is filtered. The blood then returns to your body through another tube.  Hemodialysis is usually performed by a health care provider at a hospital or dialysis center three times a week. Visits last about 3-4 hours. It may also be performed with the help of another person at home with training.  What is peritoneal dialysis?  Peritoneal dialysis is a type of dialysis in which the thin lining of the abdomen (peritoneum) is used as a filter. Before beginning peritoneal dialysis, you will have surgery to place a catheter in your abdomen. The catheter will be used to transfer a fluid called dialysate to and from your abdomen. At the start of a session, your abdomen is filled with dialysate. During the session, wastes, salt, and extra water in the blood pass through the peritoneum and into the dialysate. The dialysate is drained from the body at the end of the session. The process of filling and draining the dialysate is called an exchange. Exchanges are repeated until you have used up all the dialysate for the day.  Peritoneal dialysis may be performed by you at home or at almost any other location. It is done every day. You may need up to five exchanges a day. The amount of time the dialysate is in your body between exchanges is called a dwell. The dwell depends on the number of exchanges needed and the  characteristics of the peritoneum. It usually varies from 1.5-3 hours. You may go about your day normally between exchanges. Alternately, the exchanges may be done at night while you sleep, using a machine called a cycler.  Which type of dialysis should I choose?  Both hemodialysis and peritoneal dialysis have advantages and disadvantages. Talk to your health care provider about which type of dialysis would be best for you. Your lifestyle and preferences should be considered along with your medical condition. In some cases, only one type of dialysis may be an option.  Advantages of hemodialysis  · It is done less often than peritoneal dialysis.  · Someone else can do the dialysis for you.  · If you go to a dialysis center, your health care provider will be able to recognize any problems right away.  · If you go to a dialysis center, you can interact with others who are having dialysis. This can provide you with emotional support.  Disadvantages of hemodialysis  · Hemodialysis may cause cramps and low blood pressure. It may leave you feeling tired on the days you have the treatment.  · If you go to a dialysis center, you will need to make weekly appointments and work around the center’s schedule.  · You will need to take extra care when traveling. If you go to a dialysis center, you will need to make special arrangements to visit a dialysis center near your destination. If you are having treatments at home, you will need to take the dialyzer with you to your destination.  · You will need to avoid more foods than you would need to avoid on peritoneal dialysis.  Advantages of peritoneal dialysis  · It is less likely than hemodialysis to cause cramps and low blood pressure.  · You may do exchanges on your own wherever you are, including when you travel.  · You do not need to avoid as many foods as you do on hemodialysis.  Disadvantages of peritoneal dialysis  · It is done more often than hemodialysis.  · Performing  peritoneal dialysis requires you to have dexterity of the hands. You must also be able to lift bags.  · You will have to learn sterilization techniques. You will need to practice them every day to reduce the risk of infection.  What changes will I need to make to my diet during dialysis?  Both hemodialysis and peritoneal dialysis require you to make some changes to your diet. For example, you will need to limit your intake of foods high in the minerals phosphorus and potassium. You will also need to limit your fluid intake. Your dietitian can help you plan meals. A good meal plan can improve your dialysis and your health.  What should I expect when beginning dialysis?  Adjusting to the dialysis treatment, schedule, and diet can take some time. You may need to stop working and may not be able to do some of the things you normally do. You may feel anxious or depressed when beginning dialysis. Eventually, many people feel better overall because of dialysis. Some people are able to return to work after making some changes, such as reducing work intensity.  Where can I find more information?  · National Kidney Foundation: www.kidney.org  · American Association of Kidney Patients: www.aakp.org  · American Kidney Fund: www.kidneyfund.org  This information is not intended to replace advice given to you by your health care provider. Make sure you discuss any questions you have with your health care provider.  Document Released: 03/09/2004 Document Revised: 05/25/2017 Document Reviewed: 02/11/2014  Elsevier Interactive Patient Education © 2017 [employer]    AV Fistula, Care After  Refer to this sheet in the next few weeks. These instructions provide you with information on caring for yourself after your procedure. Your caregiver may also give you more specific instructions. Your treatment has been planned according to current medical practices, but problems sometimes occur. Call your caregiver if you have any problems or  "questions after your procedure.  HOME CARE INSTRUCTIONS   · Do not drive a car or take public transportation alone.  · Do not drink alcohol.  · Only take medicine that has been prescribed by your caregiver.  · Do not sign important papers or make important decisions.  · Have a responsible person with you.  · Ask your caregiver to show you how to check your access at home for a vibration (called a \"thrill\") or for a sound (called a \"bruit\" pronounced brew-ee).  · Your vein will need time to enlarge and mature so needles can be inserted for dialysis. Follow your caregiver's instructions about what you need to do to make this happen.  · Keep dressings clean and dry.  · Keep the arm elevated above your heart. Use a pillow.  · Rest.  · Use the arm as usual for all activities.  · Have the stitches or tape closures removed in 10 to 14 days, or as directed by your caregiver.  · Do not sleep or lie on the area of the fistula or that arm. This may decrease or stop the blood flow through your fistula.  · Do not allow blood pressures to be taken on this arm.  · Do not allow blood drawing to be done from the graft.  · Do not wear tight clothing around the access site or on the arm.  · Avoid lifting heavy objects with the arm that has the fistula.  · Do not use creams or lotions over the access site.  SEEK MEDICAL CARE IF:   · You have a fever.  · You have swelling around the fistula that gets worse, or you have new pain.  · You have unusual bleeding at the fistula site or from any other area.  · You have pus or other drainage at the fistula site.  · You have skin redness or red streaking on the skin around, above, or below the fistula site.  · Your access site feels warm.  · You have any flu-like symptoms.  SEEK IMMEDIATE MEDICAL CARE IF:   · You have pain, numbness, or an unusual pale skin on the hand or on the side of your fistula.  · You have dizziness or weakness that you have not had before.  · You have shortness of " breath.  · You have chest pain.  · Your fistula disconnects or breaks, and there is bleeding that cannot be easily controlled.  Call for local emergency medical help. Do not try to drive yourself to the hospital.  MAKE SURE YOU  · Understand these instructions.  · Will watch your condition.  · Will get help right away if you are not doing well or get worse.     This information is not intended to replace advice given to you by your health care provider. Make sure you discuss any questions you have with your health care provider.     Document Released: 12/18/2006 Document Revised: 01/08/2016 Document Reviewed: 06/06/2012  [employer] Interactive Patient Education ©2016 Elsevier Inc.    Dialysis Diet  Dialysis is a treatment that you may undergo if you have significant damage to your kidneys. Dialysis replaces some of the work that the kidneys do. One of the jobs that it takes over is removing wastes, salt, and extra water from your blood. This helps to keep the amount of potassium and other nutrients in your blood at healthy levels.  When you need dialysis, it is important to pay careful attention to your diet. Between dialysis sessions, certain nutrients can build up in your blood and cause you to get sick. Vitamins and minerals are an important part of a healthy diet and should not be avoided entirely. However, it is commonly recommended that you limit your intake of potassium, phosphorus, and sodium. It may also be necessary to restrict other nutrients, such as carbohydrates or fat, if you have other health conditions. Your health care provider or dietitian can help you to determine the amount of these nutrients that is right for you.  WHAT IS MY PLAN?  Your dietitian will help you to design a meal plan that is specific to your needs. Generally, meal plans include:  · Grains, 6-11 servings per day. One serving is equal to 1 slice of bread or ½ cup of cooked rice or pasta.  · Low-potassium vegetables, 2-3 servings per  day. One serving is equal to ½ cup.  · Low-potassium fruits, 2-3 servings per day. One serving is equal to ½ cup.  · Meats and other protein sources, 8-11 oz per day.  · Dairy, ½ cup per day.  Your dietitian will provide you with specific instructions about the amount of fluids you can have each day.  WHAT DO I NEED TO KNOW ABOUT A DIALYSIS DIET?  · Limit your intake of potassium. Potassium is found in milk, fruits, and vegetables.  · Limit your intake of phosphorus. Phosphorus is found in milk, cheese, beans, nuts, and carbonated beverages. Avoid whole-grain and high-fiber foods because they contain high amounts of phosphorus.  · Limit your intake of sodium. Foods that are high in sodium include processed and cured meats, ready-made frozen meals, canned vegetables, and salty snack foods. Do not use salt substitutes because they contain potassium.  · If you were instructed to restrict your fluid intake, follow your health care provider's specific instructions. You may be told to:  ¨ Write down what you drink and any foods you eat that are made mostly from water, such as gelatin and soups.  ¨ Drink from small cups to help control how much you drink.  · Ask your health care provider if you should regularly take an over-the-counter medicine that binds phosphorus, such as antacid products that contain calcium carbonate.  · Take vitamin and mineral supplements only as directed by your health care provider.  · Eat high-quality proteins, such as meat, poultry, fish, and eggs. Limit low-quality plant-based proteins, such as nuts and beans.  · Cut potatoes into small pieces and boil them in unsalted water before you eat them. This can help to remove some potassium from the potato.  · Drain all fluid from cooked vegetables and canned fruits before eating them.  WHAT FOODS CAN I EAT?  Grains  White bread. White rice. Cooked cereal. Unsalted popcorn. Tortillas. Pasta.  Vegetables  Fresh or frozen broccoli, carrots, and green  beans. Cabbage. Cauliflower. Celery. Cucumbers. Eggplant. Radishes. Zucchini.  Fruits  Apples. Fresh or frozen berries. Fresh or canned pears, peaches, and pineapple. Grapes. Plums.  Meats and Other Protein Sources  Fresh or frozen beef, pork, chicken, and fish. Eggs. Low-sodium canned tuna or salmon.  Dairy  Cream cheese. Heavy cream. Ricotta cheese.  Beverages  Apple cider. Cranberry juice. Grape juice. Lemonade. Black coffee.  Condiments  Herbs. Spices. Jam and jelly. Honey.  Sweets and Desserts  Sherbet. Cakes. Cookies.  Fats and Oils  Olive oil, canola oil, and safflower oil.  Other  Non-dairy creamer. Non-dairy whipped topping. Homemade broth without salt.  The items listed above may not be a complete list of recommended foods or beverages. Contact your dietitian for more options.   WHAT FOODS ARE NOT RECOMMENDED?  Grains  Whole-grain bread. Whole-grain pasta. High-fiber cereal.  Vegetables  Potatoes. Beets. Tomatoes. Winter squash and pumpkin. Asparagus. Spinach. Parsnips.  Fruits  Star fruit. Bananas. Oranges. Kiwi. Nectarines. Prunes. Melon. Dried fruit. Avocado.  Meats and Other Protein Sources  Canned, smoked, and cured meats. Packaged luncheon meat. Sardines. Nuts and seeds. Peanut butter. Beans and legumes.  Dairy  Milk. Buttermilk. Yogurt. Cheese and cottage cheese. Processed cheese spreads.  Beverages  Orange juice. Prune juice. Carbonated soft drinks.  Condiments  Salt. Salt substitutes. Soy sauce.  Sweets and Desserts  Ice cream. Chocolate. Candied nuts.  Fats and Oils  Butter. Margarine.  Other  Ready-made frozen meals. Canned soups.  The items listed above may not be a complete list of foods and beverages to avoid. Contact your dietitian for more information.     This information is not intended to replace advice given to you by your health care provider. Make sure you discuss any questions you have with your health care provider.     Document Released: 09/14/2005 Document Revised: 01/08/2016  Document Reviewed: 07/21/2015  [employer] Interactive Patient Education ©2016 Elsevier Inc.

## 2018-10-25 NOTE — PROGRESS NOTES
Patient is sleeping comfortably in bed. No overt signs of distress noted. Bed remains locked and in lowest position. Side rails up x 2. Bed alarm is on and in use. Tray table, call light, phone, and personal belongings within reach. Will continue to monitor.

## 2018-10-25 NOTE — THERAPY
"Physical Therapy Evaluation completed.   Bed Mobility:  Supine to Sit: Supervised  Transfers: Sit to Stand: Supervised  Gait: Level Of Assist: Supervised with Front-Wheel Walker       Plan of Care: Patient with no further skilled PT needs in the acute care setting at this time  Discharge Recommendations: Equipment: No Equipment Needed.   Pt presents with renal failure, now starting dialysis. Today, pt tolerated ambulation x 125 feet using FWW with  supervision and 2 seated rests. Pt has two stairs at home with no rail, is agreeable to have family present to assist for safety. Pt is close to baseline function, usually has assist from daughter in am and will continue with this assist. No further inpt PT needs. See \"Rehab Therapy-Acute\" Patient Summary Report for complete documentation.     "

## 2018-10-25 NOTE — PROGRESS NOTES
Received bedside report from [facility]. Patient is sitting up in bed, resting. Plan of care reviewed; communication board updated; all questions answered. Patient requesting tylenol. Given as per EMARs. Offered toileting, declined at this time. No further needs as per patient. Bed is locked and in lowest position. Side rails up x 2. Bed alarm is on and in use. Call light, phone, and personal belongings within reach. Will continue to monitor patient.

## 2018-10-26 ENCOUNTER — PATIENT OUTREACH (OUTPATIENT)
Dept: HEALTH INFORMATION MANAGEMENT | Facility: OTHER | Age: 73
End: 2018-10-26

## 2018-10-26 LAB
BACTERIA BLD CULT: NORMAL
BACTERIA BLD CULT: NORMAL
SIGNIFICANT IND 70042: NORMAL
SIGNIFICANT IND 70042: NORMAL
SITE SITE: NORMAL
SITE SITE: NORMAL
SOURCE SOURCE: NORMAL
SOURCE SOURCE: NORMAL

## 2018-10-26 NOTE — DISCHARGE SUMMARY
Discharge Summary    CHIEF COMPLAINT ON ADMISSION  Chief Complaint   Patient presents with   • N/V   • Itching       Reason for Admission  Nausea     Admission Date  10/21/2018    CODE STATUS  Prior    HPI & HOSPITAL COURSE  73 y.o. female admitted 10/21/2018 with chronic kidney disease 5 who is followed in the outpatient chronic kidney disease clinic.  Her serum creatinine has been creeping up and she has been referred to vascular surgery and had a fistula placed. She was referred for chronic kidney disease education and was felt to need dialysis in the near future.       Subsequently, the patient presented to the hospital with shortness of breath and weakness as well as nausea.  Basically, she has jaida uremic symptoms.  On further review with the patient she has had these symptoms for some period of time.  However with marked worsening over the last few days.      Patient was seen in consultation by nephrology, felt to benefit from initiation of hemodialysis which was undertaken on 10/23/18 after placement of a tunneled hemodialysis catheter on 10/22/18.  Patient was dialyzed on the 23rd and 24th with excellent improvement in her uremic symptoms nausea and abdominal discomfort.  Outpatient hemodialysis chair was arranged for this coming Friday, 10/26/18, at [facility], where she will begin Monday Wednesday Friday dialysis sessions.  Her appointment time will be at 2 PM this coming Friday.  She is advised to arrive 30 minutes prior to her scheduled appointment for intake paperwork.  Address will be at [address] [place] [address], [address].       Therefore, she is discharged in good and stable condition to home with close outpatient follow-up.    The patient met 2-midnight criteria for an inpatient stay at the time of discharge.    Discharge Date  10/25/2018    FOLLOW UP ITEMS POST DISCHARGE  HD as scheduled per above.      DISCHARGE DIAGNOSES  Principal Problem (Resolved):    Acute on chronic respiratory failure with  hypoxia (HCC) POA: Unknown  Active Problems:    Stage 5 chronic kidney disease (HCC) POA: Yes    Essential hypertension POA: Yes      Overview: ICD-10 transition  Resolved Problems:    Hypokalemia POA: Yes    Leg erythema POA: Unknown      FOLLOW UP  Future Appointments  Date Time Provider Department Center   10/29/2018 3:40 PM [facility] [address]   11/7/2018 11:40 AM Cheryl Monge M.D. [address]     already scheduled with Cheryl Monge            MEDICATIONS ON DISCHARGE     Medication List      CONTINUE taking these medications      Instructions   B-12 100 MCG Tabs   Take 100 mcg by mouth every day.  Dose:  100 mcg     calcitRIOL 0.25 MCG Caps  Commonly known as:  ROCALTROL   Take 0.5 mcg by mouth every day.  Dose:  0.5 mcg     ferrous sulfate 325 (65 Fe) MG tablet   Take 325 mg by mouth every day.  Dose:  325 mg     hydrALAZINE 100 MG tablet  Commonly known as:  APRESOLINE   Take 100 mg by mouth every day.  Dose:  100 mg     ipratropium-albuterol 0.5-2.5 (3) MG/3ML nebulizer solution  Commonly known as:  DUONEB   Doctor's comments:  Bid and up to qid prn  3 mL by Nebulization route every 6 hours as needed for Shortness of Breath.  Dose:  3 mL     isosorbide mononitrate 120 MG CR tablet  Commonly known as:  IMDUR   TAKE ONE TABLET BY MOUTH ONCE DAILY     lovastatin 10 MG tablet  Commonly known as:  MEVACOR   TAKE 1 TABLET BY MOUTH ONCE DAILY     multivitamin Tabs   Take 1 Tab by mouth every day.  Dose:  1 Tab     vitamin D 1000 UNIT Tabs  Commonly known as:  cholecalciferol   Take 1,000 Units by mouth every day.  Dose:  1000 Units        STOP taking these medications    furosemide 40 MG Tabs  Commonly known as:  LASIX            Allergies  No Known Allergies    DIET  No orders of the defined types were placed in this encounter.      ACTIVITY  As tolerated.  Weight bearing as tolerated    CONSULTATIONS  Saulo Garza MD, Neprology    PROCEDURES  Patient underwent placement  of a tunneled hemodialysis catheter with Dr. Pickard on 10/22/18, EBL of 5 mL, procedure was well tolerated without immediate complication.    LABORATORY  Lab Results   Component Value Date    SODIUM 137 10/24/2018    POTASSIUM 3.9 10/24/2018    CHLORIDE 100 10/24/2018    CO2 28 10/24/2018    GLUCOSE 88 10/24/2018    BUN 25 (H) 10/24/2018    CREATININE 3.03 (H) 10/24/2018        Lab Results   Component Value Date    WBC 7.4 10/24/2018    HEMOGLOBIN 11.6 (L) 10/24/2018    HEMATOCRIT 36.0 (L) 10/24/2018    PLATELETCT 142 (L) 10/24/2018        Total time of the discharge process exceeds 32 minutes.

## 2018-10-29 ENCOUNTER — PATIENT OUTREACH (OUTPATIENT)
Dept: HEALTH INFORMATION MANAGEMENT | Facility: OTHER | Age: 73
End: 2018-10-29

## 2018-10-29 NOTE — PROGRESS NOTES
10/29/18 3:40pm:  CM post discharge outreach call first attempt.  VM left requesting return call to  at [phone].    10/29/18 4:15pm:  CM post discharge outreach call second attempt.  VM left requesting return call to  at [phone].    10/31/18 10:00am:  Patient returned CM post discharge call.  Pt states she will be at appointment at post discharge clinic next week.

## 2018-11-07 ENCOUNTER — TELEPHONE (OUTPATIENT)
Dept: MEDICAL GROUP | Facility: MEDICAL CENTER | Age: 73
End: 2018-11-07

## 2018-11-07 ENCOUNTER — OFFICE VISIT (OUTPATIENT)
Dept: MEDICAL GROUP | Facility: MEDICAL CENTER | Age: 73
End: 2018-11-07
Payer: MEDICARE

## 2018-11-07 VITALS
BODY MASS INDEX: 26.13 KG/M2 | TEMPERATURE: 98 F | DIASTOLIC BLOOD PRESSURE: 70 MMHG | OXYGEN SATURATION: 88 % | RESPIRATION RATE: 18 BRPM | WEIGHT: 142 LBS | SYSTOLIC BLOOD PRESSURE: 120 MMHG | HEIGHT: 62 IN | HEART RATE: 76 BPM

## 2018-11-07 DIAGNOSIS — I10 ESSENTIAL HYPERTENSION: ICD-10-CM

## 2018-11-07 DIAGNOSIS — J96.11 CHRONIC RESPIRATORY FAILURE WITH HYPOXIA (HCC): ICD-10-CM

## 2018-11-07 DIAGNOSIS — N18.6 ESRD (END STAGE RENAL DISEASE) (HCC): ICD-10-CM

## 2018-11-07 DIAGNOSIS — T82.590S MALFUNCTION OF ARTERIOVENOUS DIALYSIS FISTULA, SEQUELA: ICD-10-CM

## 2018-11-07 DIAGNOSIS — Z09 HOSPITAL DISCHARGE FOLLOW-UP: ICD-10-CM

## 2018-11-07 DIAGNOSIS — R09.81 NASAL CONGESTION: ICD-10-CM

## 2018-11-07 PROCEDURE — 99495 TRANSJ CARE MGMT MOD F2F 14D: CPT | Performed by: FAMILY MEDICINE

## 2018-11-07 RX ORDER — FLUTICASONE PROPIONATE 50 MCG
2 SPRAY, SUSPENSION (ML) NASAL DAILY
Qty: 16 G | Refills: 2 | Status: SHIPPED | OUTPATIENT
Start: 2018-11-07 | End: 2018-12-27

## 2018-11-07 NOTE — PROGRESS NOTES
"Subjective:     Kristyn Gillespie is a 73 y.o. female who presents for Hospital Follow-up.  Chart and discharge summary reviewed.   Date of discharge 10/25/18.  48- hour post discharge RN call completed on 10/31/18 and documented in the medical record by Rakel León RN:  POST DISCHARGE CALL:  Discharge Date:10/25/2018   Date of Outreach Call: 10/31/2018  9:59 AM  Now that you're home, how are you doing? Good  Comment:Pt reports she is doing good. Denies feeling  short of breath. Reports she will be going to dialysis  today.  Do you have questions about your medications? No    Did you fill your medications? No    Do you have a follow-up appointment scheduled?Yes  Comment:Post discharge clinic on 11/7    Discharging Department: Telemetry 7    Number of Attempts: 3  Current or previous attempts completed within two business days of discharge? Yes  Provided education regarding treatment plan, medication, self-management, ADLs? Yes  Has patient completed Advance Directive? If yes, advise them to bring to appointment. No  Care Manager phone number provided? Yes  Is there anything else I can help you with? No      HPI: Recently hospitalized for shortness of breath along with weakness and nausea.  She has a history of stage V chronic kidney disease and was found to be uremic.  She was started on dialysis.  She had previously had an AV fistula placed by Dr. Romero, vascular surgery.  However, she says it \"never matured.\"  She had and her daughter say that she was told to follow-up with Dr. Romero but her daughter has called his office twice and was told that she did not need a follow-up.  Dr. Cruz is her nephrologist.  She has a history of COPD and is on chronic oxygen 2 L/min.  However, she says she has been having a hard time breathing out of her nose for the last couple of months so cannot get all the oxygen.    Since returning home, patient reports feeling a little better but she continues to have severe " itching all over her body which she has had for the past couple of months.     The patient has questions regarding hospitalization or discharge: All of her questions were answered  The patient has weakness; denies difficulty taking care of self at home.  Patient reports taking medications as instructed.    Current Outpatient Prescriptions   Medication Sig Dispense Refill   • fluticasone (FLONASE) 50 MCG/ACT nasal spray Spray 2 Sprays in nose every day. 16 g 2   • ferrous sulfate 325 (65 Fe) MG tablet Take 325 mg by mouth every day.     • vitamin D (CHOLECALCIFEROL) 1000 UNIT Tab Take 1,000 Units by mouth every day.     • isosorbide mononitrate (IMDUR) 120 MG CR tablet TAKE ONE TABLET BY MOUTH ONCE DAILY 90 Tab 3   • lovastatin (MEVACOR) 10 MG tablet TAKE 1 TABLET BY MOUTH ONCE DAILY 90 Tab 3   • ipratropium-albuterol (DUONEB) 0.5-2.5 (3) MG/3ML nebulizer solution 3 mL by Nebulization route every 6 hours as needed for Shortness of Breath. 120 Vial 0   • hydrALAZINE (APRESOLINE) 100 MG tablet Take 100 mg by mouth every day.     • multivitamin (THERAGRAN) Tab Take 1 Tab by mouth every day. 90 Tab 0   • cyanocobalamin 100 MCG Tab Take 100 mcg by mouth every day. 30 Tab 3     No current facility-administered medications for this visit.        Allergies:   Patient has no known allergies.    Social History:  Social History   Substance Use Topics   • Smoking status: Former Smoker     Packs/day: 1.00     Years: 40.00     Types: Cigarettes     Quit date: 11/24/2016   • Smokeless tobacco: Never Used   • Alcohol use No        Family History:  Family History   Problem Relation Age of Onset   • Cancer Mother         breast   • Heart Disease Father    • Heart Failure Father        Past Medical History:   Diagnosis Date   • Anemia 11/11/2016   • Arthritis     osteo-knees   • Breath shortness     02 2 LTR cont   • Bronchitis    • COPD (chronic obstructive pulmonary disease) ([facility]) 9/18/2009   • Dental disorder     missing teeth   •  "Emphysema of lung (HCC)    • Epilepsy (HCC) 9/18/2009   • Heart valve disease     mitral valve clip   • Jaundice     at birth   • Migraines, neuralgic 9/18/2009   • Osteoarthritis 9/18/2009   • Oxygen dependent     2 LTR cont   • Peripheral edema 9/18/2009   • Renal insufficiency 8/5/2016    ESRD   • Stroke (HCC)    • Tobacco use disorder 9/18/2009   • Unspecified essential hypertension 9/18/2009       Surgical History  Past Surgical History:   Procedure Laterality Date   • AV FISTULA CREATION Right 12/8/2017    Procedure: AV FISTULA CREATION- DISTAL RADIAL CEPHALIC FOREARM;  Surgeon: Fidel Romero M.D.;  Location: SURGERY [facility];  Service: General   • OTHER CARDIAC SURGERY  06/2017    mitral valve clip   • GASTROSCOPY WITH BIOPSY  11/15/2016    Procedure: GASTROSCOPY WITH BIOPSY;  Surgeon: Guzman Olson M.D.;  Location: ENDOSCOPY [facility];  Service:    • ABDOMINAL HYSTERECTOMY TOTAL      endometriosis       ROS:    Constitutional:  Denies fever, chills, night sweats  HENT: Denies head congestion, ear pain or drainage, sore throat.  She has chronic nasal congestion.  Eyes: Denies visual changes, eye drainage or redness, eye pain  Neck: Denies pain, swollen glands  Lungs: Denies wheezing, cough.  She has chronic shortness of breath  Cardiovascular: Denies chest pain, orthopnea, lower extremity edema, palpitations  Abdominal: Denies abdominal pain, change in bowel or bladder habits, nausea or vomiting  Endocrine: Denies unexplained weight changes, heat or cold intolerance, hair loss, polyuria or polydipsia  Neurological: Denies dizziness, headache, confusion, focal weakness or numbness, memory loss  Psychiatric: Denies depression, anxiety, insomnia       Objective:     Blood pressure 120/70, pulse 76, temperature 36.7 °C (98 °F), temperature source Temporal, resp. rate 18, height 1.575 m (5' 2\"), weight 64.4 kg (142 lb), SpO2 88 %, not currently breastfeeding.  (2 L O2 nasal cannula)    Physical " Exam:    GEN:  Alert, oriented, in no distress  HEENT:  PERRLA, EOMI, TM's clear bilaterally, nasal passages show swollen mucosa bilaterally.  NECK;  Supple without adenopathy   LUNGS:  Clear to auscultation with diffusely decreased breath sounds.  CV:  Heart RRR with 1/6 systolic murmur, no S3 or S4  EXT:  Without cyanosis, clubbing, or edema.  NEURO:  Cranial nerves II through XII intact.  Motor function and sensation intact.  SKIN: Multiple excoriations from scratching bilateral lower legs with calamine lotion present.  PSYCH:  Behavior is appropriate.      Assessment and Plan:     1. Hospital follow-up:   Hospitalization and results reviewed with patient. High risk conditions requiring teaching or care coordination were identified and addressed.The patient demonstrate understanding of admission and underlying conditions. The patient understands discharge instructions and when to seek medical attention. Medications reviewed including instructions regarding high risk medications, dosing and side effects.    The patient is able to safely adhere to ADL/IADL, treatment and medication regimen, self-manage of high-risk conditions? Yes  The patient requires physical therapy/home health/DME referral? No   The patient requires referral to care coordination/behavioral health/social work?  No   Patient requires referral for pharmacy consult? No   Advance directive/POLST on file?  No   Required counseling on advance directive? Deferred    2. ESRD (end stage renal disease) (HCC)  -Now on dialysis 3 times a week  -I discussed with her that this should eventually resolve her itching.    3. Chronic respiratory failure with hypoxia (HCC)  -Still hypoxic on oxygen due to nasal congestion.  Flonase prescribed.  Consider ENT consult.    4. Malfunction of arteriovenous dialysis fistula, sequela    - REFERRAL TO VASCULAR SURGERY to follow up with Dr. Romero.    5. Nasal congestion    - fluticasone (FLONASE) 50 MCG/ACT nasal spray;  Spray 2 Sprays in nose every day.  Dispense: 16 g; Refill: 2    6. Essential hypertension  -She was previously on hydralazine 100 mg daily.  She requests a refill but says she has not taken it for about a month.  Her blood pressure is not elevated and I would be concerned about hypotension during dialysis.  Therefore I will defer her refill request to her PCP, Dr. Miguel.        Medication Reconciliation  Medication list at end of encounter:   Current Outpatient Prescriptions   Medication Sig Dispense Refill   • fluticasone (FLONASE) 50 MCG/ACT nasal spray Spray 2 Sprays in nose every day. 16 g 2   • ferrous sulfate 325 (65 Fe) MG tablet Take 325 mg by mouth every day.     • vitamin D (CHOLECALCIFEROL) 1000 UNIT Tab Take 1,000 Units by mouth every day.     • isosorbide mononitrate (IMDUR) 120 MG CR tablet TAKE ONE TABLET BY MOUTH ONCE DAILY 90 Tab 3   • lovastatin (MEVACOR) 10 MG tablet TAKE 1 TABLET BY MOUTH ONCE DAILY 90 Tab 3   • ipratropium-albuterol (DUONEB) 0.5-2.5 (3) MG/3ML nebulizer solution 3 mL by Nebulization route every 6 hours as needed for Shortness of Breath. 120 Vial 0   • hydrALAZINE (APRESOLINE) 100 MG tablet Take 100 mg by mouth every day.     • multivitamin (THERAGRAN) Tab Take 1 Tab by mouth every day. 90 Tab 0   • cyanocobalamin 100 MCG Tab Take 100 mcg by mouth every day. 30 Tab 3     No current facility-administered medications for this visit.        Primary care follow-up:  New health conditions identified during hospitalization? Yes, dialysis    Recommended followup:  with Rosa LACEY M.D.   Future Appointments       Provider Department [place]    11/21/2018 10:30 AM Rosa LACEY M.D. [facility]           Patient Instruction  Patient provided with educational material on discharge diagnosis and management of symptoms/red flags. Patient instructed to keep follow-up appointments and to bring written questions and and actual medications to each office  visit. Patient instructed to call PCP/specialist with any problems/questions/concerns. Patient verbalizes understanding and has no further questions at this time.    Face-to-face transitional care management services with moderate medical decision complexity were provided.

## 2018-11-07 NOTE — TELEPHONE ENCOUNTER
Call made to [facility] -Dr Romero's office. I had to leave a voicemail for the MA and requested a call back to tell us if patient should have a follow up or not.

## 2018-11-07 NOTE — TELEPHONE ENCOUNTER
Dr. Monge,  Patient's daughter left a voicemail asking if you could fill out her FMLA because she takes care of her mom? Please advise.     Anahy Thurston  Phone: [phone]

## 2018-11-08 NOTE — TELEPHONE ENCOUNTER
Called Anahy back. She said it will be the date her mother went in to the hospital which is 10/21/18. Anahy is currently in an airport leaving on vacation. She will try to fax it later.

## 2018-11-21 ENCOUNTER — OFFICE VISIT (OUTPATIENT)
Dept: MEDICAL GROUP | Facility: PHYSICIAN GROUP | Age: 73
End: 2018-11-21
Payer: MEDICARE

## 2018-11-21 VITALS
HEIGHT: 62 IN | OXYGEN SATURATION: 97 % | HEART RATE: 84 BPM | DIASTOLIC BLOOD PRESSURE: 80 MMHG | SYSTOLIC BLOOD PRESSURE: 140 MMHG | RESPIRATION RATE: 22 BRPM | TEMPERATURE: 97.4 F | WEIGHT: 138 LBS | BODY MASS INDEX: 25.4 KG/M2

## 2018-11-21 DIAGNOSIS — M19.90 OSTEOARTHRITIS, UNSPECIFIED OSTEOARTHRITIS TYPE, UNSPECIFIED SITE: ICD-10-CM

## 2018-11-21 DIAGNOSIS — J96.11 CHRONIC RESPIRATORY FAILURE WITH HYPOXIA (HCC): ICD-10-CM

## 2018-11-21 DIAGNOSIS — G44.019 EPISODIC CLUSTER HEADACHE, NOT INTRACTABLE: ICD-10-CM

## 2018-11-21 DIAGNOSIS — Z87.891 PERSONAL HISTORY OF NICOTINE DEPENDENCE: ICD-10-CM

## 2018-11-21 DIAGNOSIS — E78.5 HYPERLIPIDEMIA, UNSPECIFIED HYPERLIPIDEMIA TYPE: ICD-10-CM

## 2018-11-21 DIAGNOSIS — I73.9 PAD (PERIPHERAL ARTERY DISEASE) (HCC): ICD-10-CM

## 2018-11-21 DIAGNOSIS — I10 ESSENTIAL HYPERTENSION: ICD-10-CM

## 2018-11-21 DIAGNOSIS — N18.5 STAGE 5 CHRONIC KIDNEY DISEASE (HCC): ICD-10-CM

## 2018-11-21 DIAGNOSIS — F17.201 TOBACCO ABUSE, IN REMISSION: ICD-10-CM

## 2018-11-21 PROCEDURE — 99214 OFFICE O/P EST MOD 30 MIN: CPT | Performed by: INTERNAL MEDICINE

## 2018-11-21 RX ORDER — TRAMADOL HYDROCHLORIDE 50 MG/1
50 TABLET ORAL EVERY 8 HOURS PRN
Qty: 30 TAB | Refills: 0 | Status: SHIPPED | OUTPATIENT
Start: 2018-11-21 | End: 2018-11-30 | Stop reason: CLARIF

## 2018-11-21 NOTE — ASSESSMENT & PLAN NOTE
Patient using tramadol prn headache, she believes the last rx was from last year.  Headache q 6 weeks, she describes pain at the temples bilaterally, positive visual scintillations, no nausea.  She was told not to take OTC nsaids or aspirin due to the renal failure

## 2018-11-21 NOTE — PROGRESS NOTES
No she does not  Chief Complaint   Patient presents with   • [facility]ascular Access Problem     [facility]        HISTORY OF PRESENT ILLNESS: Patient is a 73 y.o. female established patient who presents today to discuss the medical issues below.    Migraines, Neuralgic  Patient using tramadol prn headache, she believes the last rx was from last year.  Headache q 6 weeks, she describes pain at the temples bilaterally, positive visual scintillations, no nausea.  She was told not to take OTC nsaids or aspirin due to the renal failure    Stage 5 chronic kidney disease (HCC)  Patient reports dialysis is going well.  She has not had any significant complaints.  She feels her breathing is a little bit better.  She is having problems with constipation.  She is a bit concerned about how little she is urinating.  Patient does have diffuse itching however thinks this is improving gradually.  States her appetite is improving.    Osteoarthritis  Patient continues with some diffuse aches and pains avoiding nonsteroidals secondary to renal insufficiency.    Tobacco abuse, in remission  Patient reports she was never contacted by the lung cancer screening program.    Essential hypertension  Blood pressure medicines have been adjusted downward since she is on dialysis.  Currently on no antihypertensives.  Denies chest pain palpitations.  She has scant edema at the top of her foot and admits to not let elevating her feet.      Patient Active Problem List    Diagnosis Date Noted   • Stage 5 chronic kidney disease (HCC) 08/18/2017     Priority: High   • Anemia in CKD (chronic kidney disease) 07/07/2017     Priority: High   • Respiratory failure (HCC) 11/16/2016     Priority: High   • Chronic respiratory failure with hypoxia (HCC) 11/15/2016     Priority: Medium   • GIB (gastrointestinal bleeding) 11/14/2016     Priority: Medium   • Metabolic acidosis 11/14/2016     Priority: Medium   • HLD (hyperlipidemia) 11/16/2016     Priority:  Low   • Gastritis 11/16/2016     Priority: Low   • COPD (chronic obstructive pulmonary disease) ([facility]) 11/15/2016     Priority: Low   • Tobacco abuse, in remission 11/12/2015     Priority: Low   • Essential hypertension 09/18/2009     Priority: Low   • SOB (shortness of breath) 11/10/2017   • Secondary hyperparathyroidism ([facility]) 09/15/2017   • CKD (chronic kidney disease), stage IV ([facility]) 02/24/2017   • H/O: CVA (cerebrovascular accident) 02/24/2017   • Non-rheumatic mitral regurgitation 11/30/2016   • HLD (hyperlipidemia) 11/12/2015   • Vitamin D deficiency 11/12/2015   • PAD (peripheral artery disease)-saw a vascular surgeon several years ago who indicated stents would be needed 10/21/2009   • Peripheral edema 09/18/2009   • Migraines, neuralgic 09/18/2009   • Osteoarthritis 09/18/2009       Allergies:Patient has no known allergies.    Current Outpatient Prescriptions   Medication Sig Dispense Refill   • tramadol (ULTRAM) 50 MG Tab Take 1 Tab by mouth every 8 hours as needed for up to 30 days. 30 Tab 0   • fluticasone (FLONASE) 50 MCG/ACT nasal spray Spray 2 Sprays in nose every day. 16 g 2   • ferrous sulfate 325 (65 Fe) MG tablet Take 325 mg by mouth every day.     • lovastatin (MEVACOR) 10 MG tablet TAKE 1 TABLET BY MOUTH ONCE DAILY 90 Tab 3   • ipratropium-albuterol (DUONEB) 0.5-2.5 (3) MG/3ML nebulizer solution 3 mL by Nebulization route every 6 hours as needed for Shortness of Breath. 120 Vial 0   • multivitamin (THERAGRAN) Tab Take 1 Tab by mouth every day. 90 Tab 0   • cyanocobalamin 100 MCG Tab Take 100 mcg by mouth every day. 30 Tab 3   • vitamin D (CHOLECALCIFEROL) 1000 UNIT Tab Take 1,000 Units by mouth every day.       No current facility-administered medications for this visit.          Past Medical History:   Diagnosis Date   • Anemia 11/11/2016   • Arthritis     osteo-knees   • Breath shortness     02 2 LTR cont   • Bronchitis    • COPD (chronic obstructive pulmonary disease) ([facility]) 9/18/2009   •  "Dental disorder     missing teeth   • Emphysema of lung (HCC)    • Epilepsy (HCC) 2009   • Heart valve disease     mitral valve clip   • Jaundice     at birth   • Migraines, neuralgic 2009   • Osteoarthritis 2009   • Oxygen dependent     2 LTR cont   • Peripheral edema 2009   • Renal insufficiency 2016    ESRD   • Stroke (HCC)    • Tobacco use disorder 2009   • Unspecified essential hypertension 2009       Social History   Substance Use Topics   • Smoking status: Former Smoker     Packs/day: 1.00     Years: 40.00     Types: Cigarettes     Quit date: 2016   • Smokeless tobacco: Never Used   • Alcohol use No       Family Status   Relation Status   • Mo    • Fa    • Sis Alive        lumpectomy     Family History   Problem Relation Age of Onset   • Cancer Mother         breast   • Heart Disease Father    • Heart Failure Father        ROS:    Respiratory: Negative for cough, sputum production, shortness of breath or wheezing.    Cardiovascular: Negative for chest pain, palpitations, orthopnea, dyspnea with exertion or edema.   Gastrointestinal: Negative for GI upset, nausea, vomiting, abdominal pain, constipation or diarrhea.   Genitourinary: Negative for dysuria, urgency, hesitancy or frequency.       Exam:    Blood pressure 140/80, pulse 84, temperature 36.3 °C (97.4 °F), temperature source Temporal, resp. rate (!) 22, height 1.575 m (5' 2\"), weight 62.6 kg (138 lb), SpO2 97 %, not currently breastfeeding.  General:  Well nourished, well developed female in NAD.  Pulmonary: Clear to ausculation and percussion.  Normal effort. No rales, rhonchi, or wheezing.  Cardiovascular: Regular rate and rhythm without murmur.   Abdomen: Normal bowel sounds soft and nontender no palpable liver spleen bladder mass.  Extremities: No LE edema noted.  Neuro: Grossly nonfocal.  Psych: Alert and oriented to person, place, and time. Appropriate mood and conversation.    Reviewed " hospital records    This dictation was created using voice recognition software. I have made reasonable attempts to correct errors, however, errors of grammar and content may exist.          Assessment/Plan:    1. PAD (peripheral artery disease) (HCC)  At baseline monitoring with dialysis, continuing statin    2. Chronic respiratory failure with hypoxia (HCC)  Continues on continuous oxygen therapy.    3. Tobacco abuse, in remission  Reviewed lung cancer screening program referral placed again  - REFERRAL TO LUNG CANCER SCREENING PROGRAM    4. Hyperlipidemia, unspecified hyperlipidemia type  Continues on Mevacor will be due for assessment at some point lab ordered.  - Lipid Profile; Future    5. Personal history of nicotine dependence   As discussed under #3  - REFERRAL TO LUNG CANCER SCREENING PROGRAM    6. Episodic cluster headache, not intractable  I have asked the patient did discuss with Dr. Dang Stevens.  Now that she is in complete renal failure IV as needed Naprosyn would be an alternative.  No evidence of adverse reaction or abuse.  This patient is continuing to use a controlled substance (CS) on a long term basis.  Rare use.  Number 90 tablets over the last 18 months.  The patient is thoroughly aware of the goals of treatment with the CS  The patient is aware that yearly and random urine drug screens are required.  The patient has been instructed to take the CS only as prescribed.  The patient is prohibited from sharing the CS with any other person.  The patient is instructed to inform the provider if any other CS is taken, of any alcohol or cannabis or other recreational drug use, any treatment for side effects of the CS or complications, if they have CS active rx in other states  The patient has evidence for a reason for the CS  The treatment plan has been discussed with the patient  The  report has been reviewed      - tramadol (ULTRAM) 50 MG Tab; Take 1 Tab by mouth every 8 hours as needed for up to  30 days.  Dispense: 30 Tab; Refill: 0    7. Osteoarthritis, unspecified osteoarthritis type, unspecified site    - tramadol (ULTRAM) 50 MG Tab; Take 1 Tab by mouth every 8 hours as needed for up to 30 days.  Dispense: 30 Tab; Refill: 0  - Consent for Opiate Prescription    8. Stage 5 chronic kidney disease (HCC)  Contributes to narcotic instead of anti-inflammatory use.  I have asked patient to review with Dr. Poole.   - Consent for Opiate Prescription    9. Essential hypertension  Well-controlled with dialysis       Patient was seen for 25 minutes face to face of which more than 50% of the time was spent in counseling and coordination of care regarding the above problems.

## 2018-11-21 NOTE — ASSESSMENT & PLAN NOTE
Patient reports dialysis is going well.  She has not had any significant complaints.  She feels her breathing is a little bit better.  She is having problems with constipation.  She is a bit concerned about how little she is urinating.  Patient does have diffuse itching however thinks this is improving gradually.  States her appetite is improving.

## 2018-11-21 NOTE — ASSESSMENT & PLAN NOTE
Patient continues with some diffuse aches and pains avoiding nonsteroidals secondary to renal insufficiency.

## 2018-11-21 NOTE — ASSESSMENT & PLAN NOTE
Blood pressure medicines have been adjusted downward since she is on dialysis.  Currently on no antihypertensives.  Denies chest pain palpitations.  She has scant edema at the top of her foot and admits to not let elevating her feet.

## 2018-11-24 ENCOUNTER — PATIENT OUTREACH (OUTPATIENT)
Dept: HEALTH INFORMATION MANAGEMENT | Facility: OTHER | Age: 73
End: 2018-11-24

## 2018-11-30 ENCOUNTER — APPOINTMENT (OUTPATIENT)
Dept: RADIOLOGY | Facility: MEDICAL CENTER | Age: 73
DRG: 252 | End: 2018-11-30
Payer: MEDICARE

## 2018-11-30 ENCOUNTER — HOSPITAL ENCOUNTER (INPATIENT)
Facility: MEDICAL CENTER | Age: 73
LOS: 16 days | DRG: 252 | End: 2018-12-16
Attending: EMERGENCY MEDICINE | Admitting: HOSPITALIST
Payer: MEDICARE

## 2018-11-30 ENCOUNTER — APPOINTMENT (OUTPATIENT)
Dept: RADIOLOGY | Facility: MEDICAL CENTER | Age: 73
DRG: 252 | End: 2018-11-30
Attending: EMERGENCY MEDICINE
Payer: MEDICARE

## 2018-11-30 DIAGNOSIS — Z86.73 H/O: CVA (CEREBROVASCULAR ACCIDENT): ICD-10-CM

## 2018-11-30 DIAGNOSIS — Z99.2 STAGE 5 CHRONIC KIDNEY DISEASE ON CHRONIC DIALYSIS (HCC): ICD-10-CM

## 2018-11-30 DIAGNOSIS — A41.01 STAPHYLOCOCCUS AUREUS BACTEREMIA WITH SEPSIS (HCC): ICD-10-CM

## 2018-11-30 DIAGNOSIS — I82.890 ACUTE EXTERNAL JUGULAR VEIN THROMBOSIS: ICD-10-CM

## 2018-11-30 DIAGNOSIS — A49.01 STAPH AUREUS INFECTION: ICD-10-CM

## 2018-11-30 DIAGNOSIS — N18.6 STAGE 5 CHRONIC KIDNEY DISEASE ON CHRONIC DIALYSIS (HCC): ICD-10-CM

## 2018-11-30 DIAGNOSIS — E43 SEVERE PROTEIN-CALORIE MALNUTRITION (HCC): ICD-10-CM

## 2018-11-30 DIAGNOSIS — J42 CHRONIC BRONCHITIS, UNSPECIFIED CHRONIC BRONCHITIS TYPE (HCC): ICD-10-CM

## 2018-11-30 PROBLEM — D50.9 IRON DEFICIENCY ANEMIA: Status: ACTIVE | Noted: 2018-11-30

## 2018-11-30 PROBLEM — E53.8 B12 DEFICIENCY: Status: ACTIVE | Noted: 2018-11-30

## 2018-11-30 PROBLEM — R78.81 BACTEREMIA: Status: ACTIVE | Noted: 2018-11-30

## 2018-11-30 PROBLEM — R44.3 HALLUCINATIONS: Status: ACTIVE | Noted: 2018-11-30

## 2018-11-30 LAB
ALBUMIN SERPL BCP-MCNC: 3.8 G/DL (ref 3.2–4.9)
ALBUMIN/GLOB SERPL: 1.1 G/DL
ALP SERPL-CCNC: 114 U/L (ref 30–99)
ALT SERPL-CCNC: 7 U/L (ref 2–50)
ANION GAP SERPL CALC-SCNC: 15 MMOL/L (ref 0–11.9)
APPEARANCE UR: CLEAR
AST SERPL-CCNC: 14 U/L (ref 12–45)
BACTERIA #/AREA URNS HPF: NEGATIVE /HPF
BASOPHILS # BLD AUTO: 0.5 % (ref 0–1.8)
BASOPHILS # BLD: 0.04 K/UL (ref 0–0.12)
BILIRUB SERPL-MCNC: 0.5 MG/DL (ref 0.1–1.5)
BILIRUB UR QL STRIP.AUTO: NEGATIVE
BUN SERPL-MCNC: 34 MG/DL (ref 8–22)
CALCIUM SERPL-MCNC: 10.4 MG/DL (ref 8.5–10.5)
CHLORIDE SERPL-SCNC: 96 MMOL/L (ref 96–112)
CO2 SERPL-SCNC: 27 MMOL/L (ref 20–33)
COLOR UR: YELLOW
CREAT SERPL-MCNC: 3.76 MG/DL (ref 0.5–1.4)
EOSINOPHIL # BLD AUTO: 0.12 K/UL (ref 0–0.51)
EOSINOPHIL NFR BLD: 1.4 % (ref 0–6.9)
EPI CELLS #/AREA URNS HPF: NEGATIVE /HPF
ERYTHROCYTE [DISTWIDTH] IN BLOOD BY AUTOMATED COUNT: 49 FL (ref 35.9–50)
GLOBULIN SER CALC-MCNC: 3.6 G/DL (ref 1.9–3.5)
GLUCOSE SERPL-MCNC: 98 MG/DL (ref 65–99)
GLUCOSE UR STRIP.AUTO-MCNC: NEGATIVE MG/DL
HAV IGM SERPL QL IA: NEGATIVE
HBV CORE IGM SER QL: NEGATIVE
HBV SURFACE AB SERPL IA-ACNC: <3.1 MIU/ML (ref 0–10)
HBV SURFACE AG SER QL: NEGATIVE
HCT VFR BLD AUTO: 35.6 % (ref 37–47)
HCV AB SER QL: NEGATIVE
HGB BLD-MCNC: 11.6 G/DL (ref 12–16)
HYALINE CASTS #/AREA URNS LPF: NORMAL /LPF
IMM GRANULOCYTES # BLD AUTO: 0.03 K/UL (ref 0–0.11)
IMM GRANULOCYTES NFR BLD AUTO: 0.3 % (ref 0–0.9)
KETONES UR STRIP.AUTO-MCNC: NEGATIVE MG/DL
LACTATE BLD-SCNC: 1.4 MMOL/L (ref 0.5–2)
LEUKOCYTE ESTERASE UR QL STRIP.AUTO: NEGATIVE
LYMPHOCYTES # BLD AUTO: 1.5 K/UL (ref 1–4.8)
LYMPHOCYTES NFR BLD: 17.3 % (ref 22–41)
MCH RBC QN AUTO: 31.4 PG (ref 27–33)
MCHC RBC AUTO-ENTMCNC: 32.6 G/DL (ref 33.6–35)
MCV RBC AUTO: 96.5 FL (ref 81.4–97.8)
MICRO URNS: ABNORMAL
MONOCYTES # BLD AUTO: 0.64 K/UL (ref 0–0.85)
MONOCYTES NFR BLD AUTO: 7.4 % (ref 0–13.4)
NEUTROPHILS # BLD AUTO: 6.32 K/UL (ref 2–7.15)
NEUTROPHILS NFR BLD: 73.1 % (ref 44–72)
NITRITE UR QL STRIP.AUTO: NEGATIVE
NRBC # BLD AUTO: 0 K/UL
NRBC BLD-RTO: 0 /100 WBC
PH UR STRIP.AUTO: >=9 [PH]
PLATELET # BLD AUTO: 210 K/UL (ref 164–446)
PMV BLD AUTO: 11.4 FL (ref 9–12.9)
POTASSIUM SERPL-SCNC: 3.7 MMOL/L (ref 3.6–5.5)
PROT SERPL-MCNC: 7.4 G/DL (ref 6–8.2)
PROT UR QL STRIP: 300 MG/DL
RBC # BLD AUTO: 3.69 M/UL (ref 4.2–5.4)
RBC # URNS HPF: NORMAL /HPF
RBC UR QL AUTO: NEGATIVE
SODIUM SERPL-SCNC: 138 MMOL/L (ref 135–145)
SP GR UR STRIP.AUTO: 1.02
UROBILINOGEN UR STRIP.AUTO-MCNC: 0.2 MG/DL
WBC # BLD AUTO: 8.7 K/UL (ref 4.8–10.8)
WBC #/AREA URNS HPF: NORMAL /HPF

## 2018-11-30 PROCEDURE — 87040 BLOOD CULTURE FOR BACTERIA: CPT

## 2018-11-30 PROCEDURE — 85025 COMPLETE CBC W/AUTO DIFF WBC: CPT

## 2018-11-30 PROCEDURE — 304561 HCHG STAT O2

## 2018-11-30 PROCEDURE — 700111 HCHG RX REV CODE 636 W/ 250 OVERRIDE (IP): Performed by: HOSPITALIST

## 2018-11-30 PROCEDURE — 36415 COLL VENOUS BLD VENIPUNCTURE: CPT

## 2018-11-30 PROCEDURE — 86706 HEP B SURFACE ANTIBODY: CPT

## 2018-11-30 PROCEDURE — 80074 ACUTE HEPATITIS PANEL: CPT

## 2018-11-30 PROCEDURE — 87086 URINE CULTURE/COLONY COUNT: CPT

## 2018-11-30 PROCEDURE — 700111 HCHG RX REV CODE 636 W/ 250 OVERRIDE (IP): Performed by: EMERGENCY MEDICINE

## 2018-11-30 PROCEDURE — 99285 EMERGENCY DEPT VISIT HI MDM: CPT

## 2018-11-30 PROCEDURE — 5A1D70Z PERFORMANCE OF URINARY FILTRATION, INTERMITTENT, LESS THAN 6 HOURS PER DAY: ICD-10-PCS | Performed by: INTERNAL MEDICINE

## 2018-11-30 PROCEDURE — 80053 COMPREHEN METABOLIC PANEL: CPT

## 2018-11-30 PROCEDURE — 83605 ASSAY OF LACTIC ACID: CPT

## 2018-11-30 PROCEDURE — 99233 SBSQ HOSP IP/OBS HIGH 50: CPT | Performed by: INTERNAL MEDICINE

## 2018-11-30 PROCEDURE — 94760 N-INVAS EAR/PLS OXIMETRY 1: CPT

## 2018-11-30 PROCEDURE — 99223 1ST HOSP IP/OBS HIGH 75: CPT | Performed by: HOSPITALIST

## 2018-11-30 PROCEDURE — 700111 HCHG RX REV CODE 636 W/ 250 OVERRIDE (IP): Performed by: INTERNAL MEDICINE

## 2018-11-30 PROCEDURE — 96365 THER/PROPH/DIAG IV INF INIT: CPT

## 2018-11-30 PROCEDURE — 71045 X-RAY EXAM CHEST 1 VIEW: CPT

## 2018-11-30 PROCEDURE — 700105 HCHG RX REV CODE 258: Performed by: EMERGENCY MEDICINE

## 2018-11-30 PROCEDURE — 90935 HEMODIALYSIS ONE EVALUATION: CPT

## 2018-11-30 PROCEDURE — 770020 HCHG ROOM/CARE - TELE (206)

## 2018-11-30 PROCEDURE — 81001 URINALYSIS AUTO W/SCOPE: CPT

## 2018-11-30 PROCEDURE — 96375 TX/PRO/DX INJ NEW DRUG ADDON: CPT

## 2018-11-30 RX ORDER — AMOXICILLIN 250 MG
2 CAPSULE ORAL 2 TIMES DAILY
Status: DISCONTINUED | OUTPATIENT
Start: 2018-12-01 | End: 2018-12-16 | Stop reason: HOSPADM

## 2018-11-30 RX ORDER — BISACODYL 10 MG
10 SUPPOSITORY, RECTAL RECTAL
Status: DISCONTINUED | OUTPATIENT
Start: 2018-11-30 | End: 2018-12-16 | Stop reason: HOSPADM

## 2018-11-30 RX ORDER — POLYETHYLENE GLYCOL 3350 17 G/17G
1 POWDER, FOR SOLUTION ORAL
Status: DISCONTINUED | OUTPATIENT
Start: 2018-11-30 | End: 2018-11-30

## 2018-11-30 RX ORDER — LOVASTATIN 20 MG/1
10 TABLET ORAL
Status: DISCONTINUED | OUTPATIENT
Start: 2018-11-30 | End: 2018-12-16 | Stop reason: HOSPADM

## 2018-11-30 RX ORDER — ACETAMINOPHEN 325 MG/1
650 TABLET ORAL EVERY 6 HOURS PRN
Status: DISCONTINUED | OUTPATIENT
Start: 2018-11-30 | End: 2018-12-16 | Stop reason: HOSPADM

## 2018-11-30 RX ORDER — IPRATROPIUM BROMIDE AND ALBUTEROL SULFATE 2.5; .5 MG/3ML; MG/3ML
3 SOLUTION RESPIRATORY (INHALATION) EVERY 6 HOURS PRN
Status: DISCONTINUED | OUTPATIENT
Start: 2018-11-30 | End: 2018-12-16 | Stop reason: HOSPADM

## 2018-11-30 RX ORDER — POLYETHYLENE GLYCOL 3350 17 G/17G
1 POWDER, FOR SOLUTION ORAL
Status: DISCONTINUED | OUTPATIENT
Start: 2018-11-30 | End: 2018-12-16 | Stop reason: HOSPADM

## 2018-11-30 RX ORDER — CEFAZOLIN SODIUM 1 G/50ML
1 INJECTION, SOLUTION INTRAVENOUS EVERY EVENING
Status: DISCONTINUED | OUTPATIENT
Start: 2018-11-30 | End: 2018-12-16 | Stop reason: HOSPADM

## 2018-11-30 RX ORDER — HEPARIN SODIUM 1000 [USP'U]/ML
3700 INJECTION, SOLUTION INTRAVENOUS; SUBCUTANEOUS
Status: DISCONTINUED | OUTPATIENT
Start: 2018-11-30 | End: 2018-12-01

## 2018-11-30 RX ORDER — TRAMADOL HYDROCHLORIDE 50 MG/1
50 TABLET ORAL EVERY 8 HOURS PRN
Status: DISCONTINUED | OUTPATIENT
Start: 2018-11-30 | End: 2018-11-30

## 2018-11-30 RX ORDER — ONDANSETRON 4 MG/1
4 TABLET, ORALLY DISINTEGRATING ORAL EVERY 4 HOURS PRN
Status: DISCONTINUED | OUTPATIENT
Start: 2018-11-30 | End: 2018-12-16 | Stop reason: HOSPADM

## 2018-11-30 RX ORDER — LOVASTATIN 10 MG/1
10 TABLET ORAL EVERY MORNING
Status: ON HOLD | COMMUNITY
End: 2019-01-04

## 2018-11-30 RX ORDER — AMOXICILLIN 250 MG
2 CAPSULE ORAL 2 TIMES DAILY
Status: DISCONTINUED | OUTPATIENT
Start: 2018-12-01 | End: 2018-11-30

## 2018-11-30 RX ORDER — FERROUS SULFATE 325(65) MG
325 TABLET ORAL DAILY
Status: DISCONTINUED | OUTPATIENT
Start: 2018-12-01 | End: 2018-12-16 | Stop reason: HOSPADM

## 2018-11-30 RX ORDER — BISACODYL 10 MG
10 SUPPOSITORY, RECTAL RECTAL
Status: DISCONTINUED | OUTPATIENT
Start: 2018-11-30 | End: 2018-11-30

## 2018-11-30 RX ORDER — ONDANSETRON 2 MG/ML
4 INJECTION INTRAMUSCULAR; INTRAVENOUS EVERY 4 HOURS PRN
Status: DISCONTINUED | OUTPATIENT
Start: 2018-11-30 | End: 2018-12-16 | Stop reason: HOSPADM

## 2018-11-30 RX ORDER — UBIDECARENONE 75 MG
100 CAPSULE ORAL DAILY
COMMUNITY
End: 2018-12-27

## 2018-11-30 RX ORDER — HEPARIN SODIUM 1000 [USP'U]/ML
2500 INJECTION, SOLUTION INTRAVENOUS; SUBCUTANEOUS
Status: DISCONTINUED | OUTPATIENT
Start: 2018-11-30 | End: 2018-12-01

## 2018-11-30 RX ORDER — FLUTICASONE PROPIONATE 50 MCG
2 SPRAY, SUSPENSION (ML) NASAL DAILY
Status: DISCONTINUED | OUTPATIENT
Start: 2018-12-01 | End: 2018-12-16 | Stop reason: HOSPADM

## 2018-11-30 RX ORDER — UBIDECARENONE 75 MG
100 CAPSULE ORAL DAILY
Status: DISCONTINUED | OUTPATIENT
Start: 2018-12-01 | End: 2018-12-16 | Stop reason: HOSPADM

## 2018-11-30 RX ORDER — HEPARIN SODIUM 5000 [USP'U]/ML
5000 INJECTION, SOLUTION INTRAVENOUS; SUBCUTANEOUS EVERY 8 HOURS
Status: DISCONTINUED | OUTPATIENT
Start: 2018-11-30 | End: 2018-12-01

## 2018-11-30 RX ADMIN — CEFAZOLIN SODIUM 1 G: 1 INJECTION, SOLUTION INTRAVENOUS at 23:53

## 2018-11-30 RX ADMIN — VANCOMYCIN HYDROCHLORIDE 500 MG: 100 INJECTION, POWDER, LYOPHILIZED, FOR SOLUTION INTRAVENOUS at 22:51

## 2018-11-30 RX ADMIN — HEPARIN SODIUM 2500 UNITS: 1000 INJECTION, SOLUTION INTRAVENOUS; SUBCUTANEOUS at 20:24

## 2018-11-30 RX ADMIN — HEPARIN SODIUM 3700 UNITS: 1000 INJECTION, SOLUTION INTRAVENOUS; SUBCUTANEOUS at 23:50

## 2018-11-30 RX ADMIN — HEPARIN SODIUM 5000 UNITS: 5000 INJECTION, SOLUTION INTRAVENOUS; SUBCUTANEOUS at 23:55

## 2018-11-30 ASSESSMENT — ENCOUNTER SYMPTOMS
SPUTUM PRODUCTION: 0
LOSS OF CONSCIOUSNESS: 0
SHORTNESS OF BREATH: 1
ABDOMINAL PAIN: 0
FEVER: 0
BACK PAIN: 1
WHEEZING: 0
HALLUCINATIONS: 1
DIARRHEA: 0
CONSTIPATION: 0
DIZZINESS: 0
VOMITING: 0
COUGH: 0
NERVOUS/ANXIOUS: 0
HEADACHES: 0
ORTHOPNEA: 0
FEVER: 1
DEPRESSION: 0
WEAKNESS: 1
NAUSEA: 0
EYES NEGATIVE: 1
CLAUDICATION: 0
CHILLS: 0
TINGLING: 0
HEMOPTYSIS: 0
PALPITATIONS: 0
FOCAL WEAKNESS: 0

## 2018-11-30 ASSESSMENT — LIFESTYLE VARIABLES: SUBSTANCE_ABUSE: 0

## 2018-11-30 ASSESSMENT — PAIN SCALES - GENERAL: PAINLEVEL_OUTOF10: 0

## 2018-11-30 NOTE — ED TRIAGE NOTES
".  Chief Complaint   Patient presents with   • Sent by MD     Dialysis sent for infected port   • ALOC     Family reports \"delusions and hallucinations\"     ./87   Pulse (!) 107   Temp 36.8 °C (98.3 °F) (Oral)   Resp 18   Ht 1.575 m (5' 2\")   Wt 61.7 kg (136 lb 0.4 oz)   SpO2 90%   BMI 24.88 kg/m²     Patient to triage via wheelchair with family with above complaints, patient sent from [facility] in [place], family states they were told patient has a blood infection, sepsis protocol ordered, to ED tech for blood draw.    "

## 2018-12-01 ENCOUNTER — APPOINTMENT (OUTPATIENT)
Dept: RADIOLOGY | Facility: MEDICAL CENTER | Age: 73
DRG: 252 | End: 2018-12-01
Attending: INTERNAL MEDICINE
Payer: MEDICARE

## 2018-12-01 PROBLEM — G93.40 ENCEPHALOPATHY ACUTE: Status: ACTIVE | Noted: 2018-11-30

## 2018-12-01 PROBLEM — K02.9 SEVERE DENTAL CARIES: Status: ACTIVE | Noted: 2018-12-01

## 2018-12-01 PROBLEM — I82.890: Status: ACTIVE | Noted: 2018-12-01

## 2018-12-01 PROBLEM — E43 SEVERE PROTEIN-CALORIE MALNUTRITION (HCC): Status: ACTIVE | Noted: 2018-12-01

## 2018-12-01 PROBLEM — A41.01 STAPHYLOCOCCUS AUREUS BACTEREMIA WITH SEPSIS (HCC): Status: ACTIVE | Noted: 2018-11-30

## 2018-12-01 PROBLEM — A41.9 SEPSIS (HCC): Status: ACTIVE | Noted: 2018-12-01

## 2018-12-01 LAB
ALBUMIN SERPL BCP-MCNC: 3.1 G/DL (ref 3.2–4.9)
ALBUMIN/GLOB SERPL: 1.1 G/DL
ALP SERPL-CCNC: 88 U/L (ref 30–99)
ALT SERPL-CCNC: 5 U/L (ref 2–50)
ANION GAP SERPL CALC-SCNC: 10 MMOL/L (ref 0–11.9)
APTT PPP: 36.5 SEC (ref 24.7–36)
AST SERPL-CCNC: 12 U/L (ref 12–45)
BASOPHILS # BLD AUTO: 0.7 % (ref 0–1.8)
BASOPHILS # BLD: 0.05 K/UL (ref 0–0.12)
BILIRUB SERPL-MCNC: 0.4 MG/DL (ref 0.1–1.5)
BUN SERPL-MCNC: 11 MG/DL (ref 8–22)
CALCIUM SERPL-MCNC: 9.1 MG/DL (ref 8.5–10.5)
CHLORIDE SERPL-SCNC: 105 MMOL/L (ref 96–112)
CO2 SERPL-SCNC: 25 MMOL/L (ref 20–33)
CREAT SERPL-MCNC: 1.81 MG/DL (ref 0.5–1.4)
EOSINOPHIL # BLD AUTO: 0.18 K/UL (ref 0–0.51)
EOSINOPHIL NFR BLD: 2.4 % (ref 0–6.9)
ERYTHROCYTE [DISTWIDTH] IN BLOOD BY AUTOMATED COUNT: 46.3 FL (ref 35.9–50)
GLOBULIN SER CALC-MCNC: 2.8 G/DL (ref 1.9–3.5)
GLUCOSE SERPL-MCNC: 109 MG/DL (ref 65–99)
HCT VFR BLD AUTO: 29.9 % (ref 37–47)
HGB BLD-MCNC: 10.2 G/DL (ref 12–16)
IMM GRANULOCYTES # BLD AUTO: 0.09 K/UL (ref 0–0.11)
IMM GRANULOCYTES NFR BLD AUTO: 1.2 % (ref 0–0.9)
LYMPHOCYTES # BLD AUTO: 1.19 K/UL (ref 1–4.8)
LYMPHOCYTES NFR BLD: 16 % (ref 22–41)
MCH RBC QN AUTO: 31.3 PG (ref 27–33)
MCHC RBC AUTO-ENTMCNC: 34.1 G/DL (ref 33.6–35)
MCV RBC AUTO: 91.7 FL (ref 81.4–97.8)
MONOCYTES # BLD AUTO: 0.75 K/UL (ref 0–0.85)
MONOCYTES NFR BLD AUTO: 10.1 % (ref 0–13.4)
NEUTROPHILS # BLD AUTO: 5.19 K/UL (ref 2–7.15)
NEUTROPHILS NFR BLD: 69.6 % (ref 44–72)
NRBC # BLD AUTO: 0 K/UL
NRBC BLD-RTO: 0 /100 WBC
PLATELET # BLD AUTO: 177 K/UL (ref 164–446)
PMV BLD AUTO: 12.1 FL (ref 9–12.9)
POTASSIUM SERPL-SCNC: 3.7 MMOL/L (ref 3.6–5.5)
PROT SERPL-MCNC: 5.9 G/DL (ref 6–8.2)
RBC # BLD AUTO: 3.26 M/UL (ref 4.2–5.4)
SODIUM SERPL-SCNC: 140 MMOL/L (ref 135–145)
WBC # BLD AUTO: 7.5 K/UL (ref 4.8–10.8)

## 2018-12-01 PROCEDURE — 700111 HCHG RX REV CODE 636 W/ 250 OVERRIDE (IP): Performed by: INTERNAL MEDICINE

## 2018-12-01 PROCEDURE — 770020 HCHG ROOM/CARE - TELE (206)

## 2018-12-01 PROCEDURE — 93990 DOPPLER FLOW TESTING: CPT

## 2018-12-01 PROCEDURE — 99407 BEHAV CHNG SMOKING > 10 MIN: CPT

## 2018-12-01 PROCEDURE — 99233 SBSQ HOSP IP/OBS HIGH 50: CPT | Performed by: INTERNAL MEDICINE

## 2018-12-01 PROCEDURE — 80053 COMPREHEN METABOLIC PANEL: CPT

## 2018-12-01 PROCEDURE — A9270 NON-COVERED ITEM OR SERVICE: HCPCS | Performed by: HOSPITALIST

## 2018-12-01 PROCEDURE — 85730 THROMBOPLASTIN TIME PARTIAL: CPT

## 2018-12-01 PROCEDURE — 700111 HCHG RX REV CODE 636 W/ 250 OVERRIDE (IP): Performed by: HOSPITALIST

## 2018-12-01 PROCEDURE — 85025 COMPLETE CBC W/AUTO DIFF WBC: CPT

## 2018-12-01 PROCEDURE — 94760 N-INVAS EAR/PLS OXIMETRY 1: CPT

## 2018-12-01 PROCEDURE — 700102 HCHG RX REV CODE 250 W/ 637 OVERRIDE(OP): Performed by: HOSPITALIST

## 2018-12-01 PROCEDURE — 99223 1ST HOSP IP/OBS HIGH 75: CPT | Performed by: INTERNAL MEDICINE

## 2018-12-01 PROCEDURE — 36415 COLL VENOUS BLD VENIPUNCTURE: CPT

## 2018-12-01 RX ORDER — HEPARIN SODIUM 1000 [USP'U]/ML
4000 INJECTION, SOLUTION INTRAVENOUS; SUBCUTANEOUS ONCE
Status: COMPLETED | OUTPATIENT
Start: 2018-12-01 | End: 2018-12-01

## 2018-12-01 RX ORDER — HEPARIN SODIUM 1000 [USP'U]/ML
2200 INJECTION, SOLUTION INTRAVENOUS; SUBCUTANEOUS PRN
Status: DISCONTINUED | OUTPATIENT
Start: 2018-12-01 | End: 2018-12-07

## 2018-12-01 RX ADMIN — HEPARIN SODIUM 900 UNITS/HR: 5000 INJECTION, SOLUTION INTRAVENOUS at 19:34

## 2018-12-01 RX ADMIN — THERA TABS 1 TABLET: TAB at 05:20

## 2018-12-01 RX ADMIN — LOVASTATIN 10 MG: 20 TABLET ORAL at 20:40

## 2018-12-01 RX ADMIN — VITAMIN B12 0.1 MG ORAL TABLET 100 MCG: 0.1 TABLET ORAL at 05:21

## 2018-12-01 RX ADMIN — STANDARDIZED SENNA CONCENTRATE AND DOCUSATE SODIUM 2 TABLET: 8.6; 5 TABLET, FILM COATED ORAL at 18:28

## 2018-12-01 RX ADMIN — FERROUS SULFATE TAB 325 MG (65 MG ELEMENTAL FE) 325 MG: 325 (65 FE) TAB at 05:21

## 2018-12-01 RX ADMIN — FLUTICASONE PROPIONATE 100 MCG: 50 SPRAY, METERED NASAL at 05:21

## 2018-12-01 RX ADMIN — VITAMIN D, TAB 1000IU (100/BT) 1000 UNITS: 25 TAB at 05:21

## 2018-12-01 RX ADMIN — STANDARDIZED SENNA CONCENTRATE AND DOCUSATE SODIUM 2 TABLET: 8.6; 5 TABLET, FILM COATED ORAL at 05:20

## 2018-12-01 RX ADMIN — HEPARIN SODIUM 5000 UNITS: 5000 INJECTION, SOLUTION INTRAVENOUS; SUBCUTANEOUS at 14:57

## 2018-12-01 RX ADMIN — CEFAZOLIN SODIUM 1 G: 1 INJECTION, SOLUTION INTRAVENOUS at 18:54

## 2018-12-01 RX ADMIN — HEPARIN SODIUM 4000 UNITS: 1000 INJECTION, SOLUTION INTRAVENOUS; SUBCUTANEOUS at 19:34

## 2018-12-01 RX ADMIN — HEPARIN SODIUM 5000 UNITS: 5000 INJECTION, SOLUTION INTRAVENOUS; SUBCUTANEOUS at 05:21

## 2018-12-01 ASSESSMENT — COGNITIVE AND FUNCTIONAL STATUS - GENERAL
STANDING UP FROM CHAIR USING ARMS: A LITTLE
SUGGESTED CMS G CODE MODIFIER DAILY ACTIVITY: CH
WALKING IN HOSPITAL ROOM: A LITTLE
CLIMB 3 TO 5 STEPS WITH RAILING: A LITTLE
DAILY ACTIVITIY SCORE: 24
MOBILITY SCORE: 21
SUGGESTED CMS G CODE MODIFIER MOBILITY: CJ

## 2018-12-01 ASSESSMENT — ENCOUNTER SYMPTOMS
VOMITING: 0
EYES NEGATIVE: 1
WEAKNESS: 0
PALPITATIONS: 0
EYE DISCHARGE: 0
SHORTNESS OF BREATH: 0
HALLUCINATIONS: 0
SPUTUM PRODUCTION: 0
FALLS: 0
WEAKNESS: 1
EYE REDNESS: 0
DIARRHEA: 0
SORE THROAT: 0
HEADACHES: 0
FEVER: 0
CHILLS: 0
COUGH: 0
NAUSEA: 0
ABDOMINAL PAIN: 0
DIZZINESS: 0
GASTROINTESTINAL NEGATIVE: 1
CONSTIPATION: 0
WEIGHT LOSS: 1
DIAPHORESIS: 0

## 2018-12-01 ASSESSMENT — COPD QUESTIONNAIRES
DURING THE PAST 4 WEEKS HOW MUCH DID YOU FEEL SHORT OF BREATH: NONE/LITTLE OF THE TIME
COPD SCREENING SCORE: 4
HAVE YOU SMOKED AT LEAST 100 CIGARETTES IN YOUR ENTIRE LIFE: YES
DO YOU EVER COUGH UP ANY MUCUS OR PHLEGM?: NO/ONLY WITH OCCASIONAL COLDS OR INFECTIONS

## 2018-12-01 ASSESSMENT — PAIN SCALES - GENERAL
PAINLEVEL_OUTOF10: 0
PAINLEVEL_OUTOF10: 0

## 2018-12-01 ASSESSMENT — LIFESTYLE VARIABLES
EVER_SMOKED: YES
EVER_SMOKED: YES

## 2018-12-01 NOTE — ED NOTES
Patient wheeled back to room on 2 liters oxygen (home dose). Patient placed on monitor and in gown.

## 2018-12-01 NOTE — CONSULTS
DATE OF SERVICE:  12/01/2018    INFECTIOUS DISEASE CONSULTATION    REASON FOR CONSULT:  Staph aureus sepsis.    CONSULTING PHYSICIAN:  Brittany Carranza MD    HISTORY OF PRESENT ILLNESS:  This is a 73-year-old white female with end-stage   renal disease, on hemodialysis, COPD who was admitted on 11/30/2018 due to   altered mental status, weakness, and blood cultures positive for   staphylococcus aureus from dialysis.  Since admission, she has received   vancomycin and Ancef, which improved her mental status.  She states she is no   longer hallucinating.  She denies any antecedent illnesses, fever, chills,   nausea, vomiting, diarrhea, cough, or other complaints.  She does have   shortness of breath, but that was unchanged from baseline.  She has noted that   her hemodialysis catheter is painful and has erythema.  She is unclear about   the duration and is planned for removal after dialysis.  Infectious disease is   consulted for antibiotic recommendations and management.    REVIEW OF SYSTEMS:  All other systems reviewed and are negative.    ALLERGIES:  She has no known antibiotic allergies.    PAST MEDICAL HISTORY:  COPD, epilepsy, mitral valve disease, stroke, and   hypertension.  She does have a history of an AV fistula on the right on   12/08/2017.    PAST SURGICAL HISTORY:  Total abdominal hysterectomy.    FAMILY HISTORY:  Cancer, heart disease, and congestive heart failure.    SOCIAL HISTORY:  She is a former smoker.  She quit about 2 years ago.  She   does not drink or use illicit drugs.  She has no new animal exposures.    PHYSICAL EXAMINATION:  GENERAL:  She is a thin elderly female in no acute distress, looks older than   her stated age.  VITAL SIGNS:  She has been afebrile since admission.  Current temperature is   98.2, blood pressure 136/78, pulse of 86, down from 107 on admission, oxygen   saturation improved from 90% to 100%.  She weighs 58 kilos.  HEENT:  Normocephalic, atraumatic.  Pupils are  equal, round, and reactive to   light.  Extraocular movements intact.  Oropharynx clear.  She has poor   dentition.  She is missing multiple teeth.  NECK:  Supple.  CARDIOVASCULAR:  Regular rate and rhythm with a systolic ejection murmur.  Her   HD catheter shows erythema all along the catheter site.  CHEST:  Clear to auscultation bilaterally, unlabored.  ABDOMEN:  Soft, nontender.  EXTREMITIES:  Show no cyanosis or clubbing.  NEUROLOGIC:  She is now awake, alert, and oriented.    CURRENT LABORATORY DATA:  Show white blood cell count of 7.5, H and H of 10.2   and 29.9, and platelets of 177.  Sodium 140, potassium 3.7, chloride 105,   bicarbonate 25, glucose 109, BUN 11, creatinine 1.81, AST 12, ALT 5, and   albumin of 3.1.  Urinalysis was essentially negative except for protein.    Hepatitis screen was negative.  QuantiFERON screen was negative.  Blood   cultures x2 here are negative at less than 24 hours.    IMAGING:  Chest x-ray shows a right IJ catheter, cardiomegaly, no infiltrates.    Echocardiogram done 2 years ago on 11/18/2016 showed significant mitral   valve disease with regurgitation, and structurally normal aortic valve.    ASSESSMENT AND PLAN:  A 73-year-old female with end-stage renal disease, on   hemodialysis, admitted due to staphylococcus aureus sepsis with altered mental   status, tachycardia, and hallucinations.  She is clinically improved on   current antimicrobial therapy.  Would continue covering for both   methicillin-resistant Staphylococcus aureus and methicillin-sensitive   Staphylococcus aureus until the pathogens susceptibility is identified.  We   will, at minimum, require removal of her current hemodialysis catheter as this   is likely the source for infection.  I recommend a transthoracic   echocardiogram to assess her bowels.  If no vegetation seen, proceed with BARBIE   as she is at significant risk for endocarditis.  Continue to follow up current   culture results.  As she has  end-stage renal disease, medications will be   adjusted for her renal function.  Further recommendations per culture results   and clinical course.    Thank you and we will follow with you.       ____________________________________     MD SIOMARA WHITT / ROLAND    DD:  12/01/2018 11:57:01  DT:  12/01/2018 12:47:04    D#:  [id number]  Job#:  [id number]

## 2018-12-01 NOTE — ASSESSMENT & PLAN NOTE
On dialysis  Monday Wednesday Friday  Nephrology consulted and following    Vascular surgery consulted regarding new fistula    Status post fistulogram and left IJ permacatheter 12/8

## 2018-12-01 NOTE — CONSULTS
Consults   Nephrology Inpatient Consultation    Date of Service  11/30/2018    Reason for Consultation  ESRD, missed HD, septic    History of Presenting Illness  73 y.o. female admitted 11/30/2018 with hallucinations and ESRD, missed HD. Normally dialyzes at [facility], however, had dialysis last on WED where she was noted to have fever. Cx done and given a dose of vancomycin. She was noted to have hallucinations. Did not get dialysis today. The patient notes some tenderness over R tunneled HD catheter area. She is hallucinating a string around her hands.     Referring Physician  Rogelio Guerrier M.D.    Consulting Physician  Saulo Garza M.D.    Review of Systems  Review of Systems   Constitutional: Positive for fever and malaise/fatigue.   HENT:        Tenderness over line in R neck   Psychiatric/Behavioral: Positive for hallucinations.   All other systems reviewed and are negative.     Past Medical History  Past Medical History:   Diagnosis Date   • Anemia 11/11/2016   • Renal insufficiency 8/5/2016    ESRD   • Unspecified essential hypertension 9/18/2009   • Peripheral edema 9/18/2009   • Tobacco use disorder 9/18/2009   • Migraines, neuralgic 9/18/2009   • COPD (chronic obstructive pulmonary disease) ([facility]) 9/18/2009   • Osteoarthritis 9/18/2009   • Epilepsy ([facility]) 9/18/2009   • Arthritis     osteo-knees   • Breath shortness     02 2 LTR cont   • Bronchitis    • Dental disorder     missing teeth   • Emphysema of lung ([facility])    • Heart valve disease     mitral valve clip   • Jaundice     at birth   • Oxygen dependent     2 LTR cont   • Stroke ([facility])        Surgical History  Past Surgical History:   Procedure Laterality Date   • AV FISTULA CREATION Right 12/8/2017    Procedure: AV FISTULA CREATION- DISTAL RADIAL CEPHALIC FOREARM;  Surgeon: Fidel Romero M.D.;  Location: SURGERY [facility];  Service: General   • OTHER CARDIAC SURGERY  06/2017    mitral valve clip   • GASTROSCOPY WITH BIOPSY   11/15/2016    Procedure: GASTROSCOPY WITH BIOPSY;  Surgeon: Guzman Olson M.D.;  Location: ENDOSCOPY [facility];  Service:    • ABDOMINAL HYSTERECTOMY TOTAL      endometriosis       Medications  No current facility-administered medications on file prior to encounter.      Current Outpatient Prescriptions on File Prior to Encounter   Medication Sig Dispense Refill   • tramadol (ULTRAM) 50 MG Tab Take 1 Tab by mouth every 8 hours as needed for up to 30 days. 30 Tab 0   • fluticasone (FLONASE) 50 MCG/ACT nasal spray Spray 2 Sprays in nose every day. 16 g 2   • ferrous sulfate 325 (65 Fe) MG tablet Take 325 mg by mouth every day.     • vitamin D (CHOLECALCIFEROL) 1000 UNIT Tab Take 1,000 Units by mouth every day.     • lovastatin (MEVACOR) 10 MG tablet TAKE 1 TABLET BY MOUTH ONCE DAILY 90 Tab 3   • ipratropium-albuterol (DUONEB) 0.5-2.5 (3) MG/3ML nebulizer solution 3 mL by Nebulization route every 6 hours as needed for Shortness of Breath. 120 Vial 0   • multivitamin (THERAGRAN) Tab Take 1 Tab by mouth every day. 90 Tab 0   • cyanocobalamin 100 MCG Tab Take 100 mcg by mouth every day. 30 Tab 3       Family History  family history includes Cancer in her mother; Heart Disease in her father; Heart Failure in her father.    Social History  Social History   Substance Use Topics   • Smoking status: Former Smoker     Packs/day: 1.00     Years: 40.00     Types: Cigarettes     Quit date: 2016   • Smokeless tobacco: Never Used   • Alcohol use No       Allergies  No Known Allergies     Physical Exam  Laboratory/Imaging   Hemodynamics  Temp (24hrs), Av.8 °C (98.3 °F), Min:36.8 °C (98.3 °F), Max:36.8 °C (98.3 °F)   Temperature: 36.8 °C (98.3 °F)  Pulse  Av  Min: 71  Max: 107 Heart Rate (Monitored): (!) 104  Blood Pressure : 137/87, NIBP: 117/74      Respiratory      Respiration: 19, Pulse Oximetry: 95 %             Fluids       Nutrition  No orders of the defined types were placed in this  encounter.      Physical Exam   Constitutional: She is oriented to person, place, and time. She appears well-developed.   HENT:   Head: Normocephalic and atraumatic.   Cardiovascular: Normal rate and regular rhythm.    Pulmonary/Chest: Effort normal and breath sounds normal.   Abdominal: Soft. Bowel sounds are normal.   Musculoskeletal: She exhibits no edema or tenderness.   Neurological: She is alert and oriented to person, place, and time.   Skin: Skin is warm and dry. She is not diaphoretic.   Psychiatric:   hallucinating       Recent Labs      11/30/18   1717   WBC  8.7   RBC  3.69*   HEMOGLOBIN  11.6*   HEMATOCRIT  35.6*   MCV  96.5   MCH  31.4   MCHC  32.6*   RDW  49.0   PLATELETCT  210   MPV  11.4                          Assessment/Plan     1. ESRD - Missed HD, HD today, BMP pending, though likely will need HD. Orders written.  2. Likely HD catheter infection - Positive BC at [facility] (x2), S. Aureus noted. s/p dose of vancomycin at HD last tx, missed HD today.  3. Hallucination - likely due to sepsis    -d/w patient and family  -Plan on HD today  -Will  need HD catheter pulled soon  -Has AVF though seems too small to use at this time  -Empiric abx sensitivities obtained

## 2018-12-01 NOTE — ED NOTES
Report received, pt resting in bed in er room 4, her family is at the bedside, pt is waiting dialysis rn   Pt is alert to person, place and time, she denies pain, dialysis cath is present to right chest with some surrounding skin redness

## 2018-12-01 NOTE — ED PROVIDER NOTES
ED Provider Note    Scribed for Rogelio Guerrier M.D. by Ana Jones. 11/30/2018, 4:08 PM.    Primary care provider: Rosa LACEY M.D.  Means of arrival: Walk in  History obtained from: Patient  History limited by: None    CHIEF COMPLAINT  •  Altered Mental Status    HPI  Kristyn Gillespie is a 73 y.o. female who presents to the Emergency Department for an altered mental status with an onset of today. She started dialysis approximately one month ago for renal insufficiency. She is followed by Dr. Garza (Nephrology) and attends dialysis on Mondays, Wednesdays and Fridays. She went to dialysis at [facility] on Wednesday and was said to have an infected port site with surrounding erythema and discharge. She was treated with Vancomycin and blood cultures were obtained. She did not attend dialysis today. She began to experience an altered mental status today described as intermittent confusion, delusions and hallucinations. Negative for fevers, chills, vomiting, chest pain, shortness of breath, focal weakness or numbness. She does make urine and denies any hematuria or dysuria.       REVIEW OF SYSTEMS  See HPI for further details. All other systems are negative. C.      PAST MEDICAL HISTORY  Patient has a past medical history of Anemia (11/11/2016); Arthritis; Breath shortness; Bronchitis; COPD (chronic obstructive pulmonary disease) ([facility]) (9/18/2009); Dental disorder; Emphysema of lung ([facility]); Epilepsy ([facility]) (9/18/2009); Heart valve disease; Jaundice; Migraines, neuralgic (9/18/2009); Osteoarthritis (9/18/2009); Oxygen dependent; Peripheral edema (9/18/2009); Renal insufficiency (8/5/2016); Stroke ([facility]); Tobacco use disorder (9/18/2009); and Unspecified essential hypertension (9/18/2009).      SURGICAL HISTORY  Patient has a past surgical history that includes abdominal hysterectomy total; gastroscopy with biopsy (11/15/2016); other cardiac surgery (06/2017); and av fistula creation (Right, 12/8/2017).      SOCIAL  "HISTORY  Social History   Substance Use Topics   • Smoking status: Former Smoker     Packs/day: 1.00     Years: 40.00     Types: Cigarettes     Quit date: 11/24/2016   • Smokeless tobacco: Never Used   • Alcohol use No      History   Drug Use No   She is accompanied by her daughter. She lives in [place].      FAMILY HISTORY  Family History   Problem Relation Age of Onset   • Cancer Mother         breast   • Heart Disease Father    • Heart Failure Father        CURRENT MEDICATIONS  Reviewed.  See Encounter Summary.       ALLERGIES  None      PHYSICAL EXAM  VITAL SIGNS: /87   Pulse (!) 107   Temp 36.8 °C (98.3 °F) (Oral)   Resp 18   Ht 1.575 m (5' 2\")   Wt 61.7 kg (136 lb 0.4 oz)   SpO2 90%   BMI 24.88 kg/m²     Constitutional: Alert in no apparent distress.  HENT: No signs of trauma, Bilateral external ears normal, Nose normal.   Eyes: Pupils are equal and reactive, Conjunctiva normal, Non-icteric.   Neck: Normal range of motion, No tenderness, Supple, No stridor.   Lymphatic: No lymphadenopathy noted.   Cardiovascular: Regular rate and rhythm, no murmurs.   Thorax & Lungs: Normal breath sounds, No respiratory distress, No wheezing, No chest tenderness. Med port right upper chest wall mild erythema, non tender.   Abdomen: Bowel sounds normal, Soft, No tenderness, No masses, No pulsatile masses. No peritoneal signs.  Skin: Warm, Dry, No erythema, No rash.   Back: No bony tenderness, No CVA tenderness.   Extremities: Intact distal pulses, No edema, No tenderness, No cyanosis, Fistula right upper extremity.   Musculoskeletal: Good range of motion in all major joints. No tenderness to palpation or major deformities noted.   Neurologic: Alert , Normal motor function, Normal sensory function, No focal deficits noted.   Psychiatric: Affect normal, Judgment normal, Mood normal.       DIAGNOSTIC STUDIES / PROCEDURES     LABS  Results for orders placed or performed during the hospital encounter of 11/30/18   Lactic " acid (lactate)   Result Value Ref Range    Lactic Acid 1.4 0.5 - 2.0 mmol/L   COMP METABOLIC PANEL   Result Value Ref Range    Sodium 138 135 - 145 mmol/L    Potassium 3.7 3.6 - 5.5 mmol/L    Chloride 96 96 - 112 mmol/L    Co2 27 20 - 33 mmol/L    Anion Gap 15.0 (H) 0.0 - 11.9    Glucose 98 65 - 99 mg/dL    Bun 34 (H) 8 - 22 mg/dL    Creatinine 3.76 (H) 0.50 - 1.40 mg/dL    Calcium 10.4 8.5 - 10.5 mg/dL    AST(SGOT) 14 12 - 45 U/L    ALT(SGPT) 7 2 - 50 U/L    Alkaline Phosphatase 114 (H) 30 - 99 U/L    Total Bilirubin 0.5 0.1 - 1.5 mg/dL    Albumin 3.8 3.2 - 4.9 g/dL    Total Protein 7.4 6.0 - 8.2 g/dL    Globulin 3.6 (H) 1.9 - 3.5 g/dL    A-G Ratio 1.1 g/dL   URINALYSIS   Result Value Ref Range    Color Yellow     Character Clear     Specific Gravity 1.017 <1.035    Ph >=9.0 (A) 5.0 - 8.0    Glucose Negative Negative mg/dL    Ketones Negative Negative mg/dL    Protein 300 (A) Negative mg/dL    Bilirubin Negative Negative    Urobilinogen, Urine 0.2 Negative    Nitrite Negative Negative    Leukocyte Esterase Negative Negative    Occult Blood Negative Negative    Micro Urine Req Microscopic    CBC WITH DIFFERENTIAL   Result Value Ref Range    WBC 8.7 4.8 - 10.8 K/uL    RBC 3.69 (L) 4.20 - 5.40 M/uL    Hemoglobin 11.6 (L) 12.0 - 16.0 g/dL    Hematocrit 35.6 (L) 37.0 - 47.0 %    MCV 96.5 81.4 - 97.8 fL    MCH 31.4 27.0 - 33.0 pg    MCHC 32.6 (L) 33.6 - 35.0 g/dL    RDW 49.0 35.9 - 50.0 fL    Platelet Count 210 164 - 446 K/uL    MPV 11.4 9.0 - 12.9 fL    Neutrophils-Polys 73.10 (H) 44.00 - 72.00 %    Lymphocytes 17.30 (L) 22.00 - 41.00 %    Monocytes 7.40 0.00 - 13.40 %    Eosinophils 1.40 0.00 - 6.90 %    Basophils 0.50 0.00 - 1.80 %    Immature Granulocytes 0.30 0.00 - 0.90 %    Nucleated RBC 0.00 /100 WBC    Neutrophils (Absolute) 6.32 2.00 - 7.15 K/uL    Lymphs (Absolute) 1.50 1.00 - 4.80 K/uL    Monos (Absolute) 0.64 0.00 - 0.85 K/uL    Eos (Absolute) 0.12 0.00 - 0.51 K/uL    Baso (Absolute) 0.04 0.00 - 0.12 K/uL     Immature Granulocytes (abs) 0.03 0.00 - 0.11 K/uL    NRBC (Absolute) 0.00 K/uL   URINE MICROSCOPIC (W/UA)   Result Value Ref Range    WBC 0-2 /hpf    RBC 0-2 /hpf    Bacteria Negative None /hpf    Epithelial Cells Negative /hpf    Hyaline Cast 0-2 /lpf   ESTIMATED GFR   Result Value Ref Range    GFR If  14 (A) >60 mL/min/1.73 m 2    GFR If Non  12 (A) >60 mL/min/1.73 m 2      All labs were reviewed by me.      RADIOLOGY  DX-CHEST-PORTABLE (1 VIEW)   Final Result      Right IJ dialysis catheter in place.      Cardiomegaly.        The radiologist's interpretation of all radiological studies and images have been reviewed by me.      COURSE & MEDICAL DECISION MAKING  Pertinent Labs & Imaging studies reviewed. (See chart for details)    4:09 PM Patient seen and examined at bedside. Patient presents for an altered mental status.      Initial orders in the Emergency Department included XR chest and laboratory testing: lactic acid, CBC with differential, CMP, estimated GFR, microscopic urine, UA, urine culture and blood cultures.     5:45 PM Spoke with Dr. Garza, Nephrology, who is familiar with the patient and will dialyze her today and tomorrow.    5:47 PM Obtained and reviewed records from [facility]. Blood culture positive for Staphylococcus aureus.     5:48 PM Plan to treat with Vancomycin IV.    5:54 PM On repeat evaluation, patient is doing well. Plan to treat with IV antibiotics and admit for further care. Lab results were reviewed with her. She verbalized her understanding and agreement.    6:06 PM Spoke with Dr. Iglesias, Hospitalist, regarding the patient's presenting symptoms and diagnostic results. He will consult and admit the patient for further evaluation and are.        Decision Making:  This is a 73 y.o. year old female who presents with bacteremia of staph aureus.  Patient had MediPort placed approximately 1 month ago for dialysis after progression to end-stage renal  failure.  There is been evidence of superficial cellulitis overlying the port for the last couple of weeks.  This led to hematology and blood cultures this Wednesday which resulted today with the above positive findings.  For this reason the [facility] dialysis center sent her to the ER for further treatment.  I discussed the case with the primary nephrologist as well.  He plans on doing her dialysis today since she missed her earlier appointment as well as tomorrow and the Mediport will be pulled.  She was given vancomycin on Wednesday and this is again been administered today.  Second blood cultures were drawn today.  Remaining labs as above.  It is reported by the family that she has had some hallucinations although none present currently.  I discussed the case with the hospitalist which is also agreeable to ongoing inpatient care and primary medical management while nephrology works on Medpor and ongoing dialysis.      DISPOSITION  Patient will be admitted to Dr. Iglesias, Hospitalist, in stable condition.       DIAGNOSIS  1. Staph aureus infection    2. Stage 5 chronic kidney disease on chronic dialysis (HCC)               Ana HARP (Scribe), am scribing for, and in the presence of, Rogelio Guerrier M.D.    Electronically signed by: Ana Jones (Scribe), 11/30/2018    Rogelio HARP M.D. personally performed the services described in this documentation, as scribed by Ana Jones in my presence, and it is both accurate and complete. C.    The note accurately reflects work and decisions made by me.  Rogelio Guerrier  11/30/2018  7:07 PM

## 2018-12-01 NOTE — ED NOTES
Dialysis in progress, pt tolerating well , per pharmacy cefazolin is to be held until after dialysis, vancomycin will be given by dialysis nurse at the end of the dialysis run   Patient Seen in: [facility] Emergency Department    History   Patient presents with:  Alcohol Intoxication (neurologic)  Eval-P (psychiatric)    Stated Complaint: ETOH    HPI    44-year-old male with a history of morbid obesity, history of chronic a noted in HPI. Constitutional and vital signs reviewed. All other systems reviewed and negative except as noted above. PSFH elements reviewed from today and agreed except as otherwise stated in HPI.     Physical Exam   ED Triage Vitals  BP: (!) 165/ All other components within normal limits   PROTHROMBIN TIME (PT) - Abnormal; Notable for the following:     PT 14.5 (*)     INR 1.12 (*)     All other components within normal limits   COMP METABOLIC PANEL (14) - Abnormal; Notable for the following: -----------         ------                     CBC W/ DIFFERENTIAL[[id number]]          Abnormal            Final result                 Please view results for these tests on the individual orders.    [address] [place] [address]  [phone]    In 1 week        Medications Prescribed:  Discharge Medication List as of 10/21/2017  3:54 PM    START taking these medications    CloNIDine HCl 0.1 MG Oral Tab  Take 1 tablet (0.1 mg total) by mouth 3 (three) times daily as neede

## 2018-12-01 NOTE — PROGRESS NOTES
Kristyn Gillespie was admitted to [facility] on 10/21/18 for acute on chronic respiratory failure with hypoxia. Patient discharged home on 10/25/18. IHD patient advocate was able to successfully engage with patient and her daughter post-discharge. Per discharge orders, patient was instructed to start dialysis at [facility] in [place]. Patient began dialysis treatments on 10/26/18, and attends three times a week. Patient had an appointment with Dr. Monge (discharge clinic) on 11/7/18, and her regular PCP on 11/21/18. Patient utilizes  through [facility]. Patient has no future appointments at this time. PPS screening was conducted at 80%.

## 2018-12-01 NOTE — RESPIRATORY CARE
COPD EDUCATION by COPD CLINICAL EDUCATOR  12/1/2018  at  11:50 AM by Alexia Tanner     Patient interviewed by COPD education team.  Patient unable to participate in full program.  Short intervention has been conducted.  A comprehensive packet including information about COPD, treatments, and smoking cessation given.

## 2018-12-01 NOTE — ED NOTES
Nephrologist at bedside. Will be receiving dialysis tonight. This RN spoke to Dialysis RN Leeanne.

## 2018-12-01 NOTE — PROGRESS NOTES
"Pharmacy Kinetics 73 y.o. female on vancomycin day # 4 2018    Currently on Vancomycin pulse dosing  · : 500 mg IV x1   · : 1000 mg IV x1 (during HD at [facility])    Indication for Treatment: Staph aureus bacteremia (per outside cx results)    Pertinent history per medical record: Admitted on 2018 for AMS and Staph bacteremia. PMH significant for ESRD on HD. She reportedly had blood cultures drawn at [facility] that were positive for Staph aureus (sensitivities not yet known). There is some erythema/tenderness at her R tunneled HD catheter site, which is presumed to be the source of her infection. ID and vascular surgery have been consulted. Plan is to remove her catheter and replace once cultures are negative. She will also be evaluated for endocarditis per ID.    Other antibiotics:  · Cefazolin 1 g IV q24h    Allergies: Patient has no known allergies.     Concerns for renal function: HD    Pertinent cultures to date:   ·  Blood x2: NGTD  ·  Urine: in process    Recent Labs      18   1717  18   0248   WBC  8.7  7.5   NEUTSPOLYS  73.10*  69.60     Recent Labs      18   1717  18   0248   BUN  34*  11   CREATININE  3.76*  1.81*   ALBUMIN  3.8  3.1*     Intake/Output Summary (Last 24 hours) at 18 1001  Last data filed at 18 0418   Gross per 24 hour   Intake              840 ml   Output             1600 ml   Net             -760 ml      Blood pressure 136/78, pulse 86, temperature 36.8 °C (98.2 °F), temperature source Temporal, resp. rate 16, height 1.575 m (5' 2\"), weight 58.4 kg (128 lb 12 oz), SpO2 100 %, not currently breastfeeding. Temp (24hrs), Av.5 °C (97.7 °F), Min:36.1 °C (97 °F), Max:36.8 °C (98.3 °F)      A/P   1. Vancomycin dose change: continue pulse dosing  2. Next vancomycin level: After another 2-3 HD sessions  3. Goal trough: 16-20 mcg/mL  4. Comments: Pt's AMS is better today. Had HD yesterday and vancomycin dose appears to have been hung " during dialysis. As not much vanco removed during HD, will wait at least 2 more sessions before checking a trough. Awaiting sensitivities from the [facility] cx to help guide therapy.    Yoli aCrrizales, PharmD, BCPS

## 2018-12-01 NOTE — PROGRESS NOTES
Patient tolerated her 3 hr dialysis and 1 liter UF. Vs stable during HD. Skin redness follows the path of the  Right permacath. vanco 500 mg given iv 30 minutes before the end of HD. Post tx report given to Primary RN.

## 2018-12-01 NOTE — PROGRESS NOTES
Pt arrived on unit from ED. VSS. POC Discussed with patient at bedside. Pt verbalizes understanding. Call light and bedside controls within reach. Bed is locked and low. Treaded socks on.

## 2018-12-01 NOTE — PROGRESS NOTES
Nephrology Daily Progress Note    Date of Service  12/1/2018    Chief Complaint  73 y.o. female admitted 11/30/2018 with bacteremia/sepsis, likely from HD catheter    Interval Problem Update  Feeling much better this AM. Notes she realized she was hallucinating earlier. She underwent dialysis yesterday. Well dialyzed today. BC sent here.    Review of Systems  Review of Systems   Constitutional: Negative for chills and fever.   HENT:        Some tenderness over HD catheter site   Cardiovascular: Negative for chest pain and palpitations.   All other systems reviewed and are negative.       Physical Exam  Temp:  [36.1 °C (97 °F)-36.8 °C (98.3 °F)] 36.1 °C (97 °F)  Pulse:  [] 96  Resp:  [13-24] 16  BP: (130-143)/(85-96) 143/85    Physical Exam   Constitutional: She is oriented to person, place, and time. She appears well-developed and well-nourished.   HENT:   Head: Normocephalic and atraumatic.   Cardiovascular: Normal rate and regular rhythm.    Pulmonary/Chest: Effort normal and breath sounds normal.   Some redness over hd catheter site   Neurological: She is alert and oriented to person, place, and time.   Skin: Skin is warm and dry.   Psychiatric: She has a normal mood and affect. Her behavior is normal.       Fluids    Intake/Output Summary (Last 24 hours) at 12/01/18 0734  Last data filed at 12/01/18 0418   Gross per 24 hour   Intake              840 ml   Output             1600 ml   Net             -760 ml       Laboratory  Recent Labs      11/30/18   1717  12/01/18   0248   WBC  8.7  7.5   RBC  3.69*  3.26*   HEMOGLOBIN  11.6*  10.2*   HEMATOCRIT  35.6*  29.9*   MCV  96.5  91.7   MCH  31.4  31.3   MCHC  32.6*  34.1   RDW  49.0  46.3   PLATELETCT  210  177   MPV  11.4  12.1     Recent Labs      11/30/18   1717  12/01/18   0248   SODIUM  138  140   POTASSIUM  3.7  3.7   CHLORIDE  96  105   CO2  27  25   GLUCOSE  98  109*   BUN  34*  11   CREATININE  3.76*  1.81*   CALCIUM  10.4  9.1                    Imaging  DX-CHEST-PORTABLE (1 VIEW)   Final Result      Right IJ dialysis catheter in place.      Cardiomegaly.           Assessment/Plan  1. ESRD - s/p HD yesterday, well dialyzed.   2. S Aureus sepsis, with associated Mental status changes/hallucinations, now improved. On antibiotics presumed from HD catheter  3. Anemia of CKD, add epogen  4. AVF, immature    -Will need HD catheter pulled soon, currently is well dialyzed   Discussed with Dr. Romero  -Continue abx, sensitivities should be back soon from [facility]  -Will need replacement line  -Eval AVF -US ordered

## 2018-12-01 NOTE — CONSULTS
VASCULAR SURGERY CONSULT      DATE OF CONSULTATION: 12/1/2018    REASON FOR CONSULT: Infected right IJ tunneled dialysis catheter requested by Dr. Garza.     HISTORY OF PRESENT ILLNESS: 73 year old with Stage 5 kidney disease has a right distal radial cephalic AV fistula that I created on 12/08/2017.  At that time she was heavier and I was planning a delayed transposition.  She was lost to our follow up.  A right IJ tunneled catheter was placed by radiology 10/22/2018.  This is her only catheter history.  She was not feeling well recently and staphylococcus aureus was cultured by dialysis.  The right forearm AVF has never developed.  If we had seen her a BAM procedure (balloon assisted maturation) would have been utilized.  Infectious disease is on her case and blood cultures are planned until sterile and then new catheter.    PAST MEDICAL HISTORY:   Past Medical History:   Diagnosis Date   • Anemia 11/11/2016   • Arthritis     osteo-knees   • Breath shortness     02 2 LTR cont   • Bronchitis    • COPD (chronic obstructive pulmonary disease) ([facility]) 9/18/2009   • Dental disorder     missing teeth   • Emphysema of lung ([facility])    • Epilepsy ([facility]) 9/18/2009   • Heart valve disease     mitral valve clip   • Jaundice     at birth   • Migraines, neuralgic 9/18/2009   • Osteoarthritis 9/18/2009   • Oxygen dependent     2 LTR cont   • Peripheral edema 9/18/2009   • Renal insufficiency 8/5/2016    ESRD   • Stroke ([facility])    • Tobacco use disorder 9/18/2009   • Unspecified essential hypertension 9/18/2009        MEDICATIONS:   Current Facility-Administered Medications   Medication Dose   • [START ON 12/3/2018] epoetin seven (EPOGEN,PROCRIT) injection 4,000 Units  4,000 Units   • lidocaine (XYLOCAINE) 1 % injection 1 mL  1 mL   • heparin injection 2,500 Units  2,500 Units   • cyanocobalamin (VITAMIN B-12) tablet 100 mcg  100 mcg   • ferrous sulfate tablet 325 mg  325 mg   • fluticasone (FLONASE) nasal spray 100 mcg  2 Spray   •  ipratropium-albuterol (DUONEB) nebulizer solution  3 mL   • lovastatin (MEVACOR) tablet 10 mg  10 mg   • multivitamin (THERAGRAN) tablet 1 Tab  1 Tab   • vitamin D (cholecalciferol) tablet 1,000 Units  1,000 Units   • Respiratory Care per Protocol     • heparin injection 5,000 Units  5,000 Units   • ondansetron (ZOFRAN) syringe/vial injection 4 mg  4 mg   • ondansetron (ZOFRAN ODT) dispertab 4 mg  4 mg   • acetaminophen (TYLENOL) tablet 650 mg  650 mg   • ceFAZolin in dextrose (ANCEF) IVPB premix 1 g  1 g   • MD Alert...Vancomycin per Pharmacy     • bisacodyl (DULCOLAX) suppository 10 mg  10 mg    And   • senna-docusate (PERICOLACE or SENOKOT S) 8.6-50 MG per tablet 2 Tab  2 Tab    And   • polyethylene glycol/lytes (MIRALAX) PACKET 1 Packet  1 Packet   • heparin injection 3,700 Units  3,700 Units       ALLERGIES:  No Known Allergies     PAST SURGICAL HISTORY:   Past Surgical History:   Procedure Laterality Date   • AV FISTULA CREATION Right 12/8/2017    Procedure: AV FISTULA CREATION- DISTAL RADIAL CEPHALIC FOREARM;  Surgeon: Fidel Romero M.D.;  Location: SURGERY [facility];  Service: General   • OTHER CARDIAC SURGERY  06/2017    mitral valve clip   • GASTROSCOPY WITH BIOPSY  11/15/2016    Procedure: GASTROSCOPY WITH BIOPSY;  Surgeon: Guzman Olson M.D.;  Location: ENDOSCOPY [facility];  Service:    • ABDOMINAL HYSTERECTOMY TOTAL      endometriosis       SOCIAL HISTORY:   Social History     Social History   • Marital status:      Spouse name: N/A   • Number of children: N/A   • Years of education: N/A     Social History Main Topics   • Smoking status: Former Smoker     Packs/day: 1.00     Years: 40.00     Types: Cigarettes     Quit date: 11/24/2016   • Smokeless tobacco: Never Used   • Alcohol use No   • Drug use: No   • Sexual activity: No      Comment:      Other Topics Concern   • Not on file     Social History Narrative   • No narrative on file        FAMILY HISTORY:   Family  "History   Problem Relation Age of Onset   • Cancer Mother         breast   • Heart Disease Father    • Heart Failure Father        REVIEW OF SYSTEMS:   Review of Systems   Eyes: Negative.    Respiratory:        Chronic oxygen and COPD   Gastrointestinal: Negative.    Genitourinary:        Renal failure but still makes small amount of urine.   Musculoskeletal:        Walks but not much due to fatigue.   Neurological: Positive for weakness.       PHYSICAL EXAMINATION:   VITAL SIGNS: Blood pressure 137/79, pulse 94, temperature 36.7 °C (98 °F), temperature source Temporal, resp. rate 16, height 1.575 m (5' 2\"), weight 58.4 kg (128 lb 12 oz), SpO2 95 %, not currently breastfeeding.  Physical Exam   Constitutional: She is oriented to person, place, and time.   Thin chronically ill appearing.   HENT:   Head: Normocephalic.   Many teeth missing   Eyes: Pupils are equal, round, and reactive to light. Conjunctivae are normal.   Neck: No thyromegaly present.   Tunneled catheter right IJ.   Pulmonary/Chest: Breath sounds normal. No respiratory distress. She has no wheezes.   Erythema along  Catheter course which is very long . Pus draining from site.     On nasal oxygen chronically.   Abdominal: She exhibits no distension. There is no tenderness.   Musculoskeletal: She exhibits edema.   Thin.  AVF has thrill through out the right forearm.   Lymphadenopathy:     She has no cervical adenopathy.   Neurological: She is alert and oriented to person, place, and time.   Skin: Skin is warm and dry.   Psychiatric: Mood, affect and judgment normal.         DIAGNOSTIC DATA:   Recent Labs      11/30/18 1717 12/01/18   0248   WBC  8.7  7.5   RBC  3.69*  3.26*   HEMOGLOBIN  11.6*  10.2*   HEMATOCRIT  35.6*  29.9*   MCV  96.5  91.7   MCH  31.4  31.3   MCHC  32.6*  34.1   RDW  49.0  46.3   PLATELETCT  210  177   MPV  11.4  12.1     Recent Labs      11/30/18 1717 12/01/18   0248   SODIUM  138  140   POTASSIUM  3.7  3.7   CHLORIDE  96  " 105   CO2  27  25   GLUCOSE  98  109*   BUN  34*  11     Discussed with patient and family who are present and with RN    ASSESSMENT AND PLAN:   Infected right IJ Hemosplit catheter with purulent drainage and erythema.  Patent right forearm AV fistula that needs intervention which I will schedule..  On antibiotic therapy.  We will remove catheter now and replace when infection cleared.

## 2018-12-01 NOTE — PROGRESS NOTES
2 RN skin check completed with Zay DALTON. Sacrum Pink and blanching, Ears pink and blanching, elbows pink and blanching, Heels red and slow to tika, floated on pillows. Lower extremities dry flaky, Old healed wound noted on right ankle.

## 2018-12-01 NOTE — H&P
Hospital Medicine History & Physical Note    Date of Service  11/30/2018    Primary Care Physician  Rosa LACEY M.D.    [facility]    Code Status  FULL     Chief Complaint  Hallucinations     History of Presenting Illness  73 y.o. female past medical history of end-stage renal disease on hemodialysis Monday Wednesday Friday, chronic obstructive pulmonary disease stable, hyperlipidemia and vitamin D deficiency.  Was sent for positive blood culture for staph aureus and hallucinations on 11/30/2018.  Patient reports generalized weakness no fevers or chills.  She reports shortness of breath that is at baseline in addition to back pain and hallucination of seeing a strings around her head.  Otherwise she is alert and oriented to place person and time.  Without loss of consciousness or auditory hallucinations.  Patient reports having erythema and swelling and tenderness around the hemodialysis insertion site.    Review of Systems  Review of Systems   Constitutional: Positive for malaise/fatigue. Negative for chills and fever.   HENT: Negative.    Eyes: Negative.    Respiratory: Positive for shortness of breath (at baseline 2). Negative for cough, hemoptysis, sputum production and wheezing.    Cardiovascular: Negative for chest pain, palpitations, orthopnea, claudication and leg swelling.   Gastrointestinal: Negative for abdominal pain, constipation, diarrhea, nausea and vomiting.   Genitourinary: Negative for dysuria, hematuria and urgency.        Oligoria    Musculoskeletal: Positive for back pain.   Skin: Negative.    Neurological: Positive for weakness. Negative for dizziness, tingling, focal weakness, loss of consciousness and headaches.   Psychiatric/Behavioral: Positive for hallucinations. Negative for depression and substance abuse. The patient is not nervous/anxious.        Past Medical History   has a past medical history of Anemia (11/11/2016); Arthritis; Breath shortness; Bronchitis;  COPD (chronic obstructive pulmonary disease) ([facility]) (9/18/2009); Dental disorder; Emphysema of lung ([facility]); Epilepsy ([facility]) (9/18/2009); Heart valve disease; Jaundice; Migraines, neuralgic (9/18/2009); Osteoarthritis (9/18/2009); Oxygen dependent; Peripheral edema (9/18/2009); Renal insufficiency (8/5/2016); Stroke ([facility]); Tobacco use disorder (9/18/2009); and Unspecified essential hypertension (9/18/2009).    Surgical History   has a past surgical history that includes abdominal hysterectomy total; gastroscopy with biopsy (11/15/2016); other cardiac surgery (06/2017); and av fistula creation (Right, 12/8/2017).     Family History  family history includes Cancer in her mother; Heart Disease in her father; Heart Failure in her father.     Social History   reports that she quit smoking about 2 years ago. Her smoking use included Cigarettes. She has a 40.00 pack-year smoking history. She has never used smokeless tobacco. She reports that she does not drink alcohol or use drugs.    Allergies  No Known Allergies    Medications  Prior to Admission Medications   Prescriptions Last Dose Informant Patient Reported? Taking?   cyanocobalamin 100 MCG Tab Taking at 0900 Patient No No   Sig: Take 100 mcg by mouth every day.   ferrous sulfate 325 (65 Fe) MG tablet Taking at 0900 Patient Yes No   Sig: Take 325 mg by mouth every day.   fluticasone (FLONASE) 50 MCG/ACT nasal spray Taking  No No   Sig: Spray 2 Sprays in nose every day.   ipratropium-albuterol (DUONEB) 0.5-2.5 (3) MG/3ML nebulizer solution Taking at 1100 Patient No No   Sig: 3 mL by Nebulization route every 6 hours as needed for Shortness of Breath.   lovastatin (MEVACOR) 10 MG tablet Taking at 0900 Patient No No   Sig: TAKE 1 TABLET BY MOUTH ONCE DAILY   multivitamin (THERAGRAN) Tab Taking at 0900 Patient No No   Sig: Take 1 Tab by mouth every day.   tramadol (ULTRAM) 50 MG Tab   No No   Sig: Take 1 Tab by mouth every 8 hours as needed for up to 30 days.   vitamin D  (CHOLECALCIFEROL) 1000 UNIT Tab Not Taking at 0900 Patient Yes No   Sig: Take 1,000 Units by mouth every day.      Facility-Administered Medications: None       Physical Exam  Temp:  [36.8 °C (98.3 °F)] 36.8 °C (98.3 °F)  Pulse:  [] 99  Resp:  [13-24] 13  BP: (137)/(87) 137/87    Physical Exam   Constitutional: She is oriented to person, place, and time. She appears well-developed and well-nourished.   HENT:   Head: Normocephalic and atraumatic.   Eyes: Pupils are equal, round, and reactive to light. Conjunctivae are normal. Right eye exhibits no discharge. Left eye exhibits no discharge. No scleral icterus.   Neck: Normal range of motion. Neck supple. No JVD present. No tracheal deviation present. No thyromegaly present.   Cardiovascular: Normal rate, regular rhythm and normal heart sounds.  Exam reveals no gallop and no friction rub.    No murmur heard.  Pulmonary/Chest: Effort normal and breath sounds normal. No stridor. No respiratory distress. She has no wheezes. She has no rales.   HD catheter in right pectoral with surrounding  cellulitis    Abdominal: Soft. Bowel sounds are normal. She exhibits no distension. There is no tenderness. There is no rebound and no guarding.   Musculoskeletal: Normal range of motion. She exhibits edema. She exhibits no tenderness or deformity.   Neurological: She is alert and oriented to person, place, and time. No cranial nerve deficit. Coordination normal.   Skin: Skin is warm and dry. No rash noted. There is erythema.   erythema and swelling and tenderness around the hemodialysis insertion site   Psychiatric: She has a normal mood and affect. Judgment normal.   Visual hallucinations   Nursing note and vitals reviewed.      Laboratory:  Recent Labs      11/30/18   1717   WBC  8.7   RBC  3.69*   HEMOGLOBIN  11.6*   HEMATOCRIT  35.6*   MCV  96.5   MCH  31.4   MCHC  32.6*   RDW  49.0   PLATELETCT  210   MPV  11.4     Recent Labs      11/30/18   1717   SODIUM  138   POTASSIUM   3.7   CHLORIDE  96   CO2  27   GLUCOSE  98   BUN  34*   CREATININE  3.76*   CALCIUM  10.4     Recent Labs      11/30/18   1717   ALTSGPT  7   ASTSGOT  14   ALKPHOSPHAT  114*   TBILIRUBIN  0.5   GLUCOSE  98                 No results for input(s): TROPONINI in the last 72 hours.    Urinalysis:    Recent Labs      11/30/18   1655   SPECGRAVITY  1.017   GLUCOSEUR  Negative   KETONES  Negative   NITRITE  Negative   LEUKESTERAS  Negative   WBCURINE  0-2   RBCURINE  0-2   BACTERIA  Negative   EPITHELCELL  Negative        Imaging:  DX-CHEST-PORTABLE (1 VIEW)   Final Result      Right IJ dialysis catheter in place.      Cardiomegaly.            Assessment/Plan:  I anticipate this patient will require at least two midnights for appropriate medical management, necessitating inpatient admission.    * Bacteremia- (present on admission)   Assessment & Plan    Positive blood cultures from outside facility for staph aureus  Started on vancomycin and Ancef  Nephrology consulted, want to keep the line and for dialysis and then replace it  No blood cultures taken      Hallucinations- (present on admission)   Assessment & Plan    Mostly due to his uremia and missing dialysis sessions  Restarting dialysis per nephrology  Continue to monitor     End stage renal disease (HCC)- (present on admission)   Assessment & Plan    On dialysis tomorrow, Monday Wednesday Friday  Nephrology consulted and following     COPD (chronic obstructive pulmonary disease) (HCC)- (present on admission)   Assessment & Plan    Not in exacerbation  Oxygen  Respiratory protocol     Iron deficiency anemia- (present on admission)   Assessment & Plan    PO Ferrous sulfate   Hb ~ 11, at her baseline  Continue to monitor     B12 deficiency- (present on admission)   Assessment & Plan    Resume home B12     HLD (hyperlipidemia)- (present on admission)   Assessment & Plan    Lovastatin     Vitamin D deficiency- (present on admission)   Assessment & Plan    Resume home  vitamin D         VTE prophylaxis: Heparin

## 2018-12-01 NOTE — ED NOTES
Med Rec Updated and Complete per Pt at bedside with permission to do so in front of family/visitors.  Allergies Reviewed  No PO ABX Last 30 days    Lovastatin dose verified with Home Pharmacy.

## 2018-12-01 NOTE — PROGRESS NOTES
"Pharmacy Kinetics 73 y.o. female on vancomycin day # 3 (continued from PTA)  2018    Currently on Vancomycin Pulse Dosing d/t ESRD on HD   : Vanco 500 mg IV x1 at 2251   : Vanco 1 gm IV reported with HD at [facility]    Indication for Treatment: Staphylococcal bacteremia    Pertinent history per medical record: Admitted on 2018 for Staphylococcal bacterermia. Patient with history of ESRD on HD, reportedly had blood cultures from [facility] positive for Staph aureus and was initiated on vancomycin with her HD session on . Patient developed altered mental status and family brought her to ED for further care. Empiric antibiotics continued pending sensitivities on prior blood cultures.    Other antibiotics: cefazolin 1 g IV q24hrs (Adjusted for renal function)    Allergies: Patient has no known allergies.     Concern for renal function includes ESRD on HD - Mon/Wed/Fri schedule.     Pertinent cultures to date:   : peripheral blood cx X2 - in process  : urine cx - in process  : blood cx from [facility] - Staph aureus    Pertinent Imaging:  None    Recent Labs      18   1717   WBC  8.7   NEUTSPOLYS  73.10*     Recent Labs      18   1717   BUN  34*   CREATININE  3.76*   ALBUMIN  3.8     No results for input(s): VANCOTROUGH, VANCOPEAK, VANCORANDOM in the last 72 hours.    Intake/Output Summary (Last 24 hours) at 18 2341  Last data filed at 18 2334   Gross per 24 hour   Intake              600 ml   Output             1600 ml   Net            -1000 ml      Blood pressure 137/87, pulse 94, temperature 36.8 °C (98.3 °F), temperature source Oral, resp. rate (!) 22, height 1.575 m (5' 2\"), weight 61.7 kg (136 lb 0.4 oz), SpO2 100 %, not currently breastfeeding. Temp (24hrs), Av.8 °C (98.3 °F), Min:36.8 °C (98.3 °F), Max:36.8 °C (98.3 °F)      A/P   1. Vancomycin dose change: No dose indicated  2. Next vancomycin level: Random AM level on   3. Goal trough: 16-20 mcg/mL " pending Staph sensitivities   4. Comments: Vancomycin continued from dialysis for treatment of Staph bacteremia/suspected HD catheter infection. Patient dialyzed in ED this evening, was given supplemental vanco dose today to complete 25 mg/kg load. No repeat dose indicated at this time, will plan random vanco level after next HD session to guide further dosing. Additional blood cultures obtained, will follow cultures and recommend de-escalation of antibiotics based on results.    Pharmacy will continue to follow.     Maxine Reyes, HilaryD

## 2018-12-01 NOTE — ED NOTES
Having difficulty attaining blood work and IV access. Ultrasound IV to be attempted next. EDP aware.

## 2018-12-01 NOTE — ED NOTES
"Pt reports she is having visual hallucinations. She is seeing strings that she describes as \"yarn\". Otherwise, she is alert and oriented. Pt is a dialysis patient and has a port which family states they were told was infected. Pt nor family knows why patient is on dialysis.  "

## 2018-12-02 ENCOUNTER — APPOINTMENT (OUTPATIENT)
Dept: CARDIOLOGY | Facility: MEDICAL CENTER | Age: 73
DRG: 252 | End: 2018-12-02
Attending: HOSPITALIST
Payer: MEDICARE

## 2018-12-02 PROBLEM — G93.40 ENCEPHALOPATHY ACUTE: Status: RESOLVED | Noted: 2018-11-30 | Resolved: 2018-12-02

## 2018-12-02 LAB
APTT PPP: 151.5 SEC (ref 24.7–36)
APTT PPP: 74.9 SEC (ref 24.7–36)
APTT PPP: 90.9 SEC (ref 24.7–36)
BACTERIA UR CULT: NORMAL
LV EJECT FRACT  99904: 50
LV EJECT FRACT MOD 2C 99903: 36.33
LV EJECT FRACT MOD 4C 99902: 45.08
LV EJECT FRACT MOD BP 99901: 38.85
SIGNIFICANT IND 70042: NORMAL
SITE SITE: NORMAL
SOURCE SOURCE: NORMAL

## 2018-12-02 PROCEDURE — 99233 SBSQ HOSP IP/OBS HIGH 50: CPT | Performed by: HOSPITALIST

## 2018-12-02 PROCEDURE — 99232 SBSQ HOSP IP/OBS MODERATE 35: CPT | Performed by: INTERNAL MEDICINE

## 2018-12-02 PROCEDURE — 700111 HCHG RX REV CODE 636 W/ 250 OVERRIDE (IP): Performed by: HOSPITALIST

## 2018-12-02 PROCEDURE — 85730 THROMBOPLASTIN TIME PARTIAL: CPT

## 2018-12-02 PROCEDURE — 770020 HCHG ROOM/CARE - TELE (206)

## 2018-12-02 PROCEDURE — 99233 SBSQ HOSP IP/OBS HIGH 50: CPT | Performed by: INTERNAL MEDICINE

## 2018-12-02 PROCEDURE — A9270 NON-COVERED ITEM OR SERVICE: HCPCS | Performed by: HOSPITALIST

## 2018-12-02 PROCEDURE — 36415 COLL VENOUS BLD VENIPUNCTURE: CPT

## 2018-12-02 PROCEDURE — 700105 HCHG RX REV CODE 258

## 2018-12-02 PROCEDURE — 700102 HCHG RX REV CODE 250 W/ 637 OVERRIDE(OP): Performed by: HOSPITALIST

## 2018-12-02 PROCEDURE — 700101 HCHG RX REV CODE 250: Performed by: HOSPITALIST

## 2018-12-02 PROCEDURE — 93306 TTE W/DOPPLER COMPLETE: CPT

## 2018-12-02 PROCEDURE — 94640 AIRWAY INHALATION TREATMENT: CPT

## 2018-12-02 PROCEDURE — 93306 TTE W/DOPPLER COMPLETE: CPT | Mod: 26 | Performed by: INTERNAL MEDICINE

## 2018-12-02 RX ORDER — SODIUM CHLORIDE 9 MG/ML
INJECTION, SOLUTION INTRAVENOUS
Status: COMPLETED
Start: 2018-12-02 | End: 2018-12-02

## 2018-12-02 RX ADMIN — FERROUS SULFATE TAB 325 MG (65 MG ELEMENTAL FE) 325 MG: 325 (65 FE) TAB at 04:37

## 2018-12-02 RX ADMIN — FLUTICASONE PROPIONATE 100 MCG: 50 SPRAY, METERED NASAL at 04:37

## 2018-12-02 RX ADMIN — THERA TABS 1 TABLET: TAB at 04:37

## 2018-12-02 RX ADMIN — STANDARDIZED SENNA CONCENTRATE AND DOCUSATE SODIUM 2 TABLET: 8.6; 5 TABLET, FILM COATED ORAL at 04:37

## 2018-12-02 RX ADMIN — LOVASTATIN 10 MG: 20 TABLET ORAL at 20:47

## 2018-12-02 RX ADMIN — SODIUM CHLORIDE: 9 INJECTION, SOLUTION INTRAVENOUS at 18:00

## 2018-12-02 RX ADMIN — VITAMIN D, TAB 1000IU (100/BT) 1000 UNITS: 25 TAB at 04:37

## 2018-12-02 RX ADMIN — CEFAZOLIN SODIUM 1 G: 1 INJECTION, SOLUTION INTRAVENOUS at 17:42

## 2018-12-02 RX ADMIN — VITAMIN B12 0.1 MG ORAL TABLET 100 MCG: 0.1 TABLET ORAL at 04:37

## 2018-12-02 RX ADMIN — IPRATROPIUM BROMIDE AND ALBUTEROL SULFATE 3 ML: .5; 3 SOLUTION RESPIRATORY (INHALATION) at 08:05

## 2018-12-02 ASSESSMENT — ENCOUNTER SYMPTOMS
HEADACHES: 0
WEIGHT LOSS: 1
DIARRHEA: 0
FALLS: 0
SHORTNESS OF BREATH: 1
SORE THROAT: 0
ABDOMINAL PAIN: 0
VOMITING: 0
CHILLS: 0
SHORTNESS OF BREATH: 0
EYE DISCHARGE: 0
WEAKNESS: 0
DIZZINESS: 0
CONSTIPATION: 0
PALPITATIONS: 0
HEMOPTYSIS: 0
DIAPHORESIS: 0
COUGH: 0
FEVER: 0
EYE REDNESS: 0
NAUSEA: 0
HALLUCINATIONS: 0
SPUTUM PRODUCTION: 0

## 2018-12-02 ASSESSMENT — PAIN SCALES - GENERAL
PAINLEVEL_OUTOF10: 0

## 2018-12-02 NOTE — PROGRESS NOTES
Ultrasound tech informed that a thrombus in the right jugular area is present. Radiologist will inform Dr. Garza. This RN paged vascular surgeon Dr. Romero.

## 2018-12-02 NOTE — ASSESSMENT & PLAN NOTE
PainPatient has multiple teeth literally rotting out of her mouth  ID agrees these will need to be removed and eventually but does not feel this is urgent

## 2018-12-02 NOTE — PROGRESS NOTES
Patient care assumed, bedside report received, POC discussed, verbalizes understanding. Safety measures in place, call light in place.

## 2018-12-02 NOTE — PROGRESS NOTES
Hospital Medicine Daily Progress Note    Date of Service  12/2/2018    Chief Complaint  Hallucinations    Hospital Course    *73 y.o. female admitted 11/30/2018 with complaints of hallucinations for the last 1-2 nights-recent blood cultures done at hemodialysis are positive for staph aureus patient denies experience any fever chills or sweats, hallucinations were occurring mainly at night but she was aware that they were hallucinations, in addition to the night prior to admission she had them her first night of admission but now they are resolved*      Interval Problem Update  Feels much better today  Felt short of breath last night which improved with a duoneb treatment  Will f/u with BCx sensitivies from [facility] in [place]. Closed today so will follow up tomorrow  BCx here NGTD  Echo pending    Consultants/Specialty  [facility]  Fidel Romero MD vascular surgery  [facility]    Code Status  Full     Disposition  TBD    Review of Systems  Review of Systems   Constitutional: Positive for weight loss (80 pounds in 2 years). Negative for chills, diaphoresis, fever and malaise/fatigue.   HENT: Negative for congestion and sore throat.         Severe dental caries   Eyes: Negative for discharge and redness.   Respiratory: Negative for cough, sputum production and shortness of breath.    Cardiovascular: Negative for chest pain and palpitations.        Hemodialysis catheter tenderness and redness have improved     Gastrointestinal: Negative for abdominal pain, constipation, diarrhea, nausea and vomiting.        Appetite down    Genitourinary: Negative for dysuria.   Musculoskeletal: Negative for falls.   Skin: Negative for itching and rash.   Neurological: Negative for dizziness, weakness and headaches.   Psychiatric/Behavioral: Negative for hallucinations (None since last night).        Physical Exam  Temp:  [36.3 °C (97.4 °F)-37.2 °C (99 °F)] 36.4 °C (97.6 °F)  Pulse:  [84-96] 96  Resp:  [16-20]  20  BP: (137-153)/(68-90) 141/71    Physical Exam   Constitutional: She is oriented to person, place, and time. She appears well-developed. No distress.   Chronically ill and hao with loose skin   HENT:   Head: Normocephalic and atraumatic.   Right Ear: External ear normal.   Left Ear: External ear normal.   Nose: Nose normal.   Mouth/Throat: Oropharynx is clear and moist.   Advanced dental caries of several teeth in her upper jaw   Eyes: Pupils are equal, round, and reactive to light. Conjunctivae and EOM are normal. Right eye exhibits no discharge. Left eye exhibits no discharge. No scleral icterus.   Neck: Neck supple.   Cardiovascular: Normal rate and regular rhythm.    Pulmonary/Chest: Effort normal and breath sounds normal. No respiratory distress. She exhibits tenderness (site of permacath insertion. improved, erythema resolved).   Abdominal: Soft. Bowel sounds are normal. She exhibits no distension.   Musculoskeletal: She exhibits no edema or tenderness.   Neurological: She is alert and oriented to person, place, and time. No cranial nerve deficit.   Skin: Skin is warm and dry. She is not diaphoretic.   Psychiatric: She has a normal mood and affect.   Nursing note and vitals reviewed.      Fluids    Intake/Output Summary (Last 24 hours) at 12/02/18 1420  Last data filed at 12/02/18 0600   Gross per 24 hour   Intake              558 ml   Output                0 ml   Net              558 ml       Laboratory  Recent Labs      11/30/18   1717  12/01/18   0248   WBC  8.7  7.5   RBC  3.69*  3.26*   HEMOGLOBIN  11.6*  10.2*   HEMATOCRIT  35.6*  29.9*   MCV  96.5  91.7   MCH  31.4  31.3   MCHC  32.6*  34.1   RDW  49.0  46.3   PLATELETCT  210  177   MPV  11.4  12.1     Recent Labs      11/30/18   1717  12/01/18   0248   SODIUM  138  140   POTASSIUM  3.7  3.7   CHLORIDE  96  105   CO2  27  25   GLUCOSE  98  109*   BUN  34*  11   CREATININE  3.76*  1.81*   CALCIUM  10.4  9.1     Recent Labs      12/01/18   1819   12/02/18   0140  12/02/18   0807   APTT  36.5*  151.5*  90.9*               Imaging  EC-ECHOCARDIOGRAM COMPLETE W/O CONT         US-HEMODIALYSIS GRAFT DUPLEX COMP UPPER EXTREMITY   Final Result      DX-CHEST-PORTABLE (1 VIEW)   Final Result      Right IJ dialysis catheter in place.      Cardiomegaly.           Assessment/Plan  * Staphylococcus aureus bacteremia with sepsis ([facility])- (present on admission)   Assessment & Plan    Positive blood cultures from outside facility for staph aureus  Sensitivities pending  ID consulted  Permacath removed  Continue vancomycin, monitor trough levels and adjust dose accordingly     End stage renal disease ([facility])- (present on admission)   Assessment & Plan    On dialysis tomorrow, Monday Wednesday Friday  Nephrology consulted and following    Vascular surgery consulted regarding new fistula     COPD (chronic obstructive pulmonary disease) ([facility])- (present on admission)   Assessment & Plan    Not in exacerbation  Oxygen  Respiratory protocol     Severe protein-calorie malnutrition ([facility])   Assessment & Plan    Patient has lost 80 pounds in 2 years she is noted to have very advanced dental caries I suspect this is the reason for her reduced intake although uremia probably plays a role.  Nutrition consult has been requested     Severe dental caries   Assessment & Plan    Patient has multiple teeth literally rotting out of her mouth  ID agrees these will need to be removed and eventually but does not feel this is urgent     Acute external jugular vein thrombosis   Assessment & Plan    Study ordered to assess vascular patency regarding fistula reveals incidental acute jugular vein thrombosis on the right  Heparin weight-based protocol has been ordered     Sepsis ([facility])   Assessment & Plan    This is severe sepsis with the following associated acute organ dysfunction(s): metabolic/septic encephalopathy.   Patient not recognizes being severely septic on admission due to lack of fever  tachycardia and leukocytosis-  However her septic encephalopathy qualifies her for diagnosis of severe sepsis and this is now resolved.  As her symptoms had resolved and she had stable vital signs, fluids serial lactates and so forth were not indicated by the time sepsis was recognized.     Iron deficiency anemia- (present on admission)   Assessment & Plan    PO Ferrous sulfate   Hb ~ 11, at her baseline  Continue to monitor     B12 deficiency- (present on admission)   Assessment & Plan    Resume home B12     HLD (hyperlipidemia)- (present on admission)   Assessment & Plan    Lovastatin     Vitamin D deficiency- (present on admission)   Assessment & Plan    Resume home vitamin D          VTE prophylaxis: On heparin drip

## 2018-12-02 NOTE — DIETARY
"Nutrition services: Day 2 of admit.  Kristyn Gillespie is a 73 y.o. female with admitting DX of Hallucinations, Bacteremia  -Consult received for Unintentional Weight Loss    Current Problem List:  1. Bacteremia  2. Hallucinations  3. ESRD on HD  4. COPD  5. Vit D, B12, Iron Deficiencies     Met with pt at bedside to assess nutrition status. Pt reports her appetite has been down for a few months as she states \" I do not feel like eating, it is too much work.\" Noted t with poor dentition. She reports difficulty chewing and states she is unsure what foods are easy to eat. Pt denied any GI distress or other issues with eating. She reports she does not have dentures. Regarding weight loss, noted MD note said \"80# loss in 2 years\" discussed weight with pt as per chart review of past encounters, her weight has been stable, pt denied any recent weight changes aside form 2-3# fluctuation 2' HD/fluid status. Will downgrade diet to mechanical soft so pt can eat foods and called nutrition rep to discuss food options with pt.     Assessment:  Height: 157.5 cm (5' 2\"). Weight: 64.4 kg (141 lb)  Body mass index is 25.97 kg/m². (WNL for age)  Diet/Intake: Renal, no PO documented in ADL's    Evaluation:   1. Glucose: 109, GFR: 27, Albumin: 3.1  2. Pertinent Meds: B12, Ferrous sulfate, Vit D, Theragran  3. + BM today    Recommendations/Plan:  1. Update diet order to mechanical soft renal as pt with poor dentition and difficulty chewing.    2. Encourage intake of meals  3. Document intake of all chano as % taken in ADL's to provide interdisciplinary communication across all shifts.   4. Monitor weight.  5. Nutrition rep will continue to see patient for ongoing meal and snack preferences.     RD following         "

## 2018-12-02 NOTE — ASSESSMENT & PLAN NOTE
This is severe sepsis with the following associated acute organ dysfunction(s): metabolic/septic encephalopathy.   Patient not recognizes being severely septic on admission due to lack of fever tachycardia and leukocytosis-  However her septic encephalopathy qualifies her for diagnosis of severe sepsis and this is now resolved.  As her symptoms had resolved and she had stable vital signs, fluids serial lactates and so forth were not indicated by the time sepsis was recognized.  Continue with Ancef as mentioned above.

## 2018-12-02 NOTE — PROGRESS NOTES
Infectious Disease Progress Note    Author: Citlaly Perales M.D. Date & Time of service: 2018  11:27 AM    Chief Complaint:  Staph aureus sepsis      Interval History:  73-year-old female with end-stage renal disease, on hemodialysis, admitted due to staphylococcus aureus sepsis with altered mental status, tachycardia, and hallucinations   AF WBC 7.5 feels SOB today-O2 sat 95% with prompted deep breath    Labs Reviewed, Medications Reviewed, Radiology Reviewed and Wound Reviewed.    Review of Systems:  Review of Systems   Constitutional: Negative for fever.   Respiratory: Positive for shortness of breath. Negative for cough, hemoptysis and sputum production.    Cardiovascular: Negative for chest pain.   Gastrointestinal: Negative for abdominal pain, diarrhea, nausea and vomiting.   Psychiatric/Behavioral: Negative for hallucinations.   All other systems reviewed and are negative.      Hemodynamics:  Temp (24hrs), Av.7 °C (98 °F), Min:36.3 °C (97.4 °F), Max:37.2 °C (99 °F)  Temperature: 36.7 °C (98 °F)  Pulse  Av.3  Min: 52  Max: 107   Blood Pressure : 149/76       Physical Exam:  Physical Exam   Constitutional: She is oriented to person, place, and time. She appears well-developed.   Looks older than stated age   HENT:   Head: Normocephalic and atraumatic.   Poor dentition   Eyes: Pupils are equal, round, and reactive to light. EOM are normal.   Neck: Neck supple.   Pulmonary/Chest: Effort normal. No respiratory distress. She has no wheezes. She has rales.   Right chest HD cath removed   Abdominal: Soft. She exhibits no distension. There is no tenderness.   Musculoskeletal: She exhibits no edema.   Neurological: She is alert and oriented to person, place, and time.   Skin: Skin is warm. She is not diaphoretic. There is erythema.   Decreased at prior cath site   Nursing note and vitals reviewed.      Meds:    Current Facility-Administered Medications:   •  [START ON 12/3/2018] epoetin seven  •   [COMPLETED] heparin **AND** heparin **AND** heparin **AND** Protocol 440 Heparin Weight Based DO NOT GIVE ANY HEPARIN BOLUS TO STROKE PATIENT **AND** Protocol 440 Heparin Weight Based Discontinue Enoxaparin (Lovenox), Dabigatran (Pradaxa), Rivaroxaban (Xarelto), Apixaban (Eliquis), Edoxaban (Savaysa, Lixiana), Fondaparinux (Arixtra) and Argatroban prior to heparin administration **AND** Protocol 440 Heparin Weight Based Draw baseline aPTT, PT, and platelet count if not already done **AND** Protocol 440 Heparin Weight Based Draw aPTT 6 hours after beginning infusion.  **AND** Protocol 440 Heparin Weight Based Draw Platelet count every three days. Contact MD if platelet is 50% lower than baseline count. **AND** Protocol 440 Heparin Weight Based Record Patient Data **AND** Protocol 440 Heparin Weight Based INSTRUCTIONS **AND** Protocol 440 Heparin Weight Based Review aPTT results 6 hours after infusion is begun as detailed **AND** Protocol 440 Heparin Weight Based Adjust heparin to maintain aPTT between 55-96 sec **AND** Protocol 440 Heparin Weight Based Order aPTT 6 hours after any rate change or hold until aPTT is therapeutic (55-96 seconds) **AND** Protocol 440 Heparin Weight Based Documentation and verification  •  lidocaine  •  cyanocobalamin  •  ferrous sulfate  •  fluticasone  •  ipratropium-albuterol  •  lovastatin  •  multivitamin  •  vitamin D  •  Respiratory Care per Protocol  •  ondansetron  •  ondansetron  •  acetaminophen  •  ceFAZolin  •  MD Alert...Vancomycin per Pharmacy  •  senna-docusate **AND** polyethylene glycol/lytes **AND** [DISCONTINUED] magnesium hydroxide **AND** bisacodyl    Labs:  Recent Labs      11/30/18   1717  12/01/18   0248   WBC  8.7  7.5   RBC  3.69*  3.26*   HEMOGLOBIN  11.6*  10.2*   HEMATOCRIT  35.6*  29.9*   MCV  96.5  91.7   MCH  31.4  31.3   RDW  49.0  46.3   PLATELETCT  210  177   MPV  11.4  12.1   NEUTSPOLYS  73.10*  69.60   LYMPHOCYTES  17.30*  16.00*   MONOCYTES  7.40   10.10   EOSINOPHILS  1.40  2.40   BASOPHILS  0.50  0.70     Recent Labs      18   1717  18   0248   SODIUM  138  140   POTASSIUM  3.7  3.7   CHLORIDE  96  105   CO2  27  25   GLUCOSE  98  109*   BUN  34*  11     Recent Labs      18   1717  18   0248   ALBUMIN  3.8  3.1*   TBILIRUBIN  0.5  0.4   ALKPHOSPHAT  114*  88   TOTPROTEIN  7.4  5.9*   ALTSGPT  7  5   ASTSGOT  14  12   CREATININE  3.76*  1.81*       Imaging:  Dx-chest-portable (1 View)    Result Date: 2018 4:34 PM HISTORY/REASON FOR EXAM:  On dialysis. Reportedly infected port. Cardiomegaly. Renal failure. TECHNIQUE/EXAM DESCRIPTION AND NUMBER OF VIEWS: Single portable view of the chest. COMPARISON: 2018 FINDINGS: Right IJ dialysis catheter is present with tip in expected location of the superior vena cava. The cardiac silhouette is enlarged. No pulmonary consolidation. No effusion or pneumothorax.     Right IJ dialysis catheter in place. Cardiomegaly.    Us-hemodialysis Graft Duplex Comp Upper Extremity    Result Date: 2018   Hemodialysis Access Report  Vascular Laboratory  Conclusions  AV fistula as described in findings section  Acute deep venous thrombus throughout the right internal jugular vein  Results called to MARCELINA DANIELS approximately 1700 hours 2018  VICENTE FRANCO  Exam Date:     2018 15:23  Room #:     Inpatient  Priority:     Routine  Ht (in):             Wt (lb):  Ordering Physician:        MARCELINA DANIELS  Referring Physician:       MARCELINA DANIELS  Sonographer:               Waqas Rodriguez RVT  Study Type:                Complete Unilateral  Technical Quality:         Adequate  Age:    73    Gender:     F  MRN:    [id number]  :    1945      BSA:  Indications:     AV Fistula - S/P  CPT Codes:       22981  ICD Codes:       447  History:         Right radiocephalic AVF evaluation  Limitations:  Exam Data:  Side:          Right  Access          Fistula   Inflow Artery   Radial  Outflow Vein    Cephalic                     Velocity (cm/s)   Prox Inflow artery          103.0                               0   Mid Inflow artery           130.0                               0   Distal Inflow artery        108.0                               0   Inflow Anastomosis          171.0                               0   Inflow Artery distal to     79.00   anastomosis          Antegrade   Proximal Graft   Mid Graft   Distal Graft   Outflow Anastomosis   Proximal Outflow Vein       689.0                               0   Mid Outflow Vein            57.00   Distal Outflow Vein         60.00   Flow Volume Mid Bicep       139.0ml/min                               0   Flow Volume Mid-Forearm     178.0ml/min                               0   Prox Outflow Vein Diam      0.40  cm   Mid Outflow Vein Diam       0.39  cm   Distal Outflow Vein Diam    0.45  cm  Findings  At the distal forearm, there are tandem diameter narrowings with  corresponding increases in velocity. The first diameter reduction is from  0.40 to 0.19 cm with a 3.7X increase in velocity. The second diameter  reduction is from 0.46 to 0.23 cm with a maximum velocity of 442 cm/sec.  Flow volumes in the right radiocephalic AVF range from 139-178 mL/min.  No evidence of hemodynamically significant stenosis in the inflow artery or  the anastomosis. Flow in the radial artery distal to the anastomosis is  antegrade and triphasic.  Incidental finding: Echolucent material consistent with acute venous  thrombosis is visualized in the internal jugular vein throughout its  length.  Luis M Perez  (Electronically Signed)  Final Date:      01 December 2018                   17:17      Micro:  Results     Procedure Component Value Units Date/Time    URINE CULTURE(NEW) [[id number]] Collected:  11/30/18 1655    Order Status:  Completed Specimen:  Urine Updated:  12/02/18 0813     Significant Indicator NEG     Source UR     Site --      "Urine Culture No growth at 48 hours    Narrative:       Indication for culture:->Emergency Room Patient    BLOOD CULTURE [[id number]] Collected:  11/30/18 1615    Order Status:  Completed Specimen:  Blood from Peripheral Updated:  12/01/18 0848     Significant Indicator NEG     Source BLD     Site PERIPHERAL     Blood Culture No Growth    Note: Blood cultures are incubated for 5 days and  are monitored continuously.Positive blood cultures  are called to the RN and reported as soon as  they are identified.      Narrative:       Per Hospital Policy: Only change Specimen Src: to \"Line\" if  specified by physician order.    BLOOD CULTURE [[id number]] Collected:  11/30/18 1717    Order Status:  Completed Specimen:  Blood from Peripheral Updated:  12/01/18 0848     Significant Indicator NEG     Source BLD     Site PERIPHERAL     Blood Culture No Growth    Note: Blood cultures are incubated for 5 days and  are monitored continuously.Positive blood cultures  are called to the RN and reported as soon as  they are identified.      Narrative:       Per Hospital Policy: Only change Specimen Src: to \"Line\" if  specified by physician order.    URINALYSIS [[id number]]  (Abnormal) Collected:  11/30/18 1655    Order Status:  Completed Specimen:  Urine Updated:  11/30/18 1734     Color Yellow     Character Clear     Specific Gravity 1.017     Ph >=9.0 (A)     Glucose Negative mg/dL      Ketones Negative mg/dL      Protein 300 (A) mg/dL      Bilirubin Negative     Urobilinogen, Urine 0.2     Nitrite Negative     Leukocyte Esterase Negative     Occult Blood Negative     Micro Urine Req Microscopic    Narrative:       Indication for culture:->Emergency Room Patient          Assessment:  Active Hospital Problems    Diagnosis   • *Staphylococcus aureus bacteremia with sepsis ([facility]) [A41.01]   • End stage renal disease ([facility]) [N18.6]   • Encephalopathy acute [G93.40]   • COPD (chronic obstructive pulmonary disease) ([facility]) [J44.9]   • B12 " deficiency [E53.8]   • Iron deficiency anemia [D50.9]   • HLD (hyperlipidemia) [E78.5]   • Vitamin D deficiency [E55.9]   • Sepsis ([facility]) [A41.9]   • Acute external jugular vein thrombosis [I82.890]   • Severe dental caries [K02.9]   • Severe protein-calorie malnutrition ([facility]) [E43]       Plan:  SA sepsis  Afebrile  No leukocytosis  AMS resolved  BCxs + SA by report-no sensi as yet-get records from HD  Repeat Bcxs 11/30 neg  TTE; if unrevealing recommend BARBIE  Anticipate 4-6 weeks IV abx from date of negative blood cxs and cath removal  Continue vanco and cefazolin for now    DVT right IJ, new  Query infected thrombophlebitis  Cath removed  Heparin  Abx as above    Dental caries  Likely will need extractions    ESRD  Dose adjust abx    Discussed with internal medicine Dr Carranza

## 2018-12-02 NOTE — PROGRESS NOTES
Pt lying in bed. No complaints of pain of signs of distress noted. Day Rn attempting to start IV for Heparin drip. Pt has not requests at this time.

## 2018-12-02 NOTE — PROGRESS NOTES
Hospital Medicine Daily Progress Note    Date of Service  12/1/2018    Chief Complaint  Hallucinations    Hospital Course    *73 y.o. female admitted 11/30/2018 with complaints of hallucinations for the last 1-2 nights-recent blood cultures done at hemodialysis are positive for staph aureus patient denies experience any fever chills or sweats, hallucinations were occurring mainly at night but she was aware that they were hallucinations, in addition to the night prior to admission she had them her first night of admission but now they are resolved*      Interval Problem Update  ID consulted discussed care plan  I am concerned about the dental caries but ID does not feel this needs to be addressed at the present time  Patient was seen by vascular surgery and ultrasound ordered showing right jugular vein thrombosis  Heparin drip has been initiated  Plan of care is reviewed with the patient-she is anxious for discharge but I told her this may take several days    Consultants/Specialty  [facility]  Fidel Romero MD vascular surgery  [facility]    Code Status  Full Per H&P    Disposition  TBD    Review of Systems  Review of Systems   Constitutional: Positive for weight loss (80 pounds in 2 years). Negative for chills, diaphoresis, fever and malaise/fatigue.   HENT: Negative for congestion and sore throat.         Severe dental caries   Eyes: Negative for discharge and redness.   Respiratory: Negative for cough, sputum production and shortness of breath.    Cardiovascular: Negative for chest pain and palpitations.        Hemodialysis catheter site is slightly red and tender   Gastrointestinal: Negative for abdominal pain, constipation, diarrhea, nausea and vomiting.        Appetite down overall but no nausea or vomiting no constipation   Genitourinary: Negative for dysuria.   Musculoskeletal: Negative for falls.   Skin: Negative for itching and rash.   Neurological: Negative for dizziness,  weakness and headaches.   Psychiatric/Behavioral: Negative for hallucinations (None since last night).        Physical Exam  Temp:  [36.1 °C (97 °F)-36.8 °C (98.2 °F)] 36.5 °C (97.7 °F)  Pulse:  [86-99] 87  Resp:  [13-24] 16  BP: (130-143)/(76-96) 137/76    Physical Exam   Constitutional: She is oriented to person, place, and time. She appears well-developed. No distress.   Chronically ill and hao with loose skin   HENT:   Head: Normocephalic and atraumatic.   Right Ear: External ear normal.   Left Ear: External ear normal.   Nose: Nose normal.   Mouth/Throat: Oropharynx is clear and moist.   Advanced dental caries of several teeth in her upper jaw   Eyes: Pupils are equal, round, and reactive to light. Conjunctivae and EOM are normal. Right eye exhibits no discharge. Left eye exhibits no discharge. No scleral icterus.   Neck: Neck supple.   Cardiovascular: Normal rate and regular rhythm.    Pulmonary/Chest: Effort normal and breath sounds normal. No respiratory distress. She exhibits tenderness (Pink and puffy around permacath insertion site under dressing).   Abdominal: Soft. Bowel sounds are normal. She exhibits no distension.   Musculoskeletal: She exhibits no edema or tenderness.   Neurological: She is alert and oriented to person, place, and time. No cranial nerve deficit.   Skin: Skin is warm and dry. She is not diaphoretic.   Psychiatric: She has a normal mood and affect.   Nursing note and vitals reviewed.      Fluids    Intake/Output Summary (Last 24 hours) at 12/01/18 1758  Last data filed at 12/01/18 0418   Gross per 24 hour   Intake              840 ml   Output             1600 ml   Net             -760 ml       Laboratory  Recent Labs      11/30/18   1717  12/01/18   0248   WBC  8.7  7.5   RBC  3.69*  3.26*   HEMOGLOBIN  11.6*  10.2*   HEMATOCRIT  35.6*  29.9*   MCV  96.5  91.7   MCH  31.4  31.3   MCHC  32.6*  34.1   RDW  49.0  46.3   PLATELETCT  210  177   MPV  11.4  12.1     Recent Labs       11/30/18   1717  12/01/18   0248   SODIUM  138  140   POTASSIUM  3.7  3.7   CHLORIDE  96  105   CO2  27  25   GLUCOSE  98  109*   BUN  34*  11   CREATININE  3.76*  1.81*   CALCIUM  10.4  9.1                   Imaging  US-HEMODIALYSIS GRAFT DUPLEX COMP UPPER EXTREMITY   Final Result      DX-CHEST-PORTABLE (1 VIEW)   Final Result      Right IJ dialysis catheter in place.      Cardiomegaly.           Assessment/Plan  * Staphylococcus aureus bacteremia with sepsis ([facility])- (present on admission)   Assessment & Plan    Positive blood cultures from outside facility for staph aureus  Sensitivities pending  ID consulted  Permacath will need to be removed as soon as feasible     Encephalopathy acute- (present on admission)   Assessment & Plan    With hallucinations due to sepsis  Now resolved     End stage renal disease ([facility])- (present on admission)   Assessment & Plan    On dialysis tomorrow, Monday Wednesday Friday  Nephrology consulted and following    Vascular surgery consulted regarding new fistula     COPD (chronic obstructive pulmonary disease) ([facility])- (present on admission)   Assessment & Plan    Not in exacerbation  Oxygen  Respiratory protocol     Severe protein-calorie malnutrition (HCC)   Assessment & Plan    Patient has lost 80 pounds in 2 years she is noted to have very advanced dental caries I suspect this is the reason for her reduced intake although uremia probably plays a role.  Nutrition consult has been requested     Severe dental caries   Assessment & Plan    Patient has multiple teeth literally rotting out of her mouth  ID agrees these will need to be removed and eventually but does not feel this is urgent     Acute external jugular vein thrombosis   Assessment & Plan    Study ordered to assess vascular patency regarding fistula reveals incidental acute jugular vein thrombosis on the right  Heparin weight-based protocol has been ordered     Sepsis ([facility])   Assessment & Plan    This is severe sepsis with the  following associated acute organ dysfunction(s): metabolic/septic encephalopathy.   Patient not recognizes being severely septic on admission due to lack of fever tachycardia and leukocytosis-  However her septic encephalopathy qualifies her for diagnosis of severe sepsis and this is now resolved.  As her symptoms had resolved and she had stable vital signs, fluids serial lactates and so forth were not indicated by the time sepsis was recognized.     Iron deficiency anemia- (present on admission)   Assessment & Plan    PO Ferrous sulfate   Hb ~ 11, at her baseline  Continue to monitor     B12 deficiency- (present on admission)   Assessment & Plan    Resume home B12     HLD (hyperlipidemia)- (present on admission)   Assessment & Plan    Lovastatin     Vitamin D deficiency- (present on admission)   Assessment & Plan    Resume home vitamin D          VTE prophylaxis: On heparin drip

## 2018-12-02 NOTE — PROGRESS NOTES
"Pharmacy Kinetics 73 y.o. female on vancomycin day # 5 2018    Currently on Vancomycin pulse dosing  · : 500 mg IV x1   · : 1000 mg IV x1 (during HD at [facility])     Indication for Treatment: Staph aureus bacteremia (per outside cx results)     Pertinent history per medical record: Admitted on 2018 for AMS and Staph bacteremia. PMH significant for ESRD on HD. She reportedly had blood cultures drawn at [facility] that were positive for Staph aureus (sensitivities not yet known). There is some erythema/tenderness at her R tunneled HD catheter site, which is presumed to be the source of her infection. ID and vascular surgery have been consulted. Her catheter has been removed and will be replaced once cultures are negative. She will also be evaluated for endocarditis per ID.     Other antibiotics:  · Cefazolin 1 g IV q24h     Allergies: Patient has no known allergies.      Concerns for renal function: HD     Pertinent cultures to date:   ·  Blood x2: NGTD  ·  Urine: NG (final)      Recent Labs      18   1717  18   0248   WBC  8.7  7.5   NEUTSPOLYS  73.10*  69.60     Recent Labs      18   1717  18   0248   BUN  34*  11   CREATININE  3.76*  1.81*   ALBUMIN  3.8  3.1*     Intake/Output Summary (Last 24 hours) at 18 1141  Last data filed at 18 0600   Gross per 24 hour   Intake              558 ml   Output                0 ml   Net              558 ml      Blood pressure 149/76, pulse 84, temperature 36.7 °C (98 °F), temperature source Temporal, resp. rate 18, height 1.575 m (5' 2\"), weight 64.4 kg (141 lb 15.6 oz), SpO2 97 %, not currently breastfeeding. Temp (24hrs), Av.7 °C (98 °F), Min:36.3 °C (97.4 °F), Max:37.2 °C (99 °F)      A/P   1. Vancomycin dose change: continue pulse dosing  2. Next vancomycin level: After another 2 HD sessions (not ordered)  3. Goal trough: 16-20 mcg/mL  4. Comments: Pt had HD catheter removed yesterday. Awaiting clear cultures to " replace. No new labs today. No need for a random level draw until at least 2 more HD sessions. Awaiting [facility] cultures to guide sensitivity - should be back by tomorrow.    Yoli Carrizales, PharmD, BCPS

## 2018-12-02 NOTE — ASSESSMENT & PLAN NOTE
incidental acute jugular vein thrombosis on the right  Heparin weight-based protocol with warfarin bridge -> monitor aptt and heparin bolus per protocol  Monitor INR with a goal between 2 and 3   Therapeutic today, cont heparin drip until 2 consecutive therapeutic INRs

## 2018-12-02 NOTE — PROGRESS NOTES
Nephrology Daily Progress Note    Date of Service  12/2/2018    Chief Complaint  73 y.o. female admitted 11/30/2018 with bacteremia/sepsis, likely from HD catheter    Interval Problem Update  Overall states that she is feeling much better.  Had dialysis catheter pulled.  Blood cultures remain negative so far.    Review of Systems  Review of Systems   Constitutional: Negative for chills and fever.   All other systems reviewed and are negative.       Physical Exam  Temp:  [36.3 °C (97.4 °F)-37.2 °C (99 °F)] 36.7 °C (98 °F)  Pulse:  [84-90] 84  Resp:  [16-18] 18  BP: (137-153)/(68-90) 149/76    Physical Exam   Constitutional: She appears well-developed and well-nourished.   HENT:   Head: Normocephalic and atraumatic.   Cardiovascular: Normal rate and regular rhythm.    AV fistula on the right side   Pulmonary/Chest: Effort normal and breath sounds normal.   Musculoskeletal: She exhibits no edema or tenderness.       Fluids    Intake/Output Summary (Last 24 hours) at 12/02/18 1346  Last data filed at 12/02/18 0600   Gross per 24 hour   Intake              558 ml   Output                0 ml   Net              558 ml       Laboratory  Recent Labs      11/30/18   1717  12/01/18   0248   WBC  8.7  7.5   RBC  3.69*  3.26*   HEMOGLOBIN  11.6*  10.2*   HEMATOCRIT  35.6*  29.9*   MCV  96.5  91.7   MCH  31.4  31.3   MCHC  32.6*  34.1   RDW  49.0  46.3   PLATELETCT  210  177   MPV  11.4  12.1     Recent Labs      11/30/18   1717  12/01/18   0248   SODIUM  138  140   POTASSIUM  3.7  3.7   CHLORIDE  96  105   CO2  27  25   GLUCOSE  98  109*   BUN  34*  11   CREATININE  3.76*  1.81*   CALCIUM  10.4  9.1     Recent Labs      12/01/18   1819  12/02/18   0140  12/02/18   0807   APTT  36.5*  151.5*  90.9*               Imaging  EC-ECHOCARDIOGRAM COMPLETE W/O CONT         US-HEMODIALYSIS GRAFT DUPLEX COMP UPPER EXTREMITY   Final Result      DX-CHEST-PORTABLE (1 VIEW)   Final Result      Right IJ dialysis catheter in place.       Cardiomegaly.           Assessment/Plan  1. ESRD -status post dialysis on Friday.  Dialysis catheter pulled Saturday.  Appears to be doing well.  No acute need for dialysis.  2. S Aureus sepsis, with associated Mental status changes/hallucinations, now improved. On antibiotics presumed from HD catheter  3. Anemia of CKD, continue Epogen  4. AVF, immature    -Appreciate Dr. Romero's consultation, noted plans for right forearm AV fistula intervention  -Continues on antibiotic therapy  -To have catheter replaced when infection cleared

## 2018-12-03 LAB
ANION GAP SERPL CALC-SCNC: 7 MMOL/L (ref 0–11.9)
APTT PPP: 78 SEC (ref 24.7–36)
BASOPHILS # BLD AUTO: 0.6 % (ref 0–1.8)
BASOPHILS # BLD: 0.04 K/UL (ref 0–0.12)
BUN SERPL-MCNC: 22 MG/DL (ref 8–22)
CALCIUM SERPL-MCNC: 9.3 MG/DL (ref 8.5–10.5)
CHLORIDE SERPL-SCNC: 102 MMOL/L (ref 96–112)
CO2 SERPL-SCNC: 24 MMOL/L (ref 20–33)
CREAT SERPL-MCNC: 3.92 MG/DL (ref 0.5–1.4)
EOSINOPHIL # BLD AUTO: 0.27 K/UL (ref 0–0.51)
EOSINOPHIL NFR BLD: 3.8 % (ref 0–6.9)
ERYTHROCYTE [DISTWIDTH] IN BLOOD BY AUTOMATED COUNT: 48 FL (ref 35.9–50)
GLUCOSE SERPL-MCNC: 86 MG/DL (ref 65–99)
HCT VFR BLD AUTO: 29.7 % (ref 37–47)
HGB BLD-MCNC: 9.8 G/DL (ref 12–16)
IMM GRANULOCYTES # BLD AUTO: 0.06 K/UL (ref 0–0.11)
IMM GRANULOCYTES NFR BLD AUTO: 0.9 % (ref 0–0.9)
INR PPP: 1.06 (ref 0.87–1.13)
LYMPHOCYTES # BLD AUTO: 1.76 K/UL (ref 1–4.8)
LYMPHOCYTES NFR BLD: 25 % (ref 22–41)
MCH RBC QN AUTO: 31.3 PG (ref 27–33)
MCHC RBC AUTO-ENTMCNC: 33 G/DL (ref 33.6–35)
MCV RBC AUTO: 94.9 FL (ref 81.4–97.8)
MONOCYTES # BLD AUTO: 0.82 K/UL (ref 0–0.85)
MONOCYTES NFR BLD AUTO: 11.7 % (ref 0–13.4)
NEUTROPHILS # BLD AUTO: 4.08 K/UL (ref 2–7.15)
NEUTROPHILS NFR BLD: 58 % (ref 44–72)
NRBC # BLD AUTO: 0 K/UL
NRBC BLD-RTO: 0 /100 WBC
PLATELET # BLD AUTO: 196 K/UL (ref 164–446)
PMV BLD AUTO: 10.9 FL (ref 9–12.9)
POTASSIUM SERPL-SCNC: 3.7 MMOL/L (ref 3.6–5.5)
PROTHROMBIN TIME: 13.9 SEC (ref 12–14.6)
RBC # BLD AUTO: 3.13 M/UL (ref 4.2–5.4)
SODIUM SERPL-SCNC: 133 MMOL/L (ref 135–145)
WBC # BLD AUTO: 7 K/UL (ref 4.8–10.8)

## 2018-12-03 PROCEDURE — 700111 HCHG RX REV CODE 636 W/ 250 OVERRIDE (IP)

## 2018-12-03 PROCEDURE — 700101 HCHG RX REV CODE 250

## 2018-12-03 PROCEDURE — 36415 COLL VENOUS BLD VENIPUNCTURE: CPT

## 2018-12-03 PROCEDURE — 06HN33Z INSERTION OF INFUSION DEVICE INTO LEFT FEMORAL VEIN, PERCUTANEOUS APPROACH: ICD-10-PCS | Performed by: SURGERY

## 2018-12-03 PROCEDURE — 700102 HCHG RX REV CODE 250 W/ 637 OVERRIDE(OP): Performed by: HOSPITALIST

## 2018-12-03 PROCEDURE — 99232 SBSQ HOSP IP/OBS MODERATE 35: CPT | Performed by: INTERNAL MEDICINE

## 2018-12-03 PROCEDURE — B54CZZA ULTRASONOGRAPHY OF LEFT LOWER EXTREMITY VEINS, GUIDANCE: ICD-10-PCS | Performed by: SURGERY

## 2018-12-03 PROCEDURE — 85730 THROMBOPLASTIN TIME PARTIAL: CPT

## 2018-12-03 PROCEDURE — 700111 HCHG RX REV CODE 636 W/ 250 OVERRIDE (IP): Performed by: INTERNAL MEDICINE

## 2018-12-03 PROCEDURE — 700111 HCHG RX REV CODE 636 W/ 250 OVERRIDE (IP): Mod: JG | Performed by: INTERNAL MEDICINE

## 2018-12-03 PROCEDURE — 770020 HCHG ROOM/CARE - TELE (206)

## 2018-12-03 PROCEDURE — 99232 SBSQ HOSP IP/OBS MODERATE 35: CPT | Performed by: HOSPITALIST

## 2018-12-03 PROCEDURE — A9270 NON-COVERED ITEM OR SERVICE: HCPCS | Performed by: HOSPITALIST

## 2018-12-03 PROCEDURE — 80048 BASIC METABOLIC PNL TOTAL CA: CPT

## 2018-12-03 PROCEDURE — 700101 HCHG RX REV CODE 250: Performed by: HOSPITALIST

## 2018-12-03 PROCEDURE — 85025 COMPLETE CBC W/AUTO DIFF WBC: CPT

## 2018-12-03 PROCEDURE — 85610 PROTHROMBIN TIME: CPT

## 2018-12-03 PROCEDURE — 700111 HCHG RX REV CODE 636 W/ 250 OVERRIDE (IP): Performed by: HOSPITALIST

## 2018-12-03 PROCEDURE — 94640 AIRWAY INHALATION TREATMENT: CPT

## 2018-12-03 RX ORDER — HEPARIN SODIUM,PORCINE 1000/ML
VIAL (ML) INJECTION
Status: COMPLETED
Start: 2018-12-03 | End: 2018-12-03

## 2018-12-03 RX ORDER — WARFARIN SODIUM 6 MG/1
6 TABLET ORAL
Status: COMPLETED | OUTPATIENT
Start: 2018-12-03 | End: 2018-12-03

## 2018-12-03 RX ORDER — LIDOCAINE HYDROCHLORIDE 10 MG/ML
INJECTION, SOLUTION INFILTRATION; PERINEURAL
Status: COMPLETED
Start: 2018-12-03 | End: 2018-12-03

## 2018-12-03 RX ORDER — IPRATROPIUM BROMIDE AND ALBUTEROL SULFATE 2.5; .5 MG/3ML; MG/3ML
3 SOLUTION RESPIRATORY (INHALATION)
Status: DISCONTINUED | OUTPATIENT
Start: 2018-12-03 | End: 2018-12-07

## 2018-12-03 RX ORDER — WARFARIN SODIUM 4 MG/1
4 TABLET ORAL
Status: DISCONTINUED | OUTPATIENT
Start: 2018-12-04 | End: 2018-12-05

## 2018-12-03 RX ADMIN — FLUTICASONE PROPIONATE 100 MCG: 50 SPRAY, METERED NASAL at 04:58

## 2018-12-03 RX ADMIN — LOVASTATIN 10 MG: 20 TABLET ORAL at 22:02

## 2018-12-03 RX ADMIN — HEPARIN SODIUM 900 UNITS: 5000 INJECTION, SOLUTION INTRAVENOUS at 00:14

## 2018-12-03 RX ADMIN — IPRATROPIUM BROMIDE AND ALBUTEROL SULFATE 3 ML: .5; 3 SOLUTION RESPIRATORY (INHALATION) at 22:21

## 2018-12-03 RX ADMIN — LIDOCAINE HYDROCHLORIDE 3 ML: 10 INJECTION, SOLUTION INFILTRATION; PERINEURAL at 17:45

## 2018-12-03 RX ADMIN — HEPARIN SODIUM 3000 UNITS: 1000 INJECTION, SOLUTION INTRAVENOUS; SUBCUTANEOUS at 17:45

## 2018-12-03 RX ADMIN — VITAMIN D, TAB 1000IU (100/BT) 1000 UNITS: 25 TAB at 04:58

## 2018-12-03 RX ADMIN — EPOETIN ALFA 4000 UNITS: 4000 SOLUTION INTRAVENOUS; SUBCUTANEOUS at 09:17

## 2018-12-03 RX ADMIN — WARFARIN SODIUM 6 MG: 6 TABLET ORAL at 17:47

## 2018-12-03 RX ADMIN — FERROUS SULFATE TAB 325 MG (65 MG ELEMENTAL FE) 325 MG: 325 (65 FE) TAB at 04:58

## 2018-12-03 RX ADMIN — CEFAZOLIN SODIUM 1 G: 1 INJECTION, SOLUTION INTRAVENOUS at 17:48

## 2018-12-03 RX ADMIN — THERA TABS 1 TABLET: TAB at 04:57

## 2018-12-03 RX ADMIN — VITAMIN B12 0.1 MG ORAL TABLET 100 MCG: 0.1 TABLET ORAL at 04:57

## 2018-12-03 ASSESSMENT — ENCOUNTER SYMPTOMS
DIZZINESS: 0
WEIGHT LOSS: 1
WEAKNESS: 0
HEADACHES: 0
HALLUCINATIONS: 0
EYE DISCHARGE: 0
SPUTUM PRODUCTION: 0
HEMOPTYSIS: 0
DIAPHORESIS: 0
NAUSEA: 0
PALPITATIONS: 0
FEVER: 0
SORE THROAT: 0
VOMITING: 0
EYE REDNESS: 0
CHILLS: 0
DIARRHEA: 0
FALLS: 0
ABDOMINAL PAIN: 0
SHORTNESS OF BREATH: 0
CONSTIPATION: 0
COUGH: 0

## 2018-12-03 ASSESSMENT — PAIN SCALES - GENERAL
PAINLEVEL_OUTOF10: 0

## 2018-12-03 NOTE — PROGRESS NOTES
Nephrology Daily Progress Note    Date of Service  12/3/2018    Chief Complaint  73 y.o. female admitted 11/30/2018 with ESRD, sepsis    Interval Problem Update  Pt is doing better, blood culture is negative since 11/30, afebrile    Review of Systems  Review of Systems   Constitutional: Positive for malaise/fatigue. Negative for chills and fever.   Respiratory: Negative for cough and shortness of breath.    Cardiovascular: Negative for chest pain and leg swelling.   Gastrointestinal: Negative for nausea and vomiting.   Genitourinary: Negative for dysuria, frequency and urgency.        Physical Exam  Temp:  [36.4 °C (97.5 °F)-36.9 °C (98.4 °F)] 36.8 °C (98.3 °F)  Pulse:  [84-92] 91  Resp:  [18-20] 18  BP: (138-152)/(68-94) 152/68    Physical Exam   Constitutional: No distress.   HENT:   Nose: Nose normal.   Eyes: Conjunctivae are normal. Right eye exhibits no discharge. Left eye exhibits no discharge. No scleral icterus.   Cardiovascular: Normal rate and regular rhythm.    No murmur heard.  Pulmonary/Chest: She has no wheezes. She has no rales.   Musculoskeletal: She exhibits no edema.   Skin: She is not diaphoretic.   Nursing note and vitals reviewed.      Fluids  No intake or output data in the 24 hours ending 12/03/18 1351    Laboratory  Recent Labs      11/30/18 1717 12/01/18   0248  12/03/18   0205   WBC  8.7  7.5  7.0   RBC  3.69*  3.26*  3.13*   HEMOGLOBIN  11.6*  10.2*  9.8*   HEMATOCRIT  35.6*  29.9*  29.7*   MCV  96.5  91.7  94.9   MCH  31.4  31.3  31.3   MCHC  32.6*  34.1  33.0*   RDW  49.0  46.3  48.0   PLATELETCT  210  177  196   MPV  11.4  12.1  10.9     Recent Labs      11/30/18 1717 12/01/18   0248  12/03/18   0205   SODIUM  138  140  133*   POTASSIUM  3.7  3.7  3.7   CHLORIDE  96  105  102   CO2  27  25  24   GLUCOSE  98  109*  86   BUN  34*  11  22   CREATININE  3.76*  1.81*  3.92*   CALCIUM  10.4  9.1  9.3     Recent Labs      12/02/18   0140  12/02/18   0807  12/02/18   1419   APTT  151.5*   90.9*  74.9*               Imaging      Assessment/Plan  1 ESRD  2 Sepsis  3 Anemia  Plan  Will place temp HD cath, awaitig AVF angioplasty  HD afetr cath placement  Appreciate ID input  Renal dose all meds  Avoid nephrotoxins  D/W Dr Diaz

## 2018-12-03 NOTE — PROGRESS NOTES
Hospital Medicine Daily Progress Note    Date of Service  12/3/2018    Chief Complaint  Hallucinations    Hospital Course    *73 y.o. female admitted 11/30/2018 with complaints of hallucinations for the last 1-2 nights-recent blood cultures done at hemodialysis are positive for staph aureus patient denies experience any fever chills or sweats, hallucinations were occurring mainly at night but she was aware that they were hallucinations, in addition to the night prior to admission she had them her first night of admission but now they are resolved*      Interval Problem Update  BCx from [facility]A, vanco discontinued  Echo with rheumatic mitral valve  Ok for new dialysis catheter per ID  Will need 4-6 weeks of antibiotics  Will start coumadin bridge with heparin drip for jugular vein thrombosis    Consultants/Specialty  [facility]  Fidel Romero MD vascular surgery  [facility]    Code Status  Full     Disposition  TBD    Review of Systems  Review of Systems   Constitutional: Positive for weight loss (80 pounds in 2 years). Negative for chills, diaphoresis, fever and malaise/fatigue.   HENT: Negative for congestion and sore throat.         Severe dental caries   Eyes: Negative for discharge and redness.   Respiratory: Negative for cough, sputum production and shortness of breath.    Cardiovascular: Negative for chest pain and palpitations.        Hemodialysis catheter tenderness and redness have improved     Gastrointestinal: Negative for abdominal pain, constipation, diarrhea, nausea and vomiting.        Appetite down    Genitourinary: Negative for dysuria.   Musculoskeletal: Negative for falls.   Skin: Negative for itching and rash.   Neurological: Negative for dizziness, weakness and headaches.   Psychiatric/Behavioral: Negative for hallucinations (None since last night).        Physical Exam  Temp:  [36.4 °C (97.6 °F)-36.9 °C (98.4 °F)] 36.6 °C (97.8 °F)  Pulse:  [84-96]  96  Resp:  [15-20] 15  BP: (138-158)/(68-94) 158/81    Physical Exam   Constitutional: She is oriented to person, place, and time. She appears well-developed. No distress.   Chronically ill and hao with loose skin   HENT:   Head: Normocephalic and atraumatic.   Right Ear: External ear normal.   Left Ear: External ear normal.   Nose: Nose normal.   Mouth/Throat: Oropharynx is clear and moist.   Advanced dental caries of several teeth in her upper jaw   Eyes: Pupils are equal, round, and reactive to light. Conjunctivae and EOM are normal. Right eye exhibits no discharge. Left eye exhibits no discharge. No scleral icterus.   Neck: Neck supple.   Cardiovascular: Normal rate and regular rhythm.    Pulmonary/Chest: Effort normal and breath sounds normal. No respiratory distress. She exhibits tenderness (site of permacath insertion. improved, erythema resolved).   Abdominal: Soft. Bowel sounds are normal. She exhibits no distension.   Musculoskeletal: She exhibits no edema or tenderness.   Neurological: She is alert and oriented to person, place, and time. No cranial nerve deficit.   Skin: Skin is warm and dry. She is not diaphoretic.   Psychiatric: She has a normal mood and affect.   Nursing note and vitals reviewed.      Fluids  No intake or output data in the 24 hours ending 12/03/18 1846    Laboratory  Recent Labs      12/01/18   0248  12/03/18   0205   WBC  7.5  7.0   RBC  3.26*  3.13*   HEMOGLOBIN  10.2*  9.8*   HEMATOCRIT  29.9*  29.7*   MCV  91.7  94.9   MCH  31.3  31.3   MCHC  34.1  33.0*   RDW  46.3  48.0   PLATELETCT  177  196   MPV  12.1  10.9     Recent Labs      12/01/18   0248  12/03/18   0205   SODIUM  140  133*   POTASSIUM  3.7  3.7   CHLORIDE  105  102   CO2  25  24   GLUCOSE  109*  86   BUN  11  22   CREATININE  1.81*  3.92*   CALCIUM  9.1  9.3     Recent Labs      12/02/18   0807  12/02/18   1419  12/03/18   1402   APTT  90.9*  74.9*  78.0*   INR   --    --   1.06                Imaging  EC-ECHOCARDIOGRAM COMPLETE W/O CONT   Final Result      US-HEMODIALYSIS GRAFT DUPLEX COMP UPPER EXTREMITY   Final Result      DX-CHEST-PORTABLE (1 VIEW)   Final Result      Right IJ dialysis catheter in place.      Cardiomegaly.           Assessment/Plan  * Staphylococcus aureus bacteremia with sepsis ([facility])- (present on admission)   Assessment & Plan    Positive blood cultures from outside facility for MSSA. Negative here  Sensitivities pending  ID consulted  Permacath removed  New dialysis catheter placed 12/3  Will need to clarify with nephro if it may be used for antibiotic administration or if pt needs PICC/midline     End stage renal disease ([facility]CC)- (present on admission)   Assessment & Plan    On dialysis tomorrow, Monday Wednesday Friday  Nephrology consulted and following    Vascular surgery consulted regarding new fistula     COPD (chronic obstructive pulmonary disease) ([facility])- (present on admission)   Assessment & Plan    Not in exacerbation  Oxygen  Respiratory protocol     Severe protein-calorie malnutrition (HCC)   Assessment & Plan    Patient has lost 80 pounds in 2 years she is noted to have very advanced dental caries I suspect this is the reason for her reduced intake although uremia probably plays a role.  Nutrition consult has been requested     Severe dental caries   Assessment & Plan    Patient has multiple teeth literally rotting out of her mouth  ID agrees these will need to be removed and eventually but does not feel this is urgent     Acute external jugular vein thrombosis   Assessment & Plan    Study ordered to assess vascular patency regarding fistula reveals incidental acute jugular vein thrombosis on the right  Heparin weight-based protocol with warfarin bridge     Sepsis ([facility])   Assessment & Plan    This is severe sepsis with the following associated acute organ dysfunction(s): metabolic/septic encephalopathy.   Patient not recognizes being severely septic on admission due to  lack of fever tachycardia and leukocytosis-  However her septic encephalopathy qualifies her for diagnosis of severe sepsis and this is now resolved.  As her symptoms had resolved and she had stable vital signs, fluids serial lactates and so forth were not indicated by the time sepsis was recognized.     Iron deficiency anemia- (present on admission)   Assessment & Plan    PO Ferrous sulfate   Hb ~ 11, at her baseline  Continue to monitor     B12 deficiency- (present on admission)   Assessment & Plan    Resume home B12     HLD (hyperlipidemia)- (present on admission)   Assessment & Plan    Lovastatin     Vitamin D deficiency- (present on admission)   Assessment & Plan    Resume home vitamin D          VTE prophylaxis: On heparin drip with warfarin bridge

## 2018-12-03 NOTE — PROGRESS NOTES
Patient feels better.  Tract of infected catheter now a small ridge and less red.  Infected tissue likely will persist a while longer. Might consider femoral temporary catheter if needed.  Will be looking for opening in schedule for fistulagrams and angioplasty.

## 2018-12-03 NOTE — PROGRESS NOTES
Infectious Disease Progress Note    Author: Julee Diaz M.D. Date & Time of service: 12/3/2018  9:44 AM    Chief Complaint:  Staph aureus sepsis      Interval History:  73-year-old female with end-stage renal disease, on hemodialysis, admitted due to staphylococcus aureus sepsis with altered mental status, tachycardia, and hallucinations   AF WBC 7.5 feels SOB today-O2 sat 95% with prompted deep breath  12/3/2018 no fevers.  Feels better today.  Denies any new issues overnight.  Labs Reviewed, Medications Reviewed, Radiology Reviewed and Wound Reviewed.    Review of Systems:  Review of Systems   Constitutional: Negative for fever.   Respiratory: Negative for cough, hemoptysis, sputum production and shortness of breath.    Cardiovascular: Negative for chest pain.   Gastrointestinal: Negative for abdominal pain, diarrhea, nausea and vomiting.   Psychiatric/Behavioral: Negative for hallucinations.   All other systems reviewed and are negative.      Hemodynamics:  Temp (24hrs), Av.6 °C (97.8 °F), Min:36.4 °C (97.5 °F), Max:36.9 °C (98.4 °F)  Temperature: 36.9 °C (98.4 °F)  Pulse  Av.2  Min: 52  Max: 107   Blood Pressure : 150/73       Physical Exam:  Physical Exam   Constitutional: She is oriented to person, place, and time. She appears well-developed.   Looks older than stated age   HENT:   Head: Normocephalic and atraumatic.   Poor dentition   Eyes: Pupils are equal, round, and reactive to light. EOM are normal.   Neck: Neck supple.   Cardiovascular: Regular rhythm.    No murmur heard.  Pulmonary/Chest: Effort normal. No respiratory distress. She has no wheezes. She has no rales.   Right chest HD cath removed   Abdominal: Soft. She exhibits no distension. There is no tenderness.   Musculoskeletal: She exhibits no edema.   Neurological: She is alert and oriented to person, place, and time.   Skin: Skin is warm. She is not diaphoretic. There is erythema.   Decreased at prior cath site   Nursing note  and vitals reviewed.      Meds:    Current Facility-Administered Medications:   •  epoetin seven  •  [COMPLETED] heparin **AND** heparin **AND** heparin **AND** Protocol 440 Heparin Weight Based DO NOT GIVE ANY HEPARIN BOLUS TO STROKE PATIENT **AND** Protocol 440 Heparin Weight Based Discontinue Enoxaparin (Lovenox), Dabigatran (Pradaxa), Rivaroxaban (Xarelto), Apixaban (Eliquis), Edoxaban (Savaysa, Lixiana), Fondaparinux (Arixtra) and Argatroban prior to heparin administration **AND** Protocol 440 Heparin Weight Based Draw baseline aPTT, PT, and platelet count if not already done **AND** Protocol 440 Heparin Weight Based Draw aPTT 6 hours after beginning infusion.  **AND** Protocol 440 Heparin Weight Based Draw Platelet count every three days. Contact MD if platelet is 50% lower than baseline count. **AND** Protocol 440 Heparin Weight Based Record Patient Data **AND** Protocol 440 Heparin Weight Based INSTRUCTIONS **AND** Protocol 440 Heparin Weight Based Review aPTT results 6 hours after infusion is begun as detailed **AND** Protocol 440 Heparin Weight Based Adjust heparin to maintain aPTT between 55-96 sec **AND** Protocol 440 Heparin Weight Based Order aPTT 6 hours after any rate change or hold until aPTT is therapeutic (55-96 seconds) **AND** Protocol 440 Heparin Weight Based Documentation and verification  •  lidocaine  •  cyanocobalamin  •  ferrous sulfate  •  fluticasone  •  ipratropium-albuterol  •  lovastatin  •  multivitamin  •  vitamin D  •  Respiratory Care per Protocol  •  ondansetron  •  ondansetron  •  acetaminophen  •  ceFAZolin  •  MD Alert...Vancomycin per Pharmacy  •  senna-docusate **AND** polyethylene glycol/lytes **AND** [DISCONTINUED] magnesium hydroxide **AND** bisacodyl    Labs:  Recent Labs      11/30/18   1717  12/01/18   0248  12/03/18   0205   WBC  8.7  7.5  7.0   RBC  3.69*  3.26*  3.13*   HEMOGLOBIN  11.6*  10.2*  9.8*   HEMATOCRIT  35.6*  29.9*  29.7*   MCV  96.5  91.7  94.9   MCH   31.4  31.3  31.3   RDW  49.0  46.3  48.0   PLATELETCT  210  177  196   MPV  11.4  12.1  10.9   NEUTSPOLYS  73.10*  69.60  58.00   LYMPHOCYTES  17.30*  16.00*  25.00   MONOCYTES  7.40  10.10  11.70   EOSINOPHILS  1.40  2.40  3.80   BASOPHILS  0.50  0.70  0.60     Recent Labs      11/30/18 1717 12/01/18   0248  12/03/18   0205   SODIUM  138  140  133*   POTASSIUM  3.7  3.7  3.7   CHLORIDE  96  105  102   CO2  27  25  24   GLUCOSE  98  109*  86   BUN  34*  11  22     Recent Labs      11/30/18 1717 12/01/18 0248  12/03/18   0205   ALBUMIN  3.8  3.1*   --    TBILIRUBIN  0.5  0.4   --    ALKPHOSPHAT  114*  88   --    TOTPROTEIN  7.4  5.9*   --    ALTSGPT  7  5   --    ASTSGOT  14  12   --    CREATININE  3.76*  1.81*  3.92*       Imaging:  Dx-chest-portable (1 View)    Result Date: 11/30/2018 11/30/2018 4:34 PM HISTORY/REASON FOR EXAM:  On dialysis. Reportedly infected port. Cardiomegaly. Renal failure. TECHNIQUE/EXAM DESCRIPTION AND NUMBER OF VIEWS: Single portable view of the chest. COMPARISON: October 21, 2018 FINDINGS: Right IJ dialysis catheter is present with tip in expected location of the superior vena cava. The cardiac silhouette is enlarged. No pulmonary consolidation. No effusion or pneumothorax.     Right IJ dialysis catheter in place. Cardiomegaly.    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  Acute deep venous thrombus throughout the right internal jugular vein  Results called to MARCELINA DANIELS approximately 1700 hours 12/1/2018  VICENTE FRANCO  Exam Date:     12/01/2018 15:23  Room #:     Inpatient  Priority:     Routine  Ht (in):             Wt (lb):  Ordering Physician:        MARCELINA DANIELS  Referring Physician:       MARCELINA DANIELS  Sonographer:               Waqas Rodriguez RVT  Study Type:                Complete Unilateral  Technical  Quality:         Adequate  Age:    73    Gender:     F  MRN:    [id number]  :    1945      BSA:  Indications:     AV Fistula - S/P  CPT Codes:       16015  ICD Codes:       447  History:         Right radiocephalic AVF evaluation  Limitations:  Exam Data:  Side:          Right  Access          Fistula  Inflow Artery   Radial  Outflow Vein    Cephalic                     Velocity (cm/s)   Prox Inflow artery          103.0                               0   Mid Inflow artery           130.0                               0   Distal Inflow artery        108.0                               0   Inflow Anastomosis          171.0                               0   Inflow Artery distal to     79.00   anastomosis          Antegrade   Proximal Graft   Mid Graft   Distal Graft   Outflow Anastomosis   Proximal Outflow Vein       689.0                               0   Mid Outflow Vein            57.00   Distal Outflow Vein         60.00   Flow Volume Mid Bicep       139.0ml/min                               0   Flow Volume Mid-Forearm     178.0ml/min                               0   Prox Outflow Vein Diam      0.40  cm   Mid Outflow Vein Diam       0.39  cm   Distal Outflow Vein Diam    0.45  cm  Findings  At the distal forearm, there are tandem diameter narrowings with  corresponding increases in velocity. The first diameter reduction is from  0.40 to 0.19 cm with a 3.7X increase in velocity. The second diameter  reduction is from 0.46 to 0.23 cm with a maximum velocity of 442 cm/sec.  Flow volumes in the right radiocephalic AVF range from 139-178 mL/min.  No evidence of hemodynamically significant stenosis in the inflow artery or  the anastomosis. Flow in the radial artery distal to the anastomosis is  antegrade and triphasic.  Incidental finding: Echolucent material consistent with acute venous  thrombosis is visualized in the internal jugular vein throughout its  length.  Luis M Perez  (Electronically Signed)  Final  "Date:      01 December 2018                   17:17      Micro:  Results     Procedure Component Value Units Date/Time    URINE CULTURE(NEW) [[id number]] Collected:  11/30/18 1655    Order Status:  Completed Specimen:  Urine Updated:  12/02/18 0813     Significant Indicator NEG     Source UR     Site --     Urine Culture No growth at 48 hours    Narrative:       Indication for culture:->Emergency Room Patient    BLOOD CULTURE [[id number]] Collected:  11/30/18 1615    Order Status:  Completed Specimen:  Blood from Peripheral Updated:  12/01/18 0848     Significant Indicator NEG     Source BLD     Site PERIPHERAL     Blood Culture No Growth    Note: Blood cultures are incubated for 5 days and  are monitored continuously.Positive blood cultures  are called to the RN and reported as soon as  they are identified.      Narrative:       Per Hospital Policy: Only change Specimen Src: to \"Line\" if  specified by physician order.    BLOOD CULTURE [[id number]] Collected:  11/30/18 1717    Order Status:  Completed Specimen:  Blood from Peripheral Updated:  12/01/18 0848     Significant Indicator NEG     Source BLD     Site PERIPHERAL     Blood Culture No Growth    Note: Blood cultures are incubated for 5 days and  are monitored continuously.Positive blood cultures  are called to the RN and reported as soon as  they are identified.      Narrative:       Per Hospital Policy: Only change Specimen Src: to \"Line\" if  specified by physician order.    URINALYSIS [[id number]]  (Abnormal) Collected:  11/30/18 1655    Order Status:  Completed Specimen:  Urine Updated:  11/30/18 3584     Color Yellow     Character Clear     Specific Gravity 1.017     Ph >=9.0 (A)     Glucose Negative mg/dL      Ketones Negative mg/dL      Protein 300 (A) mg/dL      Bilirubin Negative     Urobilinogen, Urine 0.2     Nitrite Negative     Leukocyte Esterase Negative     Occult Blood Negative     Micro Urine Req Microscopic    Narrative:       Indication for " culture:->Emergency Room Patient          Assessment:  Active Hospital Problems    Diagnosis   • *Staphylococcus aureus bacteremia with sepsis ([facility]) [A41.01]   • End stage renal disease ([facility]) [N18.6]   • Encephalopathy acute [G93.40]   • COPD (chronic obstructive pulmonary disease) ([facility]) [J44.9]   • B12 deficiency [E53.8]   • Iron deficiency anemia [D50.9]   • HLD (hyperlipidemia) [E78.5]   • Vitamin D deficiency [E55.9]   • Sepsis ([facility]) [A41.9]   • Acute external jugular vein thrombosis [I82.890]   • Severe dental caries [K02.9]   • Severe protein-calorie malnutrition ([facility]) [E43]       Plan:  SA sepsis  Afebrile  No leukocytosis  AMS resolved  BCxs + MSSA  Repeat Bcxs 11/30 neg  TTE reveals rheumatic looking mitral valve  Anticipate 4-6 weeks IV abx from date of negative blood cxs and cath removal  Continue cefazolin  Discontinue vancomycin    DVT right IJ, new  Query infected thrombophlebitis  Cath removed  Heparin  Abx as above  Since the cultures have been negative since 11/30/2018 I would recommend it is okay to place the new hemodialysis catheter    Dental caries  Likely will need extractions    ESRD  Dose adjust abx    Discussed with internal medicine Dr Carranza

## 2018-12-03 NOTE — CARE PLAN
Problem: Communication  Goal: The ability to communicate needs accurately and effectively will improve  Pt able to communicate needs to staff.    Problem: Infection  Goal: Will remain free from infection  Pt being treated for infection.

## 2018-12-04 ENCOUNTER — PATIENT OUTREACH (OUTPATIENT)
Dept: HEALTH INFORMATION MANAGEMENT | Facility: OTHER | Age: 73
End: 2018-12-04

## 2018-12-04 LAB
ANION GAP SERPL CALC-SCNC: 11 MMOL/L (ref 0–11.9)
APTT PPP: 237.4 SEC (ref 24.7–36)
APTT PPP: >240 SEC (ref 24.7–36)
BASOPHILS # BLD AUTO: 0.5 % (ref 0–1.8)
BASOPHILS # BLD AUTO: 0.5 % (ref 0–1.8)
BASOPHILS # BLD: 0.04 K/UL (ref 0–0.12)
BASOPHILS # BLD: 0.04 K/UL (ref 0–0.12)
BUN SERPL-MCNC: 27 MG/DL (ref 8–22)
CALCIUM SERPL-MCNC: 9.4 MG/DL (ref 8.5–10.5)
CHLORIDE SERPL-SCNC: 102 MMOL/L (ref 96–112)
CO2 SERPL-SCNC: 21 MMOL/L (ref 20–33)
CREAT SERPL-MCNC: 4.22 MG/DL (ref 0.5–1.4)
EOSINOPHIL # BLD AUTO: 0.15 K/UL (ref 0–0.51)
EOSINOPHIL # BLD AUTO: 0.19 K/UL (ref 0–0.51)
EOSINOPHIL NFR BLD: 2 % (ref 0–6.9)
EOSINOPHIL NFR BLD: 2.6 % (ref 0–6.9)
ERYTHROCYTE [DISTWIDTH] IN BLOOD BY AUTOMATED COUNT: 46.9 FL (ref 35.9–50)
ERYTHROCYTE [DISTWIDTH] IN BLOOD BY AUTOMATED COUNT: 47.8 FL (ref 35.9–50)
GLUCOSE SERPL-MCNC: 89 MG/DL (ref 65–99)
HCT VFR BLD AUTO: 26.7 % (ref 37–47)
HCT VFR BLD AUTO: 28 % (ref 37–47)
HGB BLD-MCNC: 8.7 G/DL (ref 12–16)
HGB BLD-MCNC: 9.1 G/DL (ref 12–16)
IMM GRANULOCYTES # BLD AUTO: 0.06 K/UL (ref 0–0.11)
IMM GRANULOCYTES # BLD AUTO: 0.07 K/UL (ref 0–0.11)
IMM GRANULOCYTES NFR BLD AUTO: 0.8 % (ref 0–0.9)
IMM GRANULOCYTES NFR BLD AUTO: 1 % (ref 0–0.9)
INR PPP: 1.15 (ref 0.87–1.13)
LYMPHOCYTES # BLD AUTO: 1.58 K/UL (ref 1–4.8)
LYMPHOCYTES # BLD AUTO: 1.68 K/UL (ref 1–4.8)
LYMPHOCYTES NFR BLD: 21.7 % (ref 22–41)
LYMPHOCYTES NFR BLD: 21.9 % (ref 22–41)
MCH RBC QN AUTO: 30.4 PG (ref 27–33)
MCH RBC QN AUTO: 30.5 PG (ref 27–33)
MCHC RBC AUTO-ENTMCNC: 32.5 G/DL (ref 33.6–35)
MCHC RBC AUTO-ENTMCNC: 32.6 G/DL (ref 33.6–35)
MCV RBC AUTO: 93.6 FL (ref 81.4–97.8)
MCV RBC AUTO: 93.7 FL (ref 81.4–97.8)
MONOCYTES # BLD AUTO: 0.73 K/UL (ref 0–0.85)
MONOCYTES # BLD AUTO: 0.84 K/UL (ref 0–0.85)
MONOCYTES NFR BLD AUTO: 10 % (ref 0–13.4)
MONOCYTES NFR BLD AUTO: 11 % (ref 0–13.4)
NEUTROPHILS # BLD AUTO: 4.68 K/UL (ref 2–7.15)
NEUTROPHILS # BLD AUTO: 4.9 K/UL (ref 2–7.15)
NEUTROPHILS NFR BLD: 63.8 % (ref 44–72)
NEUTROPHILS NFR BLD: 64.2 % (ref 44–72)
NRBC # BLD AUTO: 0 K/UL
NRBC # BLD AUTO: 0 K/UL
NRBC BLD-RTO: 0 /100 WBC
NRBC BLD-RTO: 0 /100 WBC
PLATELET # BLD AUTO: 184 K/UL (ref 164–446)
PLATELET # BLD AUTO: 184 K/UL (ref 164–446)
PMV BLD AUTO: 10.9 FL (ref 9–12.9)
PMV BLD AUTO: 11 FL (ref 9–12.9)
POTASSIUM SERPL-SCNC: 3.8 MMOL/L (ref 3.6–5.5)
PROTHROMBIN TIME: 14.8 SEC (ref 12–14.6)
RBC # BLD AUTO: 2.85 M/UL (ref 4.2–5.4)
RBC # BLD AUTO: 2.99 M/UL (ref 4.2–5.4)
SODIUM SERPL-SCNC: 134 MMOL/L (ref 135–145)
WBC # BLD AUTO: 7.3 K/UL (ref 4.8–10.8)
WBC # BLD AUTO: 7.7 K/UL (ref 4.8–10.8)

## 2018-12-04 PROCEDURE — 700102 HCHG RX REV CODE 250 W/ 637 OVERRIDE(OP): Performed by: HOSPITALIST

## 2018-12-04 PROCEDURE — A9270 NON-COVERED ITEM OR SERVICE: HCPCS | Performed by: HOSPITALIST

## 2018-12-04 PROCEDURE — 5A1D70Z PERFORMANCE OF URINARY FILTRATION, INTERMITTENT, LESS THAN 6 HOURS PER DAY: ICD-10-PCS | Performed by: INTERNAL MEDICINE

## 2018-12-04 PROCEDURE — 36415 COLL VENOUS BLD VENIPUNCTURE: CPT

## 2018-12-04 PROCEDURE — 90935 HEMODIALYSIS ONE EVALUATION: CPT | Performed by: INTERNAL MEDICINE

## 2018-12-04 PROCEDURE — 700111 HCHG RX REV CODE 636 W/ 250 OVERRIDE (IP): Performed by: HOSPITALIST

## 2018-12-04 PROCEDURE — 80048 BASIC METABOLIC PNL TOTAL CA: CPT | Mod: 91

## 2018-12-04 PROCEDURE — 99232 SBSQ HOSP IP/OBS MODERATE 35: CPT | Performed by: INTERNAL MEDICINE

## 2018-12-04 PROCEDURE — 85025 COMPLETE CBC W/AUTO DIFF WBC: CPT | Mod: 91

## 2018-12-04 PROCEDURE — 700111 HCHG RX REV CODE 636 W/ 250 OVERRIDE (IP): Performed by: INTERNAL MEDICINE

## 2018-12-04 PROCEDURE — 85730 THROMBOPLASTIN TIME PARTIAL: CPT | Mod: 91

## 2018-12-04 PROCEDURE — 85610 PROTHROMBIN TIME: CPT | Mod: 91

## 2018-12-04 PROCEDURE — 700101 HCHG RX REV CODE 250: Performed by: HOSPITALIST

## 2018-12-04 PROCEDURE — 90935 HEMODIALYSIS ONE EVALUATION: CPT

## 2018-12-04 PROCEDURE — 94640 AIRWAY INHALATION TREATMENT: CPT

## 2018-12-04 PROCEDURE — 770001 HCHG ROOM/CARE - MED/SURG/GYN PRIV*

## 2018-12-04 RX ORDER — HEPARIN SODIUM 1000 [USP'U]/ML
2800 INJECTION, SOLUTION INTRAVENOUS; SUBCUTANEOUS
Status: DISCONTINUED | OUTPATIENT
Start: 2018-12-04 | End: 2018-12-10

## 2018-12-04 RX ORDER — HEPARIN SODIUM 1000 [USP'U]/ML
2500 INJECTION, SOLUTION INTRAVENOUS; SUBCUTANEOUS
Status: DISCONTINUED | OUTPATIENT
Start: 2018-12-04 | End: 2018-12-16 | Stop reason: HOSPADM

## 2018-12-04 RX ADMIN — HEPARIN SODIUM 2800 UNITS: 1000 INJECTION, SOLUTION INTRAVENOUS; SUBCUTANEOUS at 11:35

## 2018-12-04 RX ADMIN — HEPARIN SODIUM 700 UNITS/HR: 5000 INJECTION, SOLUTION INTRAVENOUS at 17:46

## 2018-12-04 RX ADMIN — VITAMIN B12 0.1 MG ORAL TABLET 100 MCG: 0.1 TABLET ORAL at 05:36

## 2018-12-04 RX ADMIN — IPRATROPIUM BROMIDE AND ALBUTEROL SULFATE 3 ML: .5; 3 SOLUTION RESPIRATORY (INHALATION) at 15:15

## 2018-12-04 RX ADMIN — VITAMIN D, TAB 1000IU (100/BT) 1000 UNITS: 25 TAB at 05:37

## 2018-12-04 RX ADMIN — HEPARIN SODIUM 2500 UNITS: 1000 INJECTION, SOLUTION INTRAVENOUS; SUBCUTANEOUS at 08:28

## 2018-12-04 RX ADMIN — IPRATROPIUM BROMIDE AND ALBUTEROL SULFATE 3 ML: .5; 3 SOLUTION RESPIRATORY (INHALATION) at 21:16

## 2018-12-04 RX ADMIN — LOVASTATIN 10 MG: 20 TABLET ORAL at 20:34

## 2018-12-04 RX ADMIN — IPRATROPIUM BROMIDE AND ALBUTEROL SULFATE 3 ML: .5; 3 SOLUTION RESPIRATORY (INHALATION) at 07:03

## 2018-12-04 RX ADMIN — FLUTICASONE PROPIONATE 100 MCG: 50 SPRAY, METERED NASAL at 05:37

## 2018-12-04 RX ADMIN — WARFARIN SODIUM 4 MG: 4 TABLET ORAL at 17:43

## 2018-12-04 RX ADMIN — IPRATROPIUM BROMIDE AND ALBUTEROL SULFATE 3 ML: .5; 3 SOLUTION RESPIRATORY (INHALATION) at 11:02

## 2018-12-04 RX ADMIN — CEFAZOLIN SODIUM 1 G: 1 INJECTION, SOLUTION INTRAVENOUS at 17:43

## 2018-12-04 RX ADMIN — FERROUS SULFATE TAB 325 MG (65 MG ELEMENTAL FE) 325 MG: 325 (65 FE) TAB at 05:37

## 2018-12-04 RX ADMIN — THERA TABS 1 TABLET: TAB at 05:37

## 2018-12-04 RX ADMIN — HEPARIN SODIUM 900 UNITS/HR: 5000 INJECTION, SOLUTION INTRAVENOUS at 01:30

## 2018-12-04 ASSESSMENT — ENCOUNTER SYMPTOMS
COUGH: 0
TREMORS: 0
CHILLS: 0
CONSTIPATION: 0
ABDOMINAL PAIN: 0
POLYDIPSIA: 0
TINGLING: 0
DOUBLE VISION: 0
WEIGHT LOSS: 1
MYALGIAS: 0
DIZZINESS: 0
EYE REDNESS: 0
PALPITATIONS: 0
SHORTNESS OF BREATH: 0
BACK PAIN: 0
HEADACHES: 0
DIARRHEA: 0
ROS GI COMMENTS: APPETITE DECREASED
DIAPHORESIS: 0
DEPRESSION: 0
SORE THROAT: 0
SPUTUM PRODUCTION: 0
FEVER: 0
HEMOPTYSIS: 0
BRUISES/BLEEDS EASILY: 0
NAUSEA: 0
HALLUCINATIONS: 0
EYE DISCHARGE: 0
BLURRED VISION: 0
FALLS: 0
ORTHOPNEA: 0
VOMITING: 0
WEAKNESS: 0
SENSORY CHANGE: 0

## 2018-12-04 ASSESSMENT — PAIN SCALES - GENERAL
PAINLEVEL_OUTOF10: 0

## 2018-12-04 NOTE — CARE PLAN
Problem: Communication  Goal: The ability to communicate needs accurately and effectively will improve  Outcome: PROGRESSING AS EXPECTED  Pt communicates needs effectively and calls for assistance appropriately.    Problem: Safety  Goal: Will remain free from falls  Outcome: PROGRESSING AS EXPECTED  Pt communicates needs effectively and calls for assistance appropriately. Bed in lowest position and locked. Bed alarm in use. Pt wearing non-slip socks, call light within reach.

## 2018-12-04 NOTE — PROCEDURES
Pt with ESRD, presented with sepsis.  Pt is doing better.  Seen and examined while getting HD.

## 2018-12-04 NOTE — PROGRESS NOTES
someone from Lab called with critical result of aptt >240 at 1450. Critical lab result read back to someone.   Dr. Turk notified of critical lab result at 1452.  Critical lab result read back by Dr. Turk.

## 2018-12-04 NOTE — OP REPORT
DATE OF SERVICE:  12/03/2018    PREOPERATIVE DIAGNOSIS:  End-stage renal disease with infected tunneled   catheter.    POSTPROCEDURE DIAGNOSIS:  End-stage renal disease with infected tunneled   catheter.    PROCEDURE:  Placement of left femoral vein hemodialysis catheter.    ANESTHESIA:  Local 4 mL.    INDICATIONS:  The patient is a 73-year-old woman who suffered a dialysis   catheter infection.  She is undergoing a temporary dialysis catheter placement   to allow resolution of infection.  Risks and benefits were explained and   include bleeding, infection, and deep venous thrombosis.  She understands and   agrees to proceed.    DESCRIPTION OF PROCEDURE:  Patient was placed in the supine position.  The   left groin was prepped with Chloraseptic prep.  Using a sterile ultrasound   probe, the left femoral vein was identified and accessed after administration   of local anesthesia.  Blood was aspirated and guidewire threaded.  Dilator was   passed over the guidewire and the hemodialysis catheter was placed without   incident.  Each port of the hemodialysis catheter was flushed with heparinized   saline and an indwelling heparin at 1000 units/mL was left in the catheter.    The catheter was sutured in place with 3-0 nylon.  Sterile occlusive dressings   were placed.  There were no apparent complications.       ____________________________________     MD TOM Goncalves / ROLAND    DD:  12/03/2018 18:04:59  DT:  12/03/2018 19:10:50    D#:  [id number]  Job#:  [id number]

## 2018-12-04 NOTE — PROGRESS NOTES
Pt dialyzed today from 0832 to 1132 per MD order.  She tolerated tx well.  Net UF 1000ml.  CVC packed with heparin, clamped and capped per protocol. Aspirate heparin prior to next tx.  See paper dialysis flow sheet for details.  Pt taken off isolation [facility] through tx by hospitalist. Report given to ALVARADO Lloyd RN.

## 2018-12-04 NOTE — DISCHARGE PLANNING
Anticipated Discharge Disposition: D/C to Home/Self-Care    Action: LSW informed during rounds that pt will require OP infusions at time of d/c. Pt does go to dialysis on M,W,F in [place], at dialysis pt's are sometimes able to receive infusions during their chair time. LSW contacted Dialysis Liaison, Wendy Suellen (ph#[phone]), to discuss above. Per Wendy, if pt goes to dialysis for the infusions then the infusions can only take place on M,W,F and they will need the medication, dosing and duration in orders. LSW will need clarification from MD regarding infusion needs for pt, if pt will need daily infusion then alternatives will need to be sought.    Barriers to Discharge: Infusion arrangements, infusion orders.    Plan: Await clarification of pt's infusion needs.

## 2018-12-04 NOTE — PROGRESS NOTES
"Inpatient Anticoagulation Service Note    Date: 12/3/2018  Reason for Anticoagulation: New Deep Vein Thrombosis      Hemoglobin Value: (!) 9.8  Hematocrit Value: (!) 29.7  Lab Platelet Value: 196  Target INR: 2.0 to 3.0    INR from last 7 days     Date/Time INR Value    12/03/18 1402  1.06        Dose from last 7 days     Date/Time Dose (mg)    12/03/18 1600  6        Significant Interactions: Antibiotics  Bridge Therapy: Yes  Date of Last VTE Event: 12/01/18  Bridge Therapy Start Date: 12/03/18  Days of Overlap Therapy: 0  INR Value Greater than 2 Prior to Discontinuation of Parenteral Anticoagulation: Not Applicable   Reversal Agent Administered: Not Applicable    HPI:   · 72 yo female admitted on 11/30/18 for Staph bacteremia identified by blood cultures drawn by [facility] dialysis 11/30/18. PMH of ESRD on HD.   · ID consulting, MSSA bacteremia, treating with Cefazolin, Vancomycin DC'd. \"Anticipate 4-6 weeks IV abx from date of negative blood cxs and cath removal\" per note.   · Nephrology consulting, plan for temp HD cath, awaiting AVF angioplasty per note.   · New start warfarin this admission for new DVT identified 12/1/18 by US - Acute deep venous thrombus throughout the right internal jugular vein.   · Bridging with heparin weight based protocol. For acute VTE, CHEST guidelines recommend 5 days of overlap therapy with parenteral + warfarin and 2 consecutive INR readings within therapeutic range prior to discontinuation of parenteral.     Assessment: INR at baseline of 1.06. Today is day \"0\" of overlap with warfarin + heparin weight based protocol. aPTT within therapeutic range. Hgb anemic, trending downward, CTM. Plts WNL.     · Potential drug-drug interactions identified with acute inpatient medications: Antibiotics   · Potential drug-drug interactions identified with chronic home medications: Lovastatin, Iron supplement & multivitamin which will become established interactions. Warfarin dose will be customized " to patient to achieve INR goal of 2 - 3.   · Renal diet.     Plan: Warfarin 6mg po x one followed by warfarin 4mg po daily. INR monitoring daily. Customized dosing regimen yet to be determined. Continue heparin weight based protocol.     Education Material Provided?: No (Patient will need counseling prior to discharge)    Pharmacist suggested discharge dosing: SHANID     Rubia Hicks, PharmD

## 2018-12-04 NOTE — PROGRESS NOTES
Infectious Disease Progress Note    Author: Julee Diaz M.D. Date & Time of service: 2018  10:38 AM    Chief Complaint:  Staph aureus sepsis      Interval History:  73-year-old female with end-stage renal disease, on hemodialysis, admitted due to staphylococcus aureus sepsis with altered mental status, tachycardia, and hallucinations   AF WBC 7.5 feels SOB today-O2 sat 95% with prompted deep breath  12/3/2018 no fevers.  Feels better today.  Denies any new issues overnight.  -no fevers.  WBC 7.3.  Creatinine is 4.22 getting hemodialysis.  Got her hemodialysis catheter  Labs Reviewed, Medications Reviewed, Radiology Reviewed and Wound Reviewed.    Review of Systems:  Review of Systems   Constitutional: Negative for fever.   Respiratory: Negative for cough, hemoptysis, sputum production and shortness of breath.    Cardiovascular: Negative for chest pain.   Gastrointestinal: Negative for abdominal pain, diarrhea, nausea and vomiting.   Psychiatric/Behavioral: Negative for hallucinations.   All other systems reviewed and are negative.      Hemodynamics:  Temp (24hrs), Av.4 °C (97.6 °F), Min:36.1 °C (96.9 °F), Max:36.8 °C (98.3 °F)  Temperature: 36.7 °C (98 °F)  Pulse  Av.3  Min: 52  Max: 107   Blood Pressure : 119/64       Physical Exam:  Physical Exam   Constitutional: She is oriented to person, place, and time. She appears well-developed.   Looks older than stated age   HENT:   Head: Normocephalic and atraumatic.   Poor dentition   Eyes: Pupils are equal, round, and reactive to light. EOM are normal.   Neck: Neck supple.   Cardiovascular: Regular rhythm.    No murmur heard.  Pulmonary/Chest: Effort normal. No respiratory distress. She has no wheezes. She has no rales.   Right chest HD cath removed   Abdominal: Soft. She exhibits no distension. There is no tenderness.   Musculoskeletal: She exhibits no edema.   Left femoral hemodialysis catheter   Neurological: She is alert and oriented to  person, place, and time.   Skin: Skin is warm. She is not diaphoretic. There is erythema.   Decreased at prior cath site   Nursing note and vitals reviewed.      Meds:    Current Facility-Administered Medications:   •  heparin  •  heparin  •  MD Alert...Warfarin per Pharmacy  •  [COMPLETED] warfarin **FOLLOWED BY** warfarin  •  ipratropium-albuterol  •  epoetin seven  •  [COMPLETED] heparin **AND** heparin **AND** heparin **AND** Protocol 440 Heparin Weight Based DO NOT GIVE ANY HEPARIN BOLUS TO STROKE PATIENT **AND** Protocol 440 Heparin Weight Based Discontinue Enoxaparin (Lovenox), Dabigatran (Pradaxa), Rivaroxaban (Xarelto), Apixaban (Eliquis), Edoxaban (Savaysa, Lixiana), Fondaparinux (Arixtra) and Argatroban prior to heparin administration **AND** Protocol 440 Heparin Weight Based Draw baseline aPTT, PT, and platelet count if not already done **AND** Protocol 440 Heparin Weight Based Draw aPTT 6 hours after beginning infusion.  **AND** Protocol 440 Heparin Weight Based Draw Platelet count every three days. Contact MD if platelet is 50% lower than baseline count. **AND** Protocol 440 Heparin Weight Based Record Patient Data **AND** Protocol 440 Heparin Weight Based INSTRUCTIONS **AND** Protocol 440 Heparin Weight Based Review aPTT results 6 hours after infusion is begun as detailed **AND** Protocol 440 Heparin Weight Based Adjust heparin to maintain aPTT between 55-96 sec **AND** Protocol 440 Heparin Weight Based Order aPTT 6 hours after any rate change or hold until aPTT is therapeutic (55-96 seconds) **AND** Protocol 440 Heparin Weight Based Documentation and verification  •  lidocaine  •  cyanocobalamin  •  ferrous sulfate  •  fluticasone  •  ipratropium-albuterol  •  lovastatin  •  multivitamin  •  vitamin D  •  Respiratory Care per Protocol  •  ondansetron  •  ondansetron  •  acetaminophen  •  ceFAZolin  •  senna-docusate **AND** polyethylene glycol/lytes **AND** [DISCONTINUED] magnesium hydroxide **AND**  bisacodyl    Labs:  Recent Labs      18   0205  18   010   WBC  7.0  7.3   RBC  3.13*  2.99*   HEMOGLOBIN  9.8*  9.1*   HEMATOCRIT  29.7*  28.0*   MCV  94.9  93.6   MCH  31.3  30.4   RDW  48.0  46.9   PLATELETCT  196  184   MPV  10.9  10.9   NEUTSPOLYS  58.00  64.20   LYMPHOCYTES  25.00  21.70*   MONOCYTES  11.70  10.00   EOSINOPHILS  3.80  2.60   BASOPHILS  0.60  0.50     Recent Labs      18   0205  18   010   SODIUM  133*  134*   POTASSIUM  3.7  3.8   CHLORIDE  102  102   CO2  24  21   GLUCOSE  86  89   BUN  22  27*     Recent Labs      18   0205  18   CREATININE  3.92*  4.22*       Imaging:  Dx-chest-portable (1 View)    Result Date: 2018 4:34 PM HISTORY/REASON FOR EXAM:  On dialysis. Reportedly infected port. Cardiomegaly. Renal failure. TECHNIQUE/EXAM DESCRIPTION AND NUMBER OF VIEWS: Single portable view of the chest. COMPARISON: 2018 FINDINGS: Right IJ dialysis catheter is present with tip in expected location of the superior vena cava. The cardiac silhouette is enlarged. No pulmonary consolidation. No effusion or pneumothorax.     Right IJ dialysis catheter in place. Cardiomegaly.    Us-hemodialysis Graft Duplex Comp Upper Extremity    Result Date: 2018   Hemodialysis Access Report  Vascular Laboratory  Conclusions  AV fistula as described in findings section  Acute deep venous thrombus throughout the right internal jugular vein  Results called to MARCELINA DANIELS approximately 1700 hours 2018  VICENTE FRANCO  Exam Date:     2018 15:23  Room #:     Inpatient  Priority:     Routine  Ht (in):             Wt (lb):  Ordering Physician:        MARCELINA DANIELS  Referring Physician:       MARCELINA DANIELS  Sonographer:               Waqas Rodriguez RVT  Study Type:                Complete Unilateral  Technical Quality:         Adequate  Age:    73    Gender:     F  MRN:    [id number]  :    1945      BSA:   Indications:     AV Fistula - S/P  CPT Codes:       84324  ICD Codes:       447  History:         Right radiocephalic AVF evaluation  Limitations:  Exam Data:  Side:          Right  Access          Fistula  Inflow Artery   Radial  Outflow Vein    Cephalic                     Velocity (cm/s)   Prox Inflow artery          103.0                               0   Mid Inflow artery           130.0                               0   Distal Inflow artery        108.0                               0   Inflow Anastomosis          171.0                               0   Inflow Artery distal to     79.00   anastomosis          Antegrade   Proximal Graft   Mid Graft   Distal Graft   Outflow Anastomosis   Proximal Outflow Vein       689.0                               0   Mid Outflow Vein            57.00   Distal Outflow Vein         60.00   Flow Volume Mid Bicep       139.0ml/min                               0   Flow Volume Mid-Forearm     178.0ml/min                               0   Prox Outflow Vein Diam      0.40  cm   Mid Outflow Vein Diam       0.39  cm   Distal Outflow Vein Diam    0.45  cm  Findings  At the distal forearm, there are tandem diameter narrowings with  corresponding increases in velocity. The first diameter reduction is from  0.40 to 0.19 cm with a 3.7X increase in velocity. The second diameter  reduction is from 0.46 to 0.23 cm with a maximum velocity of 442 cm/sec.  Flow volumes in the right radiocephalic AVF range from 139-178 mL/min.  No evidence of hemodynamically significant stenosis in the inflow artery or  the anastomosis. Flow in the radial artery distal to the anastomosis is  antegrade and triphasic.  Incidental finding: Echolucent material consistent with acute venous  thrombosis is visualized in the internal jugular vein throughout its  length.  Luis M Perez  (Electronically Signed)  Final Date:      01 December 2018                   17:17      Micro:  Results     Procedure Component Value  "Units Date/Time    URINE CULTURE(NEW) [[id number]] Collected:  11/30/18 1655    Order Status:  Completed Specimen:  Urine Updated:  12/02/18 0813     Significant Indicator NEG     Source UR     Site --     Urine Culture No growth at 48 hours    Narrative:       Indication for culture:->Emergency Room Patient    BLOOD CULTURE [[id number]] Collected:  11/30/18 1615    Order Status:  Completed Specimen:  Blood from Peripheral Updated:  12/01/18 0848     Significant Indicator NEG     Source BLD     Site PERIPHERAL     Blood Culture No Growth    Note: Blood cultures are incubated for 5 days and  are monitored continuously.Positive blood cultures  are called to the RN and reported as soon as  they are identified.      Narrative:       Per Hospital Policy: Only change Specimen Src: to \"Line\" if  specified by physician order.    BLOOD CULTURE [[id number]] Collected:  11/30/18 1717    Order Status:  Completed Specimen:  Blood from Peripheral Updated:  12/01/18 0848     Significant Indicator NEG     Source BLD     Site PERIPHERAL     Blood Culture No Growth    Note: Blood cultures are incubated for 5 days and  are monitored continuously.Positive blood cultures  are called to the RN and reported as soon as  they are identified.      Narrative:       Per Hospital Policy: Only change Specimen Src: to \"Line\" if  specified by physician order.    URINALYSIS [[id number]]  (Abnormal) Collected:  11/30/18 1655    Order Status:  Completed Specimen:  Urine Updated:  11/30/18 1734     Color Yellow     Character Clear     Specific Gravity 1.017     Ph >=9.0 (A)     Glucose Negative mg/dL      Ketones Negative mg/dL      Protein 300 (A) mg/dL      Bilirubin Negative     Urobilinogen, Urine 0.2     Nitrite Negative     Leukocyte Esterase Negative     Occult Blood Negative     Micro Urine Req Microscopic    Narrative:       Indication for culture:->Emergency Room Patient          Assessment:  Active Hospital Problems    Diagnosis   • " *Staphylococcus aureus bacteremia with sepsis ([facility]) [A41.01]   • End stage renal disease ([facility]) [N18.6]   • Encephalopathy acute [G93.40]   • COPD (chronic obstructive pulmonary disease) ([facility]) [J44.9]   • B12 deficiency [E53.8]   • Iron deficiency anemia [D50.9]   • HLD (hyperlipidemia) [E78.5]   • Vitamin D deficiency [E55.9]   • Sepsis ([facility]) [A41.9]   • Acute external jugular vein thrombosis [I82.890]   • Severe dental caries [K02.9]   • Severe protein-calorie malnutrition ([facility]) [E43]       Plan:  SA sepsis  Afebrile  No leukocytosis  AMS resolved  BCxs + MSSA  Repeat Bcxs 11/30 neg  TTE reveals rheumatic looking mitral valve  Consider BARBIE  Anticipate 6 weeks IV abx from date of negative blood cxs and cath removal  Continue cefazolin      DVT right IJ, new  Query infected thrombophlebitis  Cath removed  Heparin  Abx as above  New hemodialysis catheter was placed on 12/3/2018    Dental caries  Likely will need extractions    ESRD  Dose adjust abx    Discussed with internal medicine

## 2018-12-04 NOTE — DISCHARGE PLANNING
OUTPATIENT HEMODIALYSIS NOTE:    Confirmed patient is active at:    [facility]  [address] [place] [address]      Schedule: Monday, Wednesday, Friday  Time: 1:15 PM    Spoke with Teri at facility who confirmed.    Forwarded records for review.

## 2018-12-04 NOTE — PROGRESS NOTES
"Inpatient Anticoagulation Service Note    Date: 12/4/2018  Reason for Anticoagulation: New Deep Vein Thrombosis      Hemoglobin Value: (!) 9.1  Hematocrit Value: (!) 28  Lab Platelet Value: 184  Target INR: 2.0 to 3.0    INR from last 7 days     Date/Time INR Value    12/04/18 0101 (!)  1.15    12/03/18 1402  1.06        Dose from last 7 days     Date/Time Dose (mg)    12/04/18 1000  4    12/03/18 1600  6        Significant Interactions: Antibiotics, Statin  Bridge Therapy: Yes  Date of Last VTE Event: 12/01/18  Bridge Therapy Start Date: 12/03/18  Days of Overlap Therapy: 1   INR Value Greater than 2 Prior to Discontinuation of Parenteral Anticoagulation: Not Applicable   Reversal Agent Administered: Not Applicable    HPI:   · 72 yo female admitted on 11/30/18 for Staph bacteremia identified by blood cultures drawn by [facility] dialysis 11/30/18. PMH of ESRD on HD.   · ID consulting, MSSA bacteremia, treating with Cefazolin, Vancomycin DC'd. \"Anticipate 4-6 weeks IV abx from date of negative blood cxs and cath removal\" per note.   · Nephrology consulting, plan for temp HD cath, awaiting AVF angioplasty per note. General surgery placed Left femoral vein hemodialysis catheter 12/3.   · New start warfarin this admission for new DVT identified 12/1/18 by US - Acute deep venous thrombus throughout the right internal jugular vein.   · Bridging with heparin weight based protocol. For acute VTE, CHEST guidelines recommend 5 days of overlap therapy with parenteral + warfarin and 2 consecutive INR readings within therapeutic range prior to discontinuation of parenteral.      Assessment: INR remains SUBtherapeutic with trend upward, 1.15 from 1.06 following first dose of warfarin 6mg yesterday. Today is day \"1\" of overlap with warfarin + heparin weight based protocol. Yesterday was day \"0\" of overlap with heparin. aPTT within therapeutic range. Hgb anemic, trending downward, remains over 9mg/dL CTM. Plts WNL.      · Potential " drug-drug interactions identified with acute inpatient medications: Antibiotics   · Potential drug-drug interactions identified with chronic home medications: Lovastatin, Iron supplement & multivitamin which will become established interactions. Warfarin dose will be customized to patient to achieve INR goal of 2 - 3.   · Renal, dysphagia 3 diet ordered.      Plan: Start warfarin 4mg po daily. Continue daily INR monitoring - customized dosing regimen yet to be determined. Continue heparin weight based protocol.      Education Material Provided?: No (Patient will need counseling prior to discharge)     Pharmacist suggested discharge dosing: TBD     Rubia Hicks, PharmD

## 2018-12-04 NOTE — OR SURGEON
Immediate Post OP Note    PreOp Diagnosis: ESRD    PostOp Diagnosis: Same    Procedure:  Left femoral vein hemodialysis catheter    Anesthesiologist/Type of Anesthesia:local    Estimated Blood Loss: 5cc    Findings: Patent vein, wire went well    Complications: none    [id number]    12/3/2018 6:01 PM Kelley Schroeder M.D.

## 2018-12-04 NOTE — CARE PLAN
Problem: Oxygenation:  Goal: Maintain adequate oxygenation dependent on patient condition  Outcome: PROGRESSING AS EXPECTED  PT is on 3 LPM   SPO2 is 94%    Problem: Bronchoconstriction:  Goal: Improve in air movement and diminished wheezing  Outcome: PROGRESSING AS EXPECTED  DUO QID

## 2018-12-05 PROBLEM — R93.1 ABNORMAL ECHOCARDIOGRAM: Status: ACTIVE | Noted: 2018-12-05

## 2018-12-05 LAB
ANION GAP SERPL CALC-SCNC: 8 MMOL/L (ref 0–11.9)
APTT PPP: 59.9 SEC (ref 24.7–36)
APTT PPP: 68.3 SEC (ref 24.7–36)
BACTERIA BLD CULT: NORMAL
BACTERIA BLD CULT: NORMAL
BUN SERPL-MCNC: 14 MG/DL (ref 8–22)
CALCIUM SERPL-MCNC: 9.1 MG/DL (ref 8.5–10.5)
CHLORIDE SERPL-SCNC: 100 MMOL/L (ref 96–112)
CO2 SERPL-SCNC: 26 MMOL/L (ref 20–33)
CREAT SERPL-MCNC: 2.51 MG/DL (ref 0.5–1.4)
GLUCOSE SERPL-MCNC: 100 MG/DL (ref 65–99)
INR PPP: 1.85 (ref 0.87–1.13)
POTASSIUM SERPL-SCNC: 4.2 MMOL/L (ref 3.6–5.5)
PROTHROMBIN TIME: 21.4 SEC (ref 12–14.6)
SIGNIFICANT IND 70042: NORMAL
SIGNIFICANT IND 70042: NORMAL
SITE SITE: NORMAL
SITE SITE: NORMAL
SODIUM SERPL-SCNC: 134 MMOL/L (ref 135–145)
SOURCE SOURCE: NORMAL
SOURCE SOURCE: NORMAL

## 2018-12-05 PROCEDURE — 700111 HCHG RX REV CODE 636 W/ 250 OVERRIDE (IP)

## 2018-12-05 PROCEDURE — A9270 NON-COVERED ITEM OR SERVICE: HCPCS | Performed by: INTERNAL MEDICINE

## 2018-12-05 PROCEDURE — 700111 HCHG RX REV CODE 636 W/ 250 OVERRIDE (IP): Performed by: HOSPITALIST

## 2018-12-05 PROCEDURE — A9270 NON-COVERED ITEM OR SERVICE: HCPCS | Performed by: HOSPITALIST

## 2018-12-05 PROCEDURE — 36415 COLL VENOUS BLD VENIPUNCTURE: CPT

## 2018-12-05 PROCEDURE — 770001 HCHG ROOM/CARE - MED/SURG/GYN PRIV*

## 2018-12-05 PROCEDURE — 700111 HCHG RX REV CODE 636 W/ 250 OVERRIDE (IP): Mod: JG | Performed by: INTERNAL MEDICINE

## 2018-12-05 PROCEDURE — 700102 HCHG RX REV CODE 250 W/ 637 OVERRIDE(OP): Performed by: HOSPITALIST

## 2018-12-05 PROCEDURE — 700101 HCHG RX REV CODE 250: Performed by: HOSPITALIST

## 2018-12-05 PROCEDURE — 700111 HCHG RX REV CODE 636 W/ 250 OVERRIDE (IP): Performed by: INTERNAL MEDICINE

## 2018-12-05 PROCEDURE — 99232 SBSQ HOSP IP/OBS MODERATE 35: CPT | Performed by: INTERNAL MEDICINE

## 2018-12-05 PROCEDURE — 5A1D70Z PERFORMANCE OF URINARY FILTRATION, INTERMITTENT, LESS THAN 6 HOURS PER DAY: ICD-10-PCS | Performed by: INTERNAL MEDICINE

## 2018-12-05 PROCEDURE — 90935 HEMODIALYSIS ONE EVALUATION: CPT

## 2018-12-05 PROCEDURE — 700102 HCHG RX REV CODE 250 W/ 637 OVERRIDE(OP): Performed by: INTERNAL MEDICINE

## 2018-12-05 PROCEDURE — 94760 N-INVAS EAR/PLS OXIMETRY 1: CPT

## 2018-12-05 PROCEDURE — 94640 AIRWAY INHALATION TREATMENT: CPT

## 2018-12-05 PROCEDURE — 85730 THROMBOPLASTIN TIME PARTIAL: CPT

## 2018-12-05 RX ORDER — SUCRALFATE 1 G/1
1 TABLET ORAL EVERY 6 HOURS
Status: DISCONTINUED | OUTPATIENT
Start: 2018-12-05 | End: 2018-12-16 | Stop reason: HOSPADM

## 2018-12-05 RX ORDER — FAMOTIDINE 20 MG/1
20 TABLET, FILM COATED ORAL DAILY
Status: DISCONTINUED | OUTPATIENT
Start: 2018-12-05 | End: 2018-12-16 | Stop reason: HOSPADM

## 2018-12-05 RX ORDER — HEPARIN SODIUM 1000 [USP'U]/ML
INJECTION, SOLUTION INTRAVENOUS; SUBCUTANEOUS
Status: COMPLETED
Start: 2018-12-05 | End: 2018-12-05

## 2018-12-05 RX ORDER — WARFARIN SODIUM 2 MG/1
2 TABLET ORAL
Status: DISCONTINUED | OUTPATIENT
Start: 2018-12-05 | End: 2018-12-08

## 2018-12-05 RX ADMIN — VITAMIN B12 0.1 MG ORAL TABLET 100 MCG: 0.1 TABLET ORAL at 05:15

## 2018-12-05 RX ADMIN — VITAMIN D, TAB 1000IU (100/BT) 1000 UNITS: 25 TAB at 05:15

## 2018-12-05 RX ADMIN — STANDARDIZED SENNA CONCENTRATE AND DOCUSATE SODIUM 2 TABLET: 8.6; 5 TABLET, FILM COATED ORAL at 18:14

## 2018-12-05 RX ADMIN — THERA TABS 1 TABLET: TAB at 05:15

## 2018-12-05 RX ADMIN — WARFARIN SODIUM 2 MG: 2 TABLET ORAL at 18:13

## 2018-12-05 RX ADMIN — SUCRALFATE 1 G: 1 TABLET ORAL at 18:14

## 2018-12-05 RX ADMIN — IPRATROPIUM BROMIDE AND ALBUTEROL SULFATE 3 ML: .5; 3 SOLUTION RESPIRATORY (INHALATION) at 18:10

## 2018-12-05 RX ADMIN — HEPARIN SODIUM 700 UNITS/HR: 5000 INJECTION, SOLUTION INTRAVENOUS at 13:26

## 2018-12-05 RX ADMIN — HEPARIN SODIUM 2800 UNITS: 1000 INJECTION, SOLUTION INTRAVENOUS; SUBCUTANEOUS at 17:00

## 2018-12-05 RX ADMIN — CEFAZOLIN SODIUM 1 G: 1 INJECTION, SOLUTION INTRAVENOUS at 18:14

## 2018-12-05 RX ADMIN — SUCRALFATE 1 G: 1 TABLET ORAL at 12:36

## 2018-12-05 RX ADMIN — IPRATROPIUM BROMIDE AND ALBUTEROL SULFATE 3 ML: .5; 3 SOLUTION RESPIRATORY (INHALATION) at 11:32

## 2018-12-05 RX ADMIN — IPRATROPIUM BROMIDE AND ALBUTEROL SULFATE 3 ML: .5; 3 SOLUTION RESPIRATORY (INHALATION) at 07:35

## 2018-12-05 RX ADMIN — FERROUS SULFATE TAB 325 MG (65 MG ELEMENTAL FE) 325 MG: 325 (65 FE) TAB at 05:15

## 2018-12-05 RX ADMIN — ONDANSETRON 4 MG: 4 TABLET, ORALLY DISINTEGRATING ORAL at 09:12

## 2018-12-05 RX ADMIN — FAMOTIDINE 20 MG: 20 TABLET ORAL at 10:17

## 2018-12-05 RX ADMIN — EPOETIN ALFA 4000 UNITS: 4000 SOLUTION INTRAVENOUS; SUBCUTANEOUS at 09:15

## 2018-12-05 RX ADMIN — LOVASTATIN 10 MG: 20 TABLET ORAL at 20:36

## 2018-12-05 RX ADMIN — FLUTICASONE PROPIONATE 100 MCG: 50 SPRAY, METERED NASAL at 05:15

## 2018-12-05 ASSESSMENT — ENCOUNTER SYMPTOMS
EYE DISCHARGE: 0
SENSORY CHANGE: 0
DIZZINESS: 0
MYALGIAS: 0
DIAPHORESIS: 0
ORTHOPNEA: 0
BRUISES/BLEEDS EASILY: 0
FEVER: 0
HEMOPTYSIS: 0
DOUBLE VISION: 0
EYE REDNESS: 0
CHILLS: 0
HEADACHES: 0
COUGH: 0
NAUSEA: 0
BACK PAIN: 0
SHORTNESS OF BREATH: 0
DIARRHEA: 0
SORE THROAT: 0
TINGLING: 0
FALLS: 0
WEAKNESS: 0
PALPITATIONS: 0
WEIGHT LOSS: 1
HALLUCINATIONS: 0
BLURRED VISION: 0
ROS GI COMMENTS: APPETITE DECREASED
DEPRESSION: 0
TREMORS: 0
ABDOMINAL PAIN: 0
POLYDIPSIA: 0
SPUTUM PRODUCTION: 0
VOMITING: 0
CONSTIPATION: 0

## 2018-12-05 ASSESSMENT — PAIN SCALES - GENERAL
PAINLEVEL_OUTOF10: 0

## 2018-12-05 NOTE — PROGRESS NOTES
Received report from day RN assumed care at 1915. Pt A&Ox 4 . Pt states 0 /10 pain at this time. Plan of care discussed with Pt, verbalized understanding. family present at bedside. Assessment completed. All Pt needs met at this time. Call light within reach, bed alarm on , bed locked and in low position. Will continue to monitor.

## 2018-12-05 NOTE — CARE PLAN
Problem: Nutritional:  Goal: Achieve adequate nutritional intake  Patient will consume >50% of meals   Outcome: PROGRESSING SLOWER THAN EXPECTED  Per chart, pt consumed 25-50% x 1 meal documented.  RD spoke with RN, who noted pt is positive for nausea however is starting a new medication regimen to help ease nausea - pt is trying to eat but still having some difficulty.  RD reviewed snacks and will continue to monitor.

## 2018-12-05 NOTE — PROGRESS NOTES
Infectious Disease Progress Note    Author: Julee Diaz M.D. Date & Time of service: 2018  12:11 PM    Chief Complaint:  Staph aureus sepsis      Interval History:  73-year-old female with end-stage renal disease, on hemodialysis, admitted due to staphylococcus aureus sepsis with altered mental status, tachycardia, and hallucinations   AF WBC 7.5 feels SOB today-O2 sat 95% with prompted deep breath  12/3/2018 no fevers.  Feels better today.  Denies any new issues overnight.  -no fevers.  WBC 7.3.  Creatinine is 4.22 getting hemodialysis.  Got her hemodialysis catheter  2018 no fevers.  Feels pretty well.  No new issues overnight.  Labs Reviewed, Medications Reviewed, Radiology Reviewed and Wound Reviewed.    Review of Systems:  Review of Systems   Constitutional: Negative for fever.   Respiratory: Negative for cough, hemoptysis, sputum production and shortness of breath.    Cardiovascular: Negative for chest pain.   Gastrointestinal: Negative for abdominal pain, diarrhea, nausea and vomiting.   Psychiatric/Behavioral: Negative for hallucinations.   All other systems reviewed and are negative.      Hemodynamics:  Temp (24hrs), Av.9 °C (98.5 °F), Min:36.4 °C (97.6 °F), Max:37.6 °C (99.6 °F)  Temperature: 37.6 °C (99.6 °F)  Pulse  Av  Min: 52  Max: 107   Blood Pressure : 138/64       Physical Exam:  Physical Exam   Constitutional: She is oriented to person, place, and time. She appears well-developed.   Looks older than stated age   HENT:   Head: Normocephalic and atraumatic.   Poor dentition   Eyes: Pupils are equal, round, and reactive to light. EOM are normal.   Neck: Neck supple.   Cardiovascular: Regular rhythm.    No murmur heard.  Pulmonary/Chest: Effort normal. No respiratory distress. She has no wheezes. She has no rales.   Right chest HD cath removed   Abdominal: Soft. She exhibits no distension. There is no tenderness.   Musculoskeletal: She exhibits no edema.   Left femoral  hemodialysis catheter   Neurological: She is alert and oriented to person, place, and time.   Skin: Skin is warm. She is not diaphoretic. There is erythema.   Decreased at prior cath site   Nursing note and vitals reviewed.      Meds:    Current Facility-Administered Medications:   •  famotidine  •  sucralfate  •  heparin  •  heparin  •  MD Alert...Warfarin per Pharmacy  •  [COMPLETED] warfarin **FOLLOWED BY** warfarin  •  ipratropium-albuterol  •  epoetin seven  •  [COMPLETED] heparin **AND** heparin **AND** heparin **AND** Protocol 440 Heparin Weight Based DO NOT GIVE ANY HEPARIN BOLUS TO STROKE PATIENT **AND** Protocol 440 Heparin Weight Based Discontinue Enoxaparin (Lovenox), Dabigatran (Pradaxa), Rivaroxaban (Xarelto), Apixaban (Eliquis), Edoxaban (Savaysa, Lixiana), Fondaparinux (Arixtra) and Argatroban prior to heparin administration **AND** Protocol 440 Heparin Weight Based Draw baseline aPTT, PT, and platelet count if not already done **AND** Protocol 440 Heparin Weight Based Draw aPTT 6 hours after beginning infusion.  **AND** Protocol 440 Heparin Weight Based Draw Platelet count every three days. Contact MD if platelet is 50% lower than baseline count. **AND** Protocol 440 Heparin Weight Based Record Patient Data **AND** Protocol 440 Heparin Weight Based INSTRUCTIONS **AND** Protocol 440 Heparin Weight Based Review aPTT results 6 hours after infusion is begun as detailed **AND** Protocol 440 Heparin Weight Based Adjust heparin to maintain aPTT between 55-96 sec **AND** Protocol 440 Heparin Weight Based Order aPTT 6 hours after any rate change or hold until aPTT is therapeutic (55-96 seconds) **AND** Protocol 440 Heparin Weight Based Documentation and verification  •  lidocaine  •  cyanocobalamin  •  ferrous sulfate  •  fluticasone  •  ipratropium-albuterol  •  lovastatin  •  multivitamin  •  vitamin D  •  Respiratory Care per Protocol  •  ondansetron  •  ondansetron  •  acetaminophen  •  ceFAZolin  •   senna-docusate **AND** polyethylene glycol/lytes **AND** [DISCONTINUED] magnesium hydroxide **AND** bisacodyl    Labs:  Recent Labs      12/03/18   0205  12/04/18   0101 12/04/18   2348   WBC  7.0  7.3  7.7   RBC  3.13*  2.99*  2.85*   HEMOGLOBIN  9.8*  9.1*  8.7*   HEMATOCRIT  29.7*  28.0*  26.7*   MCV  94.9  93.6  93.7   MCH  31.3  30.4  30.5   RDW  48.0  46.9  47.8   PLATELETCT  196  184  184   MPV  10.9  10.9  11.0   NEUTSPOLYS  58.00  64.20  63.80   LYMPHOCYTES  25.00  21.70*  21.90*   MONOCYTES  11.70  10.00  11.00   EOSINOPHILS  3.80  2.60  2.00   BASOPHILS  0.60  0.50  0.50     Recent Labs      12/03/18   0205 12/04/18   0101  12/04/18   2348   SODIUM  133*  134*  134*   POTASSIUM  3.7  3.8  4.2   CHLORIDE  102  102  100   CO2  24  21  26   GLUCOSE  86  89  100*   BUN  22  27*  14     Recent Labs      12/03/18   0205  12/04/18   0101  12/04/18   2348   CREATININE  3.92*  4.22*  2.51*       Imaging:  Dx-chest-portable (1 View)    Result Date: 11/30/2018 11/30/2018 4:34 PM HISTORY/REASON FOR EXAM:  On dialysis. Reportedly infected port. Cardiomegaly. Renal failure. TECHNIQUE/EXAM DESCRIPTION AND NUMBER OF VIEWS: Single portable view of the chest. COMPARISON: October 21, 2018 FINDINGS: Right IJ dialysis catheter is present with tip in expected location of the superior vena cava. The cardiac silhouette is enlarged. No pulmonary consolidation. No effusion or pneumothorax.     Right IJ dialysis catheter in place. Cardiomegaly.    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  Acute deep venous thrombus throughout the right internal jugular vein  Results called to MARCELINA DANIELS approximately 1700 hours 12/1/2018  VICENTE FRANCO  Exam Date:     12/01/2018 15:23  Room #:     Inpatient  Priority:     Routine  Ht (in):             Wt (lb):  Ordering Physician:         MARCELINA DANIELS  Referring Physician:       MARCELINA DANIELS  Sonographer:               Waqas Rodriguez RVT  Study Type:                Complete Unilateral  Technical Quality:         Adequate  Age:    73    Gender:     F  MRN:    [id number]  :    1945      BSA:  Indications:     AV Fistula - S/P  CPT Codes:       41892  ICD Codes:       447  History:         Right radiocephalic AVF evaluation  Limitations:  Exam Data:  Side:          Right  Access          Fistula  Inflow Artery   Radial  Outflow Vein    Cephalic                     Velocity (cm/s)   Prox Inflow artery          103.0                               0   Mid Inflow artery           130.0                               0   Distal Inflow artery        108.0                               0   Inflow Anastomosis          171.0                               0   Inflow Artery distal to     79.00   anastomosis          Antegrade   Proximal Graft   Mid Graft   Distal Graft   Outflow Anastomosis   Proximal Outflow Vein       689.0                               0   Mid Outflow Vein            57.00   Distal Outflow Vein         60.00   Flow Volume Mid Bicep       139.0ml/min                               0   Flow Volume Mid-Forearm     178.0ml/min                               0   Prox Outflow Vein Diam      0.40  cm   Mid Outflow Vein Diam       0.39  cm   Distal Outflow Vein Diam    0.45  cm  Findings  At the distal forearm, there are tandem diameter narrowings with  corresponding increases in velocity. The first diameter reduction is from  0.40 to 0.19 cm with a 3.7X increase in velocity. The second diameter  reduction is from 0.46 to 0.23 cm with a maximum velocity of 442 cm/sec.  Flow volumes in the right radiocephalic AVF range from 139-178 mL/min.  No evidence of hemodynamically significant stenosis in the inflow artery or  the anastomosis. Flow in the radial artery distal to the anastomosis is  antegrade and triphasic.  Incidental finding:  "Echolucent material consistent with acute venous  thrombosis is visualized in the internal jugular vein throughout its  length.  Luis M Perez  (Electronically Signed)  Final Date:      01 December 2018                   17:17      Micro:  Results     Procedure Component Value Units Date/Time    URINE CULTURE(NEW) [[id number]] Collected:  11/30/18 1655    Order Status:  Completed Specimen:  Urine Updated:  12/02/18 0813     Significant Indicator NEG     Source UR     Site --     Urine Culture No growth at 48 hours    Narrative:       Indication for culture:->Emergency Room Patient    BLOOD CULTURE [[id number]] Collected:  11/30/18 1615    Order Status:  Completed Specimen:  Blood from Peripheral Updated:  12/01/18 0848     Significant Indicator NEG     Source BLD     Site PERIPHERAL     Blood Culture No Growth    Note: Blood cultures are incubated for 5 days and  are monitored continuously.Positive blood cultures  are called to the RN and reported as soon as  they are identified.      Narrative:       Per Hospital Policy: Only change Specimen Src: to \"Line\" if  specified by physician order.    BLOOD CULTURE [[id number]] Collected:  11/30/18 1717    Order Status:  Completed Specimen:  Blood from Peripheral Updated:  12/01/18 0848     Significant Indicator NEG     Source BLD     Site PERIPHERAL     Blood Culture No Growth    Note: Blood cultures are incubated for 5 days and  are monitored continuously.Positive blood cultures  are called to the RN and reported as soon as  they are identified.      Narrative:       Per Hospital Policy: Only change Specimen Src: to \"Line\" if  specified by physician order.    URINALYSIS [[id number]]  (Abnormal) Collected:  11/30/18 1655    Order Status:  Completed Specimen:  Urine Updated:  11/30/18 1734     Color Yellow     Character Clear     Specific Gravity 1.017     Ph >=9.0 (A)     Glucose Negative mg/dL      Ketones Negative mg/dL      Protein 300 (A) mg/dL      Bilirubin Negative     " Urobilinogen, Urine 0.2     Nitrite Negative     Leukocyte Esterase Negative     Occult Blood Negative     Micro Urine Req Microscopic    Narrative:       Indication for culture:->Emergency Room Patient          Assessment:  Active Hospital Problems    Diagnosis   • *Staphylococcus aureus bacteremia with sepsis ([facility]) [A41.01]   • End stage renal disease ([facility]) [N18.6]   • Encephalopathy acute [G93.40]   • COPD (chronic obstructive pulmonary disease) ([facility]) [J44.9]   • B12 deficiency [E53.8]   • Iron deficiency anemia [D50.9]   • HLD (hyperlipidemia) [E78.5]   • Vitamin D deficiency [E55.9]   • Sepsis ([facility]) [A41.9]   • Acute external jugular vein thrombosis [I82.890]   • Severe dental caries [K02.9]   • Severe protein-calorie malnutrition ([facility]) [E43]       Plan:  SA sepsis  Afebrile  No leukocytosis  AMS resolved  BCxs + MSSA  Repeat Bcxs 11/30 neg  TTE reveals rheumatic looking mitral valve.  It seems significantly different read from 2016  For BARBIE in a.m.  Anticipate 6 weeks IV abx from date of negative blood cxs and cath removal  Continue cefazolin  Can be given during hemodialysis      DVT right IJ, new  Query infected thrombophlebitis  Cath removed  Heparin  Abx as above  New hemodialysis catheter was placed on 12/3/2018    Dental caries  Likely will need extractions  Discussed with the patient    ESRD  Dose adjust abx    Discussed with internal medicine

## 2018-12-05 NOTE — PROGRESS NOTES
Hospital Medicine Daily Progress Note    Date of Service  12/4/2018    Chief Complaint  Hallucinations    Hospital Course    *73 y.o. female admitted 11/30/2018 with complaints of hallucinations for the last 1-2 nights-recent blood cultures done at hemodialysis are positive for staph aureus patient denies experience any fever chills or sweats, hallucinations were occurring mainly at night but she was aware that they were hallucinations, in addition to the night prior to admission she had them her first night of admission but now they are resolved*    BCx from [facility] growing MSSA, vanco discontinued  Echo with rheumatic mitral valve      Interval Problem Update  new dialysis catheter for ID placed yesterday  Patient is currently undergoing routine hemodialysis through the new catheter  Will need 4-6 weeks of antibiotics.  Could be provided with hemodialysis per ID.  We will clarify dose and interval with Dr. Diaz and will convey this information to Dr. Najjar so that he can put order for hemodialysis and  Started on Coumadin bridge with heparin drip for jugular vein thrombosis  I discussed patient case with infectious disease doctor and kidney doctor, as well as nursing staff, case coordinator,  and patient herself.    Consultants/Specialty  [facility]  Fidel Romero MD vascular surgery  [facility]    Code Status  Full     Disposition  TBD    Review of Systems  Review of Systems   Constitutional: Positive for weight loss (80 pounds in 2 years). Negative for chills, diaphoresis, fever and malaise/fatigue.   HENT: Negative for congestion and sore throat.    Eyes: Negative for blurred vision, double vision, discharge and redness.   Respiratory: Negative for cough, sputum production and shortness of breath.    Cardiovascular: Negative for chest pain, palpitations and orthopnea.   Gastrointestinal: Negative for abdominal pain, constipation, diarrhea, nausea and vomiting.         Appetite decreased   Genitourinary: Negative for dysuria, frequency and urgency.   Musculoskeletal: Negative for back pain, falls and myalgias.   Skin: Negative for itching and rash.   Neurological: Negative for dizziness, tingling, tremors, sensory change, weakness and headaches.   Endo/Heme/Allergies: Negative for environmental allergies and polydipsia. Does not bruise/bleed easily.   Psychiatric/Behavioral: Negative for depression, hallucinations (None since last night) and suicidal ideas.        Physical Exam  Temp:  [36.1 °C (96.9 °F)-37.2 °C (99 °F)] 37.2 °C (99 °F)  Pulse:  [84-96] 95  Resp:  [14-20] 16  BP: (119-148)/(51-81) 121/67    Physical Exam   Constitutional: She is oriented to person, place, and time. She appears well-developed. No distress.   Chronically ill and hao with loose skin   HENT:   Head: Normocephalic and atraumatic.   Right Ear: External ear normal.   Left Ear: External ear normal.   Nose: Nose normal.   Mouth/Throat: Oropharynx is clear and moist.   Eyes: Pupils are equal, round, and reactive to light. Conjunctivae and EOM are normal. Right eye exhibits no discharge. Left eye exhibits no discharge. No scleral icterus.   Neck: Neck supple.   Cardiovascular: Normal rate and regular rhythm.    Pulmonary/Chest: Effort normal and breath sounds normal. No respiratory distress. She exhibits tenderness (site of permacath insertion. improved, erythema resolved).   Abdominal: Soft. Bowel sounds are normal. She exhibits no distension.   Musculoskeletal: She exhibits no edema or tenderness.   Neurological: She is alert and oriented to person, place, and time. No cranial nerve deficit.   Skin: Skin is warm and dry. She is not diaphoretic.   Psychiatric: She has a normal mood and affect.   Nursing note and vitals reviewed.      Fluids    Intake/Output Summary (Last 24 hours) at 12/04/18 1813  Last data filed at 12/04/18 1400   Gross per 24 hour   Intake              820 ml   Output              1550 ml   Net             -730 ml       Laboratory  Recent Labs      12/03/18   0205  12/04/18   0101   WBC  7.0  7.3   RBC  3.13*  2.99*   HEMOGLOBIN  9.8*  9.1*   HEMATOCRIT  29.7*  28.0*   MCV  94.9  93.6   MCH  31.3  30.4   MCHC  33.0*  32.5*   RDW  48.0  46.9   PLATELETCT  196  184   MPV  10.9  10.9     Recent Labs      12/03/18   0205  12/04/18   0101   SODIUM  133*  134*   POTASSIUM  3.7  3.8   CHLORIDE  102  102   CO2  24  21   GLUCOSE  86  89   BUN  22  27*   CREATININE  3.92*  4.22*   CALCIUM  9.3  9.4     Recent Labs      12/03/18   1402  12/04/18   0101  12/04/18   1401  12/04/18   1522   APTT  78.0*   --   >240.0*  237.4*   INR  1.06  1.15*   --    --                Imaging  EC-ECHOCARDIOGRAM COMPLETE W/O CONT   Final Result      US-HEMODIALYSIS GRAFT DUPLEX COMP UPPER EXTREMITY   Final Result      DX-CHEST-PORTABLE (1 VIEW)   Final Result      Right IJ dialysis catheter in place.      Cardiomegaly.           Assessment/Plan  * Staphylococcus aureus bacteremia with sepsis (HCC)- (present on admission)   Assessment & Plan    Positive blood cultures from outside facility for MSSA. Negative here 11/30  ID consulted -needs 4-6 weeks of IV antibiotics  Permacath removed  New dialysis catheter placed 12/3  We will need to clarify with ID antibiotic, dose and interval     End stage renal disease (HCC)- (present on admission)   Assessment & Plan    On dialysis  Monday Wednesday Friday  Nephrology consulted and following    Vascular surgery consulted regarding new fistula     COPD (chronic obstructive pulmonary disease) (HCC)- (present on admission)   Assessment & Plan    Not in exacerbation  Oxygen  Respiratory protocol  Stable     Severe protein-calorie malnutrition (HCC)   Assessment & Plan    Patient has lost 80 pounds in 2 years     Nutrition consult has been requested     Severe dental caries   Assessment & Plan    Patient has multiple teeth literally rotting out of her mouth  ID agrees these will need to  be removed and eventually but does not feel this is urgent     Acute external jugular vein thrombosis   Assessment & Plan     incidental acute jugular vein thrombosis on the right  Heparin weight-based protocol with warfarin bridge     Sepsis (HCC)   Assessment & Plan    This is severe sepsis with the following associated acute organ dysfunction(s): metabolic/septic encephalopathy.   Patient not recognizes being severely septic on admission due to lack of fever tachycardia and leukocytosis-  However her septic encephalopathy qualifies her for diagnosis of severe sepsis and this is now resolved.  As her symptoms had resolved and she had stable vital signs, fluids serial lactates and so forth were not indicated by the time sepsis was recognized.    Resolved     Iron deficiency anemia- (present on admission)   Assessment & Plan    PO Ferrous sulfate   Hb ~ 11, at her baseline  Continue to monitor     B12 deficiency- (present on admission)   Assessment & Plan    Resume home B12     HLD (hyperlipidemia)- (present on admission)   Assessment & Plan    Lovastatin     Vitamin D deficiency- (present on admission)   Assessment & Plan    Resume home vitamin D          VTE prophylaxis: On heparin drip with warfarin bridge

## 2018-12-05 NOTE — FLOWSHEET NOTE
12/05/18 1133   Events/Summary/Plan   Events/Summary/Plan SVN   Interdisciplinary Plan of Care-Goals (Indications)   Bronchodilator Indications History / Diagnosis   Interdisciplinary Plan of Care-Outcomes    Bronchodilator Outcome Patient at Stable Baseline   Education   Education Yes - Pt. / Family has been Instructed in use of Respiratory Equipment   SVN Group   #SVN Performed Yes   Given By: Mouthpiece   Chest Exam   Work Of Breathing / Effort Mild   Respiration 20   Pulse 96   Breath Sounds   Pre/Post Intervention Pre Intervention Assessment   RUL Breath Sounds Clear   RML Breath Sounds Diminished   RLL Breath Sounds Diminished   AC Breath Sounds Clear   LLL Breath Sounds Diminished   Secretions   Cough Moist;Non Productive   Oximetry   Continuous Oximetry Yes   Oxygen   Pulse Oximetry 96 %   O2 (LPM) 4   O2 Daily Delivery Respiratory  OxyMask

## 2018-12-05 NOTE — CARE PLAN
Problem: Oxygenation:  Goal: Maintain adequate oxygenation dependent on patient condition  Outcome: PROGRESSING AS EXPECTED    Intervention: Manage oxygen therapy by monitoring pulse oximetry and/or ABG values  Pt has bedside p/ox and was on 5L via n/c      Problem: Bronchoconstriction:  Goal: Improve in air movement and diminished wheezing  Outcome: PROGRESSING AS EXPECTED    Intervention: Implement inhaled treatments  QID/SVN with Duoneb

## 2018-12-05 NOTE — PROGRESS NOTES
POD #2  PROCEDURE:  Placement of left femoral vein hemodialysis catheter. End-stage renal disease with MSSA infected tunneled catheter.    INTERVAL EVENTS:  No new issues reported by RN or patient    EXAM:  Afebrile, vital signs stable, on simple mask with 4L 02  Alert and oriented x 3  Neuro intact  Left groin dialysis catheter clean and dry  Right radiocephalic AVF with thrill exam suggests small vein that needs mechanical maturation.    PLAN:  Kristyn needs a fistulogram prior to use of the AVF.

## 2018-12-05 NOTE — PROGRESS NOTES
Inpatient Anticoagulation Service Note    Date: 2018  Reason for Anticoagulation: New Deep Vein Thrombosis      Hemoglobin Value: (!) 8.7  Hematocrit Value: (!) 26.7  Lab Platelet Value: 184  Target INR: 2.0 to 3.0    INR from last 7 days     Date/Time INR Value    18 2348 (!)  1.85    18 0101 (!)  1.15    18 1402  1.06        Dose from last 7 days     Date/Time Dose (mg)    18 1329  2    18 1000  4    18 1600  6        Significant Interactions: Antibiotics, Statin  Bridge Therapy: Yes  Date of Last VTE Event: 18  Bridge Therapy Start Date: 18  Days of Overlap Therapy: 2   INR Value Greater than 2 Prior to Discontinuation of Parenteral Anticoagulation: Not Applicable   Reversal Agent Administered: Not Applicable    Comments: INR up quite a bit from yesterday after only 2 days of warfarin dosing. The heparin drip for bridging continues. H/H/Plt are all stable and no bleeding noted. Ancef continues and will be needed for 6 weeks from  per ID. Will decrease warfarin dose tonight.     Plan: Warfarin 2 mg with INR tomorrow AM  Education Material Provided?: Yes (Provided by pharmacy intern today)  Pharmacist suggested discharge dosin mg daily and INR within 72 hours of discharge     Yoli Carrizales, PharmD, BCPS

## 2018-12-06 LAB
APTT PPP: 78.7 SEC (ref 24.7–36)
GLUCOSE BLD-MCNC: 92 MG/DL (ref 65–99)
INR PPP: 2.21 (ref 0.87–1.13)
PROTHROMBIN TIME: 24.6 SEC (ref 12–14.6)

## 2018-12-06 PROCEDURE — A9270 NON-COVERED ITEM OR SERVICE: HCPCS | Performed by: INTERNAL MEDICINE

## 2018-12-06 PROCEDURE — A9270 NON-COVERED ITEM OR SERVICE: HCPCS | Performed by: HOSPITALIST

## 2018-12-06 PROCEDURE — 97162 PT EVAL MOD COMPLEX 30 MIN: CPT

## 2018-12-06 PROCEDURE — G8979 MOBILITY GOAL STATUS: HCPCS | Mod: CI

## 2018-12-06 PROCEDURE — 770001 HCHG ROOM/CARE - MED/SURG/GYN PRIV*

## 2018-12-06 PROCEDURE — 94640 AIRWAY INHALATION TREATMENT: CPT

## 2018-12-06 PROCEDURE — 85730 THROMBOPLASTIN TIME PARTIAL: CPT

## 2018-12-06 PROCEDURE — G8978 MOBILITY CURRENT STATUS: HCPCS | Mod: CJ

## 2018-12-06 PROCEDURE — 97165 OT EVAL LOW COMPLEX 30 MIN: CPT

## 2018-12-06 PROCEDURE — 700102 HCHG RX REV CODE 250 W/ 637 OVERRIDE(OP): Performed by: HOSPITALIST

## 2018-12-06 PROCEDURE — 82962 GLUCOSE BLOOD TEST: CPT

## 2018-12-06 PROCEDURE — 99232 SBSQ HOSP IP/OBS MODERATE 35: CPT | Performed by: INTERNAL MEDICINE

## 2018-12-06 PROCEDURE — 700101 HCHG RX REV CODE 250: Performed by: HOSPITALIST

## 2018-12-06 PROCEDURE — G8987 SELF CARE CURRENT STATUS: HCPCS | Mod: CJ

## 2018-12-06 PROCEDURE — 36415 COLL VENOUS BLD VENIPUNCTURE: CPT

## 2018-12-06 PROCEDURE — 700111 HCHG RX REV CODE 636 W/ 250 OVERRIDE (IP): Performed by: HOSPITALIST

## 2018-12-06 PROCEDURE — G8988 SELF CARE GOAL STATUS: HCPCS | Mod: CI

## 2018-12-06 PROCEDURE — 700102 HCHG RX REV CODE 250 W/ 637 OVERRIDE(OP): Performed by: INTERNAL MEDICINE

## 2018-12-06 PROCEDURE — 85610 PROTHROMBIN TIME: CPT

## 2018-12-06 RX ADMIN — IPRATROPIUM BROMIDE AND ALBUTEROL SULFATE 3 ML: .5; 3 SOLUTION RESPIRATORY (INHALATION) at 07:10

## 2018-12-06 RX ADMIN — SUCRALFATE 1 G: 1 TABLET ORAL at 01:48

## 2018-12-06 RX ADMIN — IPRATROPIUM BROMIDE AND ALBUTEROL SULFATE 3 ML: .5; 3 SOLUTION RESPIRATORY (INHALATION) at 14:37

## 2018-12-06 RX ADMIN — THERA TABS 1 TABLET: TAB at 05:31

## 2018-12-06 RX ADMIN — VITAMIN B12 0.1 MG ORAL TABLET 100 MCG: 0.1 TABLET ORAL at 05:31

## 2018-12-06 RX ADMIN — STANDARDIZED SENNA CONCENTRATE AND DOCUSATE SODIUM 2 TABLET: 8.6; 5 TABLET, FILM COATED ORAL at 05:30

## 2018-12-06 RX ADMIN — SUCRALFATE 1 G: 1 TABLET ORAL at 17:26

## 2018-12-06 RX ADMIN — SUCRALFATE 1 G: 1 TABLET ORAL at 11:40

## 2018-12-06 RX ADMIN — FAMOTIDINE 20 MG: 20 TABLET ORAL at 05:31

## 2018-12-06 RX ADMIN — FERROUS SULFATE TAB 325 MG (65 MG ELEMENTAL FE) 325 MG: 325 (65 FE) TAB at 05:30

## 2018-12-06 RX ADMIN — WARFARIN SODIUM 2 MG: 2 TABLET ORAL at 17:26

## 2018-12-06 RX ADMIN — LOVASTATIN 10 MG: 20 TABLET ORAL at 21:33

## 2018-12-06 RX ADMIN — IPRATROPIUM BROMIDE AND ALBUTEROL SULFATE 3 ML: .5; 3 SOLUTION RESPIRATORY (INHALATION) at 20:06

## 2018-12-06 RX ADMIN — FLUTICASONE PROPIONATE 100 MCG: 50 SPRAY, METERED NASAL at 05:31

## 2018-12-06 RX ADMIN — VITAMIN D, TAB 1000IU (100/BT) 1000 UNITS: 25 TAB at 05:31

## 2018-12-06 RX ADMIN — CEFAZOLIN SODIUM 1 G: 1 INJECTION, SOLUTION INTRAVENOUS at 17:27

## 2018-12-06 RX ADMIN — STANDARDIZED SENNA CONCENTRATE AND DOCUSATE SODIUM 1 TABLET: 8.6; 5 TABLET, FILM COATED ORAL at 17:26

## 2018-12-06 RX ADMIN — SUCRALFATE 1 G: 1 TABLET ORAL at 23:14

## 2018-12-06 RX ADMIN — SUCRALFATE 1 G: 1 TABLET ORAL at 05:31

## 2018-12-06 ASSESSMENT — PAIN SCALES - GENERAL
PAINLEVEL_OUTOF10: 0

## 2018-12-06 ASSESSMENT — ENCOUNTER SYMPTOMS
EYE REDNESS: 0
HALLUCINATIONS: 0
TREMORS: 0
PALPITATIONS: 0
SPEECH CHANGE: 0
HEMOPTYSIS: 0
NAUSEA: 0
SENSORY CHANGE: 0
FALLS: 0
ORTHOPNEA: 0
BACK PAIN: 0
HEADACHES: 0
DEPRESSION: 0
COUGH: 0
ABDOMINAL PAIN: 0
VOMITING: 0
EYE DISCHARGE: 0
FEVER: 0
CHILLS: 0
DIZZINESS: 0
DOUBLE VISION: 0
CONSTIPATION: 0
MYALGIAS: 0
BRUISES/BLEEDS EASILY: 0
DIAPHORESIS: 0
PHOTOPHOBIA: 0
WEIGHT LOSS: 1
SHORTNESS OF BREATH: 0
TINGLING: 0
SORE THROAT: 0
BLURRED VISION: 0
DIARRHEA: 0
SPUTUM PRODUCTION: 0
POLYDIPSIA: 0
WEAKNESS: 0
NECK PAIN: 0
ROS GI COMMENTS: APPETITE DECREASED

## 2018-12-06 ASSESSMENT — COGNITIVE AND FUNCTIONAL STATUS - GENERAL
MOVING FROM LYING ON BACK TO SITTING ON SIDE OF FLAT BED: UNABLE
SUGGESTED CMS G CODE MODIFIER MOBILITY: CL
HELP NEEDED FOR BATHING: A LITTLE
DRESSING REGULAR LOWER BODY CLOTHING: A LITTLE
MOVING TO AND FROM BED TO CHAIR: UNABLE
SUGGESTED CMS G CODE MODIFIER DAILY ACTIVITY: CJ
STANDING UP FROM CHAIR USING ARMS: A LITTLE
CLIMB 3 TO 5 STEPS WITH RAILING: A LOT
PERSONAL GROOMING: A LITTLE
MOBILITY SCORE: 14
WALKING IN HOSPITAL ROOM: A LITTLE
TOILETING: A LITTLE
DAILY ACTIVITIY SCORE: 20

## 2018-12-06 ASSESSMENT — GAIT ASSESSMENTS
ASSISTIVE DEVICE: FRONT WHEEL WALKER
DISTANCE (FEET): 15
DEVIATION: SHUFFLED GAIT;BRADYKINETIC
GAIT LEVEL OF ASSIST: MINIMAL ASSIST

## 2018-12-06 ASSESSMENT — LIFESTYLE VARIABLES: SUBSTANCE_ABUSE: 0

## 2018-12-06 ASSESSMENT — ACTIVITIES OF DAILY LIVING (ADL): TOILETING: INDEPENDENT

## 2018-12-06 NOTE — PROGRESS NOTES
Bedside report received. Patient A&O x4. 3L oxy mask.  Pt is medical. No complaints of pain at this time. POC discussed with patient. Patient verbalized understanding. Call light and belongings within reach. Bed locked and in lowest position, alarm and fall precautions in place.

## 2018-12-06 NOTE — PROGRESS NOTES
Hospital Medicine Daily Progress Note    Date of Service  12/5/2018    Chief Complaint  Hallucinations    Hospital Course    *73 y.o. female admitted 11/30/2018 with complaints of hallucinations for the last 1-2 nights-recent blood cultures done at hemodialysis are positive for staph aureus patient denies experience any fever chills or sweats, hallucinations were occurring mainly at night but she was aware that they were hallucinations, in addition to the night prior to admission she had them her first night of admission but now they are resolved*    BCx from [facility], vanco discontinued  Echo with rheumatic mitral valve      Interval Problem Update  Patient resting in her bed without signs of distress.  On 4 L nasal cannula.  BARBIE ordered per ID recommendation to evaluate mitral valve      Consultants/Specialty  [facility]  Fidel Romero MD vascular surgery  [facility]    Code Status  Full     Disposition  TBD    Review of Systems  Review of Systems   Constitutional: Positive for weight loss (80 pounds in 2 years). Negative for chills, diaphoresis, fever and malaise/fatigue.   HENT: Negative for congestion and sore throat.    Eyes: Negative for blurred vision, double vision, discharge and redness.   Respiratory: Negative for cough, sputum production and shortness of breath.    Cardiovascular: Negative for chest pain, palpitations and orthopnea.   Gastrointestinal: Negative for abdominal pain, constipation, diarrhea, nausea and vomiting.        Appetite decreased   Genitourinary: Negative for dysuria, frequency and urgency.   Musculoskeletal: Negative for back pain, falls and myalgias.   Skin: Negative for itching and rash.   Neurological: Negative for dizziness, tingling, tremors, sensory change, weakness and headaches.   Endo/Heme/Allergies: Negative for environmental allergies and polydipsia. Does not bruise/bleed easily.   Psychiatric/Behavioral: Negative for depression,  hallucinations (None since last night) and suicidal ideas.        Physical Exam  Temp:  [36.4 °C (97.6 °F)-37.6 °C (99.6 °F)] 37.6 °C (99.6 °F)  Pulse:  [90-99] 95  Resp:  [16-20] 20  BP: (138-140)/(53-78) 138/78    Physical Exam   Constitutional: She is oriented to person, place, and time. She appears well-developed. No distress.   Chronically ill and hao with loose skin   HENT:   Head: Normocephalic and atraumatic.   Right Ear: External ear normal.   Left Ear: External ear normal.   Nose: Nose normal.   Mouth/Throat: Oropharynx is clear and moist.   Eyes: Pupils are equal, round, and reactive to light. Conjunctivae and EOM are normal. Right eye exhibits no discharge. Left eye exhibits no discharge. No scleral icterus.   Neck: Neck supple.   Cardiovascular: Normal rate and regular rhythm.    Pulmonary/Chest: Effort normal and breath sounds normal. No respiratory distress. She exhibits tenderness (site of permacath insertion. improved, erythema resolved).   Abdominal: Soft. Bowel sounds are normal. She exhibits no distension.   Musculoskeletal: She exhibits no edema or tenderness.   Neurological: She is alert and oriented to person, place, and time. No cranial nerve deficit.   Skin: Skin is warm and dry. She is not diaphoretic.   Psychiatric: She has a normal mood and affect.   Nursing note and vitals reviewed.      Fluids    Intake/Output Summary (Last 24 hours) at 12/05/18 1832  Last data filed at 12/05/18 1358   Gross per 24 hour   Intake              500 ml   Output             1800 ml   Net            -1300 ml       Laboratory  Recent Labs      12/03/18   0205  12/04/18   0101  12/04/18   2348   WBC  7.0  7.3  7.7   RBC  3.13*  2.99*  2.85*   HEMOGLOBIN  9.8*  9.1*  8.7*   HEMATOCRIT  29.7*  28.0*  26.7*   MCV  94.9  93.6  93.7   MCH  31.3  30.4  30.5   MCHC  33.0*  32.5*  32.6*   RDW  48.0  46.9  47.8   PLATELETCT  196  184  184   MPV  10.9  10.9  11.0     Recent Labs      12/03/18   0205  12/04/18   0101   "12/04/18   2348   SODIUM  133*  134*  134*   POTASSIUM  3.7  3.8  4.2   CHLORIDE  102  102  100   CO2  24  21  26   GLUCOSE  86  89  100*   BUN  22  27*  14   CREATININE  3.92*  4.22*  2.51*   CALCIUM  9.3  9.4  9.1     Recent Labs      12/03/18   1402  12/04/18   0101   12/04/18   1522  12/04/18   2348  12/05/18   0609   APTT  78.0*   --    < >  237.4*  59.9*  68.3*   INR  1.06  1.15*   --    --   1.85*   --     < > = values in this interval not displayed.               Imaging  EC-ECHOCARDIOGRAM COMPLETE W/O CONT   Final Result      US-HEMODIALYSIS GRAFT DUPLEX COMP UPPER EXTREMITY   Final Result      DX-CHEST-PORTABLE (1 VIEW)   Final Result      Right IJ dialysis catheter in place.      Cardiomegaly.           Assessment/Plan  * Staphylococcus aureus bacteremia with sepsis (HCC)- (present on admission)   Assessment & Plan    Positive blood cultures from outside facility for MSSA. Negative here 11/30  ID consulted -needs 4-6 weeks of IV antibiotics  Permacath removed  New dialysis catheter placed 12/3  Per ID, cefazolin 3 times a week after discharge for 4-6 weeks from last negative blood culture     End stage renal disease ([facility])- (present on admission)   Assessment & Plan    On dialysis  Monday Wednesday Friday  Nephrology consulted and following    Vascular surgery consulted regarding new fistula     COPD (chronic obstructive pulmonary disease) ([facility])- (present on admission)   Assessment & Plan    Not in exacerbation  Oxygen  Respiratory protocol  Stable     Abnormal echocardiogram   Assessment & Plan    \"Rheumatic appearance of mitral valve with mild stenosis and mild   Regurgitation.\"  In the light of strep bacteremia, ordered BARBIE     Severe protein-calorie malnutrition (HCC)   Assessment & Plan    Patient has lost 80 pounds in 2 years     Nutrition consult has been requested     Severe dental caries   Assessment & Plan    Patient has multiple teeth literally rotting out of her mouth  ID agrees these will need " to be removed and eventually but does not feel this is urgent     Acute external jugular vein thrombosis   Assessment & Plan     incidental acute jugular vein thrombosis on the right  Heparin weight-based protocol with warfarin bridge     Sepsis (HCC)   Assessment & Plan    This is severe sepsis with the following associated acute organ dysfunction(s): metabolic/septic encephalopathy.   Patient not recognizes being severely septic on admission due to lack of fever tachycardia and leukocytosis-  However her septic encephalopathy qualifies her for diagnosis of severe sepsis and this is now resolved.  As her symptoms had resolved and she had stable vital signs, fluids serial lactates and so forth were not indicated by the time sepsis was recognized.    Resolved     Iron deficiency anemia- (present on admission)   Assessment & Plan    PO Ferrous sulfate   Hb ~ 11, at her baseline  Continue to monitor     B12 deficiency- (present on admission)   Assessment & Plan    Resume home B12     HLD (hyperlipidemia)- (present on admission)   Assessment & Plan    Lovastatin     Vitamin D deficiency- (present on admission)   Assessment & Plan    Resume home vitamin D          VTE prophylaxis: On heparin drip with warfarin bridge

## 2018-12-06 NOTE — ASSESSMENT & PLAN NOTE
"\"Rheumatic appearance of mitral valve with mild stenosis and mild   Regurgitation.\"  BARBIE negative for endocarditis  "

## 2018-12-06 NOTE — CARE PLAN
Problem: Communication  Goal: The ability to communicate needs accurately and effectively will improve  Outcome: PROGRESSING AS EXPECTED  Discussed POC and medications with patient. Pt is bridging with heparin and coumadin. Pt verbalized understanding.      Problem: Safety  Goal: Will remain free from injury  Outcome: PROGRESSING AS EXPECTED  Bed locked and in lowest position. Bed alarm on. Pt is standby assist. Treaded socks provided. Call light and belongings within reach.  Patient educated to call for assistance. Pt verbalized understanding. Hourly rounding in place.

## 2018-12-06 NOTE — PROGRESS NOTES
"Hospital Medicine Daily Progress Note    Date of Service  12/6/2018    Chief Complaint  Hallucinations    Hospital Course    *73 y.o. female admitted 11/30/2018 with complaints of hallucinations for the last 1-2 nights-recent blood cultures done at hemodialysis are positive for staph aureus patient denies experience any fever chills or sweats, hallucinations were occurring mainly at night but she was aware that they were hallucinations, in addition to the night prior to admission she had them her first night of admission but now they are resolved*    BCx from [facility], vanco discontinued  Echo with rheumatic mitral valve      Interval Problem Update  Patient is awaiting for BARBIE to evaluate mitral valve.  Patient states she had a clipping procedure many years ago  No overnight events.  Vitals stable  I discussed patient's care with the patient, Dr Romero/vascular surgery,Dr Najjar/nephrology, Dr Diaz/infectious disease  Plan to do fistulogram and PermCath placement on Saturday.  Okay per infectious disease  Patient states she is \"tired to stay in the hospital\"        Consultants/Specialty  [facility]  Fidel Romero MD vascular surgery  [facility]    Code Status  Full     Disposition  TBD    Review of Systems  Review of Systems   Constitutional: Positive for weight loss (80 pounds in 2 years). Negative for chills, diaphoresis, fever and malaise/fatigue.   HENT: Negative for congestion, ear pain, sore throat and tinnitus.    Eyes: Negative for blurred vision, double vision, photophobia, discharge and redness.   Respiratory: Negative for cough, hemoptysis, sputum production and shortness of breath.    Cardiovascular: Negative for chest pain, palpitations and orthopnea.   Gastrointestinal: Negative for abdominal pain, constipation, diarrhea, nausea and vomiting.        Appetite decreased   Genitourinary: Negative for dysuria, frequency and urgency.   Musculoskeletal: Negative for " back pain, falls, myalgias and neck pain.   Skin: Negative for itching and rash.   Neurological: Negative for dizziness, tingling, tremors, sensory change, speech change, weakness and headaches.   Endo/Heme/Allergies: Negative for environmental allergies and polydipsia. Does not bruise/bleed easily.   Psychiatric/Behavioral: Negative for depression, hallucinations (None since last night), substance abuse and suicidal ideas.        Physical Exam  Temp:  [36.3 °C (97.4 °F)-37 °C (98.6 °F)] 37 °C (98.6 °F)  Pulse:  [] 82  Resp:  [14-20] 16  BP: (123-146)/(56-70) 123/65    Physical Exam   Constitutional: She is oriented to person, place, and time. She appears well-developed. No distress.   Chronically ill and hao with loose skin   HENT:   Head: Normocephalic and atraumatic.   Right Ear: External ear normal.   Left Ear: External ear normal.   Nose: Nose normal.   Mouth/Throat: Oropharynx is clear and moist.   Eyes: Pupils are equal, round, and reactive to light. Conjunctivae and EOM are normal. Right eye exhibits no discharge. Left eye exhibits no discharge. No scleral icterus.   Neck: Neck supple.   Cardiovascular: Normal rate and regular rhythm.    Pulmonary/Chest: Effort normal and breath sounds normal. No respiratory distress. She exhibits tenderness (site of permacath insertion. improved, erythema resolved).   Abdominal: Soft. Bowel sounds are normal. She exhibits no distension.   Musculoskeletal: She exhibits no edema or tenderness.   Neurological: She is alert and oriented to person, place, and time. No cranial nerve deficit.   Skin: Skin is warm and dry. She is not diaphoretic.   Psychiatric: She has a normal mood and affect.   Nursing note and vitals reviewed.  I examined the patient, there are no significant changes    Fluids    Intake/Output Summary (Last 24 hours) at 12/06/18 1602  Last data filed at 12/06/18 1300   Gross per 24 hour   Intake              300 ml   Output                0 ml   Net               300 ml       Laboratory  Recent Labs      12/04/18   0101 12/04/18 2348   WBC  7.3  7.7   RBC  2.99*  2.85*   HEMOGLOBIN  9.1*  8.7*   HEMATOCRIT  28.0*  26.7*   MCV  93.6  93.7   MCH  30.4  30.5   MCHC  32.5*  32.6*   RDW  46.9  47.8   PLATELETCT  184  184   MPV  10.9  11.0     Recent Labs      12/04/18   0101 12/04/18   2348   SODIUM  134*  134*   POTASSIUM  3.8  4.2   CHLORIDE  102  100   CO2  21  26   GLUCOSE  89  100*   BUN  27*  14   CREATININE  4.22*  2.51*   CALCIUM  9.4  9.1     Recent Labs      12/04/18 0101 12/04/18 2348 12/05/18   0609  12/06/18   0603   APTT   --    < >  59.9*  68.3*  78.7*   INR  1.15*   --   1.85*   --   2.21*    < > = values in this interval not displayed.               Imaging  EC-ECHOCARDIOGRAM COMPLETE W/O CONT   Final Result      US-HEMODIALYSIS GRAFT DUPLEX COMP UPPER EXTREMITY   Final Result      DX-CHEST-PORTABLE (1 VIEW)   Final Result      Right IJ dialysis catheter in place.      Cardiomegaly.      IR-INTRO STENT DIALYSIS CIRCUIT S&I    (Results Pending)        Assessment/Plan  * Staphylococcus aureus bacteremia with sepsis (HCC)- (present on admission)   Assessment & Plan    Positive blood cultures from outside facility for MSSA. Negative here 11/30  ID consulted -needs 4-6 weeks of IV antibiotics  Permacath removed  New dialysis catheter placed 12/3 in the left femoral vein, temporary  Per ID, cefazolin 3 times a week after discharge for 4-6 weeks from last negative blood culture     End stage renal disease (HCC)- (present on admission)   Assessment & Plan    On dialysis  Monday Wednesday Friday  Nephrology consulted and following    Vascular surgery consulted regarding new fistula    Discussed with Dr. Romero, plan to do fistulogram and left IJ permacatheter on Saturday morning     COPD (chronic obstructive pulmonary disease) (HCC)- (present on admission)   Assessment & Plan    Not in exacerbation  Oxygen  Respiratory protocol  Stable     Abnormal  "echocardiogram   Assessment & Plan    \"Rheumatic appearance of mitral valve with mild stenosis and mild   Regurgitation.\"  In the light of strep bacteremia, ordered BARBIE     Severe protein-calorie malnutrition (HCC)   Assessment & Plan    Patient has lost 80 pounds in 2 years     Nutrition consult has been requested     Severe dental caries   Assessment & Plan    Patient has multiple teeth literally rotting out of her mouth  ID agrees these will need to be removed and eventually but does not feel this is urgent  Denies complaints of tooth     Acute external jugular vein thrombosis   Assessment & Plan     incidental acute jugular vein thrombosis on the right  Heparin weight-based protocol with warfarin bridge     Sepsis (HCC)   Assessment & Plan    This is severe sepsis with the following associated acute organ dysfunction(s): metabolic/septic encephalopathy.   Patient not recognizes being severely septic on admission due to lack of fever tachycardia and leukocytosis-  However her septic encephalopathy qualifies her for diagnosis of severe sepsis and this is now resolved.  As her symptoms had resolved and she had stable vital signs, fluids serial lactates and so forth were not indicated by the time sepsis was recognized.    Resolved     Iron deficiency anemia- (present on admission)   Assessment & Plan    PO Ferrous sulfate   Hb ~ 11, at her baseline  Continue to monitor     B12 deficiency- (present on admission)   Assessment & Plan    Resume home B12     HLD (hyperlipidemia)- (present on admission)   Assessment & Plan    Lovastatin     Vitamin D deficiency- (present on admission)   Assessment & Plan    Resume home vitamin D          VTE prophylaxis: On heparin drip with warfarin bridge      "

## 2018-12-06 NOTE — PROGRESS NOTES
Nephrology Daily Progress Note    Date of Service  12/5/2018    Chief Complaint  73 y.o. female admitted 11/30/2018 with ESRD, sepsis    Interval Problem Update  Pt is doing the same, no new complaints    Review of Systems  Review of Systems   Constitutional: Negative for chills, fever and malaise/fatigue.   Respiratory: Negative for cough and shortness of breath.    Cardiovascular: Negative for chest pain and leg swelling.   Gastrointestinal: Negative for nausea and vomiting.   Genitourinary: Negative for dysuria, frequency and urgency.        Physical Exam  Temp:  [36.4 °C (97.6 °F)-37.6 °C (99.6 °F)] 37.6 °C (99.6 °F)  Pulse:  [90-99] 96  Resp:  [16-20] 20  BP: (121-140)/(53-67) 138/64    Physical Exam   Constitutional: No distress.   HENT:   Nose: Nose normal.   Eyes: Conjunctivae are normal. Right eye exhibits no discharge. Left eye exhibits no discharge. No scleral icterus.   Cardiovascular: Normal rate and regular rhythm.    No murmur heard.  Pulmonary/Chest: She has no wheezes. She has no rales.   Musculoskeletal: She exhibits no edema.   Skin: She is not diaphoretic.   Nursing note and vitals reviewed.      Fluids  No intake or output data in the 24 hours ending 12/05/18 1615    Laboratory  Recent Labs      12/03/18   0205  12/04/18   0101  12/04/18   2348   WBC  7.0  7.3  7.7   RBC  3.13*  2.99*  2.85*   HEMOGLOBIN  9.8*  9.1*  8.7*   HEMATOCRIT  29.7*  28.0*  26.7*   MCV  94.9  93.6  93.7   MCH  31.3  30.4  30.5   MCHC  33.0*  32.5*  32.6*   RDW  48.0  46.9  47.8   PLATELETCT  196  184  184   MPV  10.9  10.9  11.0     Recent Labs      12/03/18   0205  12/04/18   0101  12/04/18   2348   SODIUM  133*  134*  134*   POTASSIUM  3.7  3.8  4.2   CHLORIDE  102  102  100   CO2  24  21  26   GLUCOSE  86  89  100*   BUN  22  27*  14   CREATININE  3.92*  4.22*  2.51*   CALCIUM  9.3  9.4  9.1     Recent Labs      12/03/18   1402  12/04/18   0101   12/04/18   1522  12/04/18   2348  12/05/18   0609   APTT  78.0*   --     < >  237.4*  59.9*  68.3*   INR  1.06  1.15*   --    --   1.85*   --     < > = values in this interval not displayed.               Imaging      Assessment/Plan  1 ESRD  2 Sepsis  3 Anemia  Plan  HD today  Continue Atb  Renal dose all meds  Avoid nephrotoxins.

## 2018-12-06 NOTE — PROGRESS NOTES
3hr HD started @ 1357 and completed @ 1358,tx well tolerated,VSS,net UF = 1300ml.L femoral non TDC dressing CDI,report given to Martha Lloyd RN.

## 2018-12-06 NOTE — PROGRESS NOTES
Vascular follow up.    Left femoral dialysis catheter temporary.  Right neck infection less erythematous.  Last 3 blood cultures negative.  Right forearm distal radial arteriovenous fistula.  Distal stenoses reducing inflow.  Has  good bruit and thrill.  Flow volume low.  Vein small at around 4 mm.    Recommend:  Right arm fistulagram and angioplasties.  Not sure that can develop good flow after this much time.  Salineno a try.  At same setting left IJ tunneled cathter if OK with Infectious Diseases.    On schedule for 8 AM this Saturday.  Discussed with patient.  Labs reviewed.  On Heparin for right IJ clot.

## 2018-12-06 NOTE — PROGRESS NOTES
Inpatient Anticoagulation Service Note    Date: 2018  Reason for Anticoagulation: New Deep Vein Thrombosis        Hemoglobin Value: (!) 8.7  Hematocrit Value: (!) 26.7  Lab Platelet Value: 184  Target INR: 2.0 to 3.0    INR from last 7 days     Date/Time INR Value    18 0603 (!)  2.21    18 2348 (!)  1.85    18 0101 (!)  1.15    18 1402  1.06        Dose from last 7 days     Date/Time Dose (mg)    18 1155  2    18 1329  2    18 1000  4    18 1600  6        Significant Interactions: Antibiotics, Statin  Bridge Therapy: Yes  Date of Last VTE Event: 18  Bridge Therapy Start Date: 18  Days of Overlap Therapy: 3   INR Value Greater than 2 Prior to Discontinuation of Parenteral Anticoagulation: Not Applicable   Reversal Agent Administered: Not Applicable    Comments: INR therapeutic today after only 3 days of dosing. No new labs but no notes of bleeding found in the chart. Heparin bridging continues (needs 2 more days per CHEST guidelines). Will continue with the lower warfarin dose tonight.    Plan:  Warfarin 2 mg with INR tomorrow AM  Education Material Provided?: Yes  Pharmacist suggested discharge dosin mg daily and INR within 72 hours of discharge     Yoli Carrizales, HilaryD, BCPS

## 2018-12-06 NOTE — THERAPY
"Occupational Therapy Evaluation completed.   Functional Status:  CGA standing grooming, SPV full body dressing, CGA toileting, CGA functional mobility w/ FWW  Plan of Care: Will benefit from Occupational Therapy 3 times per week  Discharge Recommendations:  Equipment: Will Continue to Assess for Equipment Needs. Post-acute therapy: pt would benefit from a short stay at subacute rehab prior to returning home due to pt is home alone for periods of the day. Pending length of stay and progress in acute pt may be DC'd home w/ home health    See \"Rehab Therapy-Acute\" Patient Summary Report for complete documentation.    Pt is a 74 y/o female who presents to acute 2/2 Bacteremia causing hallucinations. PMH includes ESRD, COPD, Epilepsy, and osteoarthritis. Pt reports she lives w/ 2 adult children who work during the day and is usually home alone. Pt demo'd impaired balance, functional mobility and activity tolerance impacting independence w/ BADLs. Will follow for Acute OT services.     "

## 2018-12-06 NOTE — PROGRESS NOTES
Nephrology Daily Progress Note    Date of Service  12/6/2018    Chief Complaint  73 y.o. female admitted 11/30/2018 with ESRD, sepsis    Interval Problem Update  Pt is doing better, anxious to go home     Review of Systems  Review of Systems   Constitutional: Negative for chills, fever and malaise/fatigue.   Respiratory: Negative for cough and shortness of breath.    Cardiovascular: Negative for chest pain and leg swelling.   Gastrointestinal: Negative for nausea and vomiting.   Genitourinary: Negative for dysuria, frequency and urgency.        Physical Exam  Temp:  [36.3 °C (97.4 °F)-37 °C (98.6 °F)] 37 °C (98.6 °F)  Pulse:  [] 102  Resp:  [14-20] 16  BP: (123-146)/(56-78) 123/65    Physical Exam   Constitutional: No distress.   HENT:   Nose: Nose normal.   Eyes: Conjunctivae are normal. Right eye exhibits no discharge. Left eye exhibits no discharge. No scleral icterus.   Cardiovascular: Normal rate and regular rhythm.    No murmur heard.  Pulmonary/Chest: She has no wheezes. She has no rales.   Musculoskeletal: She exhibits edema.   Skin: She is not diaphoretic.   Nursing note and vitals reviewed.      Fluids    Intake/Output Summary (Last 24 hours) at 12/06/18 1435  Last data filed at 12/06/18 1300   Gross per 24 hour   Intake              300 ml   Output                0 ml   Net              300 ml       Laboratory  Recent Labs      12/04/18 0101 12/04/18 2348   WBC  7.3  7.7   RBC  2.99*  2.85*   HEMOGLOBIN  9.1*  8.7*   HEMATOCRIT  28.0*  26.7*   MCV  93.6  93.7   MCH  30.4  30.5   MCHC  32.5*  32.6*   RDW  46.9  47.8   PLATELETCT  184  184   MPV  10.9  11.0     Recent Labs      12/04/18   0101 12/04/18 2348   SODIUM  134*  134*   POTASSIUM  3.8  4.2   CHLORIDE  102  100   CO2  21  26   GLUCOSE  89  100*   BUN  27*  14   CREATININE  4.22*  2.51*   CALCIUM  9.4  9.1     Recent Labs      12/04/18   0101   12/04/18   2348  12/05/18   0609  12/06/18   0603   APTT   --    < >  59.9*  68.3*  78.7*    INR  1.15*   --   1.85*   --   2.21*    < > = values in this interval not displayed.               Imaging      Assessment/Plan  1 ESRD:HD MWF  2 Sepsis:on ATB  3 Anemia  Plan  no need for HD  Renal dose all meds  Avoid nephrotoxins  Continue Atb

## 2018-12-06 NOTE — PROGRESS NOTES
Infectious Disease Progress Note    Author: Julee Diaz M.D. Date & Time of service: 2018  2:44 PM    Chief Complaint:  Staph aureus sepsis      Interval History:  73-year-old female with end-stage renal disease, on hemodialysis, admitted due to staphylococcus aureus sepsis with altered mental status, tachycardia, and hallucinations   AF WBC 7.5 feels SOB today-O2 sat 95% with prompted deep breath  12/3/2018 no fevers.  Feels better today.  Denies any new issues overnight.  -no fevers.  WBC 7.3.  Creatinine is 4.22 getting hemodialysis.  Got her hemodialysis catheter  2018 no fevers.  Feels pretty well.  No new issues overnight.  2018-no fevers.  No new issues overnight.  Awaiting BARBIE and fistula placement  Labs Reviewed, Medications Reviewed, Radiology Reviewed and Wound Reviewed.    Review of Systems:  Review of Systems   Constitutional: Negative for fever.   Respiratory: Negative for cough, hemoptysis, sputum production and shortness of breath.    Cardiovascular: Negative for chest pain.   Gastrointestinal: Negative for abdominal pain, diarrhea, nausea and vomiting.   Psychiatric/Behavioral: Negative for hallucinations.   All other systems reviewed and are negative.      Hemodynamics:  Temp (24hrs), Av.8 °C (98.2 °F), Min:36.3 °C (97.4 °F), Max:37 °C (98.6 °F)  Temperature: 37 °C (98.6 °F)  Pulse  Av.4  Min: 52  Max: 107   Blood Pressure :  (pt is medical)       Physical Exam:  Physical Exam   Constitutional: She is oriented to person, place, and time. She appears well-developed.   Looks older than stated age   HENT:   Head: Normocephalic and atraumatic.   Poor dentition   Eyes: Pupils are equal, round, and reactive to light. EOM are normal.   Neck: Neck supple.   Cardiovascular: Regular rhythm.    No murmur heard.  Pulmonary/Chest: Effort normal. No respiratory distress. She has no wheezes. She has no rales.   Right chest HD cath removed   Abdominal: Soft. She exhibits no  distension. There is no tenderness.   Musculoskeletal: She exhibits no edema.   Left femoral hemodialysis catheter   Neurological: She is alert and oriented to person, place, and time.   Skin: Skin is warm. She is not diaphoretic. There is erythema.   Decreased at prior cath site   Nursing note and vitals reviewed.      Meds:    Current Facility-Administered Medications:   •  famotidine  •  sucralfate  •  [COMPLETED] warfarin **FOLLOWED BY** warfarin  •  heparin  •  heparin  •  MD Alert...Warfarin per Pharmacy  •  ipratropium-albuterol  •  epoetin seven  •  [COMPLETED] heparin **AND** heparin **AND** heparin **AND** Protocol 440 Heparin Weight Based DO NOT GIVE ANY HEPARIN BOLUS TO STROKE PATIENT **AND** Protocol 440 Heparin Weight Based Discontinue Enoxaparin (Lovenox), Dabigatran (Pradaxa), Rivaroxaban (Xarelto), Apixaban (Eliquis), Edoxaban (Savaysa, Lixiana), Fondaparinux (Arixtra) and Argatroban prior to heparin administration **AND** Protocol 440 Heparin Weight Based Draw baseline aPTT, PT, and platelet count if not already done **AND** Protocol 440 Heparin Weight Based Draw aPTT 6 hours after beginning infusion.  **AND** Protocol 440 Heparin Weight Based Draw Platelet count every three days. Contact MD if platelet is 50% lower than baseline count. **AND** Protocol 440 Heparin Weight Based Record Patient Data **AND** Protocol 440 Heparin Weight Based INSTRUCTIONS **AND** Protocol 440 Heparin Weight Based Review aPTT results 6 hours after infusion is begun as detailed **AND** Protocol 440 Heparin Weight Based Adjust heparin to maintain aPTT between 55-96 sec **AND** Protocol 440 Heparin Weight Based Order aPTT 6 hours after any rate change or hold until aPTT is therapeutic (55-96 seconds) **AND** Protocol 440 Heparin Weight Based Documentation and verification  •  lidocaine  •  cyanocobalamin  •  ferrous sulfate  •  fluticasone  •  ipratropium-albuterol  •  lovastatin  •  multivitamin  •  vitamin D  •   Respiratory Care per Protocol  •  ondansetron  •  ondansetron  •  acetaminophen  •  ceFAZolin  •  senna-docusate **AND** polyethylene glycol/lytes **AND** [DISCONTINUED] magnesium hydroxide **AND** bisacodyl    Labs:  Recent Labs      12/04/18   0101  12/04/18   2348   WBC  7.3  7.7   RBC  2.99*  2.85*   HEMOGLOBIN  9.1*  8.7*   HEMATOCRIT  28.0*  26.7*   MCV  93.6  93.7   MCH  30.4  30.5   RDW  46.9  47.8   PLATELETCT  184  184   MPV  10.9  11.0   NEUTSPOLYS  64.20  63.80   LYMPHOCYTES  21.70*  21.90*   MONOCYTES  10.00  11.00   EOSINOPHILS  2.60  2.00   BASOPHILS  0.50  0.50     Recent Labs      12/04/18   0101  12/04/18   2348   SODIUM  134*  134*   POTASSIUM  3.8  4.2   CHLORIDE  102  100   CO2  21  26   GLUCOSE  89  100*   BUN  27*  14     Recent Labs      12/04/18   0101  12/04/18   2348   CREATININE  4.22*  2.51*       Imaging:  Dx-chest-portable (1 View)    Result Date: 11/30/2018 11/30/2018 4:34 PM HISTORY/REASON FOR EXAM:  On dialysis. Reportedly infected port. Cardiomegaly. Renal failure. TECHNIQUE/EXAM DESCRIPTION AND NUMBER OF VIEWS: Single portable view of the chest. COMPARISON: October 21, 2018 FINDINGS: Right IJ dialysis catheter is present with tip in expected location of the superior vena cava. The cardiac silhouette is enlarged. No pulmonary consolidation. No effusion or pneumothorax.     Right IJ dialysis catheter in place. Cardiomegaly.    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  Acute deep venous thrombus throughout the right internal jugular vein  Results called to MARCELINA DANIELS approximately 1700 hours 12/1/2018  VICENTE FRANCO  Exam Date:     12/01/2018 15:23  Room #:     Inpatient  Priority:     Routine  Ht (in):             Wt (lb):  Ordering Physician:        MARCELINA DANIELS  Referring Physician:       MARCELINA DANIELS  Sonographer:                Waqas Rodriguez RVT  Study Type:                Complete Unilateral  Technical Quality:         Adequate  Age:    73    Gender:     F  MRN:    [id number]  :    1945      BSA:  Indications:     AV Fistula - S/P  CPT Codes:       03810  ICD Codes:       447  History:         Right radiocephalic AVF evaluation  Limitations:  Exam Data:  Side:          Right  Access          Fistula  Inflow Artery   Radial  Outflow Vein    Cephalic                     Velocity (cm/s)   Prox Inflow artery          103.0                               0   Mid Inflow artery           130.0                               0   Distal Inflow artery        108.0                               0   Inflow Anastomosis          171.0                               0   Inflow Artery distal to     79.00   anastomosis          Antegrade   Proximal Graft   Mid Graft   Distal Graft   Outflow Anastomosis   Proximal Outflow Vein       689.0                               0   Mid Outflow Vein            57.00   Distal Outflow Vein         60.00   Flow Volume Mid Bicep       139.0ml/min                               0   Flow Volume Mid-Forearm     178.0ml/min                               0   Prox Outflow Vein Diam      0.40  cm   Mid Outflow Vein Diam       0.39  cm   Distal Outflow Vein Diam    0.45  cm  Findings  At the distal forearm, there are tandem diameter narrowings with  corresponding increases in velocity. The first diameter reduction is from  0.40 to 0.19 cm with a 3.7X increase in velocity. The second diameter  reduction is from 0.46 to 0.23 cm with a maximum velocity of 442 cm/sec.  Flow volumes in the right radiocephalic AVF range from 139-178 mL/min.  No evidence of hemodynamically significant stenosis in the inflow artery or  the anastomosis. Flow in the radial artery distal to the anastomosis is  antegrade and triphasic.  Incidental finding: Echolucent material consistent with acute venous  thrombosis is visualized in the  "internal jugular vein throughout its  length.  Luis M Perez  (Electronically Signed)  Final Date:      01 December 2018                   17:17      Micro:  Results     Procedure Component Value Units Date/Time    BLOOD CULTURE [[id number]] Collected:  11/30/18 1615    Order Status:  Completed Specimen:  Blood from Peripheral Updated:  12/05/18 1900     Significant Indicator NEG     Source BLD     Site PERIPHERAL     Blood Culture No growth after 5 days of incubation.    Narrative:       Per Hospital Policy: Only change Specimen Src: to \"Line\" if  specified by physician order.    BLOOD CULTURE [[id number]] Collected:  11/30/18 1717    Order Status:  Completed Specimen:  Blood from Peripheral Updated:  12/05/18 1900     Significant Indicator NEG     Source BLD     Site PERIPHERAL     Blood Culture No growth after 5 days of incubation.    Narrative:       Per Hospital Policy: Only change Specimen Src: to \"Line\" if  specified by physician order.    URINE CULTURE(NEW) [[id number]] Collected:  11/30/18 1655    Order Status:  Completed Specimen:  Urine Updated:  12/02/18 0813     Significant Indicator NEG     Source UR     Site --     Urine Culture No growth at 48 hours    Narrative:       Indication for culture:->Emergency Room Patient    URINALYSIS [[id number]]  (Abnormal) Collected:  11/30/18 1655    Order Status:  Completed Specimen:  Urine Updated:  11/30/18 1734     Color Yellow     Character Clear     Specific Gravity 1.017     Ph >=9.0 (A)     Glucose Negative mg/dL      Ketones Negative mg/dL      Protein 300 (A) mg/dL      Bilirubin Negative     Urobilinogen, Urine 0.2     Nitrite Negative     Leukocyte Esterase Negative     Occult Blood Negative     Micro Urine Req Microscopic    Narrative:       Indication for culture:->Emergency Room Patient          Assessment:  Active Hospital Problems    Diagnosis   • *Staphylococcus aureus bacteremia with sepsis ([facility]) [A41.01]   • End stage renal disease (HCC) [N18.6]   • " Encephalopathy acute [G93.40]   • COPD (chronic obstructive pulmonary disease) ([facility]) [J44.9]   • B12 deficiency [E53.8]   • Iron deficiency anemia [D50.9]   • HLD (hyperlipidemia) [E78.5]   • Vitamin D deficiency [E55.9]   • Sepsis ([facility]) [A41.9]   • Acute external jugular vein thrombosis [I82.890]   • Severe dental caries [K02.9]   • Severe protein-calorie malnutrition ([facility]) [E43]       Plan:  SA sepsis  Afebrile  No leukocytosis  AMS resolved  BCxs + MSSA  Repeat Bcxs 11/30 neg  TTE reveals rheumatic looking mitral valve.  It seems significantly different read from 2016  For BARBIE in a.m.  Anticipate 6 weeks IV abx from date of negative blood cxs and cath removal  Continue cefazolin  Can be given during hemodialysis      DVT right IJ, new  Query infected thrombophlebitis  Cath removed  Heparin  Abx as above  New hemodialysis catheter was placed on 12/3/2018    Dental caries  Likely will need extractions  Discussed with the patient    ESRD  Dose adjust abx    Discussed with internal medicine

## 2018-12-07 ENCOUNTER — APPOINTMENT (OUTPATIENT)
Dept: CARDIOLOGY | Facility: MEDICAL CENTER | Age: 73
DRG: 252 | End: 2018-12-07
Attending: INTERNAL MEDICINE
Payer: MEDICARE

## 2018-12-07 LAB
APTT PPP: 71.6 SEC (ref 24.7–36)
INR PPP: 2.31 (ref 0.87–1.13)
POTASSIUM SERPL-SCNC: 4.6 MMOL/L (ref 3.6–5.5)
PROTHROMBIN TIME: 25.4 SEC (ref 12–14.6)

## 2018-12-07 PROCEDURE — 99232 SBSQ HOSP IP/OBS MODERATE 35: CPT | Performed by: INTERNAL MEDICINE

## 2018-12-07 PROCEDURE — 36415 COLL VENOUS BLD VENIPUNCTURE: CPT

## 2018-12-07 PROCEDURE — 85610 PROTHROMBIN TIME: CPT

## 2018-12-07 PROCEDURE — 700111 HCHG RX REV CODE 636 W/ 250 OVERRIDE (IP)

## 2018-12-07 PROCEDURE — 700101 HCHG RX REV CODE 250: Performed by: HOSPITALIST

## 2018-12-07 PROCEDURE — 84132 ASSAY OF SERUM POTASSIUM: CPT

## 2018-12-07 PROCEDURE — 93325 DOPPLER ECHO COLOR FLOW MAPG: CPT

## 2018-12-07 PROCEDURE — 700102 HCHG RX REV CODE 250 W/ 637 OVERRIDE(OP): Performed by: HOSPITALIST

## 2018-12-07 PROCEDURE — 93325 DOPPLER ECHO COLOR FLOW MAPG: CPT | Mod: 26 | Performed by: INTERNAL MEDICINE

## 2018-12-07 PROCEDURE — B246ZZ4 ULTRASONOGRAPHY OF RIGHT AND LEFT HEART, TRANSESOPHAGEAL: ICD-10-PCS | Performed by: INTERNAL MEDICINE

## 2018-12-07 PROCEDURE — 700111 HCHG RX REV CODE 636 W/ 250 OVERRIDE (IP): Performed by: INTERNAL MEDICINE

## 2018-12-07 PROCEDURE — 700111 HCHG RX REV CODE 636 W/ 250 OVERRIDE (IP): Mod: JG | Performed by: INTERNAL MEDICINE

## 2018-12-07 PROCEDURE — 700101 HCHG RX REV CODE 250: Performed by: INTERNAL MEDICINE

## 2018-12-07 PROCEDURE — 160002 HCHG RECOVERY MINUTES (STAT)

## 2018-12-07 PROCEDURE — 700102 HCHG RX REV CODE 250 W/ 637 OVERRIDE(OP): Performed by: INTERNAL MEDICINE

## 2018-12-07 PROCEDURE — 93312 ECHO TRANSESOPHAGEAL: CPT | Mod: 26 | Performed by: INTERNAL MEDICINE

## 2018-12-07 PROCEDURE — 5A1D70Z PERFORMANCE OF URINARY FILTRATION, INTERMITTENT, LESS THAN 6 HOURS PER DAY: ICD-10-PCS | Performed by: INTERNAL MEDICINE

## 2018-12-07 PROCEDURE — 700111 HCHG RX REV CODE 636 W/ 250 OVERRIDE (IP): Performed by: HOSPITALIST

## 2018-12-07 PROCEDURE — 85730 THROMBOPLASTIN TIME PARTIAL: CPT

## 2018-12-07 PROCEDURE — 770001 HCHG ROOM/CARE - MED/SURG/GYN PRIV*

## 2018-12-07 PROCEDURE — 700111 HCHG RX REV CODE 636 W/ 250 OVERRIDE (IP): Performed by: SURGERY

## 2018-12-07 PROCEDURE — A9270 NON-COVERED ITEM OR SERVICE: HCPCS | Performed by: INTERNAL MEDICINE

## 2018-12-07 PROCEDURE — 94640 AIRWAY INHALATION TREATMENT: CPT

## 2018-12-07 PROCEDURE — A9270 NON-COVERED ITEM OR SERVICE: HCPCS | Performed by: HOSPITALIST

## 2018-12-07 PROCEDURE — 90935 HEMODIALYSIS ONE EVALUATION: CPT

## 2018-12-07 RX ORDER — IPRATROPIUM BROMIDE AND ALBUTEROL SULFATE 2.5; .5 MG/3ML; MG/3ML
3 SOLUTION RESPIRATORY (INHALATION)
Status: DISCONTINUED | OUTPATIENT
Start: 2018-12-07 | End: 2018-12-07 | Stop reason: HOSPADM

## 2018-12-07 RX ORDER — HEPARIN SODIUM 1000 [USP'U]/ML
INJECTION, SOLUTION INTRAVENOUS; SUBCUTANEOUS
Status: COMPLETED
Start: 2018-12-07 | End: 2018-12-07

## 2018-12-07 RX ORDER — ONDANSETRON 2 MG/ML
4 INJECTION INTRAMUSCULAR; INTRAVENOUS
Status: DISCONTINUED | OUTPATIENT
Start: 2018-12-07 | End: 2018-12-07 | Stop reason: HOSPADM

## 2018-12-07 RX ORDER — SODIUM CHLORIDE 9 MG/ML
INJECTION, SOLUTION INTRAVENOUS CONTINUOUS
Status: DISCONTINUED | OUTPATIENT
Start: 2018-12-07 | End: 2018-12-16 | Stop reason: HOSPADM

## 2018-12-07 RX ORDER — MIDAZOLAM HYDROCHLORIDE 1 MG/ML
INJECTION INTRAMUSCULAR; INTRAVENOUS
Status: DISPENSED
Start: 2018-12-07 | End: 2018-12-07

## 2018-12-07 RX ORDER — IPRATROPIUM BROMIDE AND ALBUTEROL SULFATE 2.5; .5 MG/3ML; MG/3ML
3 SOLUTION RESPIRATORY (INHALATION)
Status: DISCONTINUED | OUTPATIENT
Start: 2018-12-07 | End: 2018-12-16 | Stop reason: HOSPADM

## 2018-12-07 RX ORDER — PHYTONADIONE 10 MG/ML
2 INJECTION, EMULSION INTRAMUSCULAR; INTRAVENOUS; SUBCUTANEOUS ONCE
Status: COMPLETED | OUTPATIENT
Start: 2018-12-07 | End: 2018-12-07

## 2018-12-07 RX ADMIN — CEFAZOLIN SODIUM 1 G: 1 INJECTION, SOLUTION INTRAVENOUS at 18:26

## 2018-12-07 RX ADMIN — FAMOTIDINE 20 MG: 20 TABLET ORAL at 05:26

## 2018-12-07 RX ADMIN — FERROUS SULFATE TAB 325 MG (65 MG ELEMENTAL FE) 325 MG: 325 (65 FE) TAB at 05:27

## 2018-12-07 RX ADMIN — PHYTONADIONE 2 MG: 10 INJECTION, EMULSION INTRAMUSCULAR; INTRAVENOUS; SUBCUTANEOUS at 22:18

## 2018-12-07 RX ADMIN — LOVASTATIN 10 MG: 20 TABLET ORAL at 22:18

## 2018-12-07 RX ADMIN — VITAMIN B12 0.1 MG ORAL TABLET 100 MCG: 0.1 TABLET ORAL at 05:26

## 2018-12-07 RX ADMIN — SUCRALFATE 1 G: 1 TABLET ORAL at 18:26

## 2018-12-07 RX ADMIN — WARFARIN SODIUM 2 MG: 2 TABLET ORAL at 18:26

## 2018-12-07 RX ADMIN — THERA TABS 1 TABLET: TAB at 05:26

## 2018-12-07 RX ADMIN — HEPARIN SODIUM 2800 UNITS: 1000 INJECTION, SOLUTION INTRAVENOUS; SUBCUTANEOUS at 17:55

## 2018-12-07 RX ADMIN — SUCRALFATE 1 G: 1 TABLET ORAL at 05:26

## 2018-12-07 RX ADMIN — EPOETIN ALFA 4000 UNITS: 4000 SOLUTION INTRAVENOUS; SUBCUTANEOUS at 10:13

## 2018-12-07 RX ADMIN — FLUTICASONE PROPIONATE 100 MCG: 50 SPRAY, METERED NASAL at 05:26

## 2018-12-07 RX ADMIN — IPRATROPIUM BROMIDE AND ALBUTEROL SULFATE 3 ML: .5; 3 SOLUTION RESPIRATORY (INHALATION) at 20:02

## 2018-12-07 RX ADMIN — IPRATROPIUM BROMIDE AND ALBUTEROL SULFATE 3 ML: .5; 3 SOLUTION RESPIRATORY (INHALATION) at 07:19

## 2018-12-07 RX ADMIN — STANDARDIZED SENNA CONCENTRATE AND DOCUSATE SODIUM 2 TABLET: 8.6; 5 TABLET, FILM COATED ORAL at 05:27

## 2018-12-07 RX ADMIN — HEPARIN SODIUM 700 UNITS/HR: 5000 INJECTION, SOLUTION INTRAVENOUS at 04:03

## 2018-12-07 RX ADMIN — VITAMIN D, TAB 1000IU (100/BT) 1000 UNITS: 25 TAB at 05:26

## 2018-12-07 ASSESSMENT — PAIN SCALES - GENERAL
PAINLEVEL_OUTOF10: 0

## 2018-12-07 ASSESSMENT — ENCOUNTER SYMPTOMS
FALLS: 0
ROS GI COMMENTS: APPETITE DECREASED
HEMOPTYSIS: 0
EYE REDNESS: 0
TINGLING: 0
SORE THROAT: 0
MYALGIAS: 0
EYE DISCHARGE: 0
PHOTOPHOBIA: 0
NECK PAIN: 0
SHORTNESS OF BREATH: 0
SENSORY CHANGE: 0
FEVER: 0
DOUBLE VISION: 0
TREMORS: 0
VOMITING: 0
CHILLS: 0
SPUTUM PRODUCTION: 0
DIAPHORESIS: 0
BRUISES/BLEEDS EASILY: 0
CONSTIPATION: 0
BLURRED VISION: 0
ABDOMINAL PAIN: 0
HALLUCINATIONS: 0
BACK PAIN: 0
DIARRHEA: 0
PALPITATIONS: 0
WEIGHT LOSS: 1
DIZZINESS: 0
NAUSEA: 0
POLYDIPSIA: 0
HEADACHES: 0
SPEECH CHANGE: 0
COUGH: 0
DEPRESSION: 0
ORTHOPNEA: 0
WEAKNESS: 0

## 2018-12-07 ASSESSMENT — LIFESTYLE VARIABLES: SUBSTANCE_ABUSE: 0

## 2018-12-07 NOTE — PROGRESS NOTES
Bedside report received. Patient A&O x4. 3L NC.  Pt is medical.  No complaints of pain at this time. Pt ambulated to bathroom, standby assist with front wheel walker.  Pt c/o of dyspnea with exertion, pt able to recover once seated. POC discussed with patient. Patient verbalized understanding. Call light and belongings within reach. Bed locked and in lowest position, alarm and fall precautions in place.

## 2018-12-07 NOTE — PROGRESS NOTES
Inpatient Anticoagulation Service Note    Date: 2018  Reason for Anticoagulation: New Deep Vein Thrombosis        Hemoglobin Value: (!) 8.7  Hematocrit Value: (!) 26.7  Lab Platelet Value: 184  Target INR: 2.0 to 3.0    INR from last 7 days     Date/Time INR Value    18 0541 (!)  2.31    18 0603 (!)  2.21    18 2348 (!)  1.85    18 0101 (!)  1.15    18 1402  1.06        Dose from last 7 days     Date/Time Dose (mg)    18 1013  2    18 1155  2    18 1329  2    18 1000  4    18 1600  6        Significant Interactions: Antibiotics, Statin  Bridge Therapy: Yes  Date of Last VTE Event: 18  Bridge Therapy Start Date: 18  Days of Overlap Therapy: 4   INR Value Greater than 2 Prior to Discontinuation of Parenteral Anticoagulation: Not Applicable   Reversal Agent Administered: Not Applicable    Comments: INR therapeutic today. No new labs, but no noted bleeding. Heparin bridging continues (needs 1 more day per CHEST guidelines). Will continue same dosing.     Plan:  Warfarin 2 mg with INR tomorrow AM  Education Material Provided?: Yes  Pharmacist suggested discharge dosin mg daily and INR within 72 hours of discharge     Yoli Carrizales, HilaryD, BCPS

## 2018-12-07 NOTE — THERAPY
"Physical Therapy Evaluation completed.   Bed Mobility:  Supine to Sit: Contact Guard Assist  Transfers: Sit to Stand: Minimal Assist  Gait: Level Of Assist: Minimal Assist (very SOB) with Front-Wheel Walker       Plan of Care: Will benefit from Physical Therapy 3 times per week  Discharge Recommendations: Equipment: Will Continue to Assess for Equipment Needs. Post-acute therapy Discharge to home with outpatient or home health for additional skilled therapy services.    See \"Rehab Therapy-Acute\" Patient Summary Report for complete documentation.     "

## 2018-12-07 NOTE — PROGRESS NOTES
Received report from Edna DALTON. Discussed pt plan of care. Any questions and concerns addressed at this time. Pt resting in bed. Bed locked and in lowest position. Educated pt on use of call light and call light within reach. Pt medical. 2 RN heparin verify. No other needs at this time.

## 2018-12-07 NOTE — FACE TO FACE
Face to Face Supporting Documentation - Home Health    The encounter with this patient was in whole or in part the primary reason for home health admission.    Date of encounter:   Patient:                    MRN:                       YOB: 2019  Kristyn Gillespie  [id number]  1945     Home health to see patient for:  Skilled Nursing care for assessment, interventions & education, Physical Therapy evaluation and treatment and Occupational therapy evaluation and treatment    Skilled need for:  Recent Deterioration of Health Status bacteremia, ESRD, COPD, osteoarthritis    Skilled nursing interventions to include:  Comment: assesment and education    Homebound status evidenced by:  Need the aid of supportive devices such as crutches, canes, wheelchairs or walkers. Leaving home requires a considerable and taxing effort. There is a normal inability to leave the home.    Community Physician to provide follow up care: Rosa LACEY M.D.     Optional Interventions? No      I certify the face to face encounter for this home health care referral meets the CMS requirements and the encounter/clinical assessment with the patient was, in whole, or in part, for the medical condition(s) listed above, which is the primary reason for home health care. Based on my clinical findings: the service(s) are medically necessary, support the need for home health care, and the homebound criteria are met.  I certify that this patient has had a face to face encounter by myself.  Nathan Turk M.D. - NPI: [id number]

## 2018-12-07 NOTE — PROGRESS NOTES
Hospital Medicine Daily Progress Note    Date of Service  12/7/2018    Chief Complaint  Hallucinations    Hospital Course    *73 y.o. female admitted 11/30/2018 with complaints of hallucinations for the last 1-2 nights-recent blood cultures done at hemodialysis are positive for staph aureus patient denies experience any fever chills or sweats, hallucinations were occurring mainly at night but she was aware that they were hallucinations, in addition to the night prior to admission she had them her first night of admission but now they are resolved*    BCx from [facility] MSSA, vanco discontinued  Echo with rheumatic mitral valve      Interval Problem Update    Status post BARBIE.  No evidence of endocarditis  Patient is laying in her bed without signs of distress.  Vitals stable.  Ordered home health.  I discussed patient care with Dr. Diaz and Dr Najjar.  IV antibiotics cefazolin 1 g 3 times a week with hemodialysis until January 14 setup as outpatient after patient discharge  Placement of left IJ PermCath and fistulogram pending tomorrow        Consultants/Specialty  [facility]  Fidel Romero MD vascular surgery  [facility]    Code Status  Full     Disposition  TBD    Review of Systems  Review of Systems   Constitutional: Positive for weight loss (80 pounds in 2 years). Negative for chills, diaphoresis, fever and malaise/fatigue.   HENT: Negative for congestion, ear pain, sore throat and tinnitus.    Eyes: Negative for blurred vision, double vision, photophobia, discharge and redness.   Respiratory: Negative for cough, hemoptysis, sputum production and shortness of breath.    Cardiovascular: Negative for chest pain, palpitations and orthopnea.   Gastrointestinal: Negative for abdominal pain, constipation, diarrhea, nausea and vomiting.        Appetite decreased   Genitourinary: Negative for dysuria, frequency and urgency.   Musculoskeletal: Negative for back pain, falls, myalgias  and neck pain.   Skin: Negative for itching and rash.   Neurological: Negative for dizziness, tingling, tremors, sensory change, speech change, weakness and headaches.   Endo/Heme/Allergies: Negative for environmental allergies and polydipsia. Does not bruise/bleed easily.   Psychiatric/Behavioral: Negative for depression, hallucinations (None since last night), substance abuse and suicidal ideas.        Physical Exam  Temp:  [36.2 °C (97.2 °F)-37.2 °C (98.9 °F)] 36.7 °C (98 °F)  Pulse:  [] 83  Resp:  [14-20] 16  BP: (124-156)/(62-83) 135/81    Physical Exam   Constitutional: She is oriented to person, place, and time. She appears well-developed. No distress.   Chronically ill and hao with loose skin   HENT:   Head: Normocephalic and atraumatic.   Right Ear: External ear normal.   Left Ear: External ear normal.   Nose: Nose normal.   Mouth/Throat: Oropharynx is clear and moist.   Eyes: Pupils are equal, round, and reactive to light. Conjunctivae and EOM are normal. Right eye exhibits no discharge. Left eye exhibits no discharge. No scleral icterus.   Neck: Neck supple.   Cardiovascular: Normal rate and regular rhythm.    Pulmonary/Chest: Effort normal and breath sounds normal. No respiratory distress. She exhibits tenderness (site of permacath insertion. improved, erythema resolved).   Abdominal: Soft. Bowel sounds are normal. She exhibits no distension.   Musculoskeletal: She exhibits no edema or tenderness.   Neurological: She is alert and oriented to person, place, and time. No cranial nerve deficit.   Skin: Skin is warm and dry. She is not diaphoretic.   Psychiatric: She has a normal mood and affect.   Nursing note and vitals reviewed.  I examined the patient, there are no significant changes    Fluids    Intake/Output Summary (Last 24 hours) at 12/07/18 1525  Last data filed at 12/07/18 1335   Gross per 24 hour   Intake              100 ml   Output                0 ml   Net              100 ml  "      Laboratory  Recent Labs      12/04/18 2348   WBC  7.7   RBC  2.85*   HEMOGLOBIN  8.7*   HEMATOCRIT  26.7*   MCV  93.7   MCH  30.5   MCHC  32.6*   RDW  47.8   PLATELETCT  184   MPV  11.0     Recent Labs      12/04/18 2348  12/07/18   0947   SODIUM  134*   --    POTASSIUM  4.2  4.6   CHLORIDE  100   --    CO2  26   --    GLUCOSE  100*   --    BUN  14   --    CREATININE  2.51*   --    CALCIUM  9.1   --      Recent Labs      12/04/18 2348  12/05/18   0609  12/06/18   0603  12/07/18   0541   APTT  59.9*  68.3*  78.7*  71.6*   INR  1.85*   --   2.21*  2.31*               Imaging  EC-BARBIE W/O CONT   Final Result      EC-ECHOCARDIOGRAM COMPLETE W/O CONT   Final Result      US-HEMODIALYSIS GRAFT DUPLEX COMP UPPER EXTREMITY   Final Result      DX-CHEST-PORTABLE (1 VIEW)   Final Result      Right IJ dialysis catheter in place.      Cardiomegaly.      IR-INTRO STENT DIALYSIS CIRCUIT S&I    (Results Pending)        Assessment/Plan  * Staphylococcus aureus bacteremia with sepsis (HCC)- (present on admission)   Assessment & Plan    Positive blood cultures from outside facility for MSSA. Negative here 11/30  ID consulted -needs 4-6 weeks of IV antibiotics  Permacath removed  New dialysis catheter placed 12/3 in the left femoral vein, temporary  Per ID, cefazolin 3 times a week after discharge for 6 weeks from last negative blood culture, stop date 1/14/2018     End stage renal disease (HCC)- (present on admission)   Assessment & Plan    On dialysis  Monday Wednesday Friday  Nephrology consulted and following    Vascular surgery consulted regarding new fistula    Discussed with Dr. Romero, plan to do fistulogram and left IJ permacatheter on Saturday morning     COPD (chronic obstructive pulmonary disease) (HCC)- (present on admission)   Assessment & Plan    Not in exacerbation  Oxygen  Respiratory protocol  Stable     Abnormal echocardiogram   Assessment & Plan    \"Rheumatic appearance of mitral valve with mild stenosis " "and mild   Regurgitation.\"  In the light of strep bacteremia, ordered BARBIE     Severe protein-calorie malnutrition (HCC)   Assessment & Plan    Patient has lost 80 pounds in 2 years     Nutrition consult has been requested     Severe dental caries   Assessment & Plan    Patient has multiple teeth literally rotting out of her mouth  ID agrees these will need to be removed and eventually but does not feel this is urgent  Denies complaints of tooth     Acute external jugular vein thrombosis   Assessment & Plan     incidental acute jugular vein thrombosis on the right  Heparin weight-based protocol with warfarin bridge     Sepsis (HCC)   Assessment & Plan    This is severe sepsis with the following associated acute organ dysfunction(s): metabolic/septic encephalopathy.   Patient not recognizes being severely septic on admission due to lack of fever tachycardia and leukocytosis-  However her septic encephalopathy qualifies her for diagnosis of severe sepsis and this is now resolved.  As her symptoms had resolved and she had stable vital signs, fluids serial lactates and so forth were not indicated by the time sepsis was recognized.    Resolved     Iron deficiency anemia- (present on admission)   Assessment & Plan    PO Ferrous sulfate   Hb ~ 11, at her baseline  Continue to monitor     B12 deficiency- (present on admission)   Assessment & Plan    Resume home B12     HLD (hyperlipidemia)- (present on admission)   Assessment & Plan    Lovastatin     Vitamin D deficiency- (present on admission)   Assessment & Plan    Resume home vitamin D          VTE prophylaxis: On heparin drip with warfarin bridge      "

## 2018-12-07 NOTE — DISCHARGE PLANNING
Outpatient Dialysis Note      Per Dr. Najjar, IV ABX has been set up at the clinic. Patient okay to discharge to HD clinic.      Tammy Betts- Dialysis Coordinator  [facility] Ph# [phone]

## 2018-12-07 NOTE — PROGRESS NOTES
Pt down to dialysis with transport.  Dialysis notified that pt is still on post-procedural vitals.  Dialysis RN verified that post-procedural vital signs would be continued in dialysis.

## 2018-12-07 NOTE — CARE PLAN
Problem: Urinary Elimination:  Goal: Ability to reestablish a normal urinary elimination pattern will improve  Outcome: PROGRESSING AS EXPECTED  Pt ambulated to bathroom and voided twice throughout shift. Oral fluid intake encouraged.     Problem: Pain Management  Goal: Pain level will decrease to patient's comfort goal  Outcome: PROGRESSING AS EXPECTED  Educated pt on importance of pain management. Pt states no pain.

## 2018-12-07 NOTE — DISCHARGE PLANNING
Anticipated Discharge Disposition: Home Health      Action: Spoke with Tammy Betts (Dialysis coordinator [phone]) concerning antibiotics to be given with dialysis.  Spoke to Dr. Turk and he stated information was already passed to Dr. Najjar.   Tammy confirmed IV ABX has been set up at the clinic.      Barriers to Discharge: Home health acceptance    Plan: RN CM to f/u with home health choice

## 2018-12-07 NOTE — CARE PLAN
Problem: Venous Thromboembolism (VTW)/Deep Vein Thrombosis (DVT) Prevention:  Goal: Patient will participate in Venous Thrombosis (VTE)/Deep Vein Thrombosis (DVT)Prevention Measures  Outcome: PROGRESSING AS EXPECTED  Pt is ambulatory. Pt on heparin to coumadin bridge. INR therapeutic today at 2.31.      Problem: Respiratory:  Goal: Respiratory status will improve  Outcome: PROGRESSING AS EXPECTED  Pt on 3L NC. Some dyspnea with exertion.  Pt receiving treatments from RT.

## 2018-12-07 NOTE — DIETARY
Nutrition Services:  Brief Update    RD following pt for adequate PO intake.  Pt is currently NPO for BARBIE today; was previously on a Renal, Dysphagia 3 diet.  Per ADL flow sheet, PO has been variable, but avg 50%.    Recommendations/Plan:  1. Advance diet as tolerated per MD.  2. Encourage intake of meals.  3. Document intake of all meals as % taken in ADLs to provide interdisciplinary communication across all shifts.   4. Monitor weight.  5. Nutrition rep will continue to see patient for ongoing meal and snack preferences.   6. Add supplement order per RD as needed.

## 2018-12-07 NOTE — PROGRESS NOTES
Pt back from BARBIE with transport.  Post procedural vital signs started. Vital signs stable at this time.

## 2018-12-07 NOTE — OR NURSING
1217 Pt arrived to PPU from procedure room s/p BARBIE. Pt lethargic at this time but wakes to voice. No C/O pain, no N/V at this time. VSS. Plan for recovery reviewed with pt.    1245 Pt fully awake and oriented at this time. Report called to SHA Bishop. Pt updated on transfer. Pt is medical/no cardiac monitoring ordered & has been placed on list for transport.     1300 Pt meets transfer criteria. Pt being DC'd from PPU to T8.      1341 Transport at bedside to take pt back to room. Pt tolerating sips of water with no c/o n/v.

## 2018-12-08 ENCOUNTER — APPOINTMENT (OUTPATIENT)
Dept: RADIOLOGY | Facility: MEDICAL CENTER | Age: 73
DRG: 252 | End: 2018-12-08
Attending: SURGERY
Payer: MEDICARE

## 2018-12-08 LAB
ANION GAP SERPL CALC-SCNC: 10 MMOL/L (ref 0–11.9)
APTT PPP: 34.4 SEC (ref 24.7–36)
BUN SERPL-MCNC: 11 MG/DL (ref 8–22)
CALCIUM SERPL-MCNC: 9.3 MG/DL (ref 8.5–10.5)
CHLORIDE SERPL-SCNC: 101 MMOL/L (ref 96–112)
CO2 SERPL-SCNC: 25 MMOL/L (ref 20–33)
CREAT SERPL-MCNC: 2.71 MG/DL (ref 0.5–1.4)
GLUCOSE SERPL-MCNC: 84 MG/DL (ref 65–99)
INR PPP: 2.01 (ref 0.87–1.13)
POTASSIUM SERPL-SCNC: 4.1 MMOL/L (ref 3.6–5.5)
PROTHROMBIN TIME: 22.8 SEC (ref 12–14.6)
SODIUM SERPL-SCNC: 136 MMOL/L (ref 135–145)

## 2018-12-08 PROCEDURE — 36415 COLL VENOUS BLD VENIPUNCTURE: CPT

## 2018-12-08 PROCEDURE — 85730 THROMBOPLASTIN TIME PARTIAL: CPT

## 2018-12-08 PROCEDURE — 05HN33Z INSERTION OF INFUSION DEVICE INTO LEFT INTERNAL JUGULAR VEIN, PERCUTANEOUS APPROACH: ICD-10-PCS | Performed by: SURGERY

## 2018-12-08 PROCEDURE — 700101 HCHG RX REV CODE 250: Performed by: INTERNAL MEDICINE

## 2018-12-08 PROCEDURE — 037Y3ZZ DILATION OF UPPER ARTERY, PERCUTANEOUS APPROACH: ICD-10-PCS | Performed by: SURGERY

## 2018-12-08 PROCEDURE — B514YZA FLUOROSCOPY OF LEFT JUGULAR VEINS USING OTHER CONTRAST, GUIDANCE: ICD-10-PCS | Performed by: SURGERY

## 2018-12-08 PROCEDURE — 99153 MOD SED SAME PHYS/QHP EA: CPT

## 2018-12-08 PROCEDURE — 85610 PROTHROMBIN TIME: CPT

## 2018-12-08 PROCEDURE — 700102 HCHG RX REV CODE 250 W/ 637 OVERRIDE(OP): Performed by: INTERNAL MEDICINE

## 2018-12-08 PROCEDURE — 700102 HCHG RX REV CODE 250 W/ 637 OVERRIDE(OP): Performed by: HOSPITALIST

## 2018-12-08 PROCEDURE — A9270 NON-COVERED ITEM OR SERVICE: HCPCS | Performed by: HOSPITALIST

## 2018-12-08 PROCEDURE — 700111 HCHG RX REV CODE 636 W/ 250 OVERRIDE (IP)

## 2018-12-08 PROCEDURE — A9270 NON-COVERED ITEM OR SERVICE: HCPCS | Performed by: INTERNAL MEDICINE

## 2018-12-08 PROCEDURE — 80048 BASIC METABOLIC PNL TOTAL CA: CPT

## 2018-12-08 PROCEDURE — 700111 HCHG RX REV CODE 636 W/ 250 OVERRIDE (IP): Performed by: HOSPITALIST

## 2018-12-08 PROCEDURE — 700111 HCHG RX REV CODE 636 W/ 250 OVERRIDE (IP): Performed by: SURGERY

## 2018-12-08 PROCEDURE — 99232 SBSQ HOSP IP/OBS MODERATE 35: CPT | Performed by: INTERNAL MEDICINE

## 2018-12-08 PROCEDURE — 770001 HCHG ROOM/CARE - MED/SURG/GYN PRIV*

## 2018-12-08 PROCEDURE — 700101 HCHG RX REV CODE 250

## 2018-12-08 PROCEDURE — 94640 AIRWAY INHALATION TREATMENT: CPT

## 2018-12-08 PROCEDURE — 057Y3ZZ DILATION OF UPPER VEIN, PERCUTANEOUS APPROACH: ICD-10-PCS | Performed by: SURGERY

## 2018-12-08 PROCEDURE — 700105 HCHG RX REV CODE 258: Performed by: ANESTHESIOLOGY

## 2018-12-08 PROCEDURE — 0JH63XZ INSERTION OF TUNNELED VASCULAR ACCESS DEVICE INTO CHEST SUBCUTANEOUS TISSUE AND FASCIA, PERCUTANEOUS APPROACH: ICD-10-PCS | Performed by: SURGERY

## 2018-12-08 PROCEDURE — 700117 HCHG RX CONTRAST REV CODE 255: Performed by: SURGERY

## 2018-12-08 RX ORDER — MIDAZOLAM HYDROCHLORIDE 1 MG/ML
INJECTION INTRAMUSCULAR; INTRAVENOUS
Status: COMPLETED
Start: 2018-12-08 | End: 2018-12-08

## 2018-12-08 RX ORDER — MIDAZOLAM HYDROCHLORIDE 1 MG/ML
.5-2 INJECTION INTRAMUSCULAR; INTRAVENOUS PRN
Status: DISCONTINUED | OUTPATIENT
Start: 2018-12-08 | End: 2018-12-08 | Stop reason: HOSPADM

## 2018-12-08 RX ORDER — IODIXANOL 270 MG/ML
12 INJECTION, SOLUTION INTRAVASCULAR ONCE
Status: COMPLETED | OUTPATIENT
Start: 2018-12-08 | End: 2018-12-08

## 2018-12-08 RX ORDER — SODIUM CHLORIDE 9 MG/ML
500 INJECTION, SOLUTION INTRAVENOUS
Status: DISCONTINUED | OUTPATIENT
Start: 2018-12-08 | End: 2018-12-08 | Stop reason: HOSPADM

## 2018-12-08 RX ORDER — HEPARIN SODIUM,PORCINE 1000/ML
VIAL (ML) INJECTION
Status: COMPLETED
Start: 2018-12-08 | End: 2018-12-08

## 2018-12-08 RX ORDER — LIDOCAINE HYDROCHLORIDE AND EPINEPHRINE 10; 10 MG/ML; UG/ML
INJECTION, SOLUTION INFILTRATION; PERINEURAL
Status: COMPLETED
Start: 2018-12-08 | End: 2018-12-08

## 2018-12-08 RX ADMIN — SUCRALFATE 1 G: 1 TABLET ORAL at 16:37

## 2018-12-08 RX ADMIN — MIDAZOLAM 1 MG: 1 INJECTION INTRAMUSCULAR; INTRAVENOUS at 13:39

## 2018-12-08 RX ADMIN — HEPARIN SODIUM: 1000 INJECTION, SOLUTION INTRAVENOUS; SUBCUTANEOUS at 13:43

## 2018-12-08 RX ADMIN — FAMOTIDINE 20 MG: 20 TABLET ORAL at 05:03

## 2018-12-08 RX ADMIN — CEFAZOLIN SODIUM 1 G: 1 INJECTION, SOLUTION INTRAVENOUS at 16:36

## 2018-12-08 RX ADMIN — LIDOCAINE HYDROCHLORIDE,EPINEPHRINE BITARTRATE: 10; .01 INJECTION, SOLUTION INFILTRATION; PERINEURAL at 13:43

## 2018-12-08 RX ADMIN — ONDANSETRON 4 MG: 4 TABLET, ORALLY DISINTEGRATING ORAL at 16:37

## 2018-12-08 RX ADMIN — FENTANYL CITRATE 25 MCG: 50 INJECTION, SOLUTION INTRAMUSCULAR; INTRAVENOUS at 13:09

## 2018-12-08 RX ADMIN — IPRATROPIUM BROMIDE AND ALBUTEROL SULFATE 3 ML: .5; 3 SOLUTION RESPIRATORY (INHALATION) at 06:57

## 2018-12-08 RX ADMIN — SUCRALFATE 1 G: 1 TABLET ORAL at 23:56

## 2018-12-08 RX ADMIN — IODIXANOL 12 ML: 270 INJECTION, SOLUTION INTRAVASCULAR at 14:04

## 2018-12-08 RX ADMIN — SUCRALFATE 1 G: 1 TABLET ORAL at 11:49

## 2018-12-08 RX ADMIN — ACETAMINOPHEN 650 MG: 325 TABLET, FILM COATED ORAL at 21:58

## 2018-12-08 RX ADMIN — IPRATROPIUM BROMIDE AND ALBUTEROL SULFATE 3 ML: .5; 3 SOLUTION RESPIRATORY (INHALATION) at 18:23

## 2018-12-08 RX ADMIN — FERROUS SULFATE TAB 325 MG (65 MG ELEMENTAL FE) 325 MG: 325 (65 FE) TAB at 05:03

## 2018-12-08 RX ADMIN — SUCRALFATE 1 G: 1 TABLET ORAL at 00:00

## 2018-12-08 RX ADMIN — MIDAZOLAM 1 MG: 1 INJECTION INTRAMUSCULAR; INTRAVENOUS at 13:00

## 2018-12-08 RX ADMIN — MIDAZOLAM 1 MG: 1 INJECTION INTRAMUSCULAR; INTRAVENOUS at 13:09

## 2018-12-08 RX ADMIN — MIDAZOLAM HYDROCHLORIDE 1 MG: 1 INJECTION, SOLUTION INTRAMUSCULAR; INTRAVENOUS at 13:00

## 2018-12-08 RX ADMIN — SUCRALFATE 1 G: 1 TABLET ORAL at 05:03

## 2018-12-08 RX ADMIN — VITAMIN B12 0.1 MG ORAL TABLET 100 MCG: 0.1 TABLET ORAL at 05:06

## 2018-12-08 RX ADMIN — FENTANYL CITRATE 25 MCG: 50 INJECTION, SOLUTION INTRAMUSCULAR; INTRAVENOUS at 13:00

## 2018-12-08 RX ADMIN — ACETAMINOPHEN 650 MG: 325 TABLET, FILM COATED ORAL at 16:37

## 2018-12-08 RX ADMIN — VITAMIN D, TAB 1000IU (100/BT) 1000 UNITS: 25 TAB at 05:03

## 2018-12-08 RX ADMIN — LOVASTATIN 10 MG: 20 TABLET ORAL at 20:33

## 2018-12-08 RX ADMIN — STANDARDIZED SENNA CONCENTRATE AND DOCUSATE SODIUM 2 TABLET: 8.6; 5 TABLET, FILM COATED ORAL at 05:03

## 2018-12-08 RX ADMIN — FENTANYL CITRATE 25 MCG: 50 INJECTION, SOLUTION INTRAMUSCULAR; INTRAVENOUS at 13:39

## 2018-12-08 RX ADMIN — FLUTICASONE PROPIONATE 100 MCG: 50 SPRAY, METERED NASAL at 05:06

## 2018-12-08 RX ADMIN — MIDAZOLAM 1 MG: 1 INJECTION INTRAMUSCULAR; INTRAVENOUS at 13:31

## 2018-12-08 RX ADMIN — FENTANYL CITRATE 25 MCG: 50 INJECTION, SOLUTION INTRAMUSCULAR; INTRAVENOUS at 13:31

## 2018-12-08 RX ADMIN — SODIUM CHLORIDE: 9 INJECTION, SOLUTION INTRAVENOUS at 07:16

## 2018-12-08 RX ADMIN — THERA TABS 1 TABLET: TAB at 05:03

## 2018-12-08 ASSESSMENT — ENCOUNTER SYMPTOMS
NECK PAIN: 0
TREMORS: 0
FEVER: 0
EYE REDNESS: 0
VOMITING: 0
PALPITATIONS: 0
SENSORY CHANGE: 0
SPUTUM PRODUCTION: 0
NAUSEA: 0
DIARRHEA: 0
HEADACHES: 0
DIAPHORESIS: 0
DIZZINESS: 0
EYE DISCHARGE: 0
SORE THROAT: 0
DEPRESSION: 0
ABDOMINAL PAIN: 0
TINGLING: 0
HALLUCINATIONS: 0
DOUBLE VISION: 0
POLYDIPSIA: 0
SHORTNESS OF BREATH: 0
CHILLS: 0
ORTHOPNEA: 0
PHOTOPHOBIA: 0
BRUISES/BLEEDS EASILY: 0
HEMOPTYSIS: 0
WEAKNESS: 0
COUGH: 0
ROS GI COMMENTS: APPETITE DECREASED
CONSTIPATION: 0
FALLS: 0
BACK PAIN: 0
SPEECH CHANGE: 0
MYALGIAS: 0
BLURRED VISION: 0
WEIGHT LOSS: 1

## 2018-12-08 ASSESSMENT — LIFESTYLE VARIABLES: SUBSTANCE_ABUSE: 0

## 2018-12-08 ASSESSMENT — PAIN SCALES - GENERAL
PAINLEVEL_OUTOF10: 0
PAINLEVEL_OUTOF10: 0
PAINLEVEL_OUTOF10: 5
PAINLEVEL_OUTOF10: 0

## 2018-12-08 NOTE — DISCHARGE PLANNING
Anticipated Discharge Disposition: Home with HHC    Action: Went to speak with pt at bedside about choice HH.  Pt is currently off the floor at dialysis.      Barriers to Discharge:Home health acceptance.  Won't be accepted over the weekend.    Plan: RN CM will f/u with HHC acceptance and choice

## 2018-12-08 NOTE — PROGRESS NOTES
Hospital Medicine Daily Progress Note    Date of Service  12/8/2018    Chief Complaint  Hallucinations    Hospital Course    *73 y.o. female admitted 11/30/2018 with complaints of hallucinations for the last 1-2 nights-recent blood cultures done at hemodialysis are positive for staph aureus patient denies experience any fever chills or sweats, hallucinations were occurring mainly at night but she was aware that they were hallucinations, in addition to the night prior to admission she had them her first night of admission but now they are resolved*    BCx from [facility], vanco discontinued  Echo with rheumatic mitral valve      Interval Problem Update  Patient is in her bed without signs of distress.  Pending fistula creation?  And permanent catheter placement today.  Given vitamin K to bring down INR  Patient is laying in her bed without signs of distress.  Vitals stable.  Pending home health acceptance  I discussed patient care with Dr. Diaz and Dr Najjar.        Consultants/Specialty  [facility]  Fidel Romero MD vascular surgery  [facility]    Code Status  Full     Disposition  TBD    Review of Systems  Review of Systems   Constitutional: Positive for weight loss (80 pounds in 2 years). Negative for chills, diaphoresis, fever and malaise/fatigue.   HENT: Negative for congestion, ear pain, sore throat and tinnitus.    Eyes: Negative for blurred vision, double vision, photophobia, discharge and redness.   Respiratory: Negative for cough, hemoptysis, sputum production and shortness of breath.    Cardiovascular: Negative for chest pain, palpitations and orthopnea.   Gastrointestinal: Negative for abdominal pain, constipation, diarrhea, nausea and vomiting.        Appetite decreased   Genitourinary: Negative for dysuria, frequency and urgency.   Musculoskeletal: Negative for back pain, falls, myalgias and neck pain.   Skin: Negative for itching and rash.   Neurological:  Negative for dizziness, tingling, tremors, sensory change, speech change, weakness and headaches.   Endo/Heme/Allergies: Negative for environmental allergies and polydipsia. Does not bruise/bleed easily.   Psychiatric/Behavioral: Negative for depression, hallucinations (None since last night), substance abuse and suicidal ideas.        Physical Exam  Temp:  [36 °C (96.8 °F)-36.8 °C (98.3 °F)] 36.8 °C (98.2 °F)  Pulse:  [68-89] 81  Resp:  [16-19] 17  BP: (112-156)/(54-91) 142/68    Physical Exam   Constitutional: She is oriented to person, place, and time. She appears well-developed. No distress.   Chronically ill and hao with loose skin   HENT:   Head: Normocephalic and atraumatic.   Right Ear: External ear normal.   Left Ear: External ear normal.   Nose: Nose normal.   Mouth/Throat: Oropharynx is clear and moist.   Eyes: Pupils are equal, round, and reactive to light. Conjunctivae and EOM are normal. Right eye exhibits no discharge. Left eye exhibits no discharge. No scleral icterus.   Neck: Neck supple.   Cardiovascular: Normal rate and regular rhythm.    Pulmonary/Chest: Effort normal and breath sounds normal. No respiratory distress. She exhibits tenderness (site of permacath insertion. improved, erythema resolved).   Abdominal: Soft. Bowel sounds are normal. She exhibits no distension.   Musculoskeletal: She exhibits no edema or tenderness.   Neurological: She is alert and oriented to person, place, and time. No cranial nerve deficit.   Skin: Skin is warm and dry. She is not diaphoretic.   Psychiatric: She has a normal mood and affect.   Nursing note and vitals reviewed.  I examined the patient, there are no significant changes    Fluids    Intake/Output Summary (Last 24 hours) at 12/08/18 1530  Last data filed at 12/07/18 1748   Gross per 24 hour   Intake              600 ml   Output             1600 ml   Net            -1000 ml       Laboratory      Recent Labs      12/07/18   0947  12/08/18   8635  "  SODIUM   --   136   POTASSIUM  4.6  4.1   CHLORIDE   --   101   CO2   --   25   GLUCOSE   --   84   BUN   --   11   CREATININE   --   2.71*   CALCIUM   --   9.3     Recent Labs      12/06/18   0603  12/07/18   0541  12/08/18   0559   APTT  78.7*  71.6*  34.4   INR  2.21*  2.31*  2.01*               Imaging  EC-BARBIE W/O CONT   Final Result      EC-ECHOCARDIOGRAM COMPLETE W/O CONT   Final Result      US-HEMODIALYSIS GRAFT DUPLEX COMP UPPER EXTREMITY   Final Result      DX-CHEST-PORTABLE (1 VIEW)   Final Result      Right IJ dialysis catheter in place.      Cardiomegaly.      IR-INTRO STENT DIALYSIS CIRCUIT S&I    (Results Pending)        Assessment/Plan  * Staphylococcus aureus bacteremia with sepsis (HCC)- (present on admission)   Assessment & Plan    Positive blood cultures from outside facility for MSSA. Negative here 11/30  ID consulted -needs 4-6 weeks of IV antibiotics  Permacath removed  New dialysis catheter placed 12/3 in the left femoral vein, temporary  Per ID, cefazolin 3 times a week after discharge for 6 weeks from last negative blood culture, stop date 1/14/2018     End stage renal disease (HCC)- (present on admission)   Assessment & Plan    On dialysis  Monday Wednesday Friday  Nephrology consulted and following    Vascular surgery consulted regarding new fistula    Pending fistulogram and left IJ permacatheter      COPD (chronic obstructive pulmonary disease) (HCC)- (present on admission)   Assessment & Plan    Not in exacerbation  Oxygen  Respiratory protocol  Stable     Abnormal echocardiogram   Assessment & Plan    \"Rheumatic appearance of mitral valve with mild stenosis and mild   Regurgitation.\"  BARBIE negative for endocarditis     Severe protein-calorie malnutrition (HCC)   Assessment & Plan    Patient has lost 80 pounds in 2 years     Nutrition consult has been requested     Severe dental caries   Assessment & Plan    PainPatient has multiple teeth literally rotting out of her mouth  ID agrees " these will need to be removed and eventually but does not feel this is urgent  Denies complaints of tooth     Acute external jugular vein thrombosis   Assessment & Plan     incidental acute jugular vein thrombosis on the right  Heparin weight-based protocol with warfarin bridge  Off heparin     Sepsis (HCC)   Assessment & Plan    This is severe sepsis with the following associated acute organ dysfunction(s): metabolic/septic encephalopathy.   Patient not recognizes being severely septic on admission due to lack of fever tachycardia and leukocytosis-  However her septic encephalopathy qualifies her for diagnosis of severe sepsis and this is now resolved.  As her symptoms had resolved and she had stable vital signs, fluids serial lactates and so forth were not indicated by the time sepsis was recognized.    Resolved     Iron deficiency anemia- (present on admission)   Assessment & Plan    PO Ferrous sulfate   Hb ~ 11, at her baseline  Continue to monitor     B12 deficiency- (present on admission)   Assessment & Plan    Resume home B12     HLD (hyperlipidemia)- (present on admission)   Assessment & Plan    Lovastatin     Vitamin D deficiency- (present on admission)   Assessment & Plan    Resume home vitamin D          VTE prophylaxis: On heparin drip with warfarin bridge

## 2018-12-08 NOTE — PROGRESS NOTES
Received report from Edna DALTON. Discussed pt plan of care. Any questions and concerns addressed at this time. Pt at edge of bed eating dinner. Bed locked and in lowest position. Bed strip alarm on and fall precautions in place. Educated pt on use of call light and call light within reach. No other needs at this time.

## 2018-12-08 NOTE — PROGRESS NOTES
IR Procedure RN's Note:    Patient consented by Dr. Schroeder prior to start of procedure; MD and patient signed consent and placed in chart; verified by Salena VEGA RN.    RIGHT arm fistulogram with intervention, LEFT IJ tunneled HD catheter placement and LEFT femoral non tunneled HD catheter removal done by Dr. Schroeder; RIGHT arm existing fistula acess site, LEFT IJ, and LEFT femoral access site; pt assessed upon arrival, pt A/Ox4, pt aware of the procedure, pt baseline on 3L oxymask d/t hx of COPD; angioplasty was done in the fistula, LEFT IJ HD line - [employer] Chronic Catheter 14.5Fr x 28cm REF#[id number] LOT#[id number], tip of non tunneled catheter intact and NOT cultured; pt appears comfortable throughout the procedure with no signs of distress or discomfort; ETCO2 monitored with a baseline of 14 mmHg and ranged between 8-21 mmHg throughout the procedure; access site CDI, soft with no signs of hematoma or bleeding, gauze and tegaderm for dressing; telephone report given to Arianna; pt is awake and on 3L oxymask O2 post procedure and during transport; pt transported to room.

## 2018-12-08 NOTE — PROGRESS NOTES
Vascular    Patient needs a new tunneled catheter.  Exit site from past infected catheter looks good.  At the same time, I hope to perform a fistulogram and improve flow through the right radial cephalic AVF.      Coumadin wasn't stopped, so I have given a dose of Vitamin K in hope of reversal of INR

## 2018-12-08 NOTE — PROGRESS NOTES
[facility] Progress Note    Hemodialysis treatment ordered today per Dr. Najjar x 3 hours. Treatment initiated at 1448 and ended at 1748    Pt tolerated treatment; see paper flow sheets for details.    Net UF 1,000 mL    Post tx, CVC flushed with saline then locked with heparin 1000 units/mL per designated amount in each wing then clamped and capped. Aspirate heparin prior to next CVC use.    Report given to Primary RN.

## 2018-12-08 NOTE — OR SURGEON
Immediate Post OP Note    PreOp Diagnosis: Stage 5 CKD    PostOp Diagnosis: Same    Procedure(s):  Left internal jugular tunneled HD catheter  Right arm fistulogram with angioplasty    Surgeon(s):HAIDER Schroeder MD    Anesthesiologist/Type of Anesthesia:Conscious sedation  Angie Li RN    Estimated Blood Loss: Minimal    Findings: diffuse stenosis in the distal fistula, dilated to 5mm  Right IJ occluded    Complications: none    [id number]    12/8/2018 2:00 PM Kelley Schroeder M.D.

## 2018-12-08 NOTE — CARE PLAN
Problem: Knowledge Deficit  Goal: Knowledge of the prescribed therapeutic regimen will improve  Outcome: PROGRESSING AS EXPECTED  Educated and discussed pt plan of care and current disease processes. Discussed pt procedure tomorrow and reason. Answered any questions and concerns. Pt verbalized understanding.     Problem: Mobility  Goal: Risk for activity intolerance will decrease  Outcome: PROGRESSING AS EXPECTED  Encouraged early mobility and activity. Pt ambulates to bathroom with standby assist. Tolerates well. Pt moves independently with sufficient strength.

## 2018-12-08 NOTE — OP REPORT
DATE OF SERVICE:  12/08/2018    PREOPERATIVE DIAGNOSIS:  Stage V chronic kidney disease.    POSTPROCEDURE DIAGNOSIS:  Stage V chronic kidney disease.    PROCEDURES:  1.  Fistulogram with angioplasty.  2.  Left internal jugular vein hemodialysis catheter.  3.  Ultrasound-guided access of the right arm fistula and the left internal   jugular vein.    SURGEON:  Kelley Schroeder MD    ANESTHESIA:  Conscious sedation.    ANESTHESIOLOGIST:  ____     INDICATIONS:  The patient is a 73-year-old woman who presents with worsening   renal failure.  Her right tunneled catheter became infected and she presents   now for placement of a new catheter.  Ultrasound investigation of the right   neck demonstrates complete occlusion of the right internal jugular vein at the   base of the neck.  Therefore, we are placing her new catheter on the left.    In addition, previously created fistula demonstrates stenosis at the distal   aspect.  In order to avoid further catheter problems, we will try to mature   this fistula.  Risks and benefits were explained and include bleeding,   infection, deep venous thrombosis, and hematoma.  Her INR is high, but I   choose to proceed.    DESCRIPTION OF PROCEDURE:  Patient was taken to the angio suite, placed in   supine position.  After adequate comfort measures were taken, a time-out was   called.  The right arm was prepped circumferentially.  Cloth towels and paper   drapes were placed.  Simultaneously, the left chest was prepped as well and   preparations made for the second procedure.    Using a sterile ultrasound probe, the right forearm fistula was identified and   accessed with a micropuncture needle.  A 0.018 guidewire was advanced   followed by a micropuncture sheath.  This was exchanged for 5-[place] short   sheath.  Occluding the outflow, performed a fistulogram.  This demonstrated an   irregular area of narrowing.  I crossed this with an 0.035 wire and dilated   the area of stenosis with a  4 mm x 4 cm balloon and then, with a 5 mm x 4 cm   balloon.  I felt uncomfortable pushing this any further as it had never been   dilated.  No maturation of the more proximal fistula has been able due to this   distal stenosis.  The wire and sheath were removed and pressure held.  The   moderate hematoma in the upper forearm resulted.    I then turned my attention to the left chest and neck.  Using a sterile   ultrasound probe, the internal jugular vein was identified and accessed.    Guidewire was threaded.  I then anesthetized the subcutaneous tunnel on the   left chest and tunneled the 28 cm Palindrome catheter through the subcutaneous   tissue using a trocar.  Using fluoroscopy as a guide, dilators were passed   over the guidewire and the dilator and peel-away sheath were threaded under   fluoroscopic control.  Dilator was removed and the catheter inserted.  The   peel away sheath was removed without incident.  The entrance site was closed   with 4-0 Vicryl.  The catheter was sutured in place with 3-0 nylon.  Each port   aspirated and flushed with ease and indwelling heparin was left within the   catheter.  Sterile occlusive dressing was placed.  There were no apparent   complications.  A final image was saved.  Sponge, needle and instrument counts   were correct.       ____________________________________     MD TOM Goncalves / ROLAND    DD:  12/08/2018 14:07:25  DT:  12/08/2018 14:26:22    D#:  [id number]  Job#:  [id number]

## 2018-12-08 NOTE — PROGRESS NOTES
Inpatient Anticoagulation Service Note    Date: 12/8/2018  Reason for Anticoagulation: New Deep Vein Thrombosis        Hemoglobin Value: (!) 8.7  Hematocrit Value: (!) 26.7  Lab Platelet Value: 184  Target INR: 2.0 to 3.0    INR from last 7 days     Date/Time INR Value    12/08/18 0559 (!)  2.01    12/07/18 0541 (!)  2.31    12/06/18 0603 (!)  2.21    12/04/18 2348 (!)  1.85    12/04/18 0101 (!)  1.15    12/03/18 1402  1.06        Dose from last 7 days     Date/Time Dose (mg)    12/07/18 1013  2    12/06/18 1155  2    12/05/18 1329  2    12/04/18 1000  4    12/03/18 1600  6        Significant Interactions: Antibiotics, Statin  Bridge Therapy: No  Date of Last VTE Event: 12/01/18  Bridge Therapy Start Date: 12/03/18  Days of Overlap Therapy: >5  INR Value Greater than 2 Prior to Discontinuation of Parenteral Anticoagulation: Not Applicable     Reversal Agent Administered: Not Applicable    Comments:   INR therapeutic today.   H/H dropped over interval, no documented s/sx of bleeding.      Plan:    Warfarin 3 mg po x1, INR in AM.    Education Material Provided?: Yes  Pharmacist suggested discharge dosing: Warfarin 2-3 mg po daily , follow up with anticoagulation clinic 48-72 hr from discharge.       John Arellano PharmD BCPS     Update: 12/09/18:  · Dose held last night due to tunneled catheter placement yesterday, restarted today.     John Arellano PharmD BCPS

## 2018-12-09 LAB
APTT PPP: 33.2 SEC (ref 24.7–36)
BASOPHILS # BLD AUTO: 1.1 % (ref 0–1.8)
BASOPHILS # BLD: 0.08 K/UL (ref 0–0.12)
EOSINOPHIL # BLD AUTO: 0.29 K/UL (ref 0–0.51)
EOSINOPHIL NFR BLD: 3.9 % (ref 0–6.9)
ERYTHROCYTE [DISTWIDTH] IN BLOOD BY AUTOMATED COUNT: 53.8 FL (ref 35.9–50)
HCT VFR BLD AUTO: 29.9 % (ref 37–47)
HGB BLD-MCNC: 9.2 G/DL (ref 12–16)
IMM GRANULOCYTES # BLD AUTO: 0.05 K/UL (ref 0–0.11)
IMM GRANULOCYTES NFR BLD AUTO: 0.7 % (ref 0–0.9)
INR PPP: 1.33 (ref 0.87–1.13)
LYMPHOCYTES # BLD AUTO: 1.37 K/UL (ref 1–4.8)
LYMPHOCYTES NFR BLD: 18.4 % (ref 22–41)
MCH RBC QN AUTO: 30.8 PG (ref 27–33)
MCHC RBC AUTO-ENTMCNC: 30.8 G/DL (ref 33.6–35)
MCV RBC AUTO: 100 FL (ref 81.4–97.8)
MONOCYTES # BLD AUTO: 0.65 K/UL (ref 0–0.85)
MONOCYTES NFR BLD AUTO: 8.7 % (ref 0–13.4)
NEUTROPHILS # BLD AUTO: 5.02 K/UL (ref 2–7.15)
NEUTROPHILS NFR BLD: 67.2 % (ref 44–72)
NRBC # BLD AUTO: 0 K/UL
NRBC BLD-RTO: 0 /100 WBC
PLATELET # BLD AUTO: 175 K/UL (ref 164–446)
PMV BLD AUTO: 11 FL (ref 9–12.9)
PROTHROMBIN TIME: 16.6 SEC (ref 12–14.6)
RBC # BLD AUTO: 2.99 M/UL (ref 4.2–5.4)
WBC # BLD AUTO: 7.5 K/UL (ref 4.8–10.8)

## 2018-12-09 PROCEDURE — 700101 HCHG RX REV CODE 250: Performed by: INTERNAL MEDICINE

## 2018-12-09 PROCEDURE — 85610 PROTHROMBIN TIME: CPT | Mod: 91

## 2018-12-09 PROCEDURE — 700102 HCHG RX REV CODE 250 W/ 637 OVERRIDE(OP): Performed by: INTERNAL MEDICINE

## 2018-12-09 PROCEDURE — 36415 COLL VENOUS BLD VENIPUNCTURE: CPT

## 2018-12-09 PROCEDURE — 700102 HCHG RX REV CODE 250 W/ 637 OVERRIDE(OP): Performed by: HOSPITALIST

## 2018-12-09 PROCEDURE — 700111 HCHG RX REV CODE 636 W/ 250 OVERRIDE (IP): Performed by: INTERNAL MEDICINE

## 2018-12-09 PROCEDURE — 99232 SBSQ HOSP IP/OBS MODERATE 35: CPT | Performed by: INTERNAL MEDICINE

## 2018-12-09 PROCEDURE — 700111 HCHG RX REV CODE 636 W/ 250 OVERRIDE (IP): Performed by: HOSPITALIST

## 2018-12-09 PROCEDURE — A9270 NON-COVERED ITEM OR SERVICE: HCPCS | Performed by: INTERNAL MEDICINE

## 2018-12-09 PROCEDURE — 85730 THROMBOPLASTIN TIME PARTIAL: CPT | Mod: 91

## 2018-12-09 PROCEDURE — 94640 AIRWAY INHALATION TREATMENT: CPT

## 2018-12-09 PROCEDURE — 770001 HCHG ROOM/CARE - MED/SURG/GYN PRIV*

## 2018-12-09 PROCEDURE — 85025 COMPLETE CBC W/AUTO DIFF WBC: CPT

## 2018-12-09 PROCEDURE — A9270 NON-COVERED ITEM OR SERVICE: HCPCS | Performed by: HOSPITALIST

## 2018-12-09 RX ORDER — WARFARIN SODIUM 4 MG/1
4 TABLET ORAL
Status: COMPLETED | OUTPATIENT
Start: 2018-12-09 | End: 2018-12-09

## 2018-12-09 RX ORDER — HEPARIN SODIUM 1000 [USP'U]/ML
2200 INJECTION, SOLUTION INTRAVENOUS; SUBCUTANEOUS PRN
Status: DISCONTINUED | OUTPATIENT
Start: 2018-12-09 | End: 2018-12-16

## 2018-12-09 RX ADMIN — WARFARIN SODIUM 4 MG: 4 TABLET ORAL at 18:35

## 2018-12-09 RX ADMIN — SUCRALFATE 1 G: 1 TABLET ORAL at 12:10

## 2018-12-09 RX ADMIN — FAMOTIDINE 20 MG: 20 TABLET ORAL at 05:52

## 2018-12-09 RX ADMIN — HEPARIN SODIUM 900 UNITS/HR: 5000 INJECTION, SOLUTION INTRAVENOUS at 16:23

## 2018-12-09 RX ADMIN — LOVASTATIN 10 MG: 20 TABLET ORAL at 18:36

## 2018-12-09 RX ADMIN — SUCRALFATE 1 G: 1 TABLET ORAL at 23:55

## 2018-12-09 RX ADMIN — IPRATROPIUM BROMIDE AND ALBUTEROL SULFATE 3 ML: .5; 3 SOLUTION RESPIRATORY (INHALATION) at 18:20

## 2018-12-09 RX ADMIN — IPRATROPIUM BROMIDE AND ALBUTEROL SULFATE 3 ML: .5; 3 SOLUTION RESPIRATORY (INHALATION) at 06:36

## 2018-12-09 RX ADMIN — CEFAZOLIN SODIUM 1 G: 1 INJECTION, SOLUTION INTRAVENOUS at 18:36

## 2018-12-09 RX ADMIN — ACETAMINOPHEN 650 MG: 325 TABLET, FILM COATED ORAL at 05:57

## 2018-12-09 RX ADMIN — SUCRALFATE 1 G: 1 TABLET ORAL at 18:37

## 2018-12-09 RX ADMIN — FERROUS SULFATE TAB 325 MG (65 MG ELEMENTAL FE) 325 MG: 325 (65 FE) TAB at 05:52

## 2018-12-09 RX ADMIN — VITAMIN B12 0.1 MG ORAL TABLET 100 MCG: 0.1 TABLET ORAL at 05:52

## 2018-12-09 RX ADMIN — FLUTICASONE PROPIONATE 100 MCG: 50 SPRAY, METERED NASAL at 05:52

## 2018-12-09 RX ADMIN — SUCRALFATE 1 G: 1 TABLET ORAL at 05:52

## 2018-12-09 RX ADMIN — STANDARDIZED SENNA CONCENTRATE AND DOCUSATE SODIUM 2 TABLET: 8.6; 5 TABLET, FILM COATED ORAL at 18:37

## 2018-12-09 RX ADMIN — THERA TABS 1 TABLET: TAB at 05:52

## 2018-12-09 RX ADMIN — VITAMIN D, TAB 1000IU (100/BT) 1000 UNITS: 25 TAB at 05:52

## 2018-12-09 ASSESSMENT — ENCOUNTER SYMPTOMS
SPEECH CHANGE: 0
BACK PAIN: 0
SHORTNESS OF BREATH: 0
SENSORY CHANGE: 0
DIAPHORESIS: 0
COUGH: 0
HALLUCINATIONS: 0
BRUISES/BLEEDS EASILY: 0
EYE REDNESS: 0
VOMITING: 0
DIZZINESS: 0
NECK PAIN: 0
FEVER: 0
TINGLING: 0
NAUSEA: 0
BLURRED VISION: 0
ABDOMINAL PAIN: 0
PHOTOPHOBIA: 0
POLYDIPSIA: 0
CHILLS: 0
SORE THROAT: 0
MYALGIAS: 0
EYE DISCHARGE: 0
FALLS: 0
DOUBLE VISION: 0
ORTHOPNEA: 0
ROS GI COMMENTS: APPETITE DECREASED
HEADACHES: 0
CONSTIPATION: 0
DIARRHEA: 0
HEMOPTYSIS: 0
WEAKNESS: 0
DEPRESSION: 0
PALPITATIONS: 0
WEIGHT LOSS: 1
SPUTUM PRODUCTION: 0
TREMORS: 0

## 2018-12-09 ASSESSMENT — PAIN SCALES - GENERAL
PAINLEVEL_OUTOF10: 0
PAINLEVEL_OUTOF10: 0
PAINLEVEL_OUTOF10: 3
PAINLEVEL_OUTOF10: 0
PAINLEVEL_OUTOF10: 0

## 2018-12-09 ASSESSMENT — LIFESTYLE VARIABLES: SUBSTANCE_ABUSE: 0

## 2018-12-09 NOTE — DISCHARGE PLANNING
Met with pt at bedside, pt's daughter in the bathroom.  Pt denies needs apart from concern that medicare will not pay for her dialysis.  Confirmed that [facility] a [facility] certified [facility] and notified pt.  Requested coumadin education from [facility].     Discharge Planning Checklist     Referral  Has home health referral been accepted: N\A, pt refused  Were PT/OT/SLP notes reviewed and verified with d/c plan: OT/PT rec home health or SNF, pt refusing  Does the patient need follow up wound care: No  - if Yes, are post acute arrangements made:   Hand off report called to receiving care givers: No    Prescriptions  Did patient receive all prescriptions: no  - if electronically prescribed, was pharmacy called to verify availability: no  - is patient able to  prescriptions: yes    Education   Did patient receive home care instructions: Yes, Details: tunneled cath care instructions  - DM Education: no  - COPD Education: no  - Coumadin Education by Pharmacy (called and verified): no  Core Measures Education Included in Discharge Instructions: N/A  Diagnosis Specific instructions in AVS: Yes  Educated on symptome management with teach back: yes  Educated on Pain Management: yes  - Pt has PCP for long term pain management: Yes    Follow Up  Have all necessary follow up appointments been scheduled:  - PCP yes  - Specialty Follow Up yes  Patient on Anticoagulation: Yes, Coumadin  - Coumadin Clinic no pt requesting INR checks through dialysis, will check with MD at rounds  Out Patient Dialysis arranged: yes    Discharge   Discharge Navigator Complete: no no order  Is all DME in place: yes  Has patient bed educated on DME: yes  Is consent for controlled substance signed: N\A  Are all specialties signed off at discharge: N\A

## 2018-12-09 NOTE — PROGRESS NOTES
Nephrology Daily Progress Note    Date of Service  12/9/2018    Chief Complaint  73 y.o. female admitted 11/30/2018 with ESRD, sepsis    Interval Problem Update  Pt is anxious to go home    Review of Systems  Review of Systems   Constitutional: Negative for chills, fever and malaise/fatigue.   Respiratory: Negative for cough and shortness of breath.    Cardiovascular: Negative for chest pain and leg swelling.   Gastrointestinal: Negative for nausea and vomiting.   Genitourinary: Negative for dysuria, frequency and urgency.        Physical Exam  Temp:  [35.9 °C (96.7 °F)-37.1 °C (98.7 °F)] 36 °C (96.8 °F)  Pulse:  [68-93] 92  Resp:  [16-19] 18  BP: ()/(49-81) 151/74    Physical Exam   Constitutional: No distress. Face mask in place.   HENT:   Nose: Nose normal.   Eyes: Conjunctivae are normal. Right eye exhibits no discharge. Left eye exhibits no discharge. No scleral icterus.   Cardiovascular: Normal rate and regular rhythm.    No murmur heard.  Pulmonary/Chest: She has no wheezes. She has no rales.   Musculoskeletal: She exhibits no edema.   Skin: She is not diaphoretic.   Nursing note and vitals reviewed.      Fluids  No intake or output data in the 24 hours ending 12/09/18 1226    Laboratory      Recent Labs      12/07/18   0947  12/08/18   0559   SODIUM   --   136   POTASSIUM  4.6  4.1   CHLORIDE   --   101   CO2   --   25   GLUCOSE   --   84   BUN   --   11   CREATININE   --   2.71*   CALCIUM   --   9.3     Recent Labs      12/07/18   0541  12/08/18   0559  12/09/18   0937   APTT  71.6*  34.4   --    INR  2.31*  2.01*  1.33*               Imaging      Assessment/Plan  1 ESRD:HD MWF  2 Sepsis:on ATB  3 Anemia  Plan  no need for HD today  Waiting for INR to be theraputic  Renal dose all meds  Avoid nephrotoxins  Renal diet  D/W Dr Turk

## 2018-12-09 NOTE — PROGRESS NOTES
Inpatient Anticoagulation Service Note    Date: 2018  Reason for Anticoagulation: Deep Vein Thrombosis      Hemoglobin Value: (!) 8.7  Hematocrit Value: (!) 26.7  Lab Platelet Value: 184  Target INR: 2.0 to 3.0    INR from last 7 days     Date/Time INR Value    18 0937 (!)  1.33    18 0559 (!)  2.01    18 0541 (!)  2.31    18 0603 (!)  2.21    18 2348 (!)  1.85    18 0101 (!)  1.15    18 1402  1.06        Dose from last 7 days     Date/Time Dose (mg)    18 0800  4    18 1000  3    18 1013  2    18 1155  2    18 1329  2    18 1000  4    18 1600  6        Average Dose (mg): 2  Significant Interactions: Antibiotics, Statin  Bridge Therapy: No  Date of Last VTE Event: 18  Bridge Therapy Start Date: 18  Days of Overlap Therapy: 5, restarting today after not getting warfarin yesterday.   INR Value Greater than 2 Prior to Discontinuation of Parenteral Anticoagulation: Not Applicable    Reversal Agent Administered: Vitamin K  Intravenous  Comments: Pt on warfarin for incidental R acute jugular vein thrombosis on  with therapy interruption and IV vitK administration on  for AV fistula placement for HD. H/H depressed but stable with plt WNL. Vitals stable with no s/sx of active bleed. CTM H/H. Latest subtherapeutic INR. Will bolus today with higher than anticipated maintenance dose to load into therapeutic INR.     Plan:  4 mg warfarin today  Education Material Provided?: Yes  Pharmacist suggested discharge dosin mg warfarin daily with f/u INR w/in 72 hours     Varsha Goel

## 2018-12-09 NOTE — PROGRESS NOTES
Infectious Disease Progress Note    Author: Julee Diaz M.D. Date & Time of service: 2018  2:25 PM    Chief Complaint:  Staph aureus sepsis      Interval History:  73-year-old female with end-stage renal disease, on hemodialysis, admitted due to staphylococcus aureus sepsis with altered mental status, tachycardia, and hallucinations   AF WBC 7.5 feels SOB today-O2 sat 95% with prompted deep breath  12/3/2018 no fevers.  Feels better today.  Denies any new issues overnight.  -no fevers.  WBC 7.3.  Creatinine is 4.22 getting hemodialysis.  Got her hemodialysis catheter  2018 no fevers.  Feels pretty well.  No new issues overnight.  2018-no fevers.  No new issues overnight.  Awaiting BARBIE and fistula placement  2018-underwent left internal jugular tunneled catheter as well as fistulogram with angioplasty on 2018.  Denies any new issues.  Awaiting discharge.  Labs Reviewed, Medications Reviewed, Radiology Reviewed and Wound Reviewed.    Review of Systems:  Review of Systems   Constitutional: Negative for fever.   Respiratory: Negative for cough, hemoptysis, sputum production and shortness of breath.    Cardiovascular: Negative for chest pain.   Gastrointestinal: Negative for abdominal pain, diarrhea, nausea and vomiting.   Psychiatric/Behavioral: Negative for hallucinations.   All other systems reviewed and are negative.      Hemodynamics:  Temp (24hrs), Av.5 °C (97.7 °F), Min:35.9 °C (96.7 °F), Max:37.1 °C (98.7 °F)  Temperature: 36 °C (96.8 °F)  Pulse  Av.5  Min: 52  Max: 107   Blood Pressure : 151/74       Physical Exam:  Physical Exam   Constitutional: She is oriented to person, place, and time. She appears well-developed.   Looks older than stated age   HENT:   Head: Normocephalic and atraumatic.   Poor dentition   Eyes: Pupils are equal, round, and reactive to light. EOM are normal.   Neck: Neck supple.   Cardiovascular: Regular rhythm.    No murmur  heard.  Pulmonary/Chest: Effort normal. No respiratory distress. She has no wheezes. She has no rales.   Right chest HD cath removed   Abdominal: Soft. She exhibits no distension. There is no tenderness.   Musculoskeletal: She exhibits no edema.   Left femoral hemodialysis catheter   Neurological: She is alert and oriented to person, place, and time.   Skin: Skin is warm. She is not diaphoretic. There is erythema.   Decreased at prior cath site   Nursing note and vitals reviewed.      Meds:    Current Facility-Administered Medications:   •  MD Alert...Warfarin per Pharmacy  •  warfarin  •  MD Alert...HEPARIN WEIGHT BASED PROTOCOL Pharmacist to implement  •  heparin **AND** heparin **AND** Protocol 440 Heparin Weight Based DO NOT GIVE ANY HEPARIN BOLUS TO STROKE PATIENT **AND** Protocol 440 Heparin Weight Based Discontinue Enoxaparin (Lovenox), Dabigatran (Pradaxa), Rivaroxaban (Xarelto), Apixaban (Eliquis), Edoxaban (Savaysa, Lixiana), Fondaparinux (Arixtra) and Argatroban prior to heparin administration **AND** Protocol 440 Heparin Weight Based Draw baseline aPTT, PT, and platelet count if not already done **AND** Protocol 440 Heparin Weight Based Draw aPTT 6 hours after beginning infusion.  **AND** Protocol 440 Heparin Weight Based Record Patient Data **AND** Protocol 440 Heparin Weight Based INSTRUCTIONS **AND** Protocol 440 Heparin Weight Based Review aPTT results 6 hours after infusion is begun as detailed **AND** Protocol 440 Heparin Weight Based Draw Platelet count every three days. Contact MD if platelet is 50% lower than baseline count. **AND** Protocol 440 Heparin Weight Based Adjust heparin to maintain aPTT between 55-96 sec **AND** Protocol 440 Heparin Weight Based Order aPTT 6 hours after any rate change or hold until aPTT is therapeutic (55-96 seconds) **AND** Protocol 440 Heparin Weight Based Documentation and verification  •  NS  •  ipratropium-albuterol  •  famotidine  •  sucralfate  •  heparin  •   heparin  •  epoetin seven  •  lidocaine  •  cyanocobalamin  •  ferrous sulfate  •  fluticasone  •  ipratropium-albuterol  •  lovastatin  •  multivitamin  •  vitamin D  •  Respiratory Care per Protocol  •  ondansetron  •  ondansetron  •  acetaminophen  •  ceFAZolin  •  senna-docusate **AND** polyethylene glycol/lytes **AND** [DISCONTINUED] magnesium hydroxide **AND** bisacodyl    Labs:  No results for input(s): WBC, RBC, HEMOGLOBIN, HEMATOCRIT, MCV, MCH, RDW, PLATELETCT, MPV, NEUTSPOLYS, LYMPHOCYTES, MONOCYTES, EOSINOPHILS, BASOPHILS, RBCMORPHOLO in the last 72 hours.  Recent Labs      12/07/18   0947  12/08/18   0559   SODIUM   --   136   POTASSIUM  4.6  4.1   CHLORIDE   --   101   CO2   --   25   GLUCOSE   --   84   BUN   --   11     Recent Labs      12/08/18   0559   CREATININE  2.71*       Imaging:  Dx-chest-portable (1 View)    Result Date: 11/30/2018 11/30/2018 4:34 PM HISTORY/REASON FOR EXAM:  On dialysis. Reportedly infected port. Cardiomegaly. Renal failure. TECHNIQUE/EXAM DESCRIPTION AND NUMBER OF VIEWS: Single portable view of the chest. COMPARISON: October 21, 2018 FINDINGS: Right IJ dialysis catheter is present with tip in expected location of the superior vena cava. The cardiac silhouette is enlarged. No pulmonary consolidation. No effusion or pneumothorax.     Right IJ dialysis catheter in place. Cardiomegaly.    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  Acute deep venous thrombus throughout the right internal jugular vein  Results called to MARCELINA DANIELS approximately 1700 hours 12/1/2018  VICENTE FRANCO  Exam Date:     12/01/2018 15:23  Room #:     Inpatient  Priority:     Routine  Ht (in):             Wt (lb):  Ordering Physician:        MARCELINA DANIELS  Referring Physician:       MARCELINA DANIELS  Sonographer:               Waqas Rodriguez RVT   Study Type:                Complete Unilateral  Technical Quality:         Adequate  Age:    73    Gender:     F  MRN:    [id number]  :    1945      BSA:  Indications:     AV Fistula - S/P  CPT Codes:       75536  ICD Codes:       447  History:         Right radiocephalic AVF evaluation  Limitations:  Exam Data:  Side:          Right  Access          Fistula  Inflow Artery   Radial  Outflow Vein    Cephalic                     Velocity (cm/s)   Prox Inflow artery          103.0                               0   Mid Inflow artery           130.0                               0   Distal Inflow artery        108.0                               0   Inflow Anastomosis          171.0                               0   Inflow Artery distal to     79.00   anastomosis          Antegrade   Proximal Graft   Mid Graft   Distal Graft   Outflow Anastomosis   Proximal Outflow Vein       689.0                               0   Mid Outflow Vein            57.00   Distal Outflow Vein         60.00   Flow Volume Mid Bicep       139.0ml/min                               0   Flow Volume Mid-Forearm     178.0ml/min                               0   Prox Outflow Vein Diam      0.40  cm   Mid Outflow Vein Diam       0.39  cm   Distal Outflow Vein Diam    0.45  cm  Findings  At the distal forearm, there are tandem diameter narrowings with  corresponding increases in velocity. The first diameter reduction is from  0.40 to 0.19 cm with a 3.7X increase in velocity. The second diameter  reduction is from 0.46 to 0.23 cm with a maximum velocity of 442 cm/sec.  Flow volumes in the right radiocephalic AVF range from 139-178 mL/min.  No evidence of hemodynamically significant stenosis in the inflow artery or  the anastomosis. Flow in the radial artery distal to the anastomosis is  antegrade and triphasic.  Incidental finding: Echolucent material consistent with acute venous  thrombosis is visualized in the internal jugular vein throughout its   "length.  Luis M Perez  (Electronically Signed)  Final Date:      01 December 2018                   17:17      Micro:  Results     Procedure Component Value Units Date/Time    BLOOD CULTURE [[id number]] Collected:  11/30/18 1615    Order Status:  Completed Specimen:  Blood from Peripheral Updated:  12/05/18 1900     Significant Indicator NEG     Source BLD     Site PERIPHERAL     Blood Culture No growth after 5 days of incubation.    Narrative:       Per Hospital Policy: Only change Specimen Src: to \"Line\" if  specified by physician order.    BLOOD CULTURE [[id number]] Collected:  11/30/18 1717    Order Status:  Completed Specimen:  Blood from Peripheral Updated:  12/05/18 1900     Significant Indicator NEG     Source BLD     Site PERIPHERAL     Blood Culture No growth after 5 days of incubation.    Narrative:       Per Hospital Policy: Only change Specimen Src: to \"Line\" if  specified by physician order.          Assessment:  Active Hospital Problems    Diagnosis   • *Staphylococcus aureus bacteremia with sepsis ([facility]) [A41.01]   • End stage renal disease ([facility]) [N18.6]   • Encephalopathy acute [G93.40]   • COPD (chronic obstructive pulmonary disease) ([facility]) [J44.9]   • B12 deficiency [E53.8]   • Iron deficiency anemia [D50.9]   • HLD (hyperlipidemia) [E78.5]   • Vitamin D deficiency [E55.9]   • Sepsis ([facility]) [A41.9]   • Acute external jugular vein thrombosis [I82.890]   • Severe dental caries [K02.9]   • Severe protein-calorie malnutrition ([facility]) [E43]       Plan:  SA sepsis  Afebrile  No leukocytosis  AMS resolved  BCxs + MSSA  Repeat Bcxs 11/30 neg  TTE reveals rheumatic looking mitral valve.  It seems significantly different read from 2016  BARBIE negative for endocarditis on 12/7/2018  Anticipate 6 weeks IV abx from date of negative blood cxs and cath removal  Continue cefazolin through 1/12/2018  Can be given during hemodialysis      DVT right IJ, new  Query infected thrombophlebitis  Cath removed  Heparin  Abx as " above  New hemodialysis catheter was placed on 12/3/2018    Dental caries  Likely will need extractions  Discussed with the patient    ESRD  Dose adjust abx    ID will sign off.  Please call as needed  Discussed with internal medicine

## 2018-12-09 NOTE — PROGRESS NOTES
Nephrology Daily Progress Note    Date of Service  12/8/2018    Chief Complaint  73 y.o. female admitted 11/30/2018 with ESRD, sepsis    Interval Problem Update  Pt had tunneled HD cath.     Review of Systems  Review of Systems   Constitutional: Negative for chills, fever and malaise/fatigue.   Respiratory: Negative for cough and shortness of breath.    Cardiovascular: Negative for chest pain and leg swelling.   Gastrointestinal: Negative for nausea and vomiting.   Genitourinary: Negative for dysuria, frequency and urgency.        Physical Exam  Temp:  [35.9 °C (96.7 °F)-37 °C (98.6 °F)] 37 °C (98.6 °F)  Pulse:  [68-93] 86  Resp:  [16-19] 17  BP: ()/(49-75) 119/63    Physical Exam   Constitutional: No distress.   HENT:   Nose: Nose normal.   Eyes: Conjunctivae are normal. Right eye exhibits no discharge. Left eye exhibits no discharge. No scleral icterus.   Cardiovascular: Normal rate and regular rhythm.    No murmur heard.  Pulmonary/Chest: She has no wheezes. She has no rales.   Musculoskeletal: She exhibits no edema.   Skin: She is not diaphoretic.   Nursing note and vitals reviewed.      Fluids  No intake or output data in the 24 hours ending 12/08/18 1956    Laboratory      Recent Labs      12/07/18   0947  12/08/18   0559   SODIUM   --   136   POTASSIUM  4.6  4.1   CHLORIDE   --   101   CO2   --   25   GLUCOSE   --   84   BUN   --   11   CREATININE   --   2.71*   CALCIUM   --   9.3     Recent Labs      12/06/18   0603  12/07/18   0541  12/08/18   0559   APTT  78.7*  71.6*  34.4   INR  2.21*  2.31*  2.01*               Imaging      Assessment/Plan  1 ESRD:HD MWF  2 Sepsis:on ATB  3 Anemia  Plan  no need for HD  Renal dose all meds  Avoid nephrotoxins  continue Atb  Appreciate dr Schroeder help

## 2018-12-09 NOTE — DISCHARGE PLANNING
Care Transition Team Assessment    Met with pt at bedside to compete assessment and discuss discharge. Pt confirmed accuracy of information listed on facesheet. She states she lives with her daughter and her son in law. She has a FWW, cane and O2 through [employer] at home. She reports being independent with ADL's and IADL's. Discussed recommendation for HH services. Pt declined stating she's not interested. Discussed benefits of HH to assist with transition home and she stated she doesn't think she needs this type of service yet.     Information Source  Orientation : Oriented x 4  Information Given By: Patient         Elopement Risk  Legal Hold: No  Ambulatory or Self Mobile in Wheelchair: Yes  Disoriented: No  Psychiatric Symptoms: None  History of Wandering: No  Elopement this Admit: No  Vocalizing Wanting to Leave: No  Displays Behaviors, Body Language Wanting to Leave: No-Not at Risk for Elopement  Elopement Risk: Not at Risk for Elopement    Interdisciplinary Discharge Planning  Does Admitting Nurse Feel This Could be a Complex Discharge?: No  Primary Care Physician: Dr. Miguel  Lives with - Patient's Self Care Capacity: Adult Children  Patient or legal guardian wants to designate a caregiver (see row info): No  Support Systems: Children  Housing / Facility: [address]  Do You Take your Prescribed Medications Regularly: Yes  Able to Return to Previous ADL's: Yes  Mobility Issues: No  Prior Services: Home-Independent  Patient Expects to be Discharged to:: Home  Assistance Needed: No  Durable Medical Equipment: Other - Specify  DME Provider / Phone: cane    Discharge Preparedness  What is your plan after discharge?: Home with help  What are your discharge supports?: Child  Prior Functional Level: Ambulatory, Independent with Activities of Daily Living, Independent with Medication Management, Uses Cane, Uses Walker  Difficulity with ADLs: None  Difficulity with IADLs: None    Functional Assesment  Prior  Functional Level: Ambulatory, Independent with Activities of Daily Living, Independent with Medication Management, Uses Cane, Uses Walker    Finances  Financial Barriers to Discharge: No  Prescription Coverage: Yes    Vision / Hearing Impairment  Vision Impairment : Yes  Right Eye Vision: Impaired, Wears Glasses  Left Eye Vision: Impaired, Wears Glasses  Hearing Impairment : No    Values / Beliefs / Concerns  Values / Beliefs Concerns : No         Domestic Abuse  Have you ever been the victim of abuse or violence?: No    Psychological Assessment  History of Substance Abuse: None  History of Psychiatric Problems: No  Non-compliant with Treatment: No  Newly Diagnosed Illness: Yes    Discharge Risks or Barriers  Discharge risks or barriers?: No    Anticipated Discharge Information  Anticipated discharge disposition: Home

## 2018-12-10 LAB
APTT PPP: 125.5 SEC (ref 24.7–36)
APTT PPP: 51.4 SEC (ref 24.7–36)
APTT PPP: 75.5 SEC (ref 24.7–36)
APTT PPP: 89.1 SEC (ref 24.7–36)
INR PPP: 1.24 (ref 0.87–1.13)
PROTHROMBIN TIME: 15.7 SEC (ref 12–14.6)

## 2018-12-10 PROCEDURE — 700102 HCHG RX REV CODE 250 W/ 637 OVERRIDE(OP): Performed by: INTERNAL MEDICINE

## 2018-12-10 PROCEDURE — 5A1D70Z PERFORMANCE OF URINARY FILTRATION, INTERMITTENT, LESS THAN 6 HOURS PER DAY: ICD-10-PCS | Performed by: INTERNAL MEDICINE

## 2018-12-10 PROCEDURE — A6250 SKIN SEAL PROTECT MOISTURIZR: HCPCS | Performed by: INTERNAL MEDICINE

## 2018-12-10 PROCEDURE — 700111 HCHG RX REV CODE 636 W/ 250 OVERRIDE (IP)

## 2018-12-10 PROCEDURE — 700101 HCHG RX REV CODE 250: Performed by: INTERNAL MEDICINE

## 2018-12-10 PROCEDURE — 700111 HCHG RX REV CODE 636 W/ 250 OVERRIDE (IP): Performed by: HOSPITALIST

## 2018-12-10 PROCEDURE — 700102 HCHG RX REV CODE 250 W/ 637 OVERRIDE(OP): Performed by: HOSPITALIST

## 2018-12-10 PROCEDURE — 770001 HCHG ROOM/CARE - MED/SURG/GYN PRIV*

## 2018-12-10 PROCEDURE — 99231 SBSQ HOSP IP/OBS SF/LOW 25: CPT | Performed by: INTERNAL MEDICINE

## 2018-12-10 PROCEDURE — 97530 THERAPEUTIC ACTIVITIES: CPT

## 2018-12-10 PROCEDURE — 700111 HCHG RX REV CODE 636 W/ 250 OVERRIDE (IP): Performed by: INTERNAL MEDICINE

## 2018-12-10 PROCEDURE — 90935 HEMODIALYSIS ONE EVALUATION: CPT

## 2018-12-10 PROCEDURE — 85730 THROMBOPLASTIN TIME PARTIAL: CPT

## 2018-12-10 PROCEDURE — A9270 NON-COVERED ITEM OR SERVICE: HCPCS | Performed by: INTERNAL MEDICINE

## 2018-12-10 PROCEDURE — 36415 COLL VENOUS BLD VENIPUNCTURE: CPT

## 2018-12-10 PROCEDURE — A9270 NON-COVERED ITEM OR SERVICE: HCPCS | Performed by: HOSPITALIST

## 2018-12-10 PROCEDURE — 700111 HCHG RX REV CODE 636 W/ 250 OVERRIDE (IP): Mod: JG | Performed by: INTERNAL MEDICINE

## 2018-12-10 PROCEDURE — 90935 HEMODIALYSIS ONE EVALUATION: CPT | Performed by: INTERNAL MEDICINE

## 2018-12-10 PROCEDURE — 94640 AIRWAY INHALATION TREATMENT: CPT

## 2018-12-10 PROCEDURE — 97535 SELF CARE MNGMENT TRAINING: CPT

## 2018-12-10 RX ORDER — HEPARIN SODIUM 1000 [USP'U]/ML
INJECTION, SOLUTION INTRAVENOUS; SUBCUTANEOUS
Status: COMPLETED
Start: 2018-12-10 | End: 2018-12-10

## 2018-12-10 RX ORDER — WARFARIN SODIUM 4 MG/1
4 TABLET ORAL
Status: COMPLETED | OUTPATIENT
Start: 2018-12-10 | End: 2018-12-10

## 2018-12-10 RX ORDER — HEPARIN SODIUM 1000 [USP'U]/ML
4200 INJECTION, SOLUTION INTRAVENOUS; SUBCUTANEOUS
Status: DISCONTINUED | OUTPATIENT
Start: 2018-12-10 | End: 2018-12-16 | Stop reason: HOSPADM

## 2018-12-10 RX ORDER — WARFARIN SODIUM 2 MG/1
2 TABLET ORAL
Status: DISCONTINUED | OUTPATIENT
Start: 2018-12-11 | End: 2018-12-11

## 2018-12-10 RX ADMIN — HEPARIN SODIUM 4200 UNITS: 1000 INJECTION, SOLUTION INTRAVENOUS; SUBCUTANEOUS at 11:30

## 2018-12-10 RX ADMIN — WARFARIN SODIUM 4 MG: 4 TABLET ORAL at 17:34

## 2018-12-10 RX ADMIN — IPRATROPIUM BROMIDE AND ALBUTEROL SULFATE 3 ML: .5; 3 SOLUTION RESPIRATORY (INHALATION) at 21:48

## 2018-12-10 RX ADMIN — FAMOTIDINE 20 MG: 20 TABLET ORAL at 04:21

## 2018-12-10 RX ADMIN — LOVASTATIN 10 MG: 20 TABLET ORAL at 21:00

## 2018-12-10 RX ADMIN — VITAMIN D, TAB 1000IU (100/BT) 1000 UNITS: 25 TAB at 04:21

## 2018-12-10 RX ADMIN — FLUTICASONE PROPIONATE 100 MCG: 50 SPRAY, METERED NASAL at 04:21

## 2018-12-10 RX ADMIN — HEPARIN SODIUM 900 UNITS/HR: 5000 INJECTION, SOLUTION INTRAVENOUS at 15:39

## 2018-12-10 RX ADMIN — SUCRALFATE 1 G: 1 TABLET ORAL at 12:53

## 2018-12-10 RX ADMIN — SUCRALFATE 1 G: 1 TABLET ORAL at 17:34

## 2018-12-10 RX ADMIN — VITAMIN B12 0.1 MG ORAL TABLET 100 MCG: 0.1 TABLET ORAL at 04:21

## 2018-12-10 RX ADMIN — CEFAZOLIN SODIUM 1 G: 1 INJECTION, SOLUTION INTRAVENOUS at 17:34

## 2018-12-10 RX ADMIN — FERROUS SULFATE TAB 325 MG (65 MG ELEMENTAL FE) 325 MG: 325 (65 FE) TAB at 04:21

## 2018-12-10 RX ADMIN — HEPARIN SODIUM 2200 UNITS: 1000 INJECTION, SOLUTION INTRAVENOUS; SUBCUTANEOUS at 01:12

## 2018-12-10 RX ADMIN — SUCRALFATE 1 G: 1 TABLET ORAL at 04:20

## 2018-12-10 RX ADMIN — THERA TABS 1 TABLET: TAB at 04:21

## 2018-12-10 RX ADMIN — ACETAMINOPHEN 650 MG: 325 TABLET, FILM COATED ORAL at 04:27

## 2018-12-10 RX ADMIN — EPOETIN ALFA 4000 UNITS: 4000 SOLUTION INTRAVENOUS; SUBCUTANEOUS at 12:52

## 2018-12-10 ASSESSMENT — ENCOUNTER SYMPTOMS
ROS GI COMMENTS: APPETITE DECREASED
PALPITATIONS: 0
SPUTUM PRODUCTION: 0
ORTHOPNEA: 0
MYALGIAS: 0
NAUSEA: 0
VOMITING: 0
CHILLS: 0
TREMORS: 0
SHORTNESS OF BREATH: 0
ABDOMINAL PAIN: 0
SORE THROAT: 0
DOUBLE VISION: 0
HEMOPTYSIS: 0
NECK PAIN: 0
FALLS: 0
POLYDIPSIA: 0
SENSORY CHANGE: 0
COUGH: 0
BACK PAIN: 0
EYE REDNESS: 0
DIARRHEA: 0
PHOTOPHOBIA: 0
FEVER: 0
HEADACHES: 0
DIAPHORESIS: 0
DEPRESSION: 0
HALLUCINATIONS: 0
EYE DISCHARGE: 0
BRUISES/BLEEDS EASILY: 0
SPEECH CHANGE: 0
TINGLING: 0
WEAKNESS: 0
CONSTIPATION: 0
DIZZINESS: 0
BLURRED VISION: 0
WEIGHT LOSS: 1

## 2018-12-10 ASSESSMENT — COGNITIVE AND FUNCTIONAL STATUS - GENERAL
MOVING TO AND FROM BED TO CHAIR: A LITTLE
SUGGESTED CMS G CODE MODIFIER MOBILITY: CK
CLIMB 3 TO 5 STEPS WITH RAILING: A LITTLE
WALKING IN HOSPITAL ROOM: A LITTLE
MOVING FROM LYING ON BACK TO SITTING ON SIDE OF FLAT BED: A LITTLE
STANDING UP FROM CHAIR USING ARMS: A LITTLE
MOBILITY SCORE: 19

## 2018-12-10 ASSESSMENT — GAIT ASSESSMENTS
DISTANCE (FEET): 20
GAIT LEVEL OF ASSIST: CONTACT GUARD ASSIST
ASSISTIVE DEVICE: FRONT WHEEL WALKER
DEVIATION: BRADYKINETIC;SHUFFLED GAIT

## 2018-12-10 ASSESSMENT — LIFESTYLE VARIABLES: SUBSTANCE_ABUSE: 0

## 2018-12-10 ASSESSMENT — PAIN SCALES - GENERAL: PAINLEVEL_OUTOF10: 0

## 2018-12-10 NOTE — PROGRESS NOTES
Vascular    VSS afeb    Right arm fistula with greatly improved thrill through out the forearm.  Moderate hematoma in the upper forearm.  Left IJ catheter in place and without hematoma or evidence of infection.     Kristyn will need follow-up as an outpatient with Dr. Romero.

## 2018-12-10 NOTE — PROGRESS NOTES
Hospital Medicine Daily Progress Note    Date of Service  12/9/2018    Chief Complaint  Hallucinations    Hospital Course    *73 y.o. female admitted 11/30/2018 with complaints of hallucinations for the last 1-2 nights-recent blood cultures done at hemodialysis are positive for staph aureus patient denies experience any fever chills or sweats, hallucinations were occurring mainly at night but she was aware that they were hallucinations, in addition to the night prior to admission she had them her first night of admission but now they are resolved*    BCx from [facility] growing MSSA, vanco discontinued  Echo with rheumatic mitral valve      Interval Problem Update  Status post successful tunneled catheter placement yesterday.  INR dropped to 1.73.  We will have to restart heparin for bridging today.  ID signed off  We need to do home O2 evaluation, anticipate discharge home with home health in the beginning of the next week as soon as INR within therapeutic range  I discussed patient care with Dr. Diaz and Dr Najjar.        Consultants/Specialty  [facility]  Fidel Romero MD vascular surgery  [facility]    Code Status  Full     Disposition  Home with home health when INR is therapeutic    Review of Systems  Review of Systems   Constitutional: Positive for malaise/fatigue and weight loss (80 pounds in 2 years). Negative for chills, diaphoresis and fever.   HENT: Negative for congestion, ear pain, sore throat and tinnitus.    Eyes: Negative for blurred vision, double vision, photophobia, discharge and redness.   Respiratory: Negative for cough, hemoptysis, sputum production and shortness of breath.    Cardiovascular: Negative for chest pain, palpitations and orthopnea.   Gastrointestinal: Negative for abdominal pain, constipation, diarrhea, nausea and vomiting.        Appetite decreased   Genitourinary: Negative for dysuria, frequency and urgency.   Musculoskeletal: Negative for back pain,  falls, myalgias and neck pain.   Skin: Negative for itching and rash.   Neurological: Negative for dizziness, tingling, tremors, sensory change, speech change, weakness and headaches.   Endo/Heme/Allergies: Negative for environmental allergies and polydipsia. Does not bruise/bleed easily.   Psychiatric/Behavioral: Negative for depression, hallucinations (None since last night), substance abuse and suicidal ideas.        Physical Exam  Temp:  [35.9 °C (96.7 °F)-37.1 °C (98.7 °F)] 36 °C (96.8 °F)  Pulse:  [78-92] 92  Resp:  [16-18] 18  BP: (105-151)/(49-81) 151/74    Physical Exam   Constitutional: She is oriented to person, place, and time. She appears well-developed. No distress.   Chronically ill and hao with loose skin   HENT:   Head: Normocephalic and atraumatic.   Right Ear: External ear normal.   Left Ear: External ear normal.   Nose: Nose normal.   Mouth/Throat: Oropharynx is clear and moist.   Eyes: Pupils are equal, round, and reactive to light. Conjunctivae and EOM are normal. Right eye exhibits no discharge. Left eye exhibits no discharge. No scleral icterus.   Neck: Neck supple.   Cardiovascular: Normal rate and regular rhythm.    Pulmonary/Chest: Effort normal and breath sounds normal. No respiratory distress. She exhibits tenderness (site of permacath insertion. improved, erythema resolved).   Abdominal: Soft. Bowel sounds are normal. She exhibits no distension.   Musculoskeletal: She exhibits no edema or tenderness.   Neurological: She is alert and oriented to person, place, and time. No cranial nerve deficit.   Skin: Skin is warm and dry. She is not diaphoretic.   Psychiatric: She has a normal mood and affect.   Nursing note and vitals reviewed.  I examined the patient, there are no significant changes    Fluids  No intake or output data in the 24 hours ending 12/09/18 1749    Laboratory  Recent Labs      12/09/18   1620   WBC  7.5   RBC  2.99*   HEMOGLOBIN  9.2*   HEMATOCRIT  29.9*   MCV  100.0*  "  MCH  30.8   MCHC  30.8*   RDW  53.8*   PLATELETCT  175   MPV  11.0     Recent Labs      12/07/18   0947  12/08/18   0559   SODIUM   --   136   POTASSIUM  4.6  4.1   CHLORIDE   --   101   CO2   --   25   GLUCOSE   --   84   BUN   --   11   CREATININE   --   2.71*   CALCIUM   --   9.3     Recent Labs      12/07/18   0541  12/08/18   0559  12/09/18   0937  12/09/18   1620   APTT  71.6*  34.4   --   33.2   INR  2.31*  2.01*  1.33*   --                Imaging  EC-BARBIE W/O CONT   Final Result      EC-ECHOCARDIOGRAM COMPLETE W/O CONT   Final Result      US-HEMODIALYSIS GRAFT DUPLEX COMP UPPER EXTREMITY   Final Result      DX-CHEST-PORTABLE (1 VIEW)   Final Result      Right IJ dialysis catheter in place.      Cardiomegaly.      IR-INTRO STENT DIALYSIS CIRCUIT S&I    (Results Pending)        Assessment/Plan  * Staphylococcus aureus bacteremia with sepsis (HCC)- (present on admission)   Assessment & Plan    Positive blood cultures from outside facility for MSSA. Negative here 11/30  ID consulted -needs 4-6 weeks of IV antibiotics  Permacath removed  New dialysis catheter placed 12/3 in the left femoral vein, temporary  Per ID, cefazolin 3 times a week after discharge for 6 weeks from last negative blood culture, stop date 1/14/2018     End stage renal disease (HCC)- (present on admission)   Assessment & Plan    On dialysis  Monday Wednesday Friday  Nephrology consulted and following    Vascular surgery consulted regarding new fistula    Status post fistulogram and left IJ permacatheter 12/8     COPD (chronic obstructive pulmonary disease) (HCC)- (present on admission)   Assessment & Plan    Not in exacerbation  Oxygen  Respiratory protocol  Stable     Abnormal echocardiogram   Assessment & Plan    \"Rheumatic appearance of mitral valve with mild stenosis and mild   Regurgitation.\"  BARBIE negative for endocarditis     Severe protein-calorie malnutrition (HCC)   Assessment & Plan    Patient has lost 80 pounds in 2 years "   Nutritionist consult     Severe dental caries   Assessment & Plan    PainPatient has multiple teeth literally rotting out of her mouth  ID agrees these will need to be removed and eventually but does not feel this is urgent  Denies complaints of tooth     Acute external jugular vein thrombosis   Assessment & Plan     incidental acute jugular vein thrombosis on the right  Heparin weight-based protocol with warfarin bridge       Sepsis (HCC)   Assessment & Plan    This is severe sepsis with the following associated acute organ dysfunction(s): metabolic/septic encephalopathy.   Patient not recognizes being severely septic on admission due to lack of fever tachycardia and leukocytosis-  However her septic encephalopathy qualifies her for diagnosis of severe sepsis and this is now resolved.  As her symptoms had resolved and she had stable vital signs, fluids serial lactates and so forth were not indicated by the time sepsis was recognized.    Resolved     Iron deficiency anemia- (present on admission)   Assessment & Plan    PO Ferrous sulfate   Hb ~ 11, at her baseline  Continue to monitor     B12 deficiency- (present on admission)   Assessment & Plan    Resume home B12     HLD (hyperlipidemia)- (present on admission)   Assessment & Plan    Lovastatin     Vitamin D deficiency- (present on admission)   Assessment & Plan    Resume home vitamin D          VTE prophylaxis: On heparin drip with warfarin bridge

## 2018-12-10 NOTE — FACE TO FACE
Face to Face Note  -  Durable Medical Equipment    Nathan Turk M.D. - NPI: [id number]  I certify that this patient is under my care and that they had a durable medical equipment(DME)face to face encounter by myself that meets the physician DME face-to-face encounter requirements with this patient on:    Date of encounter:   Patient:                    MRN:                       YOB: 2019  Kristyn Gillespie  [id number]  1945     The encounter with the patient was in whole, or in part, for the following medical condition, which is the primary reason for durable medical equipment:  COPD    I certify that, based on my findings, the following durable medical equipment is medically necessary:  Oxygen.    HOME O2 Saturation Measurements:(Values must be present for Home Oxygen orders)         ,    With Liters of O2: 4, O2 sat with amb with O2 : 82  Is the patient mobile?: Yes    My Clinical findings support the need for the above equipment due to:  Hypoxia    Supporting Symptoms: Dyspnea

## 2018-12-10 NOTE — PROGRESS NOTES
Inpatient Anticoagulation Service Note    Date: 12/10/2018  Reason for Anticoagulation: Deep Vein Thrombosis        Hemoglobin Value: (!) 9.2  Hematocrit Value: (!) 29.9  Lab Platelet Value: 175  Target INR: 2.0 to 3.0    INR from last 7 days     Date/Time INR Value    18 2246 (!)  1.24    18 0937 (!)  1.33    18 0559 (!)  2.01    18 0541 (!)  2.31    18 0603 (!)  2.21    18 2348 (!)  1.85    18 0101 (!)  1.15    18 1402  1.06        Dose from last 7 days     Date/Time Dose (mg)    12/10/18 1139  4    18 0800  4    18 1000  0    18 1013  2    18 1155  2    18 1329  2    18 1000  4    18 1600  6        Average Dose (mg): New start  Significant Interactions: Antibiotics, Statin  Bridge Therapy: No  Date of Last VTE Event: 18  Bridge Therapy Start Date: 18  Days of Overlap Therapy: 5   INR Value Greater than 2 Prior to Discontinuation of Parenteral Anticoagulation: Not Applicable   Reversal Agent Administered: Vitamin K  Subcutaneous (2 mg on )    Comments: INR remains subtherapeutic today after 2 mg of subQ vitamin K on  and the dose being held that day. No new CBC, but no notes of bleeding. Will give one more increased dose this evening.    Plan:  Warfarin 4 mg tonight and INR tomorrow AM  Education Material Provided?: Yes   Pharmacist suggested discharge dosin mg daily and INR within 72-96 hours of discharge     Yoli Carrizales, PharmD, BCPS

## 2018-12-10 NOTE — PROGRESS NOTES
Bedside report received from [facility]. Initial patient assessment performed, chart and eMAR reviewed. See relevant flowsheet for details. Patient updated on plan of care for the day, with all questions answered. Call light and personal belongings are within reach, and bed is in the lowest position.

## 2018-12-11 LAB
APTT PPP: 62.6 SEC (ref 24.7–36)
INR PPP: 1.24 (ref 0.87–1.13)
PROTHROMBIN TIME: 15.7 SEC (ref 12–14.6)

## 2018-12-11 PROCEDURE — 700101 HCHG RX REV CODE 250: Performed by: INTERNAL MEDICINE

## 2018-12-11 PROCEDURE — 700111 HCHG RX REV CODE 636 W/ 250 OVERRIDE (IP): Performed by: HOSPITALIST

## 2018-12-11 PROCEDURE — 700102 HCHG RX REV CODE 250 W/ 637 OVERRIDE(OP): Performed by: HOSPITALIST

## 2018-12-11 PROCEDURE — 94760 N-INVAS EAR/PLS OXIMETRY 1: CPT

## 2018-12-11 PROCEDURE — A9270 NON-COVERED ITEM OR SERVICE: HCPCS | Performed by: HOSPITALIST

## 2018-12-11 PROCEDURE — 94640 AIRWAY INHALATION TREATMENT: CPT

## 2018-12-11 PROCEDURE — 99232 SBSQ HOSP IP/OBS MODERATE 35: CPT | Performed by: INTERNAL MEDICINE

## 2018-12-11 PROCEDURE — 85730 THROMBOPLASTIN TIME PARTIAL: CPT

## 2018-12-11 PROCEDURE — 770001 HCHG ROOM/CARE - MED/SURG/GYN PRIV*

## 2018-12-11 PROCEDURE — A9270 NON-COVERED ITEM OR SERVICE: HCPCS | Performed by: INTERNAL MEDICINE

## 2018-12-11 PROCEDURE — 99232 SBSQ HOSP IP/OBS MODERATE 35: CPT | Performed by: FAMILY MEDICINE

## 2018-12-11 PROCEDURE — 97535 SELF CARE MNGMENT TRAINING: CPT

## 2018-12-11 PROCEDURE — 36415 COLL VENOUS BLD VENIPUNCTURE: CPT

## 2018-12-11 PROCEDURE — 700111 HCHG RX REV CODE 636 W/ 250 OVERRIDE (IP): Performed by: INTERNAL MEDICINE

## 2018-12-11 PROCEDURE — A9270 NON-COVERED ITEM OR SERVICE: HCPCS | Performed by: FAMILY MEDICINE

## 2018-12-11 PROCEDURE — 85610 PROTHROMBIN TIME: CPT

## 2018-12-11 PROCEDURE — 700102 HCHG RX REV CODE 250 W/ 637 OVERRIDE(OP): Performed by: FAMILY MEDICINE

## 2018-12-11 PROCEDURE — 700102 HCHG RX REV CODE 250 W/ 637 OVERRIDE(OP): Performed by: INTERNAL MEDICINE

## 2018-12-11 RX ORDER — WARFARIN SODIUM 4 MG/1
4 TABLET ORAL
Status: DISCONTINUED | OUTPATIENT
Start: 2018-12-11 | End: 2018-12-13

## 2018-12-11 RX ADMIN — SUCRALFATE 1 G: 1 TABLET ORAL at 01:01

## 2018-12-11 RX ADMIN — SUCRALFATE 1 G: 1 TABLET ORAL at 14:02

## 2018-12-11 RX ADMIN — LOVASTATIN 10 MG: 20 TABLET ORAL at 20:36

## 2018-12-11 RX ADMIN — STANDARDIZED SENNA CONCENTRATE AND DOCUSATE SODIUM 2 TABLET: 8.6; 5 TABLET, FILM COATED ORAL at 17:25

## 2018-12-11 RX ADMIN — FAMOTIDINE 20 MG: 20 TABLET ORAL at 06:15

## 2018-12-11 RX ADMIN — IPRATROPIUM BROMIDE AND ALBUTEROL SULFATE 3 ML: .5; 3 SOLUTION RESPIRATORY (INHALATION) at 07:30

## 2018-12-11 RX ADMIN — WARFARIN SODIUM 4 MG: 4 TABLET ORAL at 17:25

## 2018-12-11 RX ADMIN — VITAMIN B12 0.1 MG ORAL TABLET 100 MCG: 0.1 TABLET ORAL at 06:15

## 2018-12-11 RX ADMIN — SUCRALFATE 1 G: 1 TABLET ORAL at 23:53

## 2018-12-11 RX ADMIN — IPRATROPIUM BROMIDE AND ALBUTEROL SULFATE 3 ML: .5; 3 SOLUTION RESPIRATORY (INHALATION) at 18:58

## 2018-12-11 RX ADMIN — FLUTICASONE PROPIONATE 100 MCG: 50 SPRAY, METERED NASAL at 06:15

## 2018-12-11 RX ADMIN — HEPARIN SODIUM 900 UNITS/HR: 5000 INJECTION, SOLUTION INTRAVENOUS at 18:35

## 2018-12-11 RX ADMIN — FERROUS SULFATE TAB 325 MG (65 MG ELEMENTAL FE) 325 MG: 325 (65 FE) TAB at 06:15

## 2018-12-11 RX ADMIN — ACETAMINOPHEN 650 MG: 325 TABLET, FILM COATED ORAL at 01:04

## 2018-12-11 RX ADMIN — CEFAZOLIN SODIUM 1 G: 1 INJECTION, SOLUTION INTRAVENOUS at 17:25

## 2018-12-11 RX ADMIN — SUCRALFATE 1 G: 1 TABLET ORAL at 17:25

## 2018-12-11 RX ADMIN — SUCRALFATE 1 G: 1 TABLET ORAL at 06:15

## 2018-12-11 RX ADMIN — VITAMIN D, TAB 1000IU (100/BT) 1000 UNITS: 25 TAB at 06:15

## 2018-12-11 RX ADMIN — THERA TABS 1 TABLET: TAB at 06:15

## 2018-12-11 ASSESSMENT — COGNITIVE AND FUNCTIONAL STATUS - GENERAL
HELP NEEDED FOR BATHING: A LITTLE
PERSONAL GROOMING: A LITTLE
DAILY ACTIVITIY SCORE: 20
DRESSING REGULAR LOWER BODY CLOTHING: A LITTLE
TOILETING: A LITTLE
SUGGESTED CMS G CODE MODIFIER DAILY ACTIVITY: CJ

## 2018-12-11 ASSESSMENT — ENCOUNTER SYMPTOMS
DIAPHORESIS: 0
DIZZINESS: 0
HEADACHES: 0
FEVER: 0
VOMITING: 0
COUGH: 0
CHILLS: 0
HALLUCINATIONS: 0
WEIGHT LOSS: 1
TREMORS: 0
NECK PAIN: 0
NAUSEA: 0
MYALGIAS: 0
SHORTNESS OF BREATH: 1
DIARRHEA: 0
HEARTBURN: 0
PALPITATIONS: 0
SENSORY CHANGE: 0
ORTHOPNEA: 0
ROS GI COMMENTS: APPETITE DECREASED
HEMOPTYSIS: 0
PHOTOPHOBIA: 0
DOUBLE VISION: 0
SPUTUM PRODUCTION: 0
ABDOMINAL PAIN: 0
DEPRESSION: 0
BRUISES/BLEEDS EASILY: 0
BLURRED VISION: 0
BACK PAIN: 0
TINGLING: 0

## 2018-12-11 ASSESSMENT — PAIN SCALES - GENERAL
PAINLEVEL_OUTOF10: 0
PAINLEVEL_OUTOF10: 3
PAINLEVEL_OUTOF10: 0

## 2018-12-11 ASSESSMENT — LIFESTYLE VARIABLES: SUBSTANCE_ABUSE: 0

## 2018-12-11 NOTE — FACE TO FACE
Face to Face Note  -  Durable Medical Equipment    Nathan Turk M.D. - NPI: [id number]  I certify that this patient is under my care and that they had a durable medical equipment(DME)face to face encounter by myself that meets the physician DME face-to-face encounter requirements with this patient on:    Date of encounter:   Patient:                    MRN:                       YOB: 2019  Kristyn Gillespie  [id number]  1945     The encounter with the patient was in whole, or in part, for the following medical condition, which is the primary reason for durable medical equipment:  COPD    I certify that, based on my findings, the following durable medical equipment is medically necessary:  Oxygen.    HOME O2 Saturation Measurements:(Values must be present for Home Oxygen orders)  Room air sat at rest: 90%  Room air sat with amb: 87%  With liters of O2: 4, O2 sat at rest with O2: 97%  With Liters of O2: 4, O2 sat with amb with O2 : 94  Is the patient mobile?: Yes    My Clinical findings support the need for the above equipment due to:  Hypoxia    Supporting Symptoms: Dyspnea

## 2018-12-11 NOTE — PROGRESS NOTES
Hospital Medicine Daily Progress Note    Date of Service  12/10/2018    Chief Complaint  Hallucinations    Hospital Course    *73 y.o. female admitted 11/30/2018 with complaints of hallucinations for the last 1-2 nights-recent blood cultures done at hemodialysis are positive for staph aureus patient denies experience any fever chills or sweats, hallucinations were occurring mainly at night but she was aware that they were hallucinations, in addition to the night prior to admission she had them her first night of admission but now they are resolved*    BCx from [facility], vanco discontinued  Echo with rheumatic mitral valve      Interval Problem Update  Patient had routine hemodialysis today  She feels okay.  No new concerns or issues.  Patient staying in the hospital for IV heparin/warfarin bridge for acute DVT until INR is therapeutic.  home O2 order placed today  I discussed patient care with Dr. Diaz and Dr Najjar.        Consultants/Specialty  [facility]  Fidel Romero MD vascular surgery  [facility]    Code Status  Full     Disposition  Home  when INR is therapeutic  Home O2 ordered    Review of Systems  Review of Systems   Constitutional: Positive for malaise/fatigue and weight loss (80 pounds in 2 years). Negative for chills, diaphoresis and fever.   HENT: Negative for congestion, ear pain, sore throat and tinnitus.    Eyes: Negative for blurred vision, double vision, photophobia, discharge and redness.   Respiratory: Negative for cough, hemoptysis, sputum production and shortness of breath.    Cardiovascular: Negative for chest pain, palpitations and orthopnea.   Gastrointestinal: Negative for abdominal pain, constipation, diarrhea, nausea and vomiting.        Appetite decreased   Genitourinary: Negative for dysuria, frequency and urgency.   Musculoskeletal: Negative for back pain, falls, myalgias and neck pain.   Skin: Negative for itching and rash.   Neurological:  Negative for dizziness, tingling, tremors, sensory change, speech change, weakness and headaches.   Endo/Heme/Allergies: Negative for environmental allergies and polydipsia. Does not bruise/bleed easily.   Psychiatric/Behavioral: Negative for depression, hallucinations (None since last night), substance abuse and suicidal ideas.        Physical Exam  Temp:  [36.3 °C (97.3 °F)-36.6 °C (97.8 °F)] 36.3 °C (97.3 °F)  Pulse:  [83-85] 84  Resp:  [17-19] 17  BP: (137-143)/(71-72) 143/71    Physical Exam   Constitutional: She is oriented to person, place, and time. She appears well-developed. No distress.   Chronically ill and hao with loose skin   HENT:   Head: Normocephalic and atraumatic.   Right Ear: External ear normal.   Left Ear: External ear normal.   Nose: Nose normal.   Mouth/Throat: Oropharynx is clear and moist.   Eyes: Pupils are equal, round, and reactive to light. Conjunctivae and EOM are normal. Right eye exhibits no discharge. Left eye exhibits no discharge. No scleral icterus.   Neck: Neck supple.   Cardiovascular: Normal rate and regular rhythm.    Pulmonary/Chest: Effort normal and breath sounds normal. No respiratory distress. She exhibits tenderness (site of permacath insertion. improved, erythema resolved).   Abdominal: Soft. Bowel sounds are normal. She exhibits no distension.   Musculoskeletal: She exhibits no edema or tenderness.   Neurological: She is alert and oriented to person, place, and time. No cranial nerve deficit.   Skin: Skin is warm and dry. She is not diaphoretic.   Psychiatric: She has a normal mood and affect.   Nursing note and vitals reviewed.  I examined the patient, there are no significant changes    Fluids  No intake or output data in the 24 hours ending 12/10/18 1714    Laboratory  Recent Labs      12/09/18   1620   WBC  7.5   RBC  2.99*   HEMOGLOBIN  9.2*   HEMATOCRIT  29.9*   MCV  100.0*   MCH  30.8   MCHC  30.8*   RDW  53.8*   PLATELETCT  175   MPV  11.0     Recent Labs  "     12/08/18   0559   SODIUM  136   POTASSIUM  4.1   CHLORIDE  101   CO2  25   GLUCOSE  84   BUN  11   CREATININE  2.71*   CALCIUM  9.3     Recent Labs      12/08/18   0559  12/09/18   0937   12/09/18   2246  12/09/18   2248  12/10/18   0741  12/10/18   1344   APTT  34.4   --    < >   --   51.4*  89.1*  125.5*   INR  2.01*  1.33*   --   1.24*   --    --    --     < > = values in this interval not displayed.               Imaging  EC-BARBIE W/O CONT   Final Result      EC-ECHOCARDIOGRAM COMPLETE W/O CONT   Final Result      US-HEMODIALYSIS GRAFT DUPLEX COMP UPPER EXTREMITY   Final Result      DX-CHEST-PORTABLE (1 VIEW)   Final Result      Right IJ dialysis catheter in place.      Cardiomegaly.      IR-INTRO STENT DIALYSIS CIRCUIT S&I    (Results Pending)        Assessment/Plan  * Staphylococcus aureus bacteremia with sepsis (HCC)- (present on admission)   Assessment & Plan    Positive blood cultures from outside facility for MSSA. Negative here 11/30  ID consulted -needs 4-6 weeks of IV antibiotics  Permacath removed  New dialysis catheter placed 12/3 in the left femoral vein, temporary  Per ID, cefazolin 3 times a week after discharge for 6 weeks from last negative blood culture, stop date 1/14/2018     End stage renal disease (HCC)- (present on admission)   Assessment & Plan    On dialysis  Monday Wednesday Friday  Nephrology consulted and following    Vascular surgery consulted regarding new fistula    Status post fistulogram and left IJ permacatheter 12/8     Chronic respiratory failure with hypoxia (HCC)- (present on admission)   Assessment & Plan    Home O2 evaluation was done.  Home O2 ordered     COPD (chronic obstructive pulmonary disease) (HCC)- (present on admission)   Assessment & Plan    Not in exacerbation  Oxygen  Respiratory protocol  Stable     Abnormal echocardiogram   Assessment & Plan    \"Rheumatic appearance of mitral valve with mild stenosis and mild   Regurgitation.\"  BARBIE negative for " endocarditis     Severe protein-calorie malnutrition (HCC)   Assessment & Plan    Patient has lost 80 pounds in 2 years   Nutritionist consult     Severe dental caries   Assessment & Plan    PainPatient has multiple teeth literally rotting out of her mouth  ID agrees these will need to be removed and eventually but does not feel this is urgent       Acute external jugular vein thrombosis   Assessment & Plan     incidental acute jugular vein thrombosis on the right  Heparin weight-based protocol with warfarin bridge       Sepsis (HCC)   Assessment & Plan    This is severe sepsis with the following associated acute organ dysfunction(s): metabolic/septic encephalopathy.   Patient not recognizes being severely septic on admission due to lack of fever tachycardia and leukocytosis-  However her septic encephalopathy qualifies her for diagnosis of severe sepsis and this is now resolved.  As her symptoms had resolved and she had stable vital signs, fluids serial lactates and so forth were not indicated by the time sepsis was recognized.    Resolved     Iron deficiency anemia- (present on admission)   Assessment & Plan    PO Ferrous sulfate   Hb ~ 11, at her baseline  Continue to monitor     B12 deficiency- (present on admission)   Assessment & Plan    Resume home B12     HLD (hyperlipidemia)- (present on admission)   Assessment & Plan    Lovastatin     Vitamin D deficiency- (present on admission)   Assessment & Plan    Resume home vitamin D          VTE prophylaxis: On heparin drip with warfarin bridge

## 2018-12-11 NOTE — THERAPY
"Physical Therapy Treatment completed.   Bed Mobility:  Supine to Sit: Stand by Assist  Transfers: Sit to Stand: Contact Guard Assist  Gait: Level Of Assist: Contact Guard Assist with Front-Wheel Walker       Plan of Care: Will benefit from Physical Therapy 3 times per week  Discharge Recommendations: Equipment: No Equipment Needed. Pt states she owns FWW.    See \"Rehab Therapy-Acute\" Patient Summary Report for complete documentation.     Pt progressing well w/ therapy. Pt stating she is \"getting back to where she was before coming to the hospital.\" Pt does have less endurance for mobility on dialysis days but is still able to perform mobility. She states she will have a lot of family help at home and \"has her way of doing things that work.\" Pt encouraged to increase OOB activity and ambulation distances. Answered pt questions and concerns about DC home.  "

## 2018-12-11 NOTE — THERAPY
"Occupational Therapy Evaluation completed.   Functional Status:  CGA standing grooming, SBA functional mobility w/ FWW, SBA LB dressing  Plan of Care: Will benefit from Occupational Therapy 3 times per week  Discharge Recommendations:  Equipment: Will Continue to Assess for Equipment Needs. Post-acute therapy Recommend home health or outpatient transitional care services for continued occupational therapy services    See \"Rehab Therapy-Acute\" Patient Summary Report for complete documentation.    Pt had 2 jaida LOB's in bathroom when performing standing grooming. Pt was able to self correct and catch herself. Bathroom floor is an uneven surface in this facility. Pt continues to be limited by SOB and increased 02 needs. While performing BADLs pt's sp02 dropped to 81-82% on 3L. Pt required 4-5 min rest break. Discussed w/ RN about using an oxymask as pt was having difficulty breathing deeply through nose. Will continue to follow for Acute OT services.   "

## 2018-12-11 NOTE — CARE PLAN
Problem: Communication  Goal: The ability to communicate needs accurately and effectively will improve  Outcome: PROGRESSING AS EXPECTED  Updated pt and family on POC and coordinating care with SW and MD for D/C.     Problem: Infection  Goal: Will remain free from infection  Outcome: PROGRESSING AS EXPECTED  Pt receiving IV abx for 6 weeks, stop date is 1/14/19, SW coordinating with dialysis for transfusion after each HD treatment.     Problem: Knowledge Deficit  Goal: Knowledge of disease process/condition, treatment plan, diagnostic tests, and medications will improve  Outcome: PROGRESSING AS EXPECTED  Educated pt and family on Heparin to coumadin bridge pt INR still 1.24, discussed need for therapeutic level before D/C.

## 2018-12-11 NOTE — PROGRESS NOTES
Nephrology Daily Progress Note    Date of Service  12/11/2018    Chief Complaint  73 y.o. female admitted 11/30/2018 with ESRD and sepsis, line changed    Interval Problem Update  Feeling better overall. On O2 of 3L.   For ancef for 3 weeks after discharge in the HD unit    Review of Systems  Review of Systems   Constitutional: Negative for chills and fever.   All other systems reviewed and are negative.       Physical Exam  Temp:  [36.7 °C (98 °F)-37.5 °C (99.5 °F)] 37 °C (98.6 °F)  Pulse:  [82-89] 84  Resp:  [16-18] 16  BP: (138-147)/(70-76) 138/71    Physical Exam   Constitutional: She is oriented to person, place, and time. She appears well-developed and well-nourished.   HENT:   Head: Normocephalic and atraumatic.   Cardiovascular: Normal rate and regular rhythm.    Pulmonary/Chest: Effort normal and breath sounds normal.   Neurological: She is alert and oriented to person, place, and time.   Skin: Skin is warm and dry.       Fluids    Intake/Output Summary (Last 24 hours) at 12/11/18 0759  Last data filed at 12/10/18 1127   Gross per 24 hour   Intake              500 ml   Output             2600 ml   Net            -2100 ml       Laboratory  Recent Labs      12/09/18   1620   WBC  7.5   RBC  2.99*   HEMOGLOBIN  9.2*   HEMATOCRIT  29.9*   MCV  100.0*   MCH  30.8   MCHC  30.8*   RDW  53.8*   PLATELETCT  175   MPV  11.0         Recent Labs      12/09/18   0937   12/09/18   2246   12/10/18   1344  12/10/18   2159  12/11/18   0410   APTT   --    < >   --    < >  125.5*  75.5*  62.6*   INR  1.33*   --   1.24*   --    --    --   1.24*    < > = values in this interval not displayed.               Imaging  EC-BARBIE W/O CONT   Final Result      EC-ECHOCARDIOGRAM COMPLETE W/O CONT   Final Result      US-HEMODIALYSIS GRAFT DUPLEX COMP UPPER EXTREMITY   Final Result      DX-CHEST-PORTABLE (1 VIEW)   Final Result      Right IJ dialysis catheter in place.      Cardiomegaly.      IR-INTRO STENT DIALYSIS CIRCUIT S&I    (Results  Pending)        Assessment/Plan  1. ESRD - HD on a MWF schedule  2. S/p line sepsis - line changed, will need ancef for 6 weeks total treatment, in HD unit  3. Anticoagulation - INR not theraputic yet    -Stable for outpatient dialysis from a renal standpoint  -HD MWF

## 2018-12-11 NOTE — CARE PLAN
Problem: Nutritional:  Goal: Achieve adequate nutritional intake  Patient will consume 50% of meals   Outcome: PROGRESSING AS EXPECTED  PO 25-75% of meals.

## 2018-12-11 NOTE — PROGRESS NOTES
Received report from [place], at pt bedside. Pt has new permicath on left chest for HD with vascular surgery new AV fistula right arm, pt on Heparin to coumadin bridge receiving IV Heparin at 900 units/hr for DVT of right jugular vein, pt requiring long term abx stop date on 1/14/18, per OT/ PT notes pt would benefit from SNF vs Home with home health, will address with SW this morning, POC discussed. Call light and belongings within reach. Bed locked and in low position. Alarm on and fall precautions in place.

## 2018-12-11 NOTE — PROGRESS NOTES
Inpatient Anticoagulation Service Note    Date: 2018  Reason for Anticoagulation: Deep Vein Thrombosis        Hemoglobin Value: (!) 9.2  Hematocrit Value: (!) 29.9  Lab Platelet Value: 175  Target INR: 2.0 to 3.0    INR from last 7 days     Date/Time INR Value    18 0410 (!)  1.24    18 2246 (!)  1.24    18 0937 (!)  1.33    18 0559 (!)  2.01    18 0541 (!)  2.31    18 0603 (!)  2.21    18 2348 (!)  1.85        Dose from last 7 days     Date/Time Dose (mg)    18 1139  4    12/10/18 1139  4    18 0800  4    18 1000  0    18 1013  2    18 1155  2    18 1329  2        Average Dose (mg): New start  Significant Interactions: Antibiotics, Statin  Bridge Therapy: Yes  Date of Last VTE Event: 18  Bridge Therapy Start Date: 18  Days of Overlap Therapy: 2   INR Value Greater than 2 Prior to Discontinuation of Parenteral Anticoagulation: Not Applicable   Reversal Agent Administered: Vitamin K  Subcutaneous (2 mg on )    Comments: INR is unchanged today from yesterday. No new CBC, but no bleeding noted. Bridging with a heparin drip continues. Will continue with the increased warfarin dose.    Plan:  Warfarin 4 mg with INR tomorrow AM  Education Material Provided?: Yes  Pharmacist suggested discharge dosin mg daily and INR within 72 hours of discharge      Yoli Carrizales, PharmD, BCPS

## 2018-12-11 NOTE — DISCHARGE PLANNING
Outpatient Dialysis Note    Patient can discharge from a nephrology standpoint per Dr. Garza.    [facility]  [address] [place] [address]        Schedule: Monday, Wednesday, Friday  Time: 1:15 PM    Tammy Betts- Dialysis Coordinator  [facility] # [phone]

## 2018-12-11 NOTE — PROGRESS NOTES
3hr HD started @ 0825 and completed @ 1127,tx well tolerated,VSS,net UF = 2100ml.RIJ TDC dressing CDI,report given to Elder Lara RN.

## 2018-12-12 LAB
ALBUMIN SERPL BCP-MCNC: 3 G/DL (ref 3.2–4.9)
ALBUMIN/GLOB SERPL: 1.1 G/DL
ALP SERPL-CCNC: 76 U/L (ref 30–99)
ALT SERPL-CCNC: <5 U/L (ref 2–50)
ANION GAP SERPL CALC-SCNC: 10 MMOL/L (ref 0–11.9)
ANION GAP SERPL CALC-SCNC: 9 MMOL/L (ref 0–11.9)
APTT PPP: 71.3 SEC (ref 24.7–36)
AST SERPL-CCNC: 15 U/L (ref 12–45)
BASOPHILS # BLD AUTO: 0.8 % (ref 0–1.8)
BASOPHILS # BLD: 0.05 K/UL (ref 0–0.12)
BILIRUB SERPL-MCNC: 0.4 MG/DL (ref 0.1–1.5)
BUN SERPL-MCNC: 18 MG/DL (ref 8–22)
BUN SERPL-MCNC: 19 MG/DL (ref 8–22)
CALCIUM SERPL-MCNC: 10.1 MG/DL (ref 8.5–10.5)
CALCIUM SERPL-MCNC: 9.8 MG/DL (ref 8.5–10.5)
CHLORIDE SERPL-SCNC: 102 MMOL/L (ref 96–112)
CHLORIDE SERPL-SCNC: 103 MMOL/L (ref 96–112)
CO2 SERPL-SCNC: 25 MMOL/L (ref 20–33)
CO2 SERPL-SCNC: 25 MMOL/L (ref 20–33)
CREAT SERPL-MCNC: 3.23 MG/DL (ref 0.5–1.4)
CREAT SERPL-MCNC: 3.31 MG/DL (ref 0.5–1.4)
EOSINOPHIL # BLD AUTO: 0.27 K/UL (ref 0–0.51)
EOSINOPHIL NFR BLD: 4.3 % (ref 0–6.9)
ERYTHROCYTE [DISTWIDTH] IN BLOOD BY AUTOMATED COUNT: 53.6 FL (ref 35.9–50)
GLOBULIN SER CALC-MCNC: 2.8 G/DL (ref 1.9–3.5)
GLUCOSE SERPL-MCNC: 88 MG/DL (ref 65–99)
GLUCOSE SERPL-MCNC: 93 MG/DL (ref 65–99)
HCT VFR BLD AUTO: 27.3 % (ref 37–47)
HGB BLD-MCNC: 8.8 G/DL (ref 12–16)
IMM GRANULOCYTES # BLD AUTO: 0.03 K/UL (ref 0–0.11)
IMM GRANULOCYTES NFR BLD AUTO: 0.5 % (ref 0–0.9)
INR PPP: 1.45 (ref 0.87–1.13)
LYMPHOCYTES # BLD AUTO: 1.2 K/UL (ref 1–4.8)
LYMPHOCYTES NFR BLD: 19.3 % (ref 22–41)
MCH RBC QN AUTO: 31.2 PG (ref 27–33)
MCHC RBC AUTO-ENTMCNC: 32.2 G/DL (ref 33.6–35)
MCV RBC AUTO: 96.8 FL (ref 81.4–97.8)
MONOCYTES # BLD AUTO: 0.59 K/UL (ref 0–0.85)
MONOCYTES NFR BLD AUTO: 9.5 % (ref 0–13.4)
NEUTROPHILS # BLD AUTO: 4.07 K/UL (ref 2–7.15)
NEUTROPHILS NFR BLD: 65.6 % (ref 44–72)
NRBC # BLD AUTO: 0 K/UL
NRBC BLD-RTO: 0 /100 WBC
PLATELET # BLD AUTO: 160 K/UL (ref 164–446)
PMV BLD AUTO: 10.6 FL (ref 9–12.9)
POTASSIUM SERPL-SCNC: 3.6 MMOL/L (ref 3.6–5.5)
POTASSIUM SERPL-SCNC: 3.8 MMOL/L (ref 3.6–5.5)
PROT SERPL-MCNC: 5.8 G/DL (ref 6–8.2)
PROTHROMBIN TIME: 17.7 SEC (ref 12–14.6)
RBC # BLD AUTO: 2.82 M/UL (ref 4.2–5.4)
SODIUM SERPL-SCNC: 137 MMOL/L (ref 135–145)
SODIUM SERPL-SCNC: 137 MMOL/L (ref 135–145)
WBC # BLD AUTO: 6.2 K/UL (ref 4.8–10.8)

## 2018-12-12 PROCEDURE — 36415 COLL VENOUS BLD VENIPUNCTURE: CPT

## 2018-12-12 PROCEDURE — 85610 PROTHROMBIN TIME: CPT

## 2018-12-12 PROCEDURE — 700111 HCHG RX REV CODE 636 W/ 250 OVERRIDE (IP): Performed by: HOSPITALIST

## 2018-12-12 PROCEDURE — 700102 HCHG RX REV CODE 250 W/ 637 OVERRIDE(OP): Performed by: HOSPITALIST

## 2018-12-12 PROCEDURE — 99232 SBSQ HOSP IP/OBS MODERATE 35: CPT | Performed by: INTERNAL MEDICINE

## 2018-12-12 PROCEDURE — 80048 BASIC METABOLIC PNL TOTAL CA: CPT

## 2018-12-12 PROCEDURE — 700111 HCHG RX REV CODE 636 W/ 250 OVERRIDE (IP): Mod: JG | Performed by: INTERNAL MEDICINE

## 2018-12-12 PROCEDURE — 85025 COMPLETE CBC W/AUTO DIFF WBC: CPT

## 2018-12-12 PROCEDURE — A9270 NON-COVERED ITEM OR SERVICE: HCPCS | Performed by: FAMILY MEDICINE

## 2018-12-12 PROCEDURE — 770001 HCHG ROOM/CARE - MED/SURG/GYN PRIV*

## 2018-12-12 PROCEDURE — 80053 COMPREHEN METABOLIC PANEL: CPT

## 2018-12-12 PROCEDURE — 700111 HCHG RX REV CODE 636 W/ 250 OVERRIDE (IP)

## 2018-12-12 PROCEDURE — A9270 NON-COVERED ITEM OR SERVICE: HCPCS | Performed by: INTERNAL MEDICINE

## 2018-12-12 PROCEDURE — 700101 HCHG RX REV CODE 250: Performed by: INTERNAL MEDICINE

## 2018-12-12 PROCEDURE — 99232 SBSQ HOSP IP/OBS MODERATE 35: CPT | Performed by: FAMILY MEDICINE

## 2018-12-12 PROCEDURE — A9270 NON-COVERED ITEM OR SERVICE: HCPCS | Performed by: HOSPITALIST

## 2018-12-12 PROCEDURE — 700102 HCHG RX REV CODE 250 W/ 637 OVERRIDE(OP): Performed by: INTERNAL MEDICINE

## 2018-12-12 PROCEDURE — 5A1D70Z PERFORMANCE OF URINARY FILTRATION, INTERMITTENT, LESS THAN 6 HOURS PER DAY: ICD-10-PCS | Performed by: INTERNAL MEDICINE

## 2018-12-12 PROCEDURE — 94760 N-INVAS EAR/PLS OXIMETRY 1: CPT

## 2018-12-12 PROCEDURE — 700102 HCHG RX REV CODE 250 W/ 637 OVERRIDE(OP): Performed by: FAMILY MEDICINE

## 2018-12-12 PROCEDURE — 94640 AIRWAY INHALATION TREATMENT: CPT

## 2018-12-12 PROCEDURE — 85730 THROMBOPLASTIN TIME PARTIAL: CPT

## 2018-12-12 PROCEDURE — 90935 HEMODIALYSIS ONE EVALUATION: CPT

## 2018-12-12 RX ORDER — HEPARIN SODIUM 1000 [USP'U]/ML
INJECTION, SOLUTION INTRAVENOUS; SUBCUTANEOUS
Status: COMPLETED
Start: 2018-12-12 | End: 2018-12-12

## 2018-12-12 RX ADMIN — CEFAZOLIN SODIUM 1 G: 1 INJECTION, SOLUTION INTRAVENOUS at 17:29

## 2018-12-12 RX ADMIN — FLUTICASONE PROPIONATE 100 MCG: 50 SPRAY, METERED NASAL at 05:25

## 2018-12-12 RX ADMIN — SUCRALFATE 1 G: 1 TABLET ORAL at 12:22

## 2018-12-12 RX ADMIN — HEPARIN SODIUM 2500 UNITS: 1000 INJECTION, SOLUTION INTRAVENOUS; SUBCUTANEOUS at 08:17

## 2018-12-12 RX ADMIN — EPOETIN ALFA 4000 UNITS: 4000 SOLUTION INTRAVENOUS; SUBCUTANEOUS at 12:22

## 2018-12-12 RX ADMIN — WARFARIN SODIUM 4 MG: 4 TABLET ORAL at 17:26

## 2018-12-12 RX ADMIN — LOVASTATIN 10 MG: 20 TABLET ORAL at 17:27

## 2018-12-12 RX ADMIN — FERROUS SULFATE TAB 325 MG (65 MG ELEMENTAL FE) 325 MG: 325 (65 FE) TAB at 05:25

## 2018-12-12 RX ADMIN — THERA TABS 1 TABLET: TAB at 05:25

## 2018-12-12 RX ADMIN — IPRATROPIUM BROMIDE AND ALBUTEROL SULFATE 3 ML: .5; 3 SOLUTION RESPIRATORY (INHALATION) at 20:07

## 2018-12-12 RX ADMIN — IPRATROPIUM BROMIDE AND ALBUTEROL SULFATE 3 ML: .5; 3 SOLUTION RESPIRATORY (INHALATION) at 07:28

## 2018-12-12 RX ADMIN — VITAMIN D, TAB 1000IU (100/BT) 1000 UNITS: 25 TAB at 05:25

## 2018-12-12 RX ADMIN — HEPARIN SODIUM 4200 UNITS: 1000 INJECTION, SOLUTION INTRAVENOUS; SUBCUTANEOUS at 11:25

## 2018-12-12 RX ADMIN — FAMOTIDINE 20 MG: 20 TABLET ORAL at 05:25

## 2018-12-12 RX ADMIN — VITAMIN B12 0.1 MG ORAL TABLET 100 MCG: 0.1 TABLET ORAL at 05:25

## 2018-12-12 RX ADMIN — SUCRALFATE 1 G: 1 TABLET ORAL at 17:26

## 2018-12-12 RX ADMIN — SUCRALFATE 1 G: 1 TABLET ORAL at 05:25

## 2018-12-12 ASSESSMENT — ENCOUNTER SYMPTOMS
DIAPHORESIS: 0
EYE PAIN: 0
ROS GI COMMENTS: APPETITE DECREASED
NERVOUS/ANXIOUS: 0
WEIGHT LOSS: 1
HEADACHES: 0
DIZZINESS: 0
BLURRED VISION: 0
SENSORY CHANGE: 0
CHILLS: 0
SPUTUM PRODUCTION: 0
SPEECH CHANGE: 0
DEPRESSION: 0
HEARTBURN: 0
HALLUCINATIONS: 0
BRUISES/BLEEDS EASILY: 0
BACK PAIN: 0
NAUSEA: 0
PHOTOPHOBIA: 0
TREMORS: 0
SHORTNESS OF BREATH: 1
COUGH: 0
PALPITATIONS: 0
ABDOMINAL PAIN: 0
NECK PAIN: 0
FEVER: 0
ORTHOPNEA: 0
MYALGIAS: 0
DOUBLE VISION: 0
TINGLING: 0
HEMOPTYSIS: 0
VOMITING: 0

## 2018-12-12 ASSESSMENT — PAIN SCALES - GENERAL
PAINLEVEL_OUTOF10: 0

## 2018-12-12 ASSESSMENT — LIFESTYLE VARIABLES: SUBSTANCE_ABUSE: 0

## 2018-12-12 NOTE — PROGRESS NOTES
Nephrology Daily Progress Note    Date of Service  12/12/2018    Chief Complaint  73 y.o. female admitted 11/30/2018 with ESRD and sepsis, line changed    Interval Problem Update  Events reviewed.  Continuing to bridge for appropriate INR    Review of Systems  Review of Systems   Constitutional: Negative for chills and fever.   All other systems reviewed and are negative.       Physical Exam  Temp:  [36.7 °C (98.1 °F)-37.1 °C (98.7 °F)] 36.9 °C (98.4 °F)  Pulse:  [82-92] 84  Resp:  [16-17] 17  BP: (126-146)/(62-87) 126/62    Physical Exam   Constitutional: She is oriented to person, place, and time. She appears well-developed and well-nourished.   HENT:   Head: Normocephalic and atraumatic.   Cardiovascular: Normal rate and regular rhythm.    Musculoskeletal: She exhibits no edema or tenderness.   Neurological: She is alert and oriented to person, place, and time.       Fluids    Intake/Output Summary (Last 24 hours) at 12/12/18 0956  Last data filed at 12/12/18 0900   Gross per 24 hour   Intake              830 ml   Output                0 ml   Net              830 ml       Laboratory  Recent Labs      12/09/18   1620  12/12/18   0406   WBC  7.5  6.2   RBC  2.99*  2.82*   HEMOGLOBIN  9.2*  8.8*   HEMATOCRIT  29.9*  27.3*   MCV  100.0*  96.8   MCH  30.8  31.2   MCHC  30.8*  32.2*   RDW  53.8*  53.6*   PLATELETCT  175  160*   MPV  11.0  10.6     Recent Labs      12/12/18   0406  12/12/18   0755   SODIUM  137  137   POTASSIUM  3.6  3.8   CHLORIDE  103  102   CO2  25  25   GLUCOSE  93  88   BUN  19  18   CREATININE  3.23*  3.31*   CALCIUM  10.1  9.8     Recent Labs      12/09/18   2246   12/10/18   2159  12/11/18   0410  12/12/18   0406   APTT   --    < >  75.5*  62.6*  71.3*   INR  1.24*   --    --   1.24*  1.45*    < > = values in this interval not displayed.               Imaging  EC-BARBIE W/O CONT   Final Result      EC-ECHOCARDIOGRAM COMPLETE W/O CONT   Final Result      US-HEMODIALYSIS GRAFT DUPLEX COMP UPPER  EXTREMITY   Final Result      DX-CHEST-PORTABLE (1 VIEW)   Final Result      Right IJ dialysis catheter in place.      Cardiomegaly.      IR-INTRO STENT DIALYSIS CIRCUIT S&I    (Results Pending)        Assessment/Plan  1. ESRD - HD MWF  2. S/p line sepsis - 6 weeks abx  3. Anticoagulation - INR not theraputic yet    -Stable for outpatient dialysis from a renal standpoint  -HD MWF

## 2018-12-12 NOTE — PROGRESS NOTES
Inpatient Anticoagulation Service Note    Date: 12/12/2018  Reason for Anticoagulation: Deep Vein Thrombosis        Hemoglobin Value: (!) 8.8  Hematocrit Value: (!) 27.3  Lab Platelet Value: (!) 160  Target INR: 2.0 to 3.0    INR from last 7 days     Date/Time INR Value    12/12/18 0406 (!)  1.45    12/11/18 0410 (!)  1.24    12/09/18 2246 (!)  1.24    12/09/18 0937 (!)  1.33    12/08/18 0559 (!)  2.01    12/07/18 0541 (!)  2.31    12/06/18 0603 (!)  2.21        Dose from last 7 days     Date/Time Dose (mg)    12/12/18 1055  4    12/11/18 1139  4    12/10/18 1139  4    12/09/18 0800  4    12/08/18 1000  0    12/07/18 1013  2    12/06/18 1155  2    12/05/18 1329  2        Average Dose (mg): New start  Significant Interactions: Antibiotics, Statin  Bridge Therapy: Yes  Date of Last VTE Event: 12/01/18  Bridge Therapy Start Date: 12/09/18  Days of Overlap Therapy: 3   INR Value Greater than 2 Prior to Discontinuation of Parenteral Anticoagulation: Not Applicable   Reversal Agent Administered: Vitamin K  Subcutaneous (2 mg on 12/7)    Comments: INR is up from yesterday but still subtherapeutic. H/H/Plt stable from prior labs with no noted bleeding. Will give another increased dose of warfarin tonight. If INR is up again tomorrow, will consider reducing the dose.    Plan:  Warfarin 4 mg with INR tomorrow AM  Education Material Provided?: Yes  Pharmacist suggested discharge dosing: Warfarin 3 mg daily and INR within 72 hours of discharge     Yoli Carrizales, PharmD, BCPS

## 2018-12-12 NOTE — DISCHARGE PLANNING
Anticipated Discharge Disposition: Home when medically stable.     Action: Discussed pt's case with dialysis liaison, pt's clinic is aware that pt will need IV abx during dialysis (three days a week with current stop date of 1/12/18 per ID note).     Barriers to Discharge: N/A.     Plan: As above, pt is not medically stable at this time. Dialysis liaison confirmed that pt's dialysis clinic is aware and it has been approved for pt to get IV abx needed 3x's a week with stop date 1/12/18 during dialysis.

## 2018-12-12 NOTE — DIETARY
Nutrition Services:  Brief Update    Consult received for significant wt loss.  Full RD assessment completed; please see note dated 12/2.  RD currently following pt for adequate PO intake.  Pt is currently on a Renal diet and per ADL flow sheet, PO >50%.    Recommendations/Plan:  1. Continue to encourage intake of meals.  2. Document intake of all meals as % taken in ADLs to provide interdisciplinary communication across all shifts.   3. Monitor weight.  4. Nutrition rep will continue to see patient for ongoing meal and snack preferences.

## 2018-12-12 NOTE — PROGRESS NOTES
Pt dialyzed for 3 hrs today from 0821 to 1121 per MD order.  She tolerated tx well.  Net UF2.0L.  CVC packed with heparin, clamped and capped per protocol.  See paper dialysis flow sheet for details. Report called to FELA Kruse RN.

## 2018-12-12 NOTE — DISCHARGE PLANNING
Anticipated Discharge Disposition: Home with HHC and oxygen    Action: RN CM talked with pt at bedside and got choice for HH and Oxygen.  Pt is already on service with [facility] for Oxygen and will continue. Choice for HHC and Oxygen have been faxed to [facility].  Pt states she lives with her daughter, Anahy, son-in-law and granddaughter and has lots of help.      Barriers to Discharge: medical clearance, acceptance to HHC, oxygen     Plan: RN CM will f/u with status of oxygen and HHC accpetance

## 2018-12-12 NOTE — PROGRESS NOTES
Hospital Medicine Daily Progress Note    Date of Service  12/11/2018    Chief Complaint  Hallucinations    Hospital Course    *73 y.o. female admitted 11/30/2018 with complaints of hallucinations for the last 1-2 nights-recent blood cultures done at hemodialysis are positive for staph aureus patient denies experience any fever chills or sweats, hallucinations were occurring mainly at night but she was aware that they were hallucinations, in addition to the night prior to admission she had them her first night of admission but now they are resolved*    BCx from [facility], vanco discontinued  Echo with rheumatic mitral valve      Interval Problem Update  Patient is laying in bed comfortably.  Daughter at bedside.  Patient denies any new complaints today.  No distress noted.        Consultants/Specialty  [facility]  Fidel Romero MD vascular surgery  [facility]    Code Status  Full     Disposition  Home  when INR is therapeutic  Home O2 ordered    Review of Systems  Review of Systems   Constitutional: Positive for malaise/fatigue and weight loss (80 pounds in 2 years). Negative for chills, diaphoresis and fever.   HENT: Negative for congestion, hearing loss and tinnitus.    Eyes: Negative for blurred vision, double vision and photophobia.   Respiratory: Positive for shortness of breath (chronic). Negative for cough, hemoptysis and sputum production.    Cardiovascular: Negative for chest pain, palpitations and orthopnea.   Gastrointestinal: Negative for abdominal pain, diarrhea, heartburn, nausea and vomiting.        Appetite decreased   Genitourinary: Negative for dysuria, frequency and urgency.   Musculoskeletal: Negative for back pain, myalgias and neck pain.   Skin: Negative for rash.   Neurological: Negative for dizziness, tingling, tremors, sensory change and headaches.   Endo/Heme/Allergies: Negative for environmental allergies. Does not bruise/bleed easily.    Psychiatric/Behavioral: Negative for depression, hallucinations (None since last night), substance abuse and suicidal ideas.        Physical Exam  Temp:  [36.4 °C (97.6 °F)-37.5 °C (99.5 °F)] 36.4 °C (97.6 °F)  Pulse:  [69-89] 69  Resp:  [16-18] 17  BP: (112-147)/(52-76) 112/52    Physical Exam   Constitutional: She is oriented to person, place, and time. She appears well-developed. No distress.   Chronically ill and hao with loose skin   HENT:   Head: Normocephalic and atraumatic.   Right Ear: External ear normal.   Left Ear: External ear normal.   Eyes: Pupils are equal, round, and reactive to light. Conjunctivae and EOM are normal. No scleral icterus.   Neck: Neck supple. No thyromegaly present.   Cardiovascular: Normal rate and regular rhythm.  Exam reveals no gallop and no friction rub.    Pulmonary/Chest: Effort normal and breath sounds normal. No respiratory distress. She exhibits tenderness (site of permacath insertion. improved, erythema resolved).   Abdominal: Soft. She exhibits no distension. There is no tenderness.   Musculoskeletal: She exhibits no tenderness or deformity.   Neurological: She is alert and oriented to person, place, and time. No cranial nerve deficit.   Skin: Skin is warm and dry. She is not diaphoretic. No erythema.   Psychiatric: She has a normal mood and affect.   Nursing note and vitals reviewed.  I examined the patient, there are no significant changes    Fluids    Intake/Output Summary (Last 24 hours) at 12/11/18 1618  Last data filed at 12/11/18 1000   Gross per 24 hour   Intake              100 ml   Output                0 ml   Net              100 ml       Laboratory  Recent Labs      12/09/18   1620   WBC  7.5   RBC  2.99*   HEMOGLOBIN  9.2*   HEMATOCRIT  29.9*   MCV  100.0*   MCH  30.8   MCHC  30.8*   RDW  53.8*   PLATELETCT  175   MPV  11.0         Recent Labs      12/09/18   0937   12/09/18   2246   12/10/18   1344  12/10/18   2159  12/11/18   0410   APTT   --    < >    "--    < >  125.5*  75.5*  62.6*   INR  1.33*   --   1.24*   --    --    --   1.24*    < > = values in this interval not displayed.               Imaging  EC-BARBIE W/O CONT   Final Result      EC-ECHOCARDIOGRAM COMPLETE W/O CONT   Final Result      US-HEMODIALYSIS GRAFT DUPLEX COMP UPPER EXTREMITY   Final Result      DX-CHEST-PORTABLE (1 VIEW)   Final Result      Right IJ dialysis catheter in place.      Cardiomegaly.      IR-INTRO STENT DIALYSIS CIRCUIT S&I    (Results Pending)        Assessment/Plan  * Staphylococcus aureus bacteremia with sepsis (HCC)- (present on admission)   Assessment & Plan    Positive blood cultures from outside facility for MSSA. Negative here 11/30  ID consulted -needs 4-6 weeks of IV antibiotics  Permacath removed  New dialysis catheter placed 12/3 in the left femoral vein, temporary  Continue with cefazolin through 1/12/2018 per ID.     End stage renal disease (HCC)- (present on admission)   Assessment & Plan    On dialysis  Monday Wednesday Friday  Nephrology consulted and following    Vascular surgery consulted regarding new fistula    Status post fistulogram and left IJ permacatheter 12/8     Chronic respiratory failure with hypoxia (HCC)- (present on admission)   Assessment & Plan    Home O2 evaluation was done.  Home O2 ordered     COPD (chronic obstructive pulmonary disease) (HCC)- (present on admission)   Assessment & Plan    Not in exacerbation  Oxygen  Respiratory protocol  Stable     Abnormal echocardiogram   Assessment & Plan    \"Rheumatic appearance of mitral valve with mild stenosis and mild   Regurgitation.\"  BARBIE negative for endocarditis     Severe protein-calorie malnutrition (HCC)   Assessment & Plan    Patient has lost 80 pounds in 2 years   Nutritionist consult     Severe dental caries   Assessment & Plan    PainPatient has multiple teeth literally rotting out of her mouth  ID agrees these will need to be removed and eventually but does not feel this is urgent       Acute " external jugular vein thrombosis   Assessment & Plan     incidental acute jugular vein thrombosis on the right  Heparin weight-based protocol with warfarin bridge  Monitor INR with a goal between 2 and 3.     Sepsis (HCC)   Assessment & Plan    This is severe sepsis with the following associated acute organ dysfunction(s): metabolic/septic encephalopathy.   Patient not recognizes being severely septic on admission due to lack of fever tachycardia and leukocytosis-  However her septic encephalopathy qualifies her for diagnosis of severe sepsis and this is now resolved.  As her symptoms had resolved and she had stable vital signs, fluids serial lactates and so forth were not indicated by the time sepsis was recognized.  Continue with Ancef as mentioned above.     Iron deficiency anemia- (present on admission)   Assessment & Plan    PO Ferrous sulfate   Monitor H&H.     B12 deficiency- (present on admission)   Assessment & Plan    Resume home B12     HLD (hyperlipidemia)- (present on admission)   Assessment & Plan    Lovastatin     Vitamin D deficiency- (present on admission)   Assessment & Plan    Resume home vitamin D     Plan of care discussed with multidisciplinary team during rounds.    VTE prophylaxis: On heparin drip with warfarin bridge

## 2018-12-12 NOTE — DISCHARGE PLANNING
Received Choice form at 1547  Agency/Facility Name: [facility]  Referral sent per Choice form at 7135

## 2018-12-13 LAB
APTT PPP: 69.3 SEC (ref 24.7–36)
INR PPP: 1.54 (ref 0.87–1.13)
PROTHROMBIN TIME: 18.5 SEC (ref 12–14.6)

## 2018-12-13 PROCEDURE — 85730 THROMBOPLASTIN TIME PARTIAL: CPT

## 2018-12-13 PROCEDURE — 700102 HCHG RX REV CODE 250 W/ 637 OVERRIDE(OP): Performed by: FAMILY MEDICINE

## 2018-12-13 PROCEDURE — 700111 HCHG RX REV CODE 636 W/ 250 OVERRIDE (IP): Performed by: INTERNAL MEDICINE

## 2018-12-13 PROCEDURE — A9270 NON-COVERED ITEM OR SERVICE: HCPCS | Performed by: HOSPITALIST

## 2018-12-13 PROCEDURE — 85610 PROTHROMBIN TIME: CPT

## 2018-12-13 PROCEDURE — A9270 NON-COVERED ITEM OR SERVICE: HCPCS | Performed by: FAMILY MEDICINE

## 2018-12-13 PROCEDURE — 36415 COLL VENOUS BLD VENIPUNCTURE: CPT

## 2018-12-13 PROCEDURE — 94640 AIRWAY INHALATION TREATMENT: CPT

## 2018-12-13 PROCEDURE — A9270 NON-COVERED ITEM OR SERVICE: HCPCS | Performed by: INTERNAL MEDICINE

## 2018-12-13 PROCEDURE — 770001 HCHG ROOM/CARE - MED/SURG/GYN PRIV*

## 2018-12-13 PROCEDURE — 99232 SBSQ HOSP IP/OBS MODERATE 35: CPT | Performed by: FAMILY MEDICINE

## 2018-12-13 PROCEDURE — 700101 HCHG RX REV CODE 250: Performed by: INTERNAL MEDICINE

## 2018-12-13 PROCEDURE — 97116 GAIT TRAINING THERAPY: CPT

## 2018-12-13 PROCEDURE — 700102 HCHG RX REV CODE 250 W/ 637 OVERRIDE(OP): Performed by: INTERNAL MEDICINE

## 2018-12-13 PROCEDURE — 97530 THERAPEUTIC ACTIVITIES: CPT

## 2018-12-13 PROCEDURE — 700102 HCHG RX REV CODE 250 W/ 637 OVERRIDE(OP): Performed by: HOSPITALIST

## 2018-12-13 RX ORDER — WARFARIN SODIUM 5 MG/1
5 TABLET ORAL
Status: DISCONTINUED | OUTPATIENT
Start: 2018-12-13 | End: 2018-12-14

## 2018-12-13 RX ADMIN — FLUTICASONE PROPIONATE 100 MCG: 50 SPRAY, METERED NASAL at 04:24

## 2018-12-13 RX ADMIN — WARFARIN SODIUM 5 MG: 5 TABLET ORAL at 17:12

## 2018-12-13 RX ADMIN — SUCRALFATE 1 G: 1 TABLET ORAL at 17:12

## 2018-12-13 RX ADMIN — VITAMIN B12 0.1 MG ORAL TABLET 100 MCG: 0.1 TABLET ORAL at 04:24

## 2018-12-13 RX ADMIN — CEFAZOLIN SODIUM 1 G: 1 INJECTION, SOLUTION INTRAVENOUS at 17:12

## 2018-12-13 RX ADMIN — LOVASTATIN 10 MG: 20 TABLET ORAL at 17:13

## 2018-12-13 RX ADMIN — FAMOTIDINE 20 MG: 20 TABLET ORAL at 04:23

## 2018-12-13 RX ADMIN — IPRATROPIUM BROMIDE AND ALBUTEROL SULFATE 3 ML: .5; 3 SOLUTION RESPIRATORY (INHALATION) at 07:29

## 2018-12-13 RX ADMIN — HEPARIN SODIUM 900 UNITS/HR: 5000 INJECTION, SOLUTION INTRAVENOUS at 00:17

## 2018-12-13 RX ADMIN — VITAMIN D, TAB 1000IU (100/BT) 1000 UNITS: 25 TAB at 04:23

## 2018-12-13 RX ADMIN — FERROUS SULFATE TAB 325 MG (65 MG ELEMENTAL FE) 325 MG: 325 (65 FE) TAB at 04:23

## 2018-12-13 RX ADMIN — SUCRALFATE 1 G: 1 TABLET ORAL at 04:23

## 2018-12-13 RX ADMIN — THERA TABS 1 TABLET: TAB at 04:23

## 2018-12-13 RX ADMIN — HEPARIN SODIUM 900 UNITS/HR: 5000 INJECTION, SOLUTION INTRAVENOUS at 00:26

## 2018-12-13 RX ADMIN — SUCRALFATE 1 G: 1 TABLET ORAL at 13:04

## 2018-12-13 ASSESSMENT — ENCOUNTER SYMPTOMS
BLURRED VISION: 0
ABDOMINAL PAIN: 0
PHOTOPHOBIA: 0
SENSORY CHANGE: 0
DIARRHEA: 0
EYE PAIN: 0
MYALGIAS: 0
NAUSEA: 0
CHILLS: 0
EYE DISCHARGE: 0
BRUISES/BLEEDS EASILY: 0
DIAPHORESIS: 0
HEARTBURN: 0
SHORTNESS OF BREATH: 1
NECK PAIN: 0
DEPRESSION: 0
DIZZINESS: 0
ORTHOPNEA: 0
COUGH: 0
TREMORS: 0
DOUBLE VISION: 0
ROS GI COMMENTS: APPETITE DECREASED
PALPITATIONS: 0
WEIGHT LOSS: 1
VOMITING: 0
FEVER: 0
HEMOPTYSIS: 0
TINGLING: 0
CLAUDICATION: 0
HEADACHES: 0
BACK PAIN: 0
HALLUCINATIONS: 0
SPUTUM PRODUCTION: 0

## 2018-12-13 ASSESSMENT — COGNITIVE AND FUNCTIONAL STATUS - GENERAL
CLIMB 3 TO 5 STEPS WITH RAILING: A LITTLE
STANDING UP FROM CHAIR USING ARMS: A LITTLE
WALKING IN HOSPITAL ROOM: A LITTLE
MOBILITY SCORE: 21
SUGGESTED CMS G CODE MODIFIER MOBILITY: CJ

## 2018-12-13 ASSESSMENT — PAIN SCALES - GENERAL
PAINLEVEL_OUTOF10: 0

## 2018-12-13 ASSESSMENT — LIFESTYLE VARIABLES: SUBSTANCE_ABUSE: 0

## 2018-12-13 ASSESSMENT — GAIT ASSESSMENTS
ASSISTIVE DEVICE: FRONT WHEEL WALKER
DEVIATION: BRADYKINETIC;SHUFFLED GAIT
GAIT LEVEL OF ASSIST: STAND BY ASSIST
DISTANCE (FEET): 100

## 2018-12-13 NOTE — CARE PLAN
Problem: Nutritional:  Goal: Achieve adequate nutritional intake  Patient will consume 50% of meals    Outcome: MET Date Met: 12/13/18  PO >50% for the last four meals.

## 2018-12-13 NOTE — PROGRESS NOTES
Hospital Medicine Daily Progress Note    Date of Service  12/12/2018    Chief Complaint  Hallucinations    Hospital Course    *73 y.o. female admitted 11/30/2018 with complaints of hallucinations for the last 1-2 nights-recent blood cultures done at hemodialysis are positive for staph aureus patient denies experience any fever chills or sweats, hallucinations were occurring mainly at night but she was aware that they were hallucinations, in addition to the night prior to admission she had them her first night of admission but now they are resolved*    BCx from [facility], vanco discontinued  Echo with rheumatic mitral valve      Interval Problem Update  Laying in bed comfortably.  No acute distress noted.  Going to dialysis..        Consultants/Specialty  [facility]  Fidel Romero MD vascular surgery  [facility]    Code Status  Full     Disposition  Home  when INR is therapeutic  Home O2 ordered    Review of Systems  Review of Systems   Constitutional: Positive for malaise/fatigue and weight loss (80 pounds in 2 years). Negative for chills, diaphoresis and fever.   HENT: Negative for congestion, ear pain, hearing loss and tinnitus.    Eyes: Negative for blurred vision, double vision, photophobia and pain.   Respiratory: Positive for shortness of breath (chronic). Negative for cough, hemoptysis and sputum production.    Cardiovascular: Negative for chest pain, palpitations and orthopnea.   Gastrointestinal: Negative for abdominal pain, heartburn, nausea and vomiting.        Appetite decreased   Genitourinary: Negative for dysuria, frequency, hematuria and urgency.   Musculoskeletal: Negative for back pain, joint pain, myalgias and neck pain.   Skin: Negative for itching and rash.   Neurological: Negative for dizziness, tingling, tremors, sensory change, speech change and headaches.   Endo/Heme/Allergies: Negative for environmental allergies. Does not bruise/bleed easily.    Psychiatric/Behavioral: Negative for depression, hallucinations (None since last night), substance abuse and suicidal ideas. The patient is not nervous/anxious.         Physical Exam  Temp:  [36.7 °C (98.1 °F)-37.1 °C (98.7 °F)] 36.8 °C (98.3 °F)  Pulse:  [79-92] 79  Resp:  [16-17] 17  BP: (126-146)/(62-87) 133/65    Physical Exam   Constitutional: She is oriented to person, place, and time. No distress.   Chronically ill and hao with loose skin   HENT:   Head: Normocephalic and atraumatic.   Eyes: Pupils are equal, round, and reactive to light. Conjunctivae and EOM are normal. No scleral icterus.   Neck: Neck supple. No thyromegaly present.   Cardiovascular: Normal rate and regular rhythm.  Exam reveals no gallop and no friction rub.    Pulmonary/Chest: Effort normal. No accessory muscle usage. No respiratory distress. She has decreased breath sounds. She exhibits tenderness.   Abdominal: Soft. She exhibits no distension. There is no tenderness.   Musculoskeletal: She exhibits no tenderness or deformity.   Neurological: She is alert and oriented to person, place, and time. No cranial nerve deficit.   Skin: Skin is warm. She is not diaphoretic. No erythema.   Psychiatric: She has a normal mood and affect. Her behavior is normal.   Nursing note and vitals reviewed.  I examined the patient, there are no significant changes    Fluids    Intake/Output Summary (Last 24 hours) at 12/12/18 1925  Last data filed at 12/12/18 1825   Gross per 24 hour   Intake             1316 ml   Output             2500 ml   Net            -1184 ml       Laboratory  Recent Labs      12/12/18   0406   WBC  6.2   RBC  2.82*   HEMOGLOBIN  8.8*   HEMATOCRIT  27.3*   MCV  96.8   MCH  31.2   MCHC  32.2*   RDW  53.6*   PLATELETCT  160*   MPV  10.6     Recent Labs      12/12/18   0406  12/12/18   0755   SODIUM  137  137   POTASSIUM  3.6  3.8   CHLORIDE  103  102   CO2  25  25   GLUCOSE  93  88   BUN  19  18   CREATININE  3.23*  3.31*   CALCIUM  " 10.1  9.8     Recent Labs      12/09/18   2246   12/10/18   2159  12/11/18   0410  12/12/18   0406   APTT   --    < >  75.5*  62.6*  71.3*   INR  1.24*   --    --   1.24*  1.45*    < > = values in this interval not displayed.               Imaging  EC-BARBIE W/O CONT   Final Result      EC-ECHOCARDIOGRAM COMPLETE W/O CONT   Final Result      US-HEMODIALYSIS GRAFT DUPLEX COMP UPPER EXTREMITY   Final Result      DX-CHEST-PORTABLE (1 VIEW)   Final Result      Right IJ dialysis catheter in place.      Cardiomegaly.      IR-INTRO STENT DIALYSIS CIRCUIT S&I    (Results Pending)        Assessment/Plan  * Staphylococcus aureus bacteremia with sepsis (HCC)- (present on admission)   Assessment & Plan    Positive blood cultures from outside facility for MSSA. Negative here 11/30  ID consulted -needs 4-6 weeks of IV antibiotics  Permacath removed  New dialysis catheter placed 12/3 in the left femoral vein, temporary  Continue with cefazolin through 1/12/2018 per ID.     End stage renal disease (HCC)- (present on admission)   Assessment & Plan    On dialysis  Monday Wednesday Friday  Nephrology consulted and following    Vascular surgery consulted regarding new fistula    Status post fistulogram and left IJ permacatheter 12/8     Chronic respiratory failure with hypoxia (HCC)- (present on admission)   Assessment & Plan    Home O2 evaluation was done.  Home O2 ordered     COPD (chronic obstructive pulmonary disease) (HCC)- (present on admission)   Assessment & Plan    Not in exacerbation  Oxygen  Respiratory protocol  Stable     Abnormal echocardiogram   Assessment & Plan    \"Rheumatic appearance of mitral valve with mild stenosis and mild   Regurgitation.\"  BARBIE negative for endocarditis     Severe protein-calorie malnutrition (HCC)   Assessment & Plan    Patient has lost 80 pounds in 2 years   Nutritionist consult     Severe dental caries   Assessment & Plan    PainPatient has multiple teeth literally rotting out of her mouth  ID " agrees these will need to be removed and eventually but does not feel this is urgent       Acute external jugular vein thrombosis   Assessment & Plan     incidental acute jugular vein thrombosis on the right  Heparin weight-based protocol with warfarin bridge  Monitor INR with a goal between 2 and 3.     Sepsis (HCC)   Assessment & Plan    This is severe sepsis with the following associated acute organ dysfunction(s): metabolic/septic encephalopathy.   Patient not recognizes being severely septic on admission due to lack of fever tachycardia and leukocytosis-  However her septic encephalopathy qualifies her for diagnosis of severe sepsis and this is now resolved.  As her symptoms had resolved and she had stable vital signs, fluids serial lactates and so forth were not indicated by the time sepsis was recognized.  Continue with Ancef as mentioned above.     Iron deficiency anemia- (present on admission)   Assessment & Plan    PO Ferrous sulfate   Monitor H&H.     B12 deficiency- (present on admission)   Assessment & Plan    Resume home B12     HLD (hyperlipidemia)- (present on admission)   Assessment & Plan    Lovastatin     Vitamin D deficiency- (present on admission)   Assessment & Plan    Resume home vitamin D     Plan of care discussed with multidisciplinary team during rounds.    VTE prophylaxis: On heparin drip with warfarin bridge

## 2018-12-13 NOTE — PROGRESS NOTES
Mrs. Gillespie tolerated last night's procedures /treatmetns without incident. At end of shift patient laying in bed, denies any complaints and does not display any signs of distress. Patient care to be endorsed to AM nurse.

## 2018-12-13 NOTE — CARE PLAN
Problem: Communication  Goal: The ability to communicate needs accurately and effectively will improve  Outcome: PROGRESSING AS EXPECTED      Problem: Infection  Goal: Will remain free from infection  Outcome: PROGRESSING AS EXPECTED      Problem: Mobility  Goal: Risk for activity intolerance will decrease  Outcome: PROGRESSING AS EXPECTED

## 2018-12-13 NOTE — THERAPY
"Physical Therapy Treatment completed.   Bed Mobility:  Supine to Sit: Supervised  Transfers: Sit to Stand: Supervised  Gait: Level Of Assist: Stand by Assist with Front-Wheel Walker       Plan of Care: Will benefit from Physical Therapy 3 times per week  Discharge Recommendations: Equipment: No Equipment Needed.     See \"Rehab Therapy-Acute\" Patient Summary Report for complete documentation.     Pt continues to progress w/ therapy. Pt able to tolerate increased activity today. Pt stating non dialysis days are easier. Pt was able to ambulate 100 feet x 2. She required a 5 min seated rest break in between ambulation attempts. Pt currently at a level in which she can DC home w/ family support.  "

## 2018-12-14 LAB
APTT PPP: 81.5 SEC (ref 24.7–36)
INR PPP: 1.8 (ref 0.87–1.13)
PROTHROMBIN TIME: 21 SEC (ref 12–14.6)

## 2018-12-14 PROCEDURE — 85730 THROMBOPLASTIN TIME PARTIAL: CPT

## 2018-12-14 PROCEDURE — 99232 SBSQ HOSP IP/OBS MODERATE 35: CPT | Performed by: FAMILY MEDICINE

## 2018-12-14 PROCEDURE — A9270 NON-COVERED ITEM OR SERVICE: HCPCS | Performed by: HOSPITALIST

## 2018-12-14 PROCEDURE — 700111 HCHG RX REV CODE 636 W/ 250 OVERRIDE (IP): Performed by: INTERNAL MEDICINE

## 2018-12-14 PROCEDURE — 700101 HCHG RX REV CODE 250: Performed by: INTERNAL MEDICINE

## 2018-12-14 PROCEDURE — 99232 SBSQ HOSP IP/OBS MODERATE 35: CPT | Performed by: INTERNAL MEDICINE

## 2018-12-14 PROCEDURE — 94640 AIRWAY INHALATION TREATMENT: CPT

## 2018-12-14 PROCEDURE — 700102 HCHG RX REV CODE 250 W/ 637 OVERRIDE(OP): Performed by: INTERNAL MEDICINE

## 2018-12-14 PROCEDURE — A9270 NON-COVERED ITEM OR SERVICE: HCPCS | Performed by: INTERNAL MEDICINE

## 2018-12-14 PROCEDURE — 94760 N-INVAS EAR/PLS OXIMETRY 1: CPT

## 2018-12-14 PROCEDURE — 770001 HCHG ROOM/CARE - MED/SURG/GYN PRIV*

## 2018-12-14 PROCEDURE — 90935 HEMODIALYSIS ONE EVALUATION: CPT

## 2018-12-14 PROCEDURE — 700102 HCHG RX REV CODE 250 W/ 637 OVERRIDE(OP): Performed by: FAMILY MEDICINE

## 2018-12-14 PROCEDURE — 700111 HCHG RX REV CODE 636 W/ 250 OVERRIDE (IP)

## 2018-12-14 PROCEDURE — 700111 HCHG RX REV CODE 636 W/ 250 OVERRIDE (IP): Mod: JG | Performed by: INTERNAL MEDICINE

## 2018-12-14 PROCEDURE — 700102 HCHG RX REV CODE 250 W/ 637 OVERRIDE(OP): Performed by: HOSPITALIST

## 2018-12-14 PROCEDURE — 97535 SELF CARE MNGMENT TRAINING: CPT

## 2018-12-14 PROCEDURE — 85610 PROTHROMBIN TIME: CPT

## 2018-12-14 PROCEDURE — 36415 COLL VENOUS BLD VENIPUNCTURE: CPT

## 2018-12-14 PROCEDURE — A9270 NON-COVERED ITEM OR SERVICE: HCPCS | Performed by: FAMILY MEDICINE

## 2018-12-14 RX ORDER — HEPARIN SODIUM 1000 [USP'U]/ML
INJECTION, SOLUTION INTRAVENOUS; SUBCUTANEOUS
Status: COMPLETED
Start: 2018-12-14 | End: 2018-12-14

## 2018-12-14 RX ORDER — WARFARIN SODIUM 6 MG/1
6 TABLET ORAL
Status: COMPLETED | OUTPATIENT
Start: 2018-12-14 | End: 2018-12-14

## 2018-12-14 RX ADMIN — FLUTICASONE PROPIONATE 100 MCG: 50 SPRAY, METERED NASAL at 04:58

## 2018-12-14 RX ADMIN — WARFARIN SODIUM 6 MG: 6 TABLET ORAL at 17:11

## 2018-12-14 RX ADMIN — FERROUS SULFATE TAB 325 MG (65 MG ELEMENTAL FE) 325 MG: 325 (65 FE) TAB at 04:57

## 2018-12-14 RX ADMIN — SUCRALFATE 1 G: 1 TABLET ORAL at 12:13

## 2018-12-14 RX ADMIN — SUCRALFATE 1 G: 1 TABLET ORAL at 00:04

## 2018-12-14 RX ADMIN — IPRATROPIUM BROMIDE AND ALBUTEROL SULFATE 3 ML: .5; 3 SOLUTION RESPIRATORY (INHALATION) at 18:31

## 2018-12-14 RX ADMIN — VITAMIN D, TAB 1000IU (100/BT) 1000 UNITS: 25 TAB at 04:57

## 2018-12-14 RX ADMIN — SUCRALFATE 1 G: 1 TABLET ORAL at 04:57

## 2018-12-14 RX ADMIN — CEFAZOLIN SODIUM 1 G: 1 INJECTION, SOLUTION INTRAVENOUS at 17:11

## 2018-12-14 RX ADMIN — HEPARIN SODIUM 2500 UNITS: 1000 INJECTION, SOLUTION INTRAVENOUS; SUBCUTANEOUS at 08:43

## 2018-12-14 RX ADMIN — FAMOTIDINE 20 MG: 20 TABLET ORAL at 04:57

## 2018-12-14 RX ADMIN — SUCRALFATE 1 G: 1 TABLET ORAL at 17:11

## 2018-12-14 RX ADMIN — THERA TABS 1 TABLET: TAB at 04:57

## 2018-12-14 RX ADMIN — VITAMIN B12 0.1 MG ORAL TABLET 100 MCG: 0.1 TABLET ORAL at 04:57

## 2018-12-14 RX ADMIN — EPOETIN ALFA 4000 UNITS: 4000 SOLUTION INTRAVENOUS; SUBCUTANEOUS at 08:29

## 2018-12-14 RX ADMIN — HEPARIN SODIUM 4200 UNITS: 1000 INJECTION, SOLUTION INTRAVENOUS; SUBCUTANEOUS at 11:50

## 2018-12-14 RX ADMIN — IPRATROPIUM BROMIDE AND ALBUTEROL SULFATE 3 ML: .5; 3 SOLUTION RESPIRATORY (INHALATION) at 07:36

## 2018-12-14 ASSESSMENT — ENCOUNTER SYMPTOMS
FOCAL WEAKNESS: 0
NECK PAIN: 0
DIARRHEA: 0
WEIGHT LOSS: 1
DEPRESSION: 0
HALLUCINATIONS: 0
BACK PAIN: 0
NAUSEA: 0
DIAPHORESIS: 0
SHORTNESS OF BREATH: 1
SPUTUM PRODUCTION: 0
HEARTBURN: 0
CONSTIPATION: 0
PALPITATIONS: 0
ORTHOPNEA: 0
BRUISES/BLEEDS EASILY: 0
HEMOPTYSIS: 0
SPEECH CHANGE: 0
CHILLS: 0
DIZZINESS: 0
ABDOMINAL PAIN: 0
TINGLING: 0
FEVER: 0
HEADACHES: 0
SENSORY CHANGE: 0
TREMORS: 0
COUGH: 0
MYALGIAS: 0
VOMITING: 0
PHOTOPHOBIA: 0
BLURRED VISION: 0
ROS GI COMMENTS: APPETITE DECREASED
EYE PAIN: 0
DOUBLE VISION: 0

## 2018-12-14 ASSESSMENT — PAIN SCALES - GENERAL
PAINLEVEL_OUTOF10: 0
PAINLEVEL_OUTOF10: 0

## 2018-12-14 ASSESSMENT — COGNITIVE AND FUNCTIONAL STATUS - GENERAL
DAILY ACTIVITIY SCORE: 21
TOILETING: A LITTLE
HELP NEEDED FOR BATHING: A LITTLE
PERSONAL GROOMING: A LITTLE
SUGGESTED CMS G CODE MODIFIER DAILY ACTIVITY: CJ

## 2018-12-14 ASSESSMENT — LIFESTYLE VARIABLES: SUBSTANCE_ABUSE: 0

## 2018-12-14 NOTE — DISCHARGE PLANNING
Agency/Facility Name: [facility]   Spoke To: Louann  Outcome: Patient accepted. [facility] would be unable to see patient until 12/19. SHA Leija notified.

## 2018-12-14 NOTE — PROGRESS NOTES
3hr HD started @ 0843 and completed @ 1146,tx well tolerated,VSS,net UF = 2000ml.ROBERTJ TDC dressing changed,CDI,report given to Martha Lloyd RN.

## 2018-12-14 NOTE — DISCHARGE PLANNING
Patient LACE Score = 79  - Previous admissions: staph infection with sepsis, respiratory failure    Pt adimtted for bacteremia with positive blood cultures and hallucinations. Pt has ESRD and receives HD M/W/F, had fistula created on 12/8/17 in the right forearm, did not fully develop.     12/1: ID consult, Right jugular vein thrombosis, heparin drip started  12/2: permacath removed, advanced dental caries per ID not urgent  12/3: Echo, new dialysis catheter, cultures growing MSSA, 4-6wks antibiotics, heparin to coumadin bridge started for jugular vein thrombosis  12/5: BARBIE ordered for mitral valve  12/6: BARBIE negative for endocarditis,   12/7: Antibiotics 3 times a week for 6 weeks from last negative blood culture, stop date 1/14/19 12/8: Tunneled catheter placed and fistulaogram today, Vit K given to reduce INR  12/9: ID sign off, home O2 eval needed, restart heparin to coumadin bridge  12/10; home O2 ordered, heparin/coumadin bridge continued, dialysis today  12/11: INR 1.24 heparin/coumadin bridge continued, home O2 at the bedside  12/12: INR 1.45 heparin/coumadin bridge continued, dialysis today  12/13: INR 1.54 heparin/coumadin bridge continued  12/14: INR 1.80 heparin/coumadin bridge continued, dialysis today          Follow Up:  Expected D/C on Sunday, waiting for therapeutic INR x2 and family for ride home.

## 2018-12-14 NOTE — DISCHARGE PLANNING
Outpatient Dialysis Note      Patient can discharge from a nephrology standpoint per Dr. Garza.    Tammy Betts- Dialysis Coordinator  [facility] Ph# [phone]

## 2018-12-14 NOTE — PROGRESS NOTES
Nephrology Daily Progress Note    Date of Service  12/14/2018    Chief Complaint  73 y.o. female admitted 11/30/2018 with ESRD and sepsis, line changed    Interval Problem Update  Events reviewed.  Patient is stable.  No new issues overnight.  INR 1.80.    Review of Systems  Review of Systems   Constitutional: Negative for chills and fever.   Cardiovascular: Negative for chest pain and palpitations.   All other systems reviewed and are negative.       Physical Exam  Temp:  [36.3 °C (97.4 °F)-37 °C (98.6 °F)] 36.9 °C (98.5 °F)  Pulse:  [82-93] 86  Resp:  [16-18] 16  BP: (125-142)/(53-77) 125/53    Physical Exam   Constitutional: She appears well-developed and well-nourished.   HENT:   Head: Normocephalic.   Cardiovascular: Normal rate and regular rhythm.    Pulmonary/Chest: Effort normal and breath sounds normal.   Musculoskeletal: She exhibits no edema or tenderness.       Fluids    Intake/Output Summary (Last 24 hours) at 12/14/18 1034  Last data filed at 12/13/18 1800   Gross per 24 hour   Intake              216 ml   Output                0 ml   Net              216 ml       Laboratory  Recent Labs      12/12/18   0406   WBC  6.2   RBC  2.82*   HEMOGLOBIN  8.8*   HEMATOCRIT  27.3*   MCV  96.8   MCH  31.2   MCHC  32.2*   RDW  53.6*   PLATELETCT  160*   MPV  10.6     Recent Labs      12/12/18   0406  12/12/18   0755   SODIUM  137  137   POTASSIUM  3.6  3.8   CHLORIDE  103  102   CO2  25  25   GLUCOSE  93  88   BUN  19  18   CREATININE  3.23*  3.31*   CALCIUM  10.1  9.8     Recent Labs      12/12/18   0406  12/13/18   0355  12/14/18   0405   APTT  71.3*  69.3*  81.5*   INR  1.45*  1.54*  1.80*               Imaging  EC-BARBIE W/O CONT   Final Result      EC-ECHOCARDIOGRAM COMPLETE W/O CONT   Final Result      US-HEMODIALYSIS GRAFT DUPLEX COMP UPPER EXTREMITY   Final Result      DX-CHEST-PORTABLE (1 VIEW)   Final Result      Right IJ dialysis catheter in place.      Cardiomegaly.      IR-INTRO STENT DIALYSIS CIRCUIT S&I     (Results Pending)        Assessment/Plan  1. ESRD -continue dialysis Monday Wednesday Friday.  2. S/p line sepsis -continue antibiotics.  3. Anticoagulation -INR improved to 1.80    -Stable for outpatient dialysis from a renal standpoint  -Continue dialysis Monday Wednesday Friday

## 2018-12-14 NOTE — PROGRESS NOTES
Inpatient Anticoagulation Service Note    Date: 12/14/2018  Reason for Anticoagulation: Deep Vein Thrombosis        Hemoglobin Value: (!) 8.8  Hematocrit Value: (!) 27.3  Lab Platelet Value: (!) 160  Target INR: 2.0 to 3.0    INR from last 7 days     Date/Time INR Value    12/14/18 0405 (!)  1.8    12/13/18 0355 (!)  1.54    12/12/18 0406 (!)  1.45    12/11/18 0410 (!)  1.24    12/09/18 2246 (!)  1.24    12/09/18 0937 (!)  1.33    12/08/18 0559 (!)  2.01        Dose from last 7 days     Date/Time Dose (mg)    12/13/18 1559  5    12/12/18 1055  4    12/11/18 1139  4    12/10/18 1139  4    12/09/18 0800  4    12/08/18 1000  0    12/07/18 1013  2        Average Dose (mg):  (New start)  Significant Interactions: Antibiotics, Statin  Bridge Therapy: Yes  Date of Last VTE Event: 12/01/18  Bridge Therapy Start Date: 12/09/18  Days of Overlap Therapy: 5   INR Value Greater than 2 Prior to Discontinuation of Parenteral Anticoagulation: Not Applicable     Reversal Agent Administered: Vitamin K  Subcutaneous (2 mg on 12/7)    Comments:   · INR increasing  ,below goal.  · H/H low but no documented s/sx of bleeding.     Plan:    · Warfarin 6 mg tonight with INR tomorrow AM.    Education Material Provided?: Yes  Pharmacist suggested discharge dosing: Warfarin 5 mg daily and INR within 72 hours of discharge.     John Arellano PharmD BCPS

## 2018-12-14 NOTE — PROGRESS NOTES
Inpatient Anticoagulation Service Note    Date: 2018  Reason for Anticoagulation: Deep Vein Thrombosis        Hemoglobin Value: (!) 8.8  Hematocrit Value: (!) 27.3  Lab Platelet Value: (!) 160  Target INR: 2.0 to 3.0    INR from last 7 days     Date/Time INR Value    18 0355 (!)  1.54    18 0406 (!)  1.45    18 0410 (!)  1.24    18 2246 (!)  1.24    18 0937 (!)  1.33    18 0559 (!)  2.01    18 0541 (!)  2.31        Dose from last 7 days     Date/Time Dose (mg)    18 1559  5    18 1055  4    18 1139  4    12/10/18 1139  4    18 0800  4    18 1000  0    18 1013  2        Average Dose (mg): New start  Significant Interactions: Antibiotics, Statin  Bridge Therapy: Yes  Date of Last VTE Event: 18  Bridge Therapy Start Date: 18  Days of Overlap Therapy: 4   INR Value Greater than 2 Prior to Discontinuation of Parenteral Anticoagulation: Not Applicable   Reversal Agent Administered: Vitamin K  Subcutaneous (2 mg on )    Comments: INR up slightly from yesterday but still subtherapeutic. No new labs today but no notes of bleeding. Will increase warfarin dose again tonight.    Plan:  Warfarin 5 mg tonight with INR tomorrow AM  Education Material Provided?: Yes  Pharmacist suggested discharge dosin mg daily and INR within 72 hours of discharge     Yoli Carrizales, PharmD, BCPS

## 2018-12-14 NOTE — THERAPY
"Occupational Therapy Treatment completed with focus on ADLs and ADL transfers.  Functional Status:  SPV for grooming/dressing ADLs, CGA functional txfs, 1 self-corrected LOB with posterior step during standing grooming at sink. SPV for UB&LB dressing.  Plan of Care: Will benefit from Occupational Therapy 3 times per week  Discharge Recommendations:  Equipment Will Continue to Assess for Equipment Needs. Post-acute therapy Discharge to home with outpatient or home health for additional skilled therapy services.    See \"Rehab Therapy-Acute\" Patient Summary Report for complete documentation.     Pt SPV for basic ADLs but CGA for functional ambulation/transfer as she continues to be limited by balance specifically when taking posterior steps. Pt demonstrates good insight to deficits and safety awareness, however requires cues for deep breathing and seated rest breaks. OT to continue to follow while in-house. Pt will benefit from home health therapies upon d/c.   "

## 2018-12-14 NOTE — DISCHARGE PLANNING
Anticipated Discharge Disposition: Home with HH, Oxygen and lives with daughter.      Action: RN CM talked with daughterAnahy at bedside to update on discharge plan.  Pt is off floor in dialysis.  Notified Anahy that Tammy MARSHALL informed SHA BOWEN that pt has been accepted by [facility] and will be seen 12/19.  Anahy had no problem with this. Oxygen tank is at bedside for transport home.  Anahy will be driving pt home upon d/c.  Anahy has appt today at 1:30pm in [place] and will not be able to transport home at this time but can before or after if d/c today.    Barriers to Discharge: Medical clearance     Plan: RN CM f/u with medical clearance in IDT rounds

## 2018-12-14 NOTE — PROGRESS NOTES
Hospital Medicine Daily Progress Note    Date of Service  12/13/2018    Chief Complaint  Hallucinations    Hospital Course    *73 y.o. female admitted 11/30/2018 with complaints of hallucinations for the last 1-2 nights-recent blood cultures done at hemodialysis are positive for staph aureus patient denies experience any fever chills or sweats, hallucinations were occurring mainly at night but she was aware that they were hallucinations, in addition to the night prior to admission she had them her first night of admission but now they are resolved*    BCx from [facility], vanco discontinued  Echo with rheumatic mitral valve      Interval Problem Update  Laying in bed comfortably.  No new complaints to report.  No distress noted.        Consultants/Specialty  [facility]  Fidel Romero MD vascular surgery  [facility]    Code Status  Full     Disposition  Home  when INR is therapeutic  Home O2 ordered    Review of Systems  Review of Systems   Constitutional: Positive for malaise/fatigue and weight loss (80 pounds in 2 years). Negative for chills, diaphoresis and fever.   HENT: Negative for congestion, ear discharge, ear pain, hearing loss and tinnitus.    Eyes: Negative for blurred vision, double vision, photophobia, pain and discharge.   Respiratory: Positive for shortness of breath (chronic). Negative for cough, hemoptysis and sputum production.    Cardiovascular: Negative for chest pain, palpitations, orthopnea and claudication.   Gastrointestinal: Negative for abdominal pain, diarrhea, heartburn, nausea and vomiting.        Appetite decreased   Genitourinary: Negative for dysuria, frequency and urgency.   Musculoskeletal: Negative for back pain, myalgias and neck pain.   Skin: Negative for rash.   Neurological: Negative for dizziness, tingling, tremors, sensory change and headaches.   Endo/Heme/Allergies: Negative for environmental allergies. Does not bruise/bleed easily.    Psychiatric/Behavioral: Negative for depression, hallucinations (None since last night), substance abuse and suicidal ideas.        Physical Exam  Temp:  [36.3 °C (97.4 °F)-37 °C (98.6 °F)] 37 °C (98.6 °F)  Pulse:  [60-91] 82  Resp:  [16-18] 16  BP: ()/(57-79) 133/59    Physical Exam   Constitutional: She is oriented to person, place, and time. She appears well-developed. No distress.   Chronically ill appearing   HENT:   Head: Normocephalic and atraumatic.   Mouth/Throat: No oropharyngeal exudate.   Eyes: Pupils are equal, round, and reactive to light. Conjunctivae and EOM are normal. No scleral icterus.   Neck: Neck supple. No thyromegaly present.   Cardiovascular: Normal rate and regular rhythm.  Exam reveals no gallop and no friction rub.    Pulmonary/Chest: Effort normal. No accessory muscle usage. No respiratory distress. She has decreased breath sounds. She exhibits tenderness.   Abdominal: Soft. She exhibits no distension. There is no tenderness.   Musculoskeletal: She exhibits no tenderness or deformity.   Neurological: She is alert and oriented to person, place, and time. No cranial nerve deficit.   Skin: Skin is warm. No rash noted. She is not diaphoretic. No erythema.   Psychiatric: She has a normal mood and affect. Her behavior is normal.   Nursing note and vitals reviewed.  I examined the patient, there are no significant changes    Fluids    Intake/Output Summary (Last 24 hours) at 12/13/18 1932  Last data filed at 12/13/18 1800   Gross per 24 hour   Intake           1057.7 ml   Output                0 ml   Net           1057.7 ml       Laboratory  Recent Labs      12/12/18   0406   WBC  6.2   RBC  2.82*   HEMOGLOBIN  8.8*   HEMATOCRIT  27.3*   MCV  96.8   MCH  31.2   MCHC  32.2*   RDW  53.6*   PLATELETCT  160*   MPV  10.6     Recent Labs      12/12/18   0406  12/12/18   0755   SODIUM  137  137   POTASSIUM  3.6  3.8   CHLORIDE  103  102   CO2  25  25   GLUCOSE  93  88   BUN  19  18  "  CREATININE  3.23*  3.31*   CALCIUM  10.1  9.8     Recent Labs      12/11/18   0410  12/12/18   0406  12/13/18   0355   APTT  62.6*  71.3*  69.3*   INR  1.24*  1.45*  1.54*               Imaging  EC-BARBIE W/O CONT   Final Result      EC-ECHOCARDIOGRAM COMPLETE W/O CONT   Final Result      US-HEMODIALYSIS GRAFT DUPLEX COMP UPPER EXTREMITY   Final Result      DX-CHEST-PORTABLE (1 VIEW)   Final Result      Right IJ dialysis catheter in place.      Cardiomegaly.      IR-INTRO STENT DIALYSIS CIRCUIT S&I    (Results Pending)        Assessment/Plan  * Staphylococcus aureus bacteremia with sepsis (HCC)- (present on admission)   Assessment & Plan    Positive blood cultures from outside facility for MSSA. Negative here 11/30  ID consulted -needs 4-6 weeks of IV antibiotics  Permacath removed  New dialysis catheter placed 12/3 in the left femoral vein, temporary  Continue with cefazolin through 1/12/2018 per ID.     Acute external jugular vein thrombosis   Assessment & Plan     incidental acute jugular vein thrombosis on the right  Heparin weight-based protocol with warfarin bridge  Monitor INR with a goal between 2 and 3.     End stage renal disease (HCC)- (present on admission)   Assessment & Plan    On dialysis  Monday Wednesday Friday  Nephrology consulted and following    Vascular surgery consulted regarding new fistula    Status post fistulogram and left IJ permacatheter 12/8     Iron deficiency anemia- (present on admission)   Assessment & Plan    PO Ferrous sulfate   Monitor H&H.     Chronic respiratory failure with hypoxia (HCC)- (present on admission)   Assessment & Plan    Home O2 evaluation was done.  Home O2 ordered     COPD (chronic obstructive pulmonary disease) (HCC)- (present on admission)   Assessment & Plan    Not in exacerbation  Oxygen  Respiratory protocol  Stable     Abnormal echocardiogram   Assessment & Plan    \"Rheumatic appearance of mitral valve with mild stenosis and mild   Regurgitation.\"  BARBIE " negative for endocarditis     Severe protein-calorie malnutrition (HCC)   Assessment & Plan    Patient has lost 80 pounds in 2 years   Nutritionist consult     Severe dental caries   Assessment & Plan    PainPatient has multiple teeth literally rotting out of her mouth  ID agrees these will need to be removed and eventually but does not feel this is urgent       Sepsis (HCC)   Assessment & Plan    This is severe sepsis with the following associated acute organ dysfunction(s): metabolic/septic encephalopathy.   Patient not recognizes being severely septic on admission due to lack of fever tachycardia and leukocytosis-  However her septic encephalopathy qualifies her for diagnosis of severe sepsis and this is now resolved.  As her symptoms had resolved and she had stable vital signs, fluids serial lactates and so forth were not indicated by the time sepsis was recognized.  Continue with Ancef as mentioned above.     B12 deficiency- (present on admission)   Assessment & Plan    Resume home B12     HLD (hyperlipidemia)- (present on admission)   Assessment & Plan    Lovastatin     Vitamin D deficiency- (present on admission)   Assessment & Plan    Resume home vitamin D     Plan of care discussed with multidisciplinary team during rounds.    VTE prophylaxis: On heparin drip with warfarin bridge

## 2018-12-14 NOTE — DISCHARGE PLANNING
Agency/Facility Name: [facility]   Spoke To: Nallely  Outcome: Oxygen delivered to patient on 12/13. ARI Leija notified.

## 2018-12-15 LAB
APTT PPP: 76 SEC (ref 24.7–36)
INR PPP: 1.98 (ref 0.87–1.13)
PROTHROMBIN TIME: 22.6 SEC (ref 12–14.6)

## 2018-12-15 PROCEDURE — A9270 NON-COVERED ITEM OR SERVICE: HCPCS | Performed by: HOSPITALIST

## 2018-12-15 PROCEDURE — 700111 HCHG RX REV CODE 636 W/ 250 OVERRIDE (IP): Performed by: INTERNAL MEDICINE

## 2018-12-15 PROCEDURE — 85610 PROTHROMBIN TIME: CPT

## 2018-12-15 PROCEDURE — 700101 HCHG RX REV CODE 250: Performed by: INTERNAL MEDICINE

## 2018-12-15 PROCEDURE — 94640 AIRWAY INHALATION TREATMENT: CPT

## 2018-12-15 PROCEDURE — A9270 NON-COVERED ITEM OR SERVICE: HCPCS | Performed by: INTERNAL MEDICINE

## 2018-12-15 PROCEDURE — 700102 HCHG RX REV CODE 250 W/ 637 OVERRIDE(OP): Performed by: INTERNAL MEDICINE

## 2018-12-15 PROCEDURE — 770006 HCHG ROOM/CARE - MED/SURG/GYN SEMI*

## 2018-12-15 PROCEDURE — 85730 THROMBOPLASTIN TIME PARTIAL: CPT

## 2018-12-15 PROCEDURE — 36415 COLL VENOUS BLD VENIPUNCTURE: CPT

## 2018-12-15 PROCEDURE — 700102 HCHG RX REV CODE 250 W/ 637 OVERRIDE(OP): Performed by: HOSPITALIST

## 2018-12-15 PROCEDURE — 99233 SBSQ HOSP IP/OBS HIGH 50: CPT | Performed by: HOSPITALIST

## 2018-12-15 PROCEDURE — 94760 N-INVAS EAR/PLS OXIMETRY 1: CPT

## 2018-12-15 RX ORDER — WARFARIN SODIUM 6 MG/1
6 TABLET ORAL
Status: DISCONTINUED | OUTPATIENT
Start: 2018-12-15 | End: 2018-12-16 | Stop reason: HOSPADM

## 2018-12-15 RX ADMIN — FERROUS SULFATE TAB 325 MG (65 MG ELEMENTAL FE) 325 MG: 325 (65 FE) TAB at 04:42

## 2018-12-15 RX ADMIN — HEPARIN SODIUM 900 UNITS: 5000 INJECTION, SOLUTION INTRAVENOUS at 09:37

## 2018-12-15 RX ADMIN — IPRATROPIUM BROMIDE AND ALBUTEROL SULFATE 3 ML: .5; 3 SOLUTION RESPIRATORY (INHALATION) at 08:18

## 2018-12-15 RX ADMIN — SUCRALFATE 1 G: 1 TABLET ORAL at 06:00

## 2018-12-15 RX ADMIN — THERA TABS 1 TABLET: TAB at 04:42

## 2018-12-15 RX ADMIN — HEPARIN SODIUM 900 UNITS/HR: 5000 INJECTION, SOLUTION INTRAVENOUS at 06:57

## 2018-12-15 RX ADMIN — STANDARDIZED SENNA CONCENTRATE AND DOCUSATE SODIUM 2 TABLET: 8.6; 5 TABLET, FILM COATED ORAL at 16:29

## 2018-12-15 RX ADMIN — FAMOTIDINE 20 MG: 20 TABLET ORAL at 04:43

## 2018-12-15 RX ADMIN — IPRATROPIUM BROMIDE AND ALBUTEROL SULFATE 3 ML: .5; 3 SOLUTION RESPIRATORY (INHALATION) at 19:22

## 2018-12-15 RX ADMIN — CEFAZOLIN SODIUM 1 G: 1 INJECTION, SOLUTION INTRAVENOUS at 16:29

## 2018-12-15 RX ADMIN — WARFARIN SODIUM 6 MG: 6 TABLET ORAL at 16:52

## 2018-12-15 RX ADMIN — VITAMIN B12 0.1 MG ORAL TABLET 100 MCG: 0.1 TABLET ORAL at 08:04

## 2018-12-15 RX ADMIN — SUCRALFATE 1 G: 1 TABLET ORAL at 01:09

## 2018-12-15 RX ADMIN — LOVASTATIN 10 MG: 20 TABLET ORAL at 21:39

## 2018-12-15 RX ADMIN — LOVASTATIN 10 MG: 20 TABLET ORAL at 01:09

## 2018-12-15 RX ADMIN — VITAMIN D, TAB 1000IU (100/BT) 1000 UNITS: 25 TAB at 04:42

## 2018-12-15 RX ADMIN — FLUTICASONE PROPIONATE 100 MCG: 50 SPRAY, METERED NASAL at 04:44

## 2018-12-15 ASSESSMENT — ENCOUNTER SYMPTOMS
DIAPHORESIS: 0
WEIGHT LOSS: 1
SPUTUM PRODUCTION: 0
ABDOMINAL PAIN: 0
DIARRHEA: 0
PALPITATIONS: 0
TINGLING: 0
NECK PAIN: 0
HALLUCINATIONS: 0
PHOTOPHOBIA: 0
HEADACHES: 0
DEPRESSION: 0
BRUISES/BLEEDS EASILY: 0
MYALGIAS: 0
SPEECH CHANGE: 0
TREMORS: 0
VOMITING: 0
ORTHOPNEA: 0
BLURRED VISION: 0
HEMOPTYSIS: 0
EYE PAIN: 0
NAUSEA: 0
DIZZINESS: 0
HEARTBURN: 0
BACK PAIN: 0
CONSTIPATION: 0
DOUBLE VISION: 0
ROS GI COMMENTS: APPETITE IMPROVING
SENSORY CHANGE: 0
SHORTNESS OF BREATH: 1
FEVER: 0
COUGH: 0
CHILLS: 0
FOCAL WEAKNESS: 0

## 2018-12-15 ASSESSMENT — PAIN SCALES - GENERAL
PAINLEVEL_OUTOF10: 0

## 2018-12-15 ASSESSMENT — LIFESTYLE VARIABLES: SUBSTANCE_ABUSE: 0

## 2018-12-15 NOTE — PROGRESS NOTES
Assumed care of pt, beside report received from SHA Thomas. Updated on POC, call light within reach all fall precautions within place. Bed locked and lowered. Pt instructed to call for assistance before getting up. All questions answered, no other needs at this time.

## 2018-12-15 NOTE — PROGRESS NOTES
Report given to Antonieta Hernandez RN. Chart and medications obtained and transferred with patient.

## 2018-12-15 NOTE — PROGRESS NOTES
Received bedside report from night shift RN. Patient is laying in bed, e, NAD noted on 2L O2 NC. Heparin gtt verified at this time. POC discussed, call light within reach, bed locked low. Needs met, assumed care at this time.

## 2018-12-15 NOTE — CARE PLAN
Problem: Safety  Goal: Will remain free from injury  Hourly rounding done. Call light within reach, bed locked low. OOB with SBA.     Problem: Infection  Goal: Will remain free from infection  Educated on s/s of infection.     Problem: Pain Management  Goal: Pain level will decrease to patient's comfort goal  Pain assessed per protocol and PRN.      Problem: Skin Integrity  Goal: Risk for impaired skin integrity will decrease  New fistula to R arm.    Problem: Mobility  Goal: Risk for activity intolerance will decrease  OOB with SBA. Patient calls appropriately.

## 2018-12-15 NOTE — PROGRESS NOTES
Inpatient Anticoagulation Service Note    Date: 12/15/2018  Reason for Anticoagulation: Deep Vein Thrombosis        Hemoglobin Value: (!) 8.8  Hematocrit Value: (!) 27.3  Lab Platelet Value: (!) 160  Target INR: 2.0 to 3.0    INR from last 7 days     Date/Time INR Value    12/15/18 0219 (!)  1.98    12/14/18 0405 (!)  1.8    12/13/18 0355 (!)  1.54    12/12/18 0406 (!)  1.45    12/11/18 0410 (!)  1.24    12/09/18 2246 (!)  1.24    12/09/18 0937 (!)  1.33        Dose from last 7 days     Date/Time Dose (mg)    12/15/18 1500  6    12/14/18 0800  6    12/13/18 1559  5    12/12/18 1055  4    12/11/18 1139  4    12/10/18 1139  4    12/09/18 0800  4        Average Dose (mg):  (New start)  Significant Interactions: Antibiotics, Statin (sucralfate)  Bridge Therapy: Yes  Date of Last VTE Event: 12/01/18  Bridge Therapy Start Date: 12/09/18  Days of Overlap Therapy: 6  INR Value Greater than 2 Prior to Discontinuation of Parenteral Anticoagulation: Not Applicable  Reversal Agent Administered: Vitamin K  Subcutaneous (2 mg on 12/7)  Comments: INR essentially therapeutic.  No s/sx of bleeding per chart review.  Heparin bridge continued.  DDI noted above, including sucralfate.  Will continue 6mg daily and INR in AM.    Plan:  Warfarin 6mg.  INR in AM  Education Material Provided?: Yes  Pharmacist suggested discharge dosing: Recommend 6mg daily with prompt follow up within 72 hours of discharge.     Ray Gaming

## 2018-12-15 NOTE — PROGRESS NOTES
· 2 RN skin check complete with Alfonso RN.  · Devices in place oxymask   · Skin assessed under devices oxymask.  · Confirmed pressure ulcers found on N/A.    · The following interventions in place moisturizer provided, education about repositioning, and appropriate skin care.    Elbows and heels and behind ears are  intact and blanching.  Sacrum is intact and blanching.

## 2018-12-15 NOTE — PROGRESS NOTES
Hospital Medicine Daily Progress Note    Date of Service  12/14/2018    Chief Complaint  Hallucinations    Hospital Course    *73 y.o. female admitted 11/30/2018 with complaints of hallucinations for the last 1-2 nights-recent blood cultures done at hemodialysis are positive for staph aureus patient denies experience any fever chills or sweats, hallucinations were occurring mainly at night but she was aware that they were hallucinations, in addition to the night prior to admission she had them her first night of admission but now they are resolved*    [facility]x from [facility], vanco discontinued  Echo with rheumatic mitral valve      Interval Problem Update  Lying in bed comfortably.  Going to dialysis.  No acute distress noted.        Consultants/Specialty  [facility]  Fidel Romero MD vascular surgery  [facility]    Code Status  Full     Disposition  Home  when INR is therapeutic  Home O2 ordered    Review of Systems  Review of Systems   Constitutional: Positive for malaise/fatigue and weight loss (80 pounds in 2 years). Negative for chills, diaphoresis and fever.   HENT: Negative for congestion, ear discharge, ear pain, hearing loss, nosebleeds and tinnitus.    Eyes: Negative for blurred vision, double vision, photophobia and pain.   Respiratory: Positive for shortness of breath (chronic). Negative for cough, hemoptysis and sputum production.    Cardiovascular: Negative for chest pain, palpitations and orthopnea.   Gastrointestinal: Negative for abdominal pain, constipation, diarrhea, heartburn, nausea and vomiting.        Appetite decreased   Genitourinary: Negative for dysuria, frequency, hematuria and urgency.   Musculoskeletal: Negative for back pain, myalgias and neck pain.   Skin: Negative for itching.   Neurological: Negative for dizziness, tingling, tremors, sensory change, speech change, focal weakness and headaches.   Endo/Heme/Allergies: Negative for environmental  allergies. Does not bruise/bleed easily.   Psychiatric/Behavioral: Negative for depression, hallucinations (None since last night), substance abuse and suicidal ideas.        Physical Exam  Temp:  [36.3 °C (97.4 °F)-36.9 °C (98.5 °F)] 36.6 °C (97.9 °F)  Pulse:  [74-93] 83  Resp:  [14-18] 14  BP: (125-142)/(53-87) 126/87    Physical Exam   Constitutional: She is oriented to person, place, and time. No distress.   Chronically ill appearing   HENT:   Head: Normocephalic and atraumatic.   Mouth/Throat: No oropharyngeal exudate.   Eyes: Pupils are equal, round, and reactive to light. Conjunctivae and EOM are normal. Right eye exhibits no discharge. Left eye exhibits no discharge.   Neck: Neck supple. No thyromegaly present.   Cardiovascular: Normal rate and regular rhythm.  Exam reveals no gallop and no friction rub.    Pulmonary/Chest: Effort normal. No accessory muscle usage. No respiratory distress. She has decreased breath sounds. She exhibits tenderness.   Abdominal: Soft. She exhibits no distension. There is no tenderness.   Musculoskeletal: She exhibits no tenderness or deformity.   Neurological: She is alert and oriented to person, place, and time. No cranial nerve deficit.   Skin: Skin is warm. She is not diaphoretic. No erythema.   Psychiatric: She has a normal mood and affect.   Nursing note and vitals reviewed.  I examined the patient, there are no significant changes    Fluids    Intake/Output Summary (Last 24 hours) at 12/14/18 1906  Last data filed at 12/14/18 1146   Gross per 24 hour   Intake              500 ml   Output             2500 ml   Net            -2000 ml       Laboratory  Recent Labs      12/12/18   0406   WBC  6.2   RBC  2.82*   HEMOGLOBIN  8.8*   HEMATOCRIT  27.3*   MCV  96.8   MCH  31.2   MCHC  32.2*   RDW  53.6*   PLATELETCT  160*   MPV  10.6     Recent Labs      12/12/18   0406  12/12/18   0755   SODIUM  137  137   POTASSIUM  3.6  3.8   CHLORIDE  103  102   CO2  25  25   GLUCOSE  93  88  "  BUN  19  18   CREATININE  3.23*  3.31*   CALCIUM  10.1  9.8     Recent Labs      12/12/18   0406  12/13/18   0355  12/14/18   0405   APTT  71.3*  69.3*  81.5*   INR  1.45*  1.54*  1.80*               Imaging  EC-BARBIE W/O CONT   Final Result      EC-ECHOCARDIOGRAM COMPLETE W/O CONT   Final Result      US-HEMODIALYSIS GRAFT DUPLEX COMP UPPER EXTREMITY   Final Result      DX-CHEST-PORTABLE (1 VIEW)   Final Result      Right IJ dialysis catheter in place.      Cardiomegaly.      IR-INTRO STENT DIALYSIS CIRCUIT S&I    (Results Pending)        Assessment/Plan  * Staphylococcus aureus bacteremia with sepsis (HCC)- (present on admission)   Assessment & Plan    Positive blood cultures from outside facility for MSSA. Negative here 11/30  ID consulted -needs 4-6 weeks of IV antibiotics  Permacath removed  New dialysis catheter placed 12/3 in the left femoral vein, temporary  Continue with cefazolin through 1/12/2018 per ID.     Acute external jugular vein thrombosis   Assessment & Plan     incidental acute jugular vein thrombosis on the right  Heparin weight-based protocol with warfarin bridge  Monitor INR with a goal between 2 and 3 (Close: 1.8 today)      End stage renal disease (HCC)- (present on admission)   Assessment & Plan    On dialysis  Monday Wednesday Friday  Nephrology consulted and following    Vascular surgery consulted regarding new fistula    Status post fistulogram and left IJ permacatheter 12/8     Iron deficiency anemia- (present on admission)   Assessment & Plan    PO Ferrous sulfate   Monitor H&H.     Chronic respiratory failure with hypoxia (HCC)- (present on admission)   Assessment & Plan    Home O2 evaluation was done.  Home O2 ordered     COPD (chronic obstructive pulmonary disease) (HCC)- (present on admission)   Assessment & Plan    Not in exacerbation  Oxygen  Respiratory protocol  Stable     Abnormal echocardiogram   Assessment & Plan    \"Rheumatic appearance of mitral valve with mild stenosis and " "mild   Regurgitation.\"  BARBIE negative for endocarditis     Severe protein-calorie malnutrition (HCC)   Assessment & Plan    Patient has lost 80 pounds in 2 years   Nutritionist consult     Severe dental caries   Assessment & Plan    PainPatient has multiple teeth literally rotting out of her mouth  ID agrees these will need to be removed and eventually but does not feel this is urgent       Sepsis ([facility])   Assessment & Plan    This is severe sepsis with the following associated acute organ dysfunction(s): metabolic/septic encephalopathy.   Patient not recognizes being severely septic on admission due to lack of fever tachycardia and leukocytosis-  However her septic encephalopathy qualifies her for diagnosis of severe sepsis and this is now resolved.  As her symptoms had resolved and she had stable vital signs, fluids serial lactates and so forth were not indicated by the time sepsis was recognized.  Continue with Ancef as mentioned above.     B12 deficiency- (present on admission)   Assessment & Plan    Resume home B12     HLD (hyperlipidemia)- (present on admission)   Assessment & Plan    Lovastatin     Vitamin D deficiency- (present on admission)   Assessment & Plan    Resume home vitamin D     Plan of care discussed with multidisciplinary team during rounds.    VTE prophylaxis: On Coumadin bridging with heparin.      "

## 2018-12-15 NOTE — PROGRESS NOTES
Hospital Medicine Daily Progress Note    Date of Service  12/15/2018    Chief Complaint  Hallucinations    Hospital Course    *73 y.o. female admitted 11/30/2018 with complaints of hallucinations for the last 1-2 nights and recent blood cultures done at hemodialysis growing MSSA.  Patient was found to have a right jugular vein thrombosis and was started on heparin drip with Coumadin bridge. Source was thought to be due to a line infection versus infected thrombophlebitis.  Patient's dialysis catheter was removed and replaced after repeat cultures were negative.   Echocardiogram done showed rheumatic mitral valve but BARBIE was negative for endocarditis.  Patient had left IJ tunneled catheter placed and has been receiving routine hemodialysis on Monday Monday, Wednesday, and Friday.  Patient is to be continued on IV antibiotics until January 12 and will be receiving antibiotics along with hemodialysis, 1 g of Ancef 3 times a week.  Patient's INR is finally therapeutic.*        Interval Problem Update  INR 1.98 today. Can likely d/c tomorrow once INR has been therapeutic for 2 days  Feeling well, as received oxygen  Excited to go home tomorrow  Cont heparin drip until INR therapeutic x 2      Consultants/Specialty  [facility]  Fidel Romero MD vascular surgery  [facility]    Code Status  Full     Disposition  Home  when INR is therapeutic  Home O2 ordered    Review of Systems  Review of Systems   Constitutional: Positive for malaise/fatigue and weight loss (80 pounds in 2 years). Negative for chills, diaphoresis and fever.   HENT: Negative for congestion, ear discharge, ear pain, hearing loss, nosebleeds and tinnitus.    Eyes: Negative for blurred vision, double vision, photophobia and pain.   Respiratory: Positive for shortness of breath (chronic). Negative for cough, hemoptysis and sputum production.    Cardiovascular: Negative for chest pain, palpitations and orthopnea.   Gastrointestinal:  Negative for abdominal pain, constipation, diarrhea, heartburn, nausea and vomiting.        Appetite improving   Genitourinary: Negative for dysuria, frequency, hematuria and urgency.   Musculoskeletal: Negative for back pain, myalgias and neck pain.   Skin: Negative for itching.   Neurological: Negative for dizziness, tingling, tremors, sensory change, speech change, focal weakness and headaches.   Endo/Heme/Allergies: Negative for environmental allergies. Does not bruise/bleed easily.   Psychiatric/Behavioral: Negative for depression, hallucinations, substance abuse and suicidal ideas.        Physical Exam  Temp:  [36.4 °C (97.6 °F)-36.8 °C (98.3 °F)] 36.5 °C (97.7 °F)  Pulse:  [51-89] 77  Resp:  [14-17] 16  BP: (115-134)/(63-87) 115/70    Physical Exam   Constitutional: She is oriented to person, place, and time. No distress.   Chronically ill appearing   HENT:   Head: Normocephalic and atraumatic.   Mouth/Throat: No oropharyngeal exudate.   Eyes: Pupils are equal, round, and reactive to light. Conjunctivae and EOM are normal. Right eye exhibits no discharge. Left eye exhibits no discharge.   Neck: Neck supple. No thyromegaly present.   Cardiovascular: Normal rate and regular rhythm.  Exam reveals no gallop and no friction rub.    Thrill in right wrist   Pulmonary/Chest: Effort normal. No accessory muscle usage. No respiratory distress. She has decreased breath sounds.   Abdominal: Soft. She exhibits no distension. There is no tenderness.   Musculoskeletal: She exhibits no tenderness or deformity.   Neurological: She is alert and oriented to person, place, and time. No cranial nerve deficit.   Skin: Skin is warm. She is not diaphoretic. No erythema.   Psychiatric: She has a normal mood and affect.   Nursing note and vitals reviewed.  I examined the patient, there are no significant changes    Fluids    Intake/Output Summary (Last 24 hours) at 12/15/18 1316  Last data filed at 12/15/18 0900   Gross per 24 hour    Intake              120 ml   Output                0 ml   Net              120 ml       Laboratory          Recent Labs      12/13/18   0355  12/14/18   0405  12/15/18   0219   APTT  69.3*  81.5*  76.0*   INR  1.54*  1.80*  1.98*               Imaging  EC-BARBIE W/O CONT   Final Result      EC-ECHOCARDIOGRAM COMPLETE W/O CONT   Final Result      US-HEMODIALYSIS GRAFT DUPLEX COMP UPPER EXTREMITY   Final Result      DX-CHEST-PORTABLE (1 VIEW)   Final Result      Right IJ dialysis catheter in place.      Cardiomegaly.      IR-INTRO STENT DIALYSIS CIRCUIT S&I    (Results Pending)        Assessment/Plan  * Staphylococcus aureus bacteremia with sepsis (HCC)- (present on admission)   Assessment & Plan    Positive blood cultures from outside facility for MSSA. Negative here 11/30  ID consulted -needs 4-6 weeks of IV antibiotics  Permacath removed  New dialysis catheter placed 12/3 in the left femoral vein, temporary  Continue with cefazolin through 1/12/2018 per ID.     Acute external jugular vein thrombosis   Assessment & Plan     incidental acute jugular vein thrombosis on the right  Heparin weight-based protocol with warfarin bridge -> monitor aptt and heparin bolus per protocol  Monitor INR with a goal between 2 and 3   Therapeutic today, cont heparin drip until 2 consecutive therapeutic INRs       End stage renal disease (HCC)- (present on admission)   Assessment & Plan    On dialysis  Monday Wednesday Friday  Nephrology consulted and following    Vascular surgery consulted regarding new fistula    Status post fistulogram and left IJ permacatheter 12/8     Iron deficiency anemia- (present on admission)   Assessment & Plan    PO Ferrous sulfate   Monitor H&H.     Chronic respiratory failure with hypoxia (HCC)- (present on admission)   Assessment & Plan    Home O2 evaluation was done.  Home O2 ordered     COPD (chronic obstructive pulmonary disease) (HCC)- (present on admission)   Assessment & Plan    Not in  "exacerbation  Oxygen  Respiratory protocol  Stable     Abnormal echocardiogram   Assessment & Plan    \"Rheumatic appearance of mitral valve with mild stenosis and mild   Regurgitation.\"  BARBIE negative for endocarditis     Severe protein-calorie malnutrition (HCC)   Assessment & Plan    Patient has lost 80 pounds in 2 years   Nutritionist consult     Severe dental caries   Assessment & Plan    PainPatient has multiple teeth literally rotting out of her mouth  ID agrees these will need to be removed and eventually but does not feel this is urgent       Sepsis (HCC)   Assessment & Plan    This is severe sepsis with the following associated acute organ dysfunction(s): metabolic/septic encephalopathy.   Patient not recognizes being severely septic on admission due to lack of fever tachycardia and leukocytosis-  However her septic encephalopathy qualifies her for diagnosis of severe sepsis and this is now resolved.  As her symptoms had resolved and she had stable vital signs, fluids serial lactates and so forth were not indicated by the time sepsis was recognized.  Continue with Ancef as mentioned above.     B12 deficiency- (present on admission)   Assessment & Plan    Resume home B12     HLD (hyperlipidemia)- (present on admission)   Assessment & Plan    Lovastatin     Vitamin D deficiency- (present on admission)   Assessment & Plan    Resume home vitamin D     Plan of care discussed with multidisciplinary team during rounds.    VTE prophylaxis: On Coumadin bridging with heparin.      "

## 2018-12-15 NOTE — CARE PLAN
Problem: Knowledge Deficit  Goal: Knowledge of disease process/condition, treatment plan, diagnostic tests, and medications will improve    Intervention: Explain information regarding disease process/condition, treatment plan, diagnostic tests, and medications and document in education  Pt educated to the indication of OAC r/t disease process. Pt verbalized understanding.       Problem: Discharge Barriers/Planning  Goal: Patient's continuum of care needs will be met    Intervention: Involve patient and significant other/support system in setting and prioritizing goals for hospital stay and discharge  Discussed POC r/t therapeutic INR and discharge. Pt. Verbalized understanding.

## 2018-12-16 VITALS
RESPIRATION RATE: 16 BRPM | TEMPERATURE: 98.1 F | HEIGHT: 62 IN | HEART RATE: 87 BPM | OXYGEN SATURATION: 94 % | SYSTOLIC BLOOD PRESSURE: 143 MMHG | BODY MASS INDEX: 22.27 KG/M2 | WEIGHT: 121.03 LBS | DIASTOLIC BLOOD PRESSURE: 67 MMHG

## 2018-12-16 PROBLEM — A41.9 SEPSIS (HCC): Status: RESOLVED | Noted: 2018-12-01 | Resolved: 2018-12-16

## 2018-12-16 PROBLEM — A41.01 STAPHYLOCOCCUS AUREUS BACTEREMIA WITH SEPSIS (HCC): Status: RESOLVED | Noted: 2018-11-30 | Resolved: 2018-12-16

## 2018-12-16 LAB
APTT PPP: 95.7 SEC (ref 24.7–36)
INR PPP: 2.31 (ref 0.87–1.13)
PROTHROMBIN TIME: 25.5 SEC (ref 12–14.6)

## 2018-12-16 PROCEDURE — 94640 AIRWAY INHALATION TREATMENT: CPT

## 2018-12-16 PROCEDURE — A9270 NON-COVERED ITEM OR SERVICE: HCPCS | Performed by: HOSPITALIST

## 2018-12-16 PROCEDURE — 94760 N-INVAS EAR/PLS OXIMETRY 1: CPT

## 2018-12-16 PROCEDURE — 700102 HCHG RX REV CODE 250 W/ 637 OVERRIDE(OP): Performed by: HOSPITALIST

## 2018-12-16 PROCEDURE — 99239 HOSP IP/OBS DSCHRG MGMT >30: CPT | Performed by: HOSPITALIST

## 2018-12-16 PROCEDURE — 85730 THROMBOPLASTIN TIME PARTIAL: CPT

## 2018-12-16 PROCEDURE — 85610 PROTHROMBIN TIME: CPT

## 2018-12-16 PROCEDURE — 700101 HCHG RX REV CODE 250: Performed by: INTERNAL MEDICINE

## 2018-12-16 PROCEDURE — 36415 COLL VENOUS BLD VENIPUNCTURE: CPT

## 2018-12-16 PROCEDURE — A9270 NON-COVERED ITEM OR SERVICE: HCPCS | Performed by: INTERNAL MEDICINE

## 2018-12-16 PROCEDURE — 700102 HCHG RX REV CODE 250 W/ 637 OVERRIDE(OP): Performed by: INTERNAL MEDICINE

## 2018-12-16 RX ORDER — CEFAZOLIN SODIUM 1 G/50ML
1 INJECTION, SOLUTION INTRAVENOUS
Status: ON HOLD
Start: 2018-12-17 | End: 2019-01-04

## 2018-12-16 RX ORDER — FAMOTIDINE 20 MG/1
20 TABLET, FILM COATED ORAL DAILY
Qty: 60 TAB | Refills: 2 | Status: SHIPPED | OUTPATIENT
Start: 2018-12-17 | End: 2018-12-27

## 2018-12-16 RX ORDER — SUCRALFATE 1 G/1
1 TABLET ORAL EVERY 6 HOURS
Qty: 120 TAB | Refills: 3 | Status: SHIPPED | OUTPATIENT
Start: 2018-12-16 | End: 2018-12-27

## 2018-12-16 RX ORDER — WARFARIN SODIUM 6 MG/1
6 TABLET ORAL
Qty: 30 TAB | Refills: 3 | Status: ON HOLD | OUTPATIENT
Start: 2018-12-16 | End: 2019-01-04

## 2018-12-16 RX ADMIN — IPRATROPIUM BROMIDE AND ALBUTEROL SULFATE 3 ML: .5; 3 SOLUTION RESPIRATORY (INHALATION) at 06:40

## 2018-12-16 RX ADMIN — THERA TABS 1 TABLET: TAB at 05:46

## 2018-12-16 RX ADMIN — STANDARDIZED SENNA CONCENTRATE AND DOCUSATE SODIUM 2 TABLET: 8.6; 5 TABLET, FILM COATED ORAL at 05:46

## 2018-12-16 RX ADMIN — FLUTICASONE PROPIONATE 100 MCG: 50 SPRAY, METERED NASAL at 05:46

## 2018-12-16 RX ADMIN — VITAMIN B12 0.1 MG ORAL TABLET 100 MCG: 0.1 TABLET ORAL at 05:46

## 2018-12-16 RX ADMIN — VITAMIN D, TAB 1000IU (100/BT) 1000 UNITS: 25 TAB at 05:46

## 2018-12-16 RX ADMIN — FERROUS SULFATE TAB 325 MG (65 MG ELEMENTAL FE) 325 MG: 325 (65 FE) TAB at 05:46

## 2018-12-16 RX ADMIN — FAMOTIDINE 20 MG: 20 TABLET ORAL at 05:46

## 2018-12-16 RX ADMIN — SUCRALFATE 1 G: 1 TABLET ORAL at 12:35

## 2018-12-16 ASSESSMENT — PAIN SCALES - GENERAL: PAINLEVEL_OUTOF10: 0

## 2018-12-16 NOTE — DISCHARGE INSTRUCTIONS
Discharge Instructions    Discharged to home by car with relative. Discharged via wheelchair, hospital escort: Yes.  Special equipment needed: Not Applicable    Be sure to schedule a follow-up appointment with your primary care doctor or any specialists as instructed.     Discharge Plan:   Diet Plan: Discussed  Activity Level: Discussed  Confirmed Follow up Appointment: Appointment Scheduled  Confirmed Symptoms Management: Discussed  Medication Reconciliation Updated: Yes  Influenza Vaccine Indication: Not indicated: Previously immunized this influenza season and > 8 years of age    I understand that a diet low in cholesterol, fat, and sodium is recommended for good health. Unless I have been given specific instructions below for another diet, I accept this instruction as my diet prescription.   Other diet: Renal Diet    Special Instructions: Sepsis, Adult  Sepsis is a serious infection of your blood or tissues that affects your whole body. The infection that causes sepsis may be bacterial, viral, fungal, or parasitic. Sepsis may be life threatening. Sepsis can cause your blood pressure to drop. This may result in shock. Shock causes your central nervous system and your organs to stop working correctly.  What increases the risk?  Sepsis can happen in anyone, but it is more likely to happen in people who have weakened immune systems.  What are the signs or symptoms?  Symptoms of sepsis can include:  · Fever or low body temperature (hypothermia).  · Rapid breathing (hyperventilation).  · Chills.  · Rapid heartbeat (tachycardia).  · Confusion or light-headedness.  · Trouble breathing.  · Urinating much less than usual.  · Cool, clammy skin or red, flushed skin.  · Other problems with the heart, kidneys, or brain.  How is this diagnosed?  Your health care provider will likely do tests to look for an infection, to see if the infection has spread to your blood, and to see how serious your condition is. Tests can  include:  · Blood tests, including cultures of your blood.  · Cultures of other fluids from your body, such as:  ¨ Urine.  ¨ Pus from wounds.  ¨ Mucus coughed up from your lungs.  · Urine tests other than cultures.  · X-ray exams or other imaging tests.  How is this treated?  Treatment will begin with elimination of the source of infection. If your sepsis is likely caused by a bacterial or fungal infection, you will be given antibiotic or antifungal medicines.  You may also receive:  · Oxygen.  · Fluids through an IV tube.  · Medicines to increase your blood pressure.  · A machine to clean your blood (dialysis) if your kidneys fail.  · A machine to help you breathe if your lungs fail.  Get help right away if:  You get an infection or develop any of the signs and symptoms of sepsis after surgery or a hospitalization.  This information is not intended to replace advice given to you by your health care provider. Make sure you discuss any questions you have with your health care provider.  Document Released: 09/15/2004 Document Revised: 05/25/2017 Document Reviewed: 08/25/2014  [employer] Interactive Patient Education © 2017 [employer]      · Is patient discharged on Warfarin / Coumadin?   Yes    You are receiving the drug warfarin. Please understand the importance of monitoring warfarin with scheduled PT/INR blood draws.  Follow-up with a call to your personal Doctor's office in 3 days to schedule a PT/INR. .    IMPORTANT: HOW TO USE THIS INFORMATION:  This is a summary and does NOT have all possible information about this product. This information does not assure that this product is safe, effective, or appropriate for you. This information is not individual medical advice and does not substitute for the advice of your health care professional. Always ask your health care professional for complete information about this product and your specific health needs.      WARFARIN - ORAL (WARF-uh-rin)      COMMON BRAND  "NAME(S): Coumadin      WARNING:  Warfarin can cause very serious (possibly fatal) bleeding. This is more likely to occur when you first start taking this medication or if you take too much warfarin. To decrease your risk for bleeding, your doctor or other health care provider will monitor you closely and check your lab results (INR test) to make sure you are not taking too much warfarin. Keep all medical and laboratory appointments. Tell your doctor right away if you notice any signs of serious bleeding. See also Side Effects section.      USES:  This medication is used to treat blood clots (such as in deep vein thrombosis-DVT or pulmonary embolus-PE) and/or to prevent new clots from forming in your body. Preventing harmful blood clots helps to reduce the risk of a stroke or heart attack. Conditions that increase your risk of developing blood clots include a certain type of irregular heart rhythm (atrial fibrillation), heart valve replacement, recent heart attack, and certain surgeries (such as hip/knee replacement). Warfarin is commonly called a \"blood thinner,\" but the more correct term is \"anticoagulant.\" It helps to keep blood flowing smoothly in your body by decreasing the amount of certain substances (clotting proteins) in your blood.      HOW TO USE:  Read the Medication Guide provided by your pharmacist before you start taking warfarin and each time you get a refill. If you have any questions, ask your doctor or pharmacist. Take this medication by mouth with or without food as directed by your doctor or other health care professional, usually once a day. It is very important to take it exactly as directed. Do not increase the dose, take it more frequently, or stop using it unless directed by your doctor. Dosage is based on your medical condition, laboratory tests (such as INR), and response to treatment. Your doctor or other health care provider will monitor you closely while you are taking this medication " to determine the right dose for you. Use this medication regularly to get the most benefit from it. To help you remember, take it at the same time each day. It is important to eat a balanced, consistent diet while taking warfarin. Some foods can affect how warfarin works in your body and may affect your treatment and dose. Avoid sudden large increases or decreases in your intake of foods high in vitamin K (such as broccoli, cauliflower, cabbage, brussels sprouts, kale, spinach, and other green leafy vegetables, liver, green tea, certain vitamin supplements). If you are trying to lose weight, check with your doctor before you try to go on a diet. Cranberry products may also affect how your warfarin works. Limit the amount of cranberry juice (16 ounces/480 milliliters a day) or other cranberry products you may drink or eat.      SIDE EFFECTS:  Nausea, loss of appetite, or stomach/abdominal pain may occur. If any of these effects persist or worsen, tell your doctor or pharmacist promptly. Remember that your doctor has prescribed this medication because he or she has judged that the benefit to you is greater than the risk of side effects. Many people using this medication do not have serious side effects. This medication can cause serious bleeding if it affects your blood clotting proteins too much (shown by unusually high INR lab results). Even if your doctor stops your medication, this risk of bleeding can continue for up to a week. Tell your doctor right away if you have any signs of serious bleeding, including: unusual pain/swelling/discomfort, unusual/easy bruising, prolonged bleeding from cuts or gums, persistent/frequent nosebleeds, unusually heavy/prolonged menstrual flow, pink/dark urine, coughing up blood, vomit that is bloody or looks like coffee grounds, severe headache, dizziness/fainting, unusual or persistent tiredness/weakness, bloody/black/tarry stools, chest pain, shortness of breath, difficulty  swallowing. Tell your doctor right away if any of these unlikely but serious side effects occur: persistent nausea/vomiting, severe stomach/abdominal pain, yellowing eyes/skin. This drug rarely has caused very serious (possibly fatal) problems if its effects lead to small blood clots (usually at the beginning of treatment). This can lead to severe skin/tissue damage that may require surgery or amputation if left untreated. Patients with certain blood conditions (protein C or S deficiency) may be at greater risk. Get medical help right away if any of these rare but serious side effects occur: painful/red/purplish patches on the skin (such as on the toe, breast, abdomen), change in the amount of urine, vision changes, confusion, slurred speech, weakness on one side of the body. A very serious allergic reaction to this drug is rare. However, get medical help right away if you notice any symptoms of a serious allergic reaction, including: rash, itching/swelling (especially of the face/tongue/throat), severe dizziness, trouble breathing. This is not a complete list of possible side effects. If you notice other effects not listed above, contact your doctor or pharmacist. In the [place] - Call your doctor for medical advice about side effects. You may report side effects to FDA at 7-432-IRU-6258. In [place] - Call your doctor for medical advice about side effects. You may report side effects to [facility] [place] at 1-283.774.1812.      PRECAUTIONS:  Before taking warfarin, tell your doctor or pharmacist if you are allergic to it; or if you have any other allergies. This product may contain inactive ingredients, which can cause allergic reactions or other problems. Talk to your pharmacist for more details. Before using this medication, tell your doctor or pharmacist your medical history, especially of: blood disorders (such as anemia, hemophilia), bleeding problems (such as bleeding of the stomach/intestines, bleeding in the brain),  blood vessel disorders (such as aneurysms), recent major injury/surgery, liver disease, alcohol use, mental/mood disorders (including memory problems), frequent falls/injuries. It is important that all your doctors and dentists know that you take warfarin. Before having surgery or any medical/dental procedures, tell your doctor or dentist that you are taking this medication and about all the products you use (including prescription drugs, nonprescription drugs, and herbal products). Avoid getting injections into the muscles. If you must have an injection into a muscle (for example, a flu shot), it should be given in the arm. This way, it will be easier to check for bleeding and/or apply pressure bandages. This medication may cause stomach bleeding. Daily use of alcohol while using this medicine will increase your risk for stomach bleeding and may also affect how this medication works. Limit or avoid alcoholic beverages. If you have not been eating well, if you have an illness or infection that causes fever, vomiting, or diarrhea for more than 2 days, or if you start using any antibiotic medications, contact your doctor or pharmacist immediately because these conditions can affect how warfarin works. This medication can cause heavy bleeding. To lower the chance of getting cut, bruised, or injured, use great caution with sharp objects like safety razors and nail cutters. Use an electric razor when shaving and a soft toothbrush when brushing your teeth. Avoid activities such as contact sports. If you fall or injure yourself, especially if you hit your head, call your doctor immediately. Your doctor may need to check you. The Food & Drug Administration has stated that generic warfarin products are interchangeable. However, consult your doctor or pharmacist before switching warfarin products. Be careful not to take more than one medication that contains warfarin unless specifically directed by the doctor or health care  "provider who is monitoring your warfarin treatment. Older adults may be at greater risk for bleeding while using this drug. This medication is not recommended for use during pregnancy because of serious (possibly fatal) harm to an unborn baby. Discuss the use of reliable forms of birth control with your doctor. If you become pregnant or think you may be pregnant, tell your doctor immediately. If you are planning pregnancy, discuss a plan for managing your condition with your doctor before you become pregnant. Your doctor may switch the type of medication you use during pregnancy. Very small amounts of this medication may pass into breast milk but is unlikely to harm a nursing infant. Consult your doctor before breast-feeding.      DRUG INTERACTIONS:  Drug interactions may change how your medications work or increase your risk for serious side effects. This document does not contain all possible drug interactions. Keep a list of all the products you use (including prescription/nonprescription drugs and herbal products) and share it with your doctor and pharmacist. Do not start, stop, or change the dosage of any medicines without your doctor's approval. Warfarin interacts with many prescription, nonprescription, vitamin, and herbal products. This includes medications that are applied to the skin or inside the vagina or rectum. The interactions with warfarin usually result in an increase or decrease in the \"blood-thinning\" (anticoagulant) effect. Your doctor or other health care professional should closely monitor you to prevent serious bleeding or clotting problems. While taking warfarin, it is very important to tell your doctor or pharmacist of any changes in medications, vitamins, or herbal products that you are taking. Some products that may interact with this drug include: capecitabine, imatinib, mifepristone. Aspirin, aspirin-like drugs (salicylates), and nonsteroidal anti-inflammatory drugs (NSAIDs such as " ibuprofen, naproxen, celecoxib) may have effects similar to warfarin. These drugs may increase the risk of bleeding problems if taken during treatment with warfarin. Carefully check all prescription/nonprescription product labels (including drugs applied to the skin such as pain-relieving creams) since the products may contain NSAIDs or salicylates. Talk to your doctor about using a different medication (such as acetaminophen) to treat pain/fever. Low-dose aspirin and related drugs (such as clopidogrel, ticlopidine) should be continued if prescribed by your doctor for specific medical reasons such as heart attack or stroke prevention. Consult your doctor or pharmacist for more details. Many herbal products interact with warfarin. Tell your doctor before taking any herbal products, especially bromelains, coenzyme Q10, cranberry, danshen, dong quai, fenugreek, garlic, ginkgo biloba, ginseng, and Iris's wort, among others. This medication may interfere with a certain laboratory test to measure theophylline levels, possibly causing false test results. Make sure laboratory personnel and all your doctors know you use this drug.      OVERDOSE:  If overdose is suspected, contact a poison control center or emergency room immediately. [place] residents can call the [place] National Poison Hotline at 1-622.833.6745. [place] residents can call a provincial poison control center. Symptoms of overdose may include: bloody/black/tarry stools, pink/dark urine, unusual/prolonged bleeding.      NOTES:  Do not share this medication with others. Laboratory and/or medical tests (such as INR, complete blood count) must be performed periodically to monitor your progress or check for side effects. Consult your doctor for more details.      MISSED DOSE:  For the best possible benefit, do not miss any doses. If you do miss a dose and remember on the same day, take it as soon as you remember. If you remember on the next day, skip the missed dose and  resume your usual dosing schedule. Do not double the dose to catch up because this could increase your risk for bleeding. Keep a record of missed doses to give to your doctor or pharmacist. Contact your doctor or pharmacist if you miss 2 or more doses in a row.      STORAGE:  Store at room temperature away from light and moisture. Do not store in the bathroom. Keep all medications away from children and pets. Do not flush medications down the toilet or pour them into a drain unless instructed to do so. Properly discard this product when it is  or no longer needed. Consult your pharmacist or local waste disposal company for more details about how to safely discard your product.      MEDICAL ALERT:  Your condition and medication can cause complications in a medical emergency. For information about enrolling in MedicAlert, call 1-975.720.1742 ([place]) or 1-200.365.8183 ([place]).      Information last revised 2010 Copyright(c) 2010 [employer]             Depression / Suicide Risk    As you are discharged from this [facility] facility, it is important to learn how to keep safe from harming yourself.    Recognize the warning signs:  · Abrupt changes in personality, positive or negative- including increase in energy   · Giving away possessions  · Change in eating patterns- significant weight changes-  positive or negative  · Change in sleeping patterns- unable to sleep or sleeping all the time   · Unwillingness or inability to communicate  · Depression  · Unusual sadness, discouragement and loneliness  · Talk of wanting to die  · Neglect of personal appearance   · Rebelliousness- reckless behavior  · Withdrawal from people/activities they love  · Confusion- inability to concentrate     If you or a loved one observes any of these behaviors or has concerns about self-harm, here's what you can do:  · Talk about it- your feelings and reasons for harming yourself  · Remove any means that you might use to  hurt yourself (examples: pills, rope, extension cords, firearm)  · Get professional help from the community (Mental Health, Substance Abuse, psychological counseling)  · Do not be alone:Call your Safe Contact- someone whom you trust who will be there for you.  · Call your local CRISIS HOTLINE 657-6464 or 687-716-0682  · Call your local Children's Mobile Crisis Response Team [place] (414) 561-7404 or www.[employer].com  · Call the toll free National Suicide Prevention Hotlines   · National Suicide Prevention Lifeline 516-208-SABO (4573)  · National Hope Line Network 800-SUICIDE (770-1048)    Sepsis, Adult  Sepsis is a serious infection of your blood or tissues that affects your whole body. The infection that causes sepsis may be bacterial, viral, fungal, or parasitic. Sepsis may be life threatening. Sepsis can cause your blood pressure to drop. This may result in shock. Shock causes your central nervous system and your organs to stop working correctly.  What increases the risk?  Sepsis can happen in anyone, but it is more likely to happen in people who have weakened immune systems.  What are the signs or symptoms?  Symptoms of sepsis can include:  · Fever or low body temperature (hypothermia).  · Rapid breathing (hyperventilation).  · Chills.  · Rapid heartbeat (tachycardia).  · Confusion or light-headedness.  · Trouble breathing.  · Urinating much less than usual.  · Cool, clammy skin or red, flushed skin.  · Other problems with the heart, kidneys, or brain.  How is this diagnosed?  Your health care provider will likely do tests to look for an infection, to see if the infection has spread to your blood, and to see how serious your condition is. Tests can include:  · Blood tests, including cultures of your blood.  · Cultures of other fluids from your body, such as:  ¨ Urine.  ¨ Pus from wounds.  ¨ Mucus coughed up from your lungs.  · Urine tests other than cultures.  · X-ray exams or other imaging tests.  How  is this treated?  Treatment will begin with elimination of the source of infection. If your sepsis is likely caused by a bacterial or fungal infection, you will be given antibiotic or antifungal medicines.  You may also receive:  · Oxygen.  · Fluids through an IV tube.  · Medicines to increase your blood pressure.  · A machine to clean your blood (dialysis) if your kidneys fail.  · A machine to help you breathe if your lungs fail.  Get help right away if:  You get an infection or develop any of the signs and symptoms of sepsis after surgery or a hospitalization.  This information is not intended to replace advice given to you by your health care provider. Make sure you discuss any questions you have with your health care provider.  Document Released: 09/15/2004 Document Revised: 05/25/2017 Document Reviewed: 08/25/2014  [employer] Interactive Patient Education © 2017 [employer]            Tunneled dialysis cather Care After  Refer to this sheet in the next few weeks. These instructions provide you with information on caring for yourself after your procedure. Your caregiver may also give you more specific instructions. Your treatment has been planned according to current medical practices, but problems sometimes occur. Call your caregiver if you have any problems or questions after your procedure.   HOME CARE INSTRUCTIONS  · Rest at home the day of the procedure. You will likely be able to return to normal activities the following day.  · Follow your caregiver's specific instructions for the type of device that you have.  · Only take over-the-counter or prescription medicines as directed by your caregiver.  · Keep the insertion site of the catheter clean and dry at all times.  ¨ Change the bandages (dressings) over the catheter site as directed by your caregiver.  ¨ Wash the area around the catheter site during each dressing change. Sponge bathe the area using a germ-killing (antiseptic) solution as directed by your  caregiver.  ¨ Look for redness or swelling at the insertion site during each dressing change.  · Apply an antibiotic ointment as directed by your caregiver.  · Flush your catheter as directed to keep it from becoming clogged.  · Always wash your hands thoroughly before changing dressings or flushing the catheter.  · Do not let air enter the catheter.  ¨ Never open the cap at the catheter tip.  ¨ Always make sure there is no air in the syringe or in the tubing for infusions.     · Do not lift anything heavy.  · Do not drive until your caregiver approves.  · Do not shower or bathe until your caregiver approves. When you shower or bathe, place a piece of plastic wrap over the catheter site. Do not allow the catheter site or the dressing to get wet. If taking a bath, do not allow the catheter to get submerged in the water.  If the catheter was inserted through an arm vein:   · Avoid wearing tight clothes or jewelry on the arm that has the catheter.    · Do not sleep with your head on the arm that has the catheter.    · Do not allow use of a blood pressure cuff on the arm that has the catheter.    · Do not let anyone draw blood from the arm that has the catheter, except through the catheter itself.  SEEK MEDICAL CARE IF:  · You have bleeding at the insertion site of the catheter.    · You feel weak or nauseous.    · Your catheter is not working properly.    · You have redness, pain, swelling, and warmth at the insertion site.    · You notice fluid draining from the insertion site.    SEEK IMMEDIATE MEDICAL CARE IF:  · Your catheter breaks or has a hole in it.    · Your catheter comes loose or gets pulled completely out. If this happens, hold firm pressure over the area with your hand or a clean cloth.    · You have a fever.  · You have chills.    · Your catheter becomes totally blocked.    · You have swelling in your arm, shoulder, neck, or face.    · You have bleeding from the insertion site that does not stop.    · You  develop chest pain or have trouble breathing.    · You feel dizzy or faint.    MAKE SURE YOU:  · Understand these instructions.  · Will watch your condition.  · Will get help right away if you are not doing well or get worse.     This information is not intended to replace advice given to you by your health care provider. Make sure you discuss any questions you have with your health care provider.     Document Released: 12/04/2013 Document Revised: 08/20/2014 Document Reviewed: 12/04/2013  [employer] Interactive Patient Education ©2016 [employer]

## 2018-12-16 NOTE — CARE PLAN
Problem: Safety  Goal: Will remain free from injury  Outcome: PROGRESSING AS EXPECTED  Bed in lowest position, call light within reach. Bed alarm on. Patient educated regarding safety precautions. Room free of clutter.

## 2018-12-16 NOTE — DISCHARGE SUMMARY
"Discharge Summary    CHIEF COMPLAINT ON ADMISSION  Chief Complaint   Patient presents with   • Sent by MD     Dialysis sent for infected port   • ALOC     Family reports \"delusions and hallucinations\"       Reason for Admission  Port Problem     Admission Date  11/30/2018    CODE STATUS  Full Code    HPI & HOSPITAL COURSE  73 y.o. female admitted 11/30/2018 with complaints of hallucinations for the last 1-2 nights and recent blood cultures done at hemodialysis growing MSSA.  Patient was found to have a right jugular vein thrombosis and was started on heparin drip with Coumadin bridge. Source was thought to be due to a line infection versus infected thrombophlebitis.  Patient's dialysis catheter was removed and replaced after repeat cultures were negative.   Echocardiogram done showed rheumatic mitral valve but BARBIE was negative for endocarditis.  Patient had left IJ tunneled catheter placed and has been receiving routine hemodialysis on Monday Monday, Wednesday, and Friday.  Patient is to be continued on IV antibiotics until January 12 and will be receiving antibiotics along with hemodialysis, 1 g of Ancef 3 times a week.  Patient's INR is finally therapeutic.    Therefore, she is discharged in good and stable condition to home with close outpatient follow-up.    The patient met 2-midnight criteria for an inpatient stay at the time of discharge.    Discharge Date  12/16/2018    FOLLOW UP ITEMS POST DISCHARGE  Ensure completion of antibiotics until Jan 12    DISCHARGE DIAGNOSES  Principal Problem (Resolved):    Staphylococcus aureus bacteremia with sepsis ([facility]) POA: Yes  Active Problems:    Acute external jugular vein thrombosis POA: Unknown    COPD (chronic obstructive pulmonary disease) ([facility]) POA: Yes    Chronic respiratory failure with hypoxia ([facility]) POA: Yes    Iron deficiency anemia POA: Yes    End stage renal disease (HCC) POA: Yes    Vitamin D deficiency POA: Yes    HLD (hyperlipidemia) POA: Yes    B12 " deficiency POA: Yes    Severe dental caries POA: Unknown    Severe protein-calorie malnutrition (HCC) POA: Unknown    Abnormal echocardiogram POA: Unknown  Resolved Problems:    Encephalopathy acute POA: Yes    Sepsis (HCC) POA: Unknown      FOLLOW UP  Future Appointments  Date Time Provider Department Center   12/24/2018 2:20 PM Guera Fallon M.D. [facility] None   1/23/2019 11:00 AM ALDEN Mai [facility] None   2/15/2019 9:30 AM LAB [facility] None   2/22/2019 1:30 PM Rosa LACEY M.D. [facility] None     [facility] ([facility])  [address] [address]  [place] [address]  [phone]          MEDICATIONS ON DISCHARGE     Medication List      START taking these medications      Instructions   ceFAZolin in dextrose 1 g/50mL Soln  Start taking on:  12/17/2018  Commonly known as:  ANCEF   50 mL by Intravenous route every Monday, Wednesday, and Friday for 26 days.  Dose:  1 g     famotidine 20 MG Tabs  Start taking on:  12/17/2018  Commonly known as:  PEPCID   Take 1 Tab by mouth every day.  Dose:  20 mg     sucralfate 1 GM Tabs  Commonly known as:  CARAFATE   Take 1 Tab by mouth every 6 hours.  Dose:  1 g     warfarin 6 MG Tabs  Commonly known as:  COUMADIN   Take 1 Tab by mouth COUMADIN-DAILY.  Dose:  6 mg        CONTINUE taking these medications      Instructions   asa/apap/caffeine 250-250-65 MG Tabs  Commonly known as:  EXCEDRIN   Take 2 Tabs by mouth 1 time daily as needed for Headache (Pain).  Dose:  2 Tab     cyanocobalamin 100 MCG Tabs  Commonly known as:  VITAMIN B-12   Take 100 mcg by mouth every day.  Dose:  100 mcg     ferrous sulfate 325 (65 Fe) MG tablet   Take 325 mg by mouth every day.  Dose:  325 mg     fluticasone 50 MCG/ACT nasal spray  Commonly known as:  FLONASE   Spray 2 Sprays in nose every day.  Dose:  2 Spray     ipratropium-albuterol 0.5-2.5 (3) MG/3ML nebulizer solution  Commonly known as:  DUONEB   Doctor's comments:  Bid and up to qid prn  3 mL by Nebulization route  every 6 hours as needed for Shortness of Breath.  Dose:  3 mL     lovastatin 10 MG tablet  Commonly known as:  MEVACOR   Take 10 mg by mouth every morning.  Dose:  10 mg     multivitamin Tabs   Take 1 Tab by mouth every day.  Dose:  1 Tab     VITAMIN C PO   Take 1 Dose by mouth every day. Unknown OTC Strength  Dose:  1 Dose     vitamin D 1000 UNIT Tabs  Commonly known as:  cholecalciferol   Take 1,000 Units by mouth every day.  Dose:  1000 Units            Allergies  No Known Allergies    DIET  Orders Placed This Encounter   Procedures   • Diet Order Renal     Standing Status:   Standing     Number of Occurrences:   1     Order Specific Question:   Diet:     Answer:   Renal [8]       ACTIVITY  As tolerated.  Weight bearing as tolerated    CONSULTATIONS  Nephrology  Infectious Disease  Vascular Surgery    PROCEDURES  12/3 Placement of left femoral vein hemodialysis catheter.  12/8   Fistulogram with angioplasty.    Left internal jugular vein hemodialysis catheter.    Ultrasound-guided access of the right arm fistula and the left internal   jugular vein.    LABORATORY  Lab Results   Component Value Date    SODIUM 137 12/12/2018    POTASSIUM 3.8 12/12/2018    CHLORIDE 102 12/12/2018    CO2 25 12/12/2018    GLUCOSE 88 12/12/2018    BUN 18 12/12/2018    CREATININE 3.31 (H) 12/12/2018        Lab Results   Component Value Date    WBC 6.2 12/12/2018    HEMOGLOBIN 8.8 (L) 12/12/2018    HEMATOCRIT 27.3 (L) 12/12/2018    PLATELETCT 160 (L) 12/12/2018        Total time of the discharge process exceeds 33 minutes.

## 2018-12-16 NOTE — PROGRESS NOTES
Inpatient Anticoagulation Service Note    Date: 12/16/2018  Reason for Anticoagulation: Deep Vein Thrombosis        Hemoglobin Value: (!) 8.8  Hematocrit Value: (!) 27.3  Lab Platelet Value: (!) 160  Target INR: 2.0 to 3.0    INR from last 7 days     Date/Time INR Value    12/16/18 0259 (!)  2.31    12/15/18 0219 (!)  1.98    12/14/18 0405 (!)  1.8    12/13/18 0355 (!)  1.54    12/12/18 0406 (!)  1.45    12/11/18 0410 (!)  1.24    12/09/18 2246 (!)  1.24        Dose from last 7 days     Date/Time Dose (mg)    12/16/18 1400  6    12/15/18 1500  6    12/14/18 0800  6    12/13/18 1559  5    12/12/18 1055  4    12/11/18 1139  4    12/10/18 1139  4        Average Dose (mg):  (New start)  Significant Interactions: Antibiotics, Statin (sucralfate)  Bridge Therapy: No  Date of Last VTE Event: 12/01/18  Bridge Therapy Start Date: 12/09/18  Days of Overlap Therapy: 6     INR Value Greater than 2 Prior to Discontinuation of Parenteral Anticoagulation: Not Applicable     Reversal Agent Administered: Vitamin K  Subcutaneous (2 mg on 12/7)    Comments: INR therapeutic, MD discontinued heparin drip. DDI noted above, including sucralfate    Plan:  Continue current dosing. Plans are for patient to d/c home with antibiotics course being finished in outpatient dialysis  Education Material Provided?: Yes  Pharmacist suggested discharge dosing: Warfarin 6 mg po daily with a follow up INR appt within 72 hours of discharge     Kristin Friedman, HilaryD

## 2018-12-16 NOTE — DISCHARGE PLANNING
RN CM notified Dr. Sprague and bedside nurse that per chart review, SW note on 12/11/2018, confirmed IV antibiotic to be given to pt during her dialysis sessions.

## 2018-12-16 NOTE — PROGRESS NOTES
Received report from day shift RN. Pt resting in bed. NAD, VSS. 2L NC Heparin drip at 18 ml/hr. Safety checks assessed & call bell within reach.

## 2018-12-16 NOTE — PROGRESS NOTES
"Assumed care of patient at 0700. Received report from RN. Patient is AOX4 . Assessment complete. Labs reviewed.Patient and RN discussed plan of care. Patient questions answered. Patient needs are met at this time. Bed in lowest and locked position. Upper bed rails up.  Call light is within reach. Hourly rounding in place.  /67   Pulse 87   Temp 36.7 °C (98.1 °F) (Temporal)   Resp 16   Ht 1.575 m (5' 2\")   Wt 54.9 kg (121 lb 0.5 oz)   SpO2 94%   Breastfeeding? No   BMI 22.14 kg/m²     "

## 2018-12-16 NOTE — CARE PLAN
Problem: Infection  Goal: Will remain free from infection    Intervention: Assess signs and symptoms of infection  Educated patient on proper handwashing techniques with soap and water. Explained the importance of washing hands after using the restroom, before and after eating. Hand  is available on the walls of the rooms for use when hands are not visibly soiled.

## 2018-12-17 ENCOUNTER — PATIENT OUTREACH (OUTPATIENT)
Dept: HEALTH INFORMATION MANAGEMENT | Facility: OTHER | Age: 73
End: 2018-12-17

## 2018-12-17 ENCOUNTER — TELEPHONE (OUTPATIENT)
Dept: VASCULAR LAB | Facility: MEDICAL CENTER | Age: 73
End: 2018-12-17

## 2018-12-18 ENCOUNTER — TELEPHONE (OUTPATIENT)
Dept: INFECTIOUS DISEASES | Facility: MEDICAL CENTER | Age: 73
End: 2018-12-18

## 2018-12-18 NOTE — TELEPHONE ENCOUNTER
Left a voice message for her to call us at the Coumadin clinic to set up an appointment.    Drake Howell, HilaryD

## 2018-12-19 ENCOUNTER — TELEPHONE (OUTPATIENT)
Dept: VASCULAR LAB | Facility: MEDICAL CENTER | Age: 73
End: 2018-12-19

## 2018-12-19 NOTE — TELEPHONE ENCOUNTER
LM for patient to call anticoagulation clinic to establish care per recent referral.  Rod Banuelos, PharmD

## 2018-12-20 ENCOUNTER — TELEPHONE (OUTPATIENT)
Dept: MEDICAL GROUP | Facility: PHYSICIAN GROUP | Age: 73
End: 2018-12-20

## 2018-12-20 NOTE — TELEPHONE ENCOUNTER
1. Caller Name: Lin from [facility]                      Call Back Number: [phone]    2. Message: Lin called to let us know that eileen was in the hospital and they ordered home health for her.  Lin went to see the patient and Eileen feels like she does not need home health at this time.  Lin told her that it would be beneficial for her if she had home health but the patient refused.  Lin said that if eileen wants to start back up again she just has to give them a call.  Lin just wanted to let you know.      3. Patient approves office to leave a detailed voicemail/MyChart message: N\A

## 2018-12-21 ENCOUNTER — TELEPHONE (OUTPATIENT)
Dept: VASCULAR LAB | Facility: MEDICAL CENTER | Age: 73
End: 2018-12-21

## 2018-12-26 ENCOUNTER — TELEPHONE (OUTPATIENT)
Dept: INFECTIOUS DISEASES | Facility: MEDICAL CENTER | Age: 73
End: 2018-12-26

## 2018-12-27 ENCOUNTER — HOSPITAL ENCOUNTER (INPATIENT)
Facility: MEDICAL CENTER | Age: 73
LOS: 8 days | DRG: 871 | End: 2019-01-04
Attending: EMERGENCY MEDICINE | Admitting: INTERNAL MEDICINE
Payer: MEDICARE
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
## 2018-12-27 DIAGNOSIS — R79.1 ELEVATED INR: ICD-10-CM

## 2018-12-27 DIAGNOSIS — A04.72 C. DIFFICILE COLITIS: ICD-10-CM

## 2018-12-27 DIAGNOSIS — J96.20 ACUTE ON CHRONIC RESPIRATORY FAILURE, UNSPECIFIED WHETHER WITH HYPOXIA OR HYPERCAPNIA (HCC): ICD-10-CM

## 2018-12-27 DIAGNOSIS — D72.829 LEUKOCYTOSIS, UNSPECIFIED TYPE: ICD-10-CM

## 2018-12-27 DIAGNOSIS — N18.6 END STAGE RENAL DISEASE (HCC): ICD-10-CM

## 2018-12-27 DIAGNOSIS — E16.2 HYPOGLYCEMIA: ICD-10-CM

## 2018-12-27 DIAGNOSIS — J42 CHRONIC BRONCHITIS, UNSPECIFIED CHRONIC BRONCHITIS TYPE (HCC): ICD-10-CM

## 2018-12-27 DIAGNOSIS — S09.90XA CLOSED HEAD INJURY, INITIAL ENCOUNTER: ICD-10-CM

## 2018-12-27 DIAGNOSIS — R74.8 ELEVATED CK: ICD-10-CM

## 2018-12-27 DIAGNOSIS — G93.40 ENCEPHALOPATHY ACUTE: ICD-10-CM

## 2018-12-27 DIAGNOSIS — E43 SEVERE PROTEIN-CALORIE MALNUTRITION (HCC): ICD-10-CM

## 2018-12-27 LAB
APTT PPP: 72.5 SEC (ref 24.7–36)
EKG IMPRESSION: NORMAL
INR PPP: >=10 (ref 0.87–1.13)
LACTATE BLD-SCNC: 2.7 MMOL/L (ref 0.5–2)
PROTHROMBIN TIME: >120 SEC (ref 12–14.6)

## 2018-12-27 PROCEDURE — 82962 GLUCOSE BLOOD TEST: CPT

## 2018-12-27 PROCEDURE — 83605 ASSAY OF LACTIC ACID: CPT

## 2018-12-27 PROCEDURE — 93005 ELECTROCARDIOGRAM TRACING: CPT

## 2018-12-27 PROCEDURE — 87040 BLOOD CULTURE FOR BACTERIA: CPT

## 2018-12-27 PROCEDURE — 99291 CRITICAL CARE FIRST HOUR: CPT

## 2018-12-27 PROCEDURE — 93005 ELECTROCARDIOGRAM TRACING: CPT | Performed by: EMERGENCY MEDICINE

## 2018-12-27 PROCEDURE — 770022 HCHG ROOM/CARE - ICU (200)

## 2018-12-27 PROCEDURE — 96365 THER/PROPH/DIAG IV INF INIT: CPT

## 2018-12-27 PROCEDURE — 36415 COLL VENOUS BLD VENIPUNCTURE: CPT

## 2018-12-27 PROCEDURE — 85730 THROMBOPLASTIN TIME PARTIAL: CPT

## 2018-12-27 PROCEDURE — 700105 HCHG RX REV CODE 258: Performed by: EMERGENCY MEDICINE

## 2018-12-27 PROCEDURE — 85610 PROTHROMBIN TIME: CPT

## 2018-12-27 PROCEDURE — 96366 THER/PROPH/DIAG IV INF ADDON: CPT

## 2018-12-27 RX ORDER — BISACODYL 10 MG
10 SUPPOSITORY, RECTAL RECTAL
Status: DISCONTINUED | OUTPATIENT
Start: 2018-12-27 | End: 2018-12-28

## 2018-12-27 RX ORDER — SUCRALFATE 1 G/1
1 TABLET ORAL EVERY 6 HOURS
Status: ON HOLD | COMMUNITY
End: 2019-01-04

## 2018-12-27 RX ORDER — ONDANSETRON 2 MG/ML
4 INJECTION INTRAMUSCULAR; INTRAVENOUS EVERY 4 HOURS PRN
Status: DISCONTINUED | OUTPATIENT
Start: 2018-12-27 | End: 2018-12-27

## 2018-12-27 RX ORDER — FAMOTIDINE 20 MG/1
20 TABLET, FILM COATED ORAL DAILY
Status: ON HOLD | COMMUNITY
End: 2019-01-04

## 2018-12-27 RX ORDER — SODIUM CHLORIDE 9 MG/ML
INJECTION, SOLUTION INTRAVENOUS CONTINUOUS
Status: DISCONTINUED | OUTPATIENT
Start: 2018-12-27 | End: 2018-12-28

## 2018-12-27 RX ORDER — ONDANSETRON 4 MG/1
4 TABLET, ORALLY DISINTEGRATING ORAL EVERY 4 HOURS PRN
Status: DISCONTINUED | OUTPATIENT
Start: 2018-12-27 | End: 2018-12-27

## 2018-12-27 RX ORDER — DEXTROSE MONOHYDRATE 25 G/50ML
25 INJECTION, SOLUTION INTRAVENOUS
Status: DISCONTINUED | OUTPATIENT
Start: 2018-12-27 | End: 2018-12-28

## 2018-12-27 RX ORDER — SODIUM CHLORIDE 9 MG/ML
500 INJECTION, SOLUTION INTRAVENOUS
Status: DISCONTINUED | OUTPATIENT
Start: 2018-12-27 | End: 2018-12-31

## 2018-12-27 RX ORDER — AMOXICILLIN 250 MG
2 CAPSULE ORAL 2 TIMES DAILY
Status: DISCONTINUED | OUTPATIENT
Start: 2018-12-27 | End: 2018-12-28

## 2018-12-27 RX ORDER — POLYETHYLENE GLYCOL 3350 17 G/17G
1 POWDER, FOR SOLUTION ORAL
Status: DISCONTINUED | OUTPATIENT
Start: 2018-12-27 | End: 2018-12-28

## 2018-12-27 RX ORDER — ACETAMINOPHEN 325 MG/1
650 TABLET ORAL EVERY 6 HOURS PRN
Status: DISCONTINUED | OUTPATIENT
Start: 2018-12-27 | End: 2018-12-28

## 2018-12-27 RX ORDER — HYDRALAZINE HYDROCHLORIDE 20 MG/ML
10 INJECTION INTRAMUSCULAR; INTRAVENOUS EVERY 4 HOURS PRN
Status: DISCONTINUED | OUTPATIENT
Start: 2018-12-27 | End: 2019-01-04 | Stop reason: HOSPADM

## 2018-12-27 RX ORDER — DEXTROSE MONOHYDRATE 100 MG/ML
INJECTION, SOLUTION INTRAVENOUS ONCE
Status: COMPLETED | OUTPATIENT
Start: 2018-12-27 | End: 2018-12-28

## 2018-12-27 RX ORDER — PHYTONADIONE 5 MG/1
5 TABLET ORAL ONCE
Status: COMPLETED | OUTPATIENT
Start: 2018-12-27 | End: 2018-12-28

## 2018-12-27 RX ADMIN — DEXTROSE MONOHYDRATE 1000 ML: 100 INJECTION, SOLUTION INTRAVENOUS at 21:50

## 2018-12-28 ENCOUNTER — APPOINTMENT (OUTPATIENT)
Dept: RADIOLOGY | Facility: MEDICAL CENTER | Age: 73
DRG: 871 | End: 2018-12-28
Attending: INTERNAL MEDICINE
Payer: MEDICARE

## 2018-12-28 PROBLEM — R79.1 SUPRATHERAPEUTIC INR: Status: ACTIVE | Noted: 2018-12-28

## 2018-12-28 PROBLEM — J96.20 ACUTE ON CHRONIC RESPIRATORY FAILURE (HCC): Status: ACTIVE | Noted: 2018-10-21
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
## 2018-12-28 PROBLEM — E16.2 HYPOGLYCEMIA: Status: ACTIVE | Noted: 2018-12-28

## 2018-12-28 PROBLEM — R73.9 HYPERGLYCEMIA: Status: ACTIVE | Noted: 2018-12-28

## 2018-12-28 LAB
ABO GROUP BLD: NORMAL
ALBUMIN SERPL BCP-MCNC: 2.5 G/DL (ref 3.2–4.9)
ALBUMIN/GLOB SERPL: 1.1 G/DL
ALP SERPL-CCNC: 112 U/L (ref 30–99)
ALT SERPL-CCNC: <5 U/L (ref 2–50)
ANION GAP SERPL CALC-SCNC: 15 MMOL/L (ref 0–11.9)
AST SERPL-CCNC: 43 U/L (ref 12–45)
BARCODED ABORH UBTYP: 7300
BARCODED ABORH UBTYP: 8400
BARCODED PRD CODE UBPRD: NORMAL
BARCODED PRD CODE UBPRD: NORMAL
BARCODED UNIT NUM UBUNT: NORMAL
BARCODED UNIT NUM UBUNT: NORMAL
BASOPHILS # BLD AUTO: 0.3 % (ref 0–1.8)
BASOPHILS # BLD: 0.06 K/UL (ref 0–0.12)
BILIRUB SERPL-MCNC: 0.4 MG/DL (ref 0.1–1.5)
BLD GP AB SCN SERPL QL: NORMAL
BUN SERPL-MCNC: 61 MG/DL (ref 8–22)
C DIFF DNA SPEC QL NAA+PROBE: NEGATIVE
C DIFF TOX A+B STL QL IA: POSITIVE
C DIFF TOX GENS STL QL NAA+PROBE: NORMAL
CALCIUM SERPL-MCNC: 8.8 MG/DL (ref 8.5–10.5)
CHLORIDE SERPL-SCNC: 96 MMOL/L (ref 96–112)
CK SERPL-CCNC: 367 U/L (ref 0–154)
CO2 SERPL-SCNC: 21 MMOL/L (ref 20–33)
COMPONENT FT 8504FT: NORMAL
COMPONENT FT 8504FT: NORMAL
CREAT SERPL-MCNC: 5.66 MG/DL (ref 0.5–1.4)
EOSINOPHIL # BLD AUTO: 0.01 K/UL (ref 0–0.51)
EOSINOPHIL NFR BLD: 0.1 % (ref 0–6.9)
ERYTHROCYTE [DISTWIDTH] IN BLOOD BY AUTOMATED COUNT: 51.2 FL (ref 35.9–50)
GLOBULIN SER CALC-MCNC: 2.3 G/DL (ref 1.9–3.5)
GLUCOSE BLD-MCNC: 10 MG/DL (ref 65–99)
GLUCOSE BLD-MCNC: 101 MG/DL (ref 65–99)
GLUCOSE BLD-MCNC: 104 MG/DL (ref 65–99)
GLUCOSE BLD-MCNC: 108 MG/DL (ref 65–99)
GLUCOSE BLD-MCNC: 138 MG/DL (ref 65–99)
GLUCOSE BLD-MCNC: 153 MG/DL (ref 65–99)
GLUCOSE BLD-MCNC: 160 MG/DL (ref 65–99)
GLUCOSE BLD-MCNC: 172 MG/DL (ref 65–99)
GLUCOSE BLD-MCNC: 193 MG/DL (ref 65–99)
GLUCOSE BLD-MCNC: 44 MG/DL (ref 65–99)
GLUCOSE BLD-MCNC: 54 MG/DL (ref 65–99)
GLUCOSE BLD-MCNC: 80 MG/DL (ref 65–99)
GLUCOSE BLD-MCNC: 92 MG/DL (ref 65–99)
GLUCOSE BLD-MCNC: 95 MG/DL (ref 65–99)
GLUCOSE SERPL-MCNC: 111 MG/DL (ref 65–99)
HCT VFR BLD AUTO: 26.3 % (ref 37–47)
HGB BLD-MCNC: 8.6 G/DL (ref 12–16)
IMM GRANULOCYTES # BLD AUTO: 0.4 K/UL (ref 0–0.11)
IMM GRANULOCYTES NFR BLD AUTO: 2.2 % (ref 0–0.9)
INR PPP: 4.49 (ref 0.87–1.13)
INR PPP: >=10 (ref 0.87–1.13)
LACTATE BLD-SCNC: 2.3 MMOL/L (ref 0.5–2)
LACTATE BLD-SCNC: 2.7 MMOL/L (ref 0.5–2)
LACTATE BLD-SCNC: 2.7 MMOL/L (ref 0.5–2)
LYMPHOCYTES # BLD AUTO: 1.63 K/UL (ref 1–4.8)
LYMPHOCYTES NFR BLD: 9.2 % (ref 22–41)
MAGNESIUM SERPL-MCNC: 2 MG/DL (ref 1.5–2.5)
MCH RBC QN AUTO: 31.3 PG (ref 27–33)
MCHC RBC AUTO-ENTMCNC: 32.7 G/DL (ref 33.6–35)
MCV RBC AUTO: 95.6 FL (ref 81.4–97.8)
MONOCYTES # BLD AUTO: 0.95 K/UL (ref 0–0.85)
MONOCYTES NFR BLD AUTO: 5.3 % (ref 0–13.4)
NEUTROPHILS # BLD AUTO: 14.75 K/UL (ref 2–7.15)
NEUTROPHILS NFR BLD: 82.9 % (ref 44–72)
NRBC # BLD AUTO: 0.02 K/UL
NRBC BLD-RTO: 0.1 /100 WBC
PLATELET # BLD AUTO: 162 K/UL (ref 164–446)
PMV BLD AUTO: 10.7 FL (ref 9–12.9)
POTASSIUM SERPL-SCNC: 4.1 MMOL/L (ref 3.6–5.5)
PROCALCITONIN SERPL-MCNC: 2.92 NG/ML
PRODUCT TYPE UPROD: NORMAL
PRODUCT TYPE UPROD: NORMAL
PROT SERPL-MCNC: 4.8 G/DL (ref 6–8.2)
PROTHROMBIN TIME: 117.3 SEC (ref 12–14.6)
PROTHROMBIN TIME: 42.6 SEC (ref 12–14.6)
RBC # BLD AUTO: 2.75 M/UL (ref 4.2–5.4)
RH BLD: NORMAL
SODIUM SERPL-SCNC: 132 MMOL/L (ref 135–145)
UNIT STATUS USTAT: NORMAL
UNIT STATUS USTAT: NORMAL
WBC # BLD AUTO: 17.8 K/UL (ref 4.8–10.8)

## 2018-12-28 PROCEDURE — 84145 PROCALCITONIN (PCT): CPT | Mod: 91

## 2018-12-28 PROCEDURE — 30233K1 TRANSFUSION OF NONAUTOLOGOUS FROZEN PLASMA INTO PERIPHERAL VEIN, PERCUTANEOUS APPROACH: ICD-10-PCS | Performed by: STUDENT IN AN ORGANIZED HEALTH CARE EDUCATION/TRAINING PROGRAM

## 2018-12-28 PROCEDURE — 86900 BLOOD TYPING SEROLOGIC ABO: CPT

## 2018-12-28 PROCEDURE — A9270 NON-COVERED ITEM OR SERVICE: HCPCS | Performed by: INTERNAL MEDICINE

## 2018-12-28 PROCEDURE — A9270 NON-COVERED ITEM OR SERVICE: HCPCS | Performed by: STUDENT IN AN ORGANIZED HEALTH CARE EDUCATION/TRAINING PROGRAM

## 2018-12-28 PROCEDURE — 302136 NUTRITION PUMP: Performed by: INTERNAL MEDICINE

## 2018-12-28 PROCEDURE — 87324 CLOSTRIDIUM AG IA: CPT

## 2018-12-28 PROCEDURE — 83605 ASSAY OF LACTIC ACID: CPT | Mod: 91

## 2018-12-28 PROCEDURE — 700102 HCHG RX REV CODE 250 W/ 637 OVERRIDE(OP): Performed by: INTERNAL MEDICINE

## 2018-12-28 PROCEDURE — P9017 PLASMA 1 DONOR FRZ W/IN 8 HR: HCPCS | Mod: 91

## 2018-12-28 PROCEDURE — 85025 COMPLETE CBC W/AUTO DIFF WBC: CPT

## 2018-12-28 PROCEDURE — 700111 HCHG RX REV CODE 636 W/ 250 OVERRIDE (IP): Performed by: INTERNAL MEDICINE

## 2018-12-28 PROCEDURE — 82962 GLUCOSE BLOOD TEST: CPT | Mod: 91

## 2018-12-28 PROCEDURE — 700102 HCHG RX REV CODE 250 W/ 637 OVERRIDE(OP): Performed by: STUDENT IN AN ORGANIZED HEALTH CARE EDUCATION/TRAINING PROGRAM

## 2018-12-28 PROCEDURE — 700105 HCHG RX REV CODE 258: Performed by: STUDENT IN AN ORGANIZED HEALTH CARE EDUCATION/TRAINING PROGRAM

## 2018-12-28 PROCEDURE — 700111 HCHG RX REV CODE 636 W/ 250 OVERRIDE (IP): Performed by: STUDENT IN AN ORGANIZED HEALTH CARE EDUCATION/TRAINING PROGRAM

## 2018-12-28 PROCEDURE — 770022 HCHG ROOM/CARE - ICU (200)

## 2018-12-28 PROCEDURE — 99223 1ST HOSP IP/OBS HIGH 75: CPT | Performed by: INTERNAL MEDICINE

## 2018-12-28 PROCEDURE — 5A1D70Z PERFORMANCE OF URINARY FILTRATION, INTERMITTENT, LESS THAN 6 HOURS PER DAY: ICD-10-PCS | Performed by: INTERNAL MEDICINE

## 2018-12-28 PROCEDURE — 80053 COMPREHEN METABOLIC PANEL: CPT

## 2018-12-28 PROCEDURE — 700101 HCHG RX REV CODE 250: Performed by: INTERNAL MEDICINE

## 2018-12-28 PROCEDURE — 700101 HCHG RX REV CODE 250: Performed by: STUDENT IN AN ORGANIZED HEALTH CARE EDUCATION/TRAINING PROGRAM

## 2018-12-28 PROCEDURE — 87493 C DIFF AMPLIFIED PROBE: CPT

## 2018-12-28 PROCEDURE — 87040 BLOOD CULTURE FOR BACTERIA: CPT

## 2018-12-28 PROCEDURE — 99291 CRITICAL CARE FIRST HOUR: CPT | Mod: GC | Performed by: INTERNAL MEDICINE

## 2018-12-28 PROCEDURE — 36430 TRANSFUSION BLD/BLD COMPNT: CPT

## 2018-12-28 PROCEDURE — 700105 HCHG RX REV CODE 258: Performed by: INTERNAL MEDICINE

## 2018-12-28 PROCEDURE — 99291 CRITICAL CARE FIRST HOUR: CPT | Performed by: INTERNAL MEDICINE

## 2018-12-28 PROCEDURE — 700111 HCHG RX REV CODE 636 W/ 250 OVERRIDE (IP): Mod: JG | Performed by: INTERNAL MEDICINE

## 2018-12-28 PROCEDURE — 96366 THER/PROPH/DIAG IV INF ADDON: CPT

## 2018-12-28 PROCEDURE — 85610 PROTHROMBIN TIME: CPT

## 2018-12-28 PROCEDURE — 86901 BLOOD TYPING SEROLOGIC RH(D): CPT

## 2018-12-28 PROCEDURE — 90935 HEMODIALYSIS ONE EVALUATION: CPT

## 2018-12-28 PROCEDURE — 86850 RBC ANTIBODY SCREEN: CPT

## 2018-12-28 PROCEDURE — 83735 ASSAY OF MAGNESIUM: CPT

## 2018-12-28 PROCEDURE — 82550 ASSAY OF CK (CPK): CPT

## 2018-12-28 PROCEDURE — 700111 HCHG RX REV CODE 636 W/ 250 OVERRIDE (IP)

## 2018-12-28 RX ORDER — LORAZEPAM 1 MG/1
0.5 TABLET ORAL 2 TIMES DAILY PRN
Status: DISCONTINUED | OUTPATIENT
Start: 2018-12-28 | End: 2018-12-29

## 2018-12-28 RX ORDER — SODIUM CHLORIDE 9 MG/ML
INJECTION, SOLUTION INTRAVENOUS
Status: ACTIVE
Start: 2018-12-28 | End: 2018-12-28

## 2018-12-28 RX ORDER — LORAZEPAM 1 MG/1
0.5 TABLET ORAL 2 TIMES DAILY PRN
Status: DISCONTINUED | OUTPATIENT
Start: 2018-12-28 | End: 2018-12-28

## 2018-12-28 RX ORDER — DEXTROSE MONOHYDRATE 100 MG/ML
INJECTION, SOLUTION INTRAVENOUS CONTINUOUS
Status: DISCONTINUED | OUTPATIENT
Start: 2018-12-28 | End: 2019-01-01

## 2018-12-28 RX ORDER — ACETAMINOPHEN 325 MG/1
650 TABLET ORAL EVERY 6 HOURS PRN
Status: DISCONTINUED | OUTPATIENT
Start: 2018-12-28 | End: 2019-01-01

## 2018-12-28 RX ORDER — HEPARIN SODIUM 1000 [USP'U]/ML
4200 INJECTION, SOLUTION INTRAVENOUS; SUBCUTANEOUS
Status: DISCONTINUED | OUTPATIENT
Start: 2018-12-28 | End: 2019-01-04 | Stop reason: HOSPADM

## 2018-12-28 RX ORDER — HYDROMORPHONE HYDROCHLORIDE 1 MG/ML
0.5 INJECTION, SOLUTION INTRAMUSCULAR; INTRAVENOUS; SUBCUTANEOUS
Status: DISCONTINUED | OUTPATIENT
Start: 2018-12-28 | End: 2019-01-04 | Stop reason: HOSPADM

## 2018-12-28 RX ORDER — DEXTROSE MONOHYDRATE 25 G/50ML
25 INJECTION, SOLUTION INTRAVENOUS
Status: DISCONTINUED | OUTPATIENT
Start: 2018-12-28 | End: 2019-01-01

## 2018-12-28 RX ORDER — HEPARIN SODIUM 1000 [USP'U]/ML
INJECTION, SOLUTION INTRAVENOUS; SUBCUTANEOUS
Status: COMPLETED
Start: 2018-12-28 | End: 2018-12-28

## 2018-12-28 RX ORDER — HEPARIN SODIUM 1000 [USP'U]/ML
2000 INJECTION, SOLUTION INTRAVENOUS; SUBCUTANEOUS
Status: DISCONTINUED | OUTPATIENT
Start: 2018-12-28 | End: 2019-01-04 | Stop reason: HOSPADM

## 2018-12-28 RX ADMIN — HEPARIN SODIUM 4200 UNITS: 1000 INJECTION, SOLUTION INTRAVENOUS; SUBCUTANEOUS at 16:16

## 2018-12-28 RX ADMIN — VANCOMYCIN HYDROCHLORIDE 125 MG: 10 INJECTION, POWDER, LYOPHILIZED, FOR SOLUTION INTRAVENOUS at 17:40

## 2018-12-28 RX ADMIN — CEFTRIAXONE SODIUM 2 G: 2 INJECTION, POWDER, FOR SOLUTION INTRAMUSCULAR; INTRAVENOUS at 06:35

## 2018-12-28 RX ADMIN — ACETAMINOPHEN 650 MG: 325 TABLET, FILM COATED ORAL at 00:28

## 2018-12-28 RX ADMIN — SODIUM CHLORIDE: 9 INJECTION, SOLUTION INTRAVENOUS at 01:48

## 2018-12-28 RX ADMIN — DEXTROSE MONOHYDRATE 25 ML: 25 INJECTION, SOLUTION INTRAVENOUS at 17:11

## 2018-12-28 RX ADMIN — HEPARIN SODIUM 2000 UNITS: 1000 INJECTION, SOLUTION INTRAVENOUS; SUBCUTANEOUS at 13:51

## 2018-12-28 RX ADMIN — EPOETIN ALFA 2700 UNITS: 3000 SOLUTION INTRAVENOUS; SUBCUTANEOUS at 16:18

## 2018-12-28 RX ADMIN — LORAZEPAM 0.5 MG: 1 TABLET ORAL at 08:48

## 2018-12-28 RX ADMIN — DEXTROSE MONOHYDRATE 25 ML: 25 INJECTION, SOLUTION INTRAVENOUS at 17:30

## 2018-12-28 RX ADMIN — DEXTROSE MONOHYDRATE: 100 INJECTION, SOLUTION INTRAVENOUS at 10:30

## 2018-12-28 RX ADMIN — VANCOMYCIN HYDROCHLORIDE 125 MG: 10 INJECTION, POWDER, LYOPHILIZED, FOR SOLUTION INTRAVENOUS at 08:03

## 2018-12-28 RX ADMIN — DEXTROSE MONOHYDRATE 25 ML: 25 INJECTION, SOLUTION INTRAVENOUS at 10:10

## 2018-12-28 RX ADMIN — HYDROMORPHONE HYDROCHLORIDE 0.5 MG: 1 INJECTION, SOLUTION INTRAMUSCULAR; INTRAVENOUS; SUBCUTANEOUS at 16:59

## 2018-12-28 RX ADMIN — DEXTROSE MONOHYDRATE 25 ML: 25 INJECTION, SOLUTION INTRAVENOUS at 10:32

## 2018-12-28 RX ADMIN — PHYTONADIONE 5 MG: 5 TABLET ORAL at 03:15

## 2018-12-28 RX ADMIN — HYDROMORPHONE HYDROCHLORIDE 0.5 MG: 1 INJECTION, SOLUTION INTRAMUSCULAR; INTRAVENOUS; SUBCUTANEOUS at 20:57

## 2018-12-28 RX ADMIN — VANCOMYCIN HYDROCHLORIDE 125 MG: 10 INJECTION, POWDER, LYOPHILIZED, FOR SOLUTION INTRAVENOUS at 23:49

## 2018-12-28 ASSESSMENT — COGNITIVE AND FUNCTIONAL STATUS - GENERAL
SUGGESTED CMS G CODE MODIFIER DAILY ACTIVITY: CK
EATING MEALS: A LITTLE
PERSONAL GROOMING: A LITTLE
TOILETING: A LITTLE
CLIMB 3 TO 5 STEPS WITH RAILING: A LITTLE
HELP NEEDED FOR BATHING: A LITTLE
DAILY ACTIVITIY SCORE: 18
STANDING UP FROM CHAIR USING ARMS: A LITTLE
DRESSING REGULAR LOWER BODY CLOTHING: A LITTLE
DRESSING REGULAR UPPER BODY CLOTHING: A LITTLE
MOBILITY SCORE: 21
WALKING IN HOSPITAL ROOM: A LITTLE
SUGGESTED CMS G CODE MODIFIER MOBILITY: CJ

## 2018-12-28 ASSESSMENT — PATIENT HEALTH QUESTIONNAIRE - PHQ9
1. LITTLE INTEREST OR PLEASURE IN DOING THINGS: NOT AT ALL
SUM OF ALL RESPONSES TO PHQ9 QUESTIONS 1 AND 2: 0
1. LITTLE INTEREST OR PLEASURE IN DOING THINGS: NOT AT ALL
2. FEELING DOWN, DEPRESSED, IRRITABLE, OR HOPELESS: NOT AT ALL
SUM OF ALL RESPONSES TO PHQ9 QUESTIONS 1 AND 2: 0
2. FEELING DOWN, DEPRESSED, IRRITABLE, OR HOPELESS: NOT AT ALL

## 2018-12-28 ASSESSMENT — ENCOUNTER SYMPTOMS
DIARRHEA: 1
SORE THROAT: 0
SHORTNESS OF BREATH: 0
BACK PAIN: 1
CONSTIPATION: 0
FALLS: 1
SPEECH CHANGE: 0
MYALGIAS: 1
COUGH: 0
FEVER: 0
LOSS OF CONSCIOUSNESS: 1
WEAKNESS: 1
VOMITING: 0
SENSORY CHANGE: 0
CHILLS: 0
NAUSEA: 0
ABDOMINAL PAIN: 1
FOCAL WEAKNESS: 0

## 2018-12-28 ASSESSMENT — PAIN SCALES - GENERAL
PAINLEVEL_OUTOF10: 0
PAINLEVEL_OUTOF10: 6
PAINLEVEL_OUTOF10: 3
PAINLEVEL_OUTOF10: 0
PAINLEVEL_OUTOF10: 6
PAINLEVEL_OUTOF10: 6
PAINLEVEL_OUTOF10: 4
PAINLEVEL_OUTOF10: 4
PAINLEVEL_OUTOF10: 6
PAINLEVEL_OUTOF10: 0
PAINLEVEL_OUTOF10: 0

## 2018-12-28 ASSESSMENT — COPD QUESTIONNAIRES
DURING THE PAST 4 WEEKS HOW MUCH DID YOU FEEL SHORT OF BREATH: NONE/LITTLE OF THE TIME
HAVE YOU SMOKED AT LEAST 100 CIGARETTES IN YOUR ENTIRE LIFE: YES
COPD SCREENING SCORE: 5
IN THE PAST 12 MONTHS DO YOU DO LESS THAN YOU USED TO BECAUSE OF YOUR BREATHING PROBLEMS: AGREE
DO YOU EVER COUGH UP ANY MUCUS OR PHLEGM?: NO/ONLY WITH OCCASIONAL COLDS OR INFECTIONS

## 2018-12-28 ASSESSMENT — LIFESTYLE VARIABLES
EVER_SMOKED: YES
ALCOHOL_USE: NO

## 2018-12-28 NOTE — DISCHARGE PLANNING
Care Transition Team Assessment  Received IPCSS indicating AD. Met w/ pt's dtr and son in law, PMA, and bedside RN. Pt is more confused than last night per dtr. Pt lives w/ her dtr, son in law, and gson at a mobile home in [place]. Pt was independent and able to make meals for the family prior to this readmit, per dtr. Pt was being watched by her grand dtr in law per pt 's dtr. As dtr and son in law were out of town for the weekend. Pt appears to have fallen per ER admit notes. One indicates tripped over skein of [facility] other indicates found down in bathroom after several hours and brought to ER.    Lsw advised pt not able to complete AD until she is A/O and Md is willing to complete Cert. of Competency document indicating she is A/O. Lsw provided copy of document and dtr indicates in past they needed support completing the document. Lsw volunteered to assist once pt is able to be part of conversation.      Dtr will wait for pt to clear as she was clear previous to admission. Also, son in law and dtr indicate pt is a DNR and has advised her wishes to the family. Dtr states in ER initially pt stated she wanted to be intubated then pt stated she is a DNR. Bedside RN indicates pt is listed as full code.     PMA states pt is on ABX and hopefully confusion will clear. Pt concerned as she is not able to understand she has rectal tube and catheter. She is very anxious about getting to the bathroom as she needs to void and not understanding she has bowel and bladder support. Another example, Rn indicates pt was able to use a straw this AM to drink fluids. Now, pt is unable to use a straw. Pt is having dialysis now as she reportedly missed dialysis appointment prior to this admission.    Spoke to Tammy w/ dialysis as pt has missed community dialysis appointment prior to admission. HD clinic advised pt is compliant w/ services and usually makes appointments. Emergency contacts at HD clinic are dtr Anahy @ [phone] and a lady  named Genesis @ [phone].    Lsw noted last admission a couple weeks ago, during assessment w/ [facility] d/c planner pt appeared A/O x4 and indicated she was fully independent w/ I/ADLs.               Information Source  Orientation : Unable to Assess  Information Given By: Relative  Informant's Name: Dtr and son in law  Who is responsible for making decisions for patient? : Patient    Readmission Evaluation  Is this a readmission?: Yes - unplanned readmission    Elopement Risk  Legal Hold: No  Ambulatory or Self Mobile in Wheelchair: Yes  Disoriented: No  Psychiatric Symptoms: None  History of Wandering: No  Elopement this Admit: No  Vocalizing Wanting to Leave: No  Displays Behaviors, Body Language Wanting to Leave: No-Not at Risk for Elopement  Elopement Risk: Not at Risk for Elopement    Interdisciplinary Discharge Planning  Primary Care Physician: SUMAN LACEY M.D  Lives with - Patient's Self Care Capacity:  (dtr, son in law, on)  Patient or legal guardian wants to designate a caregiver (see row info): No  Support Systems: Family Member(s)  Housing / Facility:  (dtr's home in [place])              Finances  Prescription Coverage:  ([facility])    Vision / Hearing Impairment  Vision Impairment : Yes  Right Eye Vision: Wears Glasses  Left Eye Vision: Wears Glasses  Hearing Impairment : Yes  Hearing Impairment: Right Ear, Left Ear  Does Pt Need Special Equipment for the Hearing Impaired?: No    Values / Beliefs / Concerns  Values / Beliefs Concerns : No  Special Hospitalization Concerns: None    Advance Directive  Advance Directive?: None  Advance Directive offered?: AD Booklet given    Domestic Abuse  Have you ever been the victim of abuse or violence?: No  Physical Abuse or Sexual Abuse: No  Verbal Abuse or Emotional Abuse: No  Possible Abuse Reported to:: Not Applicable

## 2018-12-28 NOTE — THERAPY
Physical Therapy Contact Note    PT order received. Per chart review, patient with INR of 4.49 and PT of 42.6. Patient also currently undergoing dialysis and per RN patient is confused. Will hold PT eval for today. Will re attempt as appropriate and able.    Adriane Forbes, PT, DPT  [phone]

## 2018-12-28 NOTE — ED NOTES
Lab called with critical result of pt >120 INR >10  at 2235. Critical lab result read back to lab.   Dr. Oh notified of critical lab result at 2236.  Critical lab result read back by Dr. Oh.

## 2018-12-28 NOTE — ASSESSMENT & PLAN NOTE
-Chronic COPD on 2 lpm O2 continuously   -Required up to 10 lpm on admission  -Missed W HD but does not appear overloaded   -CXR from OSH does not suggest PNA but pt was septic with hypotension on arrival  -Not wheezy on exam, pt denies SOB/worsened cough  -RT protocol, O2 therapy, pulse ox  -Started on C3 based on recent MSSA bacteremia

## 2018-12-28 NOTE — DISCHARGE PLANNING
Anticipated Discharge Disposition: TBD    Action: Spoke to Tammy w/ dialysis. She indicates the community dialysis clinic pt goes to reported the following information for pt.    The SW at HD clinic met w/ pt once. Pt reported she has hallucinations at baseline based on what people tell her. Pt's history is as follows: pt's gdtr is her transport to and from dialysis, pt was living at [facility] after her  passed away. She just recently moved to [facility]'s home in [place].     Barriers to Discharge: TBD    Plan: f/u w/ privately w/ pt once A/O, and medical team

## 2018-12-28 NOTE — CONSULTS
Critical Care Consultation    Date of consult: 12/28/2018    Referring Physician  Dr Oh    Reason for Consultation  Hypoglycemia, hypotension, coagulopathy    History of Presenting Illness  73 y.o. female who presented 12/27/2018 with end-stage renal disease hypertension dyslipidemia anemia COPD on chronic 2 L oxygen history of stroke DVT on Coumadin mitral regurg status post clip.  History of MSSA bacteremia requiring hospitalizations from November 30 to December 16 this year.  She recalls the entire events that she slipped over some yarn falling to the ground while her family was out of town she laid on the ground for 1 day.  Denies loss of consciousness. She was transferred to [facility] for further care due to hypoglycemia hypotension and 30,000 white count and an INR greater than 10.  Patient was started on ceftriaxone given p.o. vitamin K dextrose infusion and fluid resuscitation for sepsis protocol.  She describes also that she has had some diarrhea prior to this.  But denies shortness of breath chest pain neck pain numbness tingling or weakness.  She does describe back pain joint pain and fatigue.    Code Status  Full Code    Review of Systems  Review of Systems   All other systems reviewed and are negative.      Past Medical History   has a past medical history of Anemia (11/11/2016); Arthritis; Breath shortness; Bronchitis; COPD (chronic obstructive pulmonary disease) ([facility]) (9/18/2009); Dental disorder; Emphysema of lung ([facility]); Epilepsy ([facility]) (9/18/2009); Heart valve disease; Jaundice; Migraines, neuralgic (9/18/2009); Osteoarthritis (9/18/2009); Oxygen dependent; Peripheral edema (9/18/2009); Renal insufficiency (8/5/2016); Stroke ([facility]); Tobacco use disorder (9/18/2009); and Unspecified essential hypertension (9/18/2009).    Surgical History   has a past surgical history that includes abdominal hysterectomy total; gastroscopy with biopsy (11/15/2016); other cardiac surgery (06/2017); and av fistula creation  (Right, 12/8/2017).    Family History  family history includes Cancer in her mother; Heart Disease in her father; Heart Failure in her father.    Social History   reports that she quit smoking about 2 years ago. Her smoking use included Cigarettes. She has a 40.00 pack-year smoking history. She has never used smokeless tobacco. She reports that she does not drink alcohol or use drugs.    Medications  Home Medications     Reviewed by Suzanne Reagan, Pharmacy Intern (Pharmacy Intern) on 12/27/18 at 2250  Med List Status: Complete   Medication Last Dose Status   asa/apap/caffeine (EXCEDRIN) 250-250-65 MG Tab 12/26/2018 Active   ceFAZolin in dextrose (ANCEF) 1 g/50mL Solution 12/24/2018 Active   famotidine (PEPCID) 20 MG Tab 12/24/2018 Active   ferrous sulfate 325 (65 Fe) MG tablet 12/26/2018 Active   lovastatin (MEVACOR) 10 MG tablet 12/26/2018 Active   sucralfate (CARAFATE) 1 GM Tab 12/24/2018 Active   warfarin (COUMADIN) 6 MG Tab 12/24/2018 Active              Current Facility-Administered Medications   Medication Dose Route Frequency Provider Last Rate Last Dose   • Pharmacy Consult Request  1 Each Other pharmacy to dose Declan Pierce M.D.       • senna-docusate (PERICOLACE or SENOKOT S) 8.6-50 MG per tablet 2 Tab  2 Tab Oral BID Declan Pierce M.D.   Stopped at 12/27/18 2245    And   • polyethylene glycol/lytes (MIRALAX) PACKET 1 Packet  1 Packet Oral QDAY PRN Declan Pierce M.D.        And   • magnesium hydroxide (MILK OF MAGNESIA) suspension 30 mL  30 mL Oral QDAY PRN Declan Pierce M.D.        And   • bisacodyl (DULCOLAX) suppository 10 mg  10 mg Rectal QDAY PRN Declan Pierce M.D.       • Respiratory Care per Protocol   Nebulization Continuous RT Declan Pierce M.D.       • hydrALAZINE (APRESOLINE) injection 10 mg  10 mg Intravenous Q4HRS PRN Declan Pierce M.D.       • acetaminophen (TYLENOL) tablet 650 mg  650 mg Oral Q6HRS PRN Declan Pierce M.D.   650 mg at  12/28/18 0028   • glucose 4 g chewable tablet 16 g  16 g Oral Q15 MIN PRN Declan Pierce M.D.        And   • dextrose 50% (D50W) injection 25 mL  25 mL Intravenous Q15 MIN PRN Declan Pierce M.D.       • NS infusion   Intravenous Continuous Declan Pierce M.D. 100 mL/hr at 12/28/18 0148     • NS (BOLUS) infusion 500 mL  500 mL Intravenous Once PRN Declan Pierce M.D.       • cefTRIAXone (ROCEPHIN) 2 g in  mL IVPB  2 g Intravenous Q24HRS Declan Pierce M.D.       • phytonadione (MEPHYTON) tablet 5 mg  5 mg Oral Once Declan Pierce M.D.           Allergies  No Known Allergies    Vital Signs last 24 hours  Temp:  [35.9 °C (96.6 °F)-36.3 °C (97.3 °F)] 36.3 °C (97.3 °F)  Pulse:  [] 116  Resp:  [13-23] 19    Physical Exam  Physical Exam   Constitutional: She is oriented to person, place, and time.   Frail elderly female   HENT:   Mouth/Throat: No oropharyngeal exudate.   Dry mucous member   Eyes: Pupils are equal, round, and reactive to light. EOM are normal.   Neck: Normal range of motion. Neck supple. No JVD present.   Cardiovascular: Normal rate, regular rhythm and normal heart sounds.    No murmur heard.  Pulmonary/Chest: Effort normal and breath sounds normal. No respiratory distress. She has no wheezes. She has no rales.   Abdominal: Soft. She exhibits no distension. There is no tenderness. There is no rebound and no guarding.   Musculoskeletal: Normal range of motion. She exhibits no edema.   Neurological: She is alert and oriented to person, place, and time. No cranial nerve deficit.   Skin: Skin is warm and dry.   Bruising to back wrapping around bilateral flank  Abdominal bruising  No jaida hematoma   Psychiatric: She has a normal mood and affect. Her behavior is normal.       Fluids  No intake or output data in the 24 hours ending 12/28/18 0306    Laboratory  Recent Results (from the past 48 hour(s))   EKG (NOW)    Collection Time: 12/27/18  8:50 PM   Result Value Ref  Range    Report       [facility] of the [facility] Emergency Dept.    Test Date:  2018  Pt Name:    VICENTE FRANCO           Department: ER  MRN:        [id number]                      Room:       BL 22  Gender:     Female                       Technician: [id number]  :        1945                   Requested By:ER TRIAGE PROTOCOL  Order #:    [id number]                    Reading MD:    Measurements  Intervals                                Axis  Rate:       104                          P:          98  AL:         132                          QRS:        -48  QRSD:       120                          T:          114  QT:         384  QTc:        506    Interpretive Statements  SINUS TACHYCARDIA  MULTIFORM VENTRICULAR PREMATURE COMPLEXES  NONSPECIFIC IVCD WITH LAD  CONSIDER ANTEROSEPTAL INFARCT  Compared to ECG 10/22/2018 12:18:58  Ventricular premature complex(es) now present  Myocardial infarct finding now present  Sinus rhythm no longer present  Left ventricular hypertrophy no longer present   Early repolarization no longer present     APTT    Collection Time: 18  9:17 PM   Result Value Ref Range    APTT 72.5 (H) 24.7 - 36.0 sec   PROTHROMBIN TIME (INR)    Collection Time: 18  9:17 PM   Result Value Ref Range    PT >120.0 (HH) 12.0 - 14.6 sec    INR >=10.00 (HH) 0.87 - 1.13   LACTIC ACID    Collection Time: 18  9:17 PM   Result Value Ref Range    Lactic Acid 2.7 (H) 0.5 - 2.0 mmol/L   ACCU-CHEK GLUCOSE    Collection Time: 18  9:26 PM   Result Value Ref Range    Glucose - Accu-Ck 54 (L) 65 - 99 mg/dL   ACCU-CHEK GLUCOSE    Collection Time: 18 11:47 PM   Result Value Ref Range    Glucose - Accu-Ck 172 (H) 65 - 99 mg/dL   Lactic Acid Every four hours after STAT order    Collection Time: 18  2:00 AM   Result Value Ref Range    Lactic Acid 2.3 (H) 0.5 - 2.0 mmol/L       Imaging  No orders to display       Assessment/Plan  End stage renal disease (HCC)- (present on  admission)   Assessment & Plan    End-stage renal disease currently without volume overload  Discussed with nephrology  Monitor electrolytes  Renally adjusted medications     COPD (chronic obstructive pulmonary disease) ([facility])- (present on admission)   Assessment & Plan    None acute exacerbation  Chronically on 2 L  Titrate to saturation goal 90-92%  Duo nebs as needed       Supratherapeutic INR   Assessment & Plan    Undetected INR  Give FFP times 2 unit  Agree with oral vitamin K  Continue to monitor for any source of bleeding  Fall precaution     Hypoglycemia   Assessment & Plan    Likely from poor oral intake versus infectious driven  Continue dextrose infusion  Wean once taking oral supplementation     Fall from ground level   Assessment & Plan    Mechanical fall  PT eval prior to discharge to evaluate gait and safety     Sepsis ([facility])- (present on admission)   Assessment & Plan    This is severe sepsis with the following associated acute organ dysfunction(s): disseminated intravascular coagulopathy (DIC).     Patient with leukocytosis hypotension and immunocompromised from end-stage renal disease  Broad-spectrum antibiotics  Rapid de-escalation  Chest x-ray unremarkable  Likely all from dehydration  Rule out C. difficile start oral vancomycin pending lab         Discussed patient condition and risk of morbidity and/or mortality with Family, RN and [facility] resident.    The patient remains critically ill severe sepsis coagulopathy requiring blood transfusions and hypoglycemia requiring continuous dextrose infusion.  Critical care time = 55 minutes in directly providing and coordinating critical care and extensive data review.  No time overlap and excludes procedures.

## 2018-12-28 NOTE — PROGRESS NOTES
.2 and INR >10. Dr. Omalley with ICU notified, no new orders at this time. Orders previous placed for 2 units of FFP, type and screen pending. Consent obtained by Dr. Jones of [facility].

## 2018-12-28 NOTE — DIETARY
"Nutrition Support Assessment     Day 1 of admit.  Kristyn Gillesipe is a 73 y.o. female with admitting DX of Sepsis (HCC), Fall from ground level     Current problem list:  1. GLF @ home, down for ~12 hours (family out of town).   2. Hypoglycemia and supratherapeutic INR on admit.  3. ESRD, missed HD  due to transportation.  4. Diarrhea, R/o C-diff.  5. Recent hospitalization - for sepsis related to dialysis cath.  6. H/o COPD  7. AMS     Assessment:  Estimated Nutritional Needs: based on:   Height: 160 cm (5' 3\")  Weight: 56.5 kg (124 lb 9 oz)  Ideal Body Weight: 52.2 kg (115 lb)  Percent Ideal Body Weight: 108.3  Body mass index is 22.06 kg/m².    Calculation/Equation: REE per MSJ x1.2 = 1242 kcal/day  Total Calories / day: 1250 - 1400 (Calories / k - 25)  Total Grams Protein / day: 68 - 85 (Grams Protein / k.2 - 1.5)     Evaluation:   1. Pt with AMS, unable to safely take PO diet. Cortrak placed and TF to start.  2. Labs and meds reviewed.  3. Standard TF formula will meet needs.      Malnutrition Risk: Pt appears malnourished, especially head/face and hands. However, no indication of poor PO intake or weight loss per nutritional admit screen. Therefore, no formal Dx of malnutrition per criteria.      Recommendations/Plan:  1. Start Replete with Fiber @ 25 ml/hr and advance per protocol to goal rate 55 ml/hr to provide 1320 kcal, 84 grams protein, and 1096 ml free water per day.  2. Fluids per MD.   3. PO diet when safe and appropriate.     RD following.         "

## 2018-12-28 NOTE — PROGRESS NOTES
Pt arrived to room Room T 620 from ED. PT transported by HILDA bragg with cardiac monitor, IVF, and oxygen. Pt accompanied by ACLS RN and CNA. Pt transferred to ICU bed and placed on monitor. VSS at this time.

## 2018-12-28 NOTE — ASSESSMENT & PLAN NOTE
-This is severe sepsis with the following associated acute organ dysfunction(s): acute respiratory failure.   -Recently DC on 12/16 for MSSA sepsis believed to be from [facility]   -3/4 SIRS on arrival (HR, WBC, RR) with hypotension and lactic acidosis  -Started on C3 and sepsis appropriate IVF at OSH  -Blood cx and procal pending  -Continue C3 and IVF  -Trend lactate  -C diff testing for diarrhea and recent abx use  -CTM on tele floor with cardiac monitor

## 2018-12-28 NOTE — PROGRESS NOTES
Previous verde catheter removed and replaced with new verde catheter per protocol. Pt tolerated well.

## 2018-12-28 NOTE — PROGRESS NOTES
RN notified Dr. Omalley that pt is on unit. Dr. Omalley states he may enter additonal orders. No new orders at this time.

## 2018-12-28 NOTE — ASSESSMENT & PLAN NOTE
On chronic 2 L/min nasal cannula 24 hours a day --> mild exacerbation 1/1/2019  RT/O2 protocols with scheduled and PRN bronchodilators  Wean systemic steroids again today to daily  Titrate oxygen to goal SaO2 between 88 and 92%

## 2018-12-28 NOTE — PROGRESS NOTES
[facility] ICU Progress Note      Admit Date: 12/27/2018  ICU Day: 1  LOS: 1    Resident(s): Shari Torres   Attending: MARK PAGE/ Dr. Zhong     Date & Time:   12/28/2018   11:46 AM       Patient ID:    Name:             Kristyn Gillespie     YOB: 1945  Age:                 73 y.o.  female   MRN:               [id number]      HPI:   A 72 yo F with a PMH of ESRD (HD on MWF), HTN, HLD, Anemia in CKD, COPD (on 2 lpm O2 baseline, no PFTs), Hx of CVA, DVT (on vascath, on coumadin), mitral regurgitation s/p clip, PAD, migraines admitted after GLF. Pt was found to have Hypotension, Hypoxia, Hypoglycemia & supra therapeutic INR > 10. Also 3/4 SIRS (WBC of 30, HR, RR) & elevated lactic acid. She was started on C3, D5W, and given sepsis appropriate IVF and then admitted to the ICU for further mx.      Consultants:  Nephrology   PMA:     Interval Events:  - Admitted overnight for  Hypotension, Hypoxia, Hypoglycemia & supra therapeutic INR > 10.  - s/p Vit K & FFP 2 units   - Nephrology consulted for HD ( ESRD on HD MWF, missed HD this Wednesday )  - HD today  - continue C3 for recent MSSA bacteremia  & Vanco for Diarrhea with recent antibiotic use ( C Diff study pending )  - low dose Ativan for anxiety   - PT / OT / SLP / Case Mx consulted     Physical Exam:  Constitutional: AAO x 2 . She appears unhealthy. Temporal muscle wasting noted.   HENT:   Head: Normocephalic and atraumatic.   Eyes: Pupils are equal, round, and reactive to light.   Cardiovascular: Regular rhythm.  Tachycardia present.    Pulmonary/Chest: Effort normal. She has decreased breath sounds. She has no wheezes. She has no rhonchi. She has no rales.   Abdominal: Soft. She exhibits no distension. There is tenderness.   Musculoskeletal: She exhibits edema. She exhibits no tenderness.   Neurological: She is AAO x 2. Motor 3/5, sensation & CN 2-12 grossly intact.   Skin: Skin is warm and dry.     Respiratory:     Respiration: 17, Pulse Oximetry: 92 %,  O2 Daily Delivery Respiratory : OxyMask    HemoDynamics:  Pulse: 82, Heart Rate (Monitored): 85 Blood Pressure : 111/45, NIBP: 118/57      Fluids:        Intake/Output Summary (Last 24 hours) at 18 1146  Last data filed at 18 0600   Gross per 24 hour   Intake          1677.33 ml   Output                5 ml   Net          1672.33 ml       Weight: 56.5 kg (124 lb 9 oz)  Body mass index is 22.06 kg/m².    Recent Labs      18   0410   SODIUM  132*   POTASSIUM  4.1   CHLORIDE  96   CO2  21   BUN  61*   CREATININE  5.66*   MAGNESIUM  2.0   CALCIUM  8.8       GI/Nutrition:  Recent Labs      18   0410   ALTSGPT  <5   ASTSGOT  43   ALKPHOSPHAT  112*   TBILIRUBIN  0.4   GLUCOSE  111*       Heme:  Recent Labs      18   2117  18   0200  18   0645  18   0800   RBC   --    --   2.75*   --    HEMOGLOBIN   --    --   8.6*   --    HEMATOCRIT   --    --   26.3*   --    PLATELETCT   --    --   162*   --    PROTHROMBTM  >120.0*  117.3*   --   42.6*   APTT  72.5*   --    --    --    INR  >=10.00*  >=10.00*   --   4.49*       Infectious Disease:  Temp  Av.3 °C (97.4 °F)  Min: 35.9 °C (96.6 °F)  Max: 36.5 °C (97.7 °F)  Recent Labs      18   0645   WBC   --   17.8*   NEUTSPOLYS   --   82.90*   LYMPHOCYTES   --   9.20*   MONOCYTES   --   5.30   EOSINOPHILS   --   0.10   BASOPHILS   --   0.30   ASTSGOT  43   --    ALTSGPT  <5   --    ALKPHOSPHAT  112*   --    TBILIRUBIN  0.4   --        Meds:  • Pharmacy  1 Each     • vancomycin 50 mg/mL  125 mg     • NS       • LORazepam  0.5 mg     • dextrose 10%   50 mL/hr at 18 1030   • [START ON 2018] epoetin seven for hemodialysis  50 Units/kg     • Respiratory Care per Protocol       • hydrALAZINE  10 mg     • acetaminophen  650 mg     • glucose 4 g  16 g      And   • dextrose 50%  25 mL     • NS   Stopped (18 1021)   • NS  500 mL     • cefTRIAXone (ROCEPHIN) IV  2 g Stopped (18 6821)        Imaging:    No orders to display         Procedures:  None    Problem and Plan:  Supratherapeutic INR  -INR >10 on arrival, scattered ecchymosis on back likely 2/2 GLF, no signs of active bleeding   -On Coumadin for vascath thrombus and recently missed HD  -Hold coumadin  -Started on PO vit K and given 2 units of FFP  -CTM INR  - INR this AM 4.49     Sepsis ([facility])  -This is severe sepsis with the following associated acute organ dysfunction(s): acute respiratory failure.   -Recently DC on 12/16 for MSSA sepsis believed to be from vascath   -3/4 SIRS on arrival (HR, WBC, RR) with hypotension and lactic acidosis  -Started on C3 and sepsis appropriate IVF at OSH  -Blood cx and procal pending  -Continue C3   -Trend lactate  -C diff testing for diarrhea and recent abx use, started on oral Vanco  -CTM      Fall from ground level  -Mechanical in description, notes LOC but after laying on floor for considerable time  -Scattered ecchymosis on back/abd with correlating tenderness  -CPK mildly elevated at OSH will order repeat  -PT/OT consult  -SW consult      End stage renal disease (HCC)  -Undergoes HD on MWF schedule  -Missed W apt due to transportation issues  -Lytes wnl aside from slight hyponatremia  -Mild acidosis likely due to lactate elevation, Urea only 55, no signs of AMS,  no signs of severe fluid overload  -CTM lytes and renal function  - Nephrology consulted. HD today.      COPD (chronic obstructive pulmonary disease) ([facility])  -No PFTs but strong smoking hx  -2 lpm O2 at baseline  -RT protocol, O2 therapy, pulse ox        Acute on chronic respiratory failure (HCC)  -Chronic COPD on 2 lpm O2 continuously   -Required up to 10 lpm on admission  -Missed W HD but does not appear overloaded   -CXR from OSH does not suggest PNA but pt was septic with hypotension on arrival  -Not wheezy on exam, pt denies SOB/worsened cough  -RT protocol, O2 therapy, pulse ox  -Started on C3 based on recent MSSA bacteremia       Hypoglycemia  -Presented to OSH with BG <50  -Given D5W and sugar went to 400s but then shortly after went down to <50 once again  -Noted to be low in ED as well and required further D5W  -Hypoglycemia protocol with Q2H Accuchecks    Malnutrition:   Thin elderly pt , temporal muscle wasting noted   Nutrition on board         Anticipated Hospital stay:  >2 midnights          DISPO: ICU    CODE STATUS: Full Code     Quality Measures:  Velez Catheter: Yes  DVT Prophylaxis: SCDs ( INR > 10 on Admit )  Ulcer Prophylaxis:None  Antibiotics: C3 & Vanco  Lines: PIV

## 2018-12-28 NOTE — PROGRESS NOTES
Dr. Omalley with ICU and Dr. Jones with [facility] at bedside to evaluate pt. Pt's family at bedside and updated on pt's condition and plan of care.

## 2018-12-28 NOTE — ED TRIAGE NOTES
Pt BIB EMS as a transfer from [place]. Pt was found on bathroom floor and per EMS had a BS of 36. Pt received 1 amp D50 and oral glucose bringing her sugar to 130's. Pt is A&O4 on arrival with simple mask at 10 L. Pt is cold and not getting accurate reading on pulse ox. Pt received abx at previous facility. Pt has hx of afib. BP in low 90's on arrival.

## 2018-12-28 NOTE — PROGRESS NOTES
Cortrak Placement    Tube Team verified patient name and medical record number prior to tube placement.  Cortrak tube (43 inches, 10 Lebanese) placed at 52 cm in right nare.  Per Cortrak picture, tube appears to be in the stomach.  Nursing Instructions: Awaiting KUB to confirm placement before use for medications or feeding. Once placement confirmed, flush tube with 30 ml of water, and then remove and save stylet, in patient medication drawer.

## 2018-12-28 NOTE — ASSESSMENT & PLAN NOTE
-Undergoes HD on MWF schedule  -Missed W apt due to transportation issues  -Lytes wnl aside from slight hyponatremia  -Mild acidosis likely due to lactate elevation, Urea only 55, no signs of AMS,  no signs of severe fluid overload  -Consult nephro in AM for HD  -CTM lytes and renal function

## 2018-12-28 NOTE — ASSESSMENT & PLAN NOTE
-Presented to OSH with BG <50  -Given D5W and sugar went to 400s but then shortly after went down to <50 once again  -Noted to be low in ED as well and required further D5W  -Hypoglycemia protocol with Q2H Accuchecks

## 2018-12-28 NOTE — CARE PLAN
Problem: Safety  Goal: Will remain free from injury  Outcome: PROGRESSING SLOWER THAN EXPECTED  Pt is alert and oriented x4 and calls for help appropriately. Call light is within reach, side rails are up x3, and pt is wearing non-slip footwear.     Problem: Respiratory:  Goal: Respiratory status will improve  Outcome: PROGRESSING AS EXPECTED  Pt is currently on home oxygen settings at 2 lpm. Oxygen saturation is within desired parameters and pt denies shortness of breath.

## 2018-12-28 NOTE — ED PROVIDER NOTES
ED Provider Note    Scribed for Javan Oh M.D. by Jules Maddox. 12/27/2018  8:58 PM    Primary care provider: Rosa LACEY M.D.  Means of arrival: Ambulance  History obtained from: Patient  History limited by: None    CHIEF COMPLAINT  Chief Complaint   Patient presents with   • Sent by MD     transfer from [place]   • Abnormal Labs     pt missed a day of dialysis causing labs to be off       HPI  Kristyn Gillespie is a 73 y.o. female who was brought to the Emergency Department from [place] by EMS for evaluation of a GLF. Patient lives in [place] with her son and daughter and she was found on her bathroom floor and per EMS had a BS of 36. Patient's family went out of town for the night and patient tripped yesterday and remained on the floor for 12 hours. During that time, patient did not have access to her O2. Her daughter reports that patient normally is on 2 L of O2. Associated symptoms include back pain and diarrhea. Patient states that she passes urine once a day. She denies any AMS. Patient was treated with 1 amp D50 and oral glucose and her BS returned to the 130's. She has a history of afib. Patient is regularly dialyzed but missed a recent day of dialysis which skewed her labs at [place].     REVIEW OF SYSTEMS  Pertinent positives include: back pain and diarrhea. Pertinent negatives include: AMS. See history of present illness. All other systems are negative.     PAST MEDICAL HISTORY   has a past medical history of Anemia (11/11/2016); Arthritis; Breath shortness; Bronchitis; COPD (chronic obstructive pulmonary disease) ([facility]) (9/18/2009); Dental disorder; Emphysema of lung ([facility]); Epilepsy ([facility]) (9/18/2009); Heart valve disease; Jaundice; Migraines, neuralgic (9/18/2009); Osteoarthritis (9/18/2009); Oxygen dependent; Peripheral edema (9/18/2009); Renal insufficiency (8/5/2016); Stroke ([facility]); Tobacco use disorder (9/18/2009); and Unspecified essential hypertension (9/18/2009).    SURGICAL HISTORY   has a  "past surgical history that includes abdominal hysterectomy total; gastroscopy with biopsy (11/15/2016); other cardiac surgery (06/2017); and av fistula creation (Right, 12/8/2017).    SOCIAL HISTORY  Social History   Substance Use Topics   • Smoking status: Former Smoker     Packs/day: 1.00     Years: 40.00     Types: Cigarettes     Quit date: 11/24/2016   • Smokeless tobacco: Never Used   • Alcohol use No      History   Drug Use No       FAMILY HISTORY  Family History   Problem Relation Age of Onset   • Cancer Mother         breast   • Heart Disease Father    • Heart Failure Father        CURRENT MEDICATIONS  Home Medications     Reviewed by Suzanne Reagan, Pharmacy Intern (Pharmacy Intern) on 12/27/18 at 2250  Med List Status: Complete   Medication Last Dose Status   asa/apap/caffeine (EXCEDRIN) 250-250-65 MG Tab 12/26/2018 Active   ceFAZolin in dextrose (ANCEF) 1 g/50mL Solution 12/24/2018 Active   famotidine (PEPCID) 20 MG Tab 12/24/2018 Active   ferrous sulfate 325 (65 Fe) MG tablet 12/26/2018 Active   lovastatin (MEVACOR) 10 MG tablet 12/26/2018 Active   sucralfate (CARAFATE) 1 GM Tab 12/24/2018 Active   warfarin (COUMADIN) 6 MG Tab 12/24/2018 Active                ALLERGIES  No Known Allergies    PHYSICAL EXAM  VITAL SIGNS: Temp 35.9 °C (96.6 °F) (Temporal)   Resp 17   Ht 1.6 m (5' 3\")   Wt 59.9 kg (132 lb)   BMI 23.38 kg/m²     Constitutional: Alert in no apparent distress, chronically ill-appearing.   HENT: No signs of trauma, Bilateral external ears normal, Nose normal. Uvula midline.   Eyes: Pupils are equal and reactive, Conjunctiva normal, Non-icteric.   Neck: Normal range of motion, No tenderness, Supple, No stridor.   Lymphatic: No lymphadenopathy noted.   Cardiovascular: Regular rate and rhythm, no murmurs.   Thorax & Lungs: Normal breath sounds, No respiratory distress, No wheezing, left-sided anterior chest cord with no surrounding erythema or edema.   Abdomen: Bowel sounds normal, Soft, No " tenderness, No peritoneal signs, No masses, No pulsatile masses.   Skin: Warm, Dry, No erythema, No rash.   Back: No bony tenderness, No CVA tenderness, diffuse ecchymosis above T-2 and spans her entire back, no evidence of subulcers forming.   Extremities: Intact distal pulses, No edema, No tenderness, No cyanosis.  Musculoskeletal: Good range of motion in all major joints. No tenderness to palpation or major deformities noted.   Neurologic: Alert , Normal motor function, Normal sensory function, No focal deficits noted.   Psychiatric: Affect normal, Judgment normal, Mood normal.     DIAGNOSTIC STUDIES / PROCEDURES    LABS  Labs Reviewed   APTT - Abnormal; Notable for the following:        Result Value    APTT 72.5 (*)     All other components within normal limits    Narrative:     Indicate which anticoagulants the patient is on:->UNKNOWN   PROTHROMBIN TIME - Abnormal; Notable for the following:     PT >120.0 (*)     INR >=10.00 (*)     All other components within normal limits    Narrative:     Indicate which anticoagulants the patient is on:->UNKNOWN   LACTIC ACID - Abnormal; Notable for the following:     Lactic Acid 2.7 (*)     All other components within normal limits    Narrative:     Indicate which anticoagulants the patient is on:->UNKNOWN   ACCU-CHEK GLUCOSE - Abnormal; Notable for the following:     Glucose - Accu-Ck 54 (*)     All other components within normal limits   ACCU-CHEK GLUCOSE - Abnormal; Notable for the following:     Glucose - Accu-Ck 172 (*)     All other components within normal limits   CULTURE RESPIRATORY W/ GRM STN   CREATINE KINASE   BLOOD CULTURE   BLOOD CULTURE   URINALYSIS   PROCALCITONIN   LACTIC ACID   LACTIC ACID   CBC WITH DIFFERENTIAL   COMP METABOLIC PANEL   MAGNESIUM      All labs reviewed by me.    EKG  12 Lead EKG interpreted by me to show:  Indication: Arrythmia  Sinus Tachycardia  Rate 104  Axis: Left axis deviation with IVCV  Intervals: Normal  Normal T waves  Normal ST  segments  No significant change from prior EKG performed on 10/22/18   My impression of this EKG: No STEMI.     COURSE & MEDICAL DECISION MAKING  Nursing notes, VS, PMSFHx reviewed in chart.    73 y.o. female p/w chief complaint of syncopal episode. Patient found to be hypoglycemic. Unknown etiology of hypoglycemia, perhaps secondary to sepsis.     8:58 PM Patient seen and examined at bedside. Informed patient that we would order lab studies to further evaluate and she is agreeable. Ordered EKG.     9:15 PM - Reviewed [facility] medical records. Potassium was normal. PH was 7.47, pCO2 was 27 mmHg, PO2 was 60 mmHg, HCO3 was 19 mmol/L, Base Excess was -3.30 mmol/mL, and O2 Sat was 88% earlier today at 1559. CTH showed no ICH. Patient was treated with 2 mg of Vitamin K to reduce her coagulopathy and treat her elevated INR. Patient was discharged on 12/26 from [facility] following treatment of staph aureus bacteremia.     9:28 PM - Patient was reevaluated at bedside. Informed the patient's family that due to her recent infection, it would be best if she were admitted to find the etiology of her symptoms. Patient and her daughter are agreeable with admittance.     9:29 PM - [facility] paged.     The total critical care time on this patient is 40 minutes, resuscitating patient, speaking with admitting physician, and deciphering test results. This 40 minutes is exclusive of separately billable procedures.     #hypoglycemia  Unclear etiology   May be 2/2 sepsis/ bacteremia unresolved vs medication induced but pt denies recent medication change or insulin usage.  Antibx given at OSH  Patient started on D10 drip after subsequent drop in glucose despite initial D50 bolus  Will admit to ICU for every hour glucose checks or sooner on drip    #Elevated INR  Patient given 2 mcg of vitamin K at outside facility    Patient with no evidence of ICH and no evidence of expanding hematoma  Decision made in the emergency department to  withhold further intervention at this time however will defer to ICU team      DISPOSITION:  Patient will be [facility] to [facility] in critical condition.    FINAL IMPRESSION  1. Fall, initial encounter    2. Leukocytosis, unspecified type    3. Hypoglycemia    4. Elevated INR    5. Closed head injury, initial encounter    6. Elevated CK         Critical Care Time of 40 minutes was spent treating the patient. This 40 minutes is exclusive of separately billable procedures.      Jules HARP (Scribe), am scribing for, and in the presence of, Javan Oh M.D..    Electronically signed by: Jules Maddox (Scribe), 12/27/2018    Javan HARP M.D. personally performed the services described in this documentation, as scribed by Jules Maddox in my presence, and it is both accurate and complete. C.     The note accurately reflects work and decisions made by me.  Javan Oh  12/28/2018  3:18 AM

## 2018-12-28 NOTE — ASSESSMENT & PLAN NOTE
HD prior schedule Monday/Wednesday/Friday --> on hold indefinitely  Patient pulled out hemodialysis catheter on 12/31  Nephro following

## 2018-12-28 NOTE — ED NOTES
Med rec complete per pt at bedside   NKDA  Pt is on ABX infusion on Monday, Wednesday, and Friday (missed Wednesday appt)  Pt also states she hasn't remembered to take her new medications since Monday 12/24/18  New medications:  Warfarin 6mg QD  Pepcid 20mg QD  Sucralfate 1g Q 6 hours

## 2018-12-28 NOTE — ASSESSMENT & PLAN NOTE
This is severe sepsis with the following associated acute organ dysfunction(s): disseminated intravascular coagulopathy (DIC).   Source = C. difficile colitis    Improved status post sepsis protocol and fluid resuscitation  Continue antibiotics

## 2018-12-28 NOTE — H&P
Critical Care Medicine Admitting History and Physical    Note Author: Declan Pierce M.D.       Name Kristyn Gillespie 1945   Age/Sex 73 y.o. female   MRN [id number]   Code Status Full     After 5PM or if no immediate response to page, please call for cross-coverage  Attending/Team: Dr Omalley/Miguel See Patient List for primary contact information  Call [phone] to page    1st Call - Day Intern (R1):   Stan        Chief Complaint:  GLF    HPI:   Pt is a 74 yo F with a PMH of ESRD (HD on MWF), HTN, HLD, Anemia in CKD, COPD (on 2 lpm O2 baseline, no PFTs), Hx of CVA, DVT (on vascath, on coumadin), mitral regurgitation s/p clip, PAD, migraines that presented due to GLF. Fall is described as mechanical after tripping over yarn in trailer, pt recalls whole fall denies hitting head, laid on ground for 12 hours and had LOC at some point several hours into this. Pt notes sever ecchymosis of back and abd with corresponding pain in those areas. She denies any other concerns or complaints. She recently missed her Wed apt for dialysis. She was recently hospitalized from  to for MSSA sepsis and a thrombus attached to her vascath. She was started on coumadin and DC'd on cefazolin based on blood cx. She was initially transported to OSH but sent to [facility] ED due to lab abnormalities.    In ED pt had 3/4 SIRS (WBC of 30, HR, RR), coupled wit hypotension, hypoxia, and hypoglycemia. Labs showed stable chronic anemia and ESRD as well as slight hyponatremia and metabolic acidosis. Lactate elevated at 2.7 but trended down to 2.3. CPK was 347. CT head and CXR at OSH were wnl. INR was >10 so pt was given PO vit K. She was started on C3, D5W, and given sepsis appropriate IVF and then admitted to the ICU for further mgmt of her conditions.       Review of Systems   Constitutional: Positive for malaise/fatigue. Negative for chills and fever.   HENT: Negative for congestion and sore throat.     Respiratory: Negative for cough and shortness of breath.    Cardiovascular: Negative for chest pain and leg swelling.   Gastrointestinal: Positive for abdominal pain and diarrhea. Negative for constipation, nausea and vomiting.   Genitourinary: Negative for dysuria.        Oliguria   Musculoskeletal: Positive for back pain, falls, joint pain and myalgias.   Neurological: Positive for loss of consciousness and weakness. Negative for sensory change, speech change and focal weakness.             Past Medical History:   Past Medical History:   Diagnosis Date   • Anemia 11/11/2016   • Arthritis     osteo-knees   • Breath shortness     02 2 LTR cont   • Bronchitis    • COPD (chronic obstructive pulmonary disease) ([facility]) 9/18/2009   • Dental disorder     missing teeth   • Emphysema of lung ([facility])    • Epilepsy ([facility]) 9/18/2009   • Heart valve disease     mitral valve clip   • Jaundice     at birth   • Migraines, neuralgic 9/18/2009   • Osteoarthritis 9/18/2009   • Oxygen dependent     2 LTR cont   • Peripheral edema 9/18/2009   • Renal insufficiency 8/5/2016    ESRD   • Stroke ([facility])    • Tobacco use disorder 9/18/2009   • Unspecified essential hypertension 9/18/2009       Past Surgical History:  Past Surgical History:   Procedure Laterality Date   • AV FISTULA CREATION Right 12/8/2017    Procedure: AV FISTULA CREATION- DISTAL RADIAL CEPHALIC FOREARM;  Surgeon: Fidel Romero M.D.;  Location: SURGERY [facility];  Service: General   • OTHER CARDIAC SURGERY  06/2017    mitral valve clip   • GASTROSCOPY WITH BIOPSY  11/15/2016    Procedure: GASTROSCOPY WITH BIOPSY;  Surgeon: Guzman Olson M.D.;  Location: ENDOSCOPY [facility];  Service:    • ABDOMINAL HYSTERECTOMY TOTAL      endometriosis       Current Outpatient Medications:  Home Medications     Reviewed by Suzanne Reagan, Pharmacy Intern (Pharmacy Intern) on 12/27/18 at 2250  Med List Status: Complete   Medication Last Dose Status   asa/apap/caffeine  "(EXCEDRIN) 250-250-65 MG Tab 12/26/2018 Active   ceFAZolin in dextrose (ANCEF) 1 g/50mL Solution 12/24/2018 Active   famotidine (PEPCID) 20 MG Tab 12/24/2018 Active   ferrous sulfate 325 (65 Fe) MG tablet 12/26/2018 Active   lovastatin (MEVACOR) 10 MG tablet 12/26/2018 Active   sucralfate (CARAFATE) 1 GM Tab 12/24/2018 Active   warfarin (COUMADIN) 6 MG Tab 12/24/2018 Active                Medication Allergy/Sensitivities:  No Known Allergies      Family History:  Family History   Problem Relation Age of Onset   • Cancer Mother         breast   • Heart Disease Father    • Heart Failure Father        Social History:  Social History     Social History   • Marital status:      Spouse name: N/A   • Number of children: N/A   • Years of education: N/A     Occupational History   • Not on file.     Social History Main Topics   • Smoking status: Former Smoker     Packs/day: 1.00     Years: 40.00     Types: Cigarettes     Quit date: 11/24/2016   • Smokeless tobacco: Never Used   • Alcohol use No   • Drug use: No   • Sexual activity: No      Comment:      Other Topics Concern   • Not on file     Social History Narrative   • No narrative on file     Living situation: home  PCP : Rosa LACEY M.D.      Physical Exam     Vitals:    12/27/18 2301 12/27/18 2315 12/28/18 0016 12/28/18 0032   Pulse: 81 (!) 119 (!) 114 93   Resp:  16     Temp:       TempSrc:       SpO2: 100% 99% 95% 96%   Weight:       Height:         Body mass index is 23.38 kg/m².  Pulse 93   Temp 35.9 °C (96.6 °F) (Temporal)   Resp 16   Ht 1.6 m (5' 3\")   Wt 59.9 kg (132 lb)   SpO2 96%   BMI 23.38 kg/m²   O2 therapy: Pulse Oximetry: 96 %    Physical Exam   Constitutional: She is oriented to person, place, and time. She appears unhealthy.   HENT:   Head: Normocephalic and atraumatic.   Eyes: Pupils are equal, round, and reactive to light.   Cardiovascular: Regular rhythm.  Tachycardia present.    Pulmonary/Chest: Effort normal. She has " decreased breath sounds. She has no wheezes. She has no rhonchi. She has no rales.   Abdominal: Soft. She exhibits no distension. There is tenderness.   Musculoskeletal: She exhibits edema. She exhibits no tenderness.   Neurological: She is alert and oriented to person, place, and time. GCS score is 15.   Skin: Skin is warm and dry.         Data Review       Old Records Request:   Completed  Current Records review/summary: Completed    Lab Data Review:  Recent Results (from the past 24 hour(s))   EKG (NOW)    Collection Time: 18  8:50 PM   Result Value Ref Range    Report       [facility] Emergency Dept.    Test Date:  2018  Pt Name:    VICENTE FRANCO           Department: ER  MRN:        [id number]                      Room:        22  Gender:     Female                       Technician: [id number]  :        1945                   Requested By:ER TRIAGE PROTOCOL  Order #:    [id number]                    Reading MD:    Measurements  Intervals                                Axis  Rate:       104                          P:          98  GA:         132                          QRS:        -48  QRSD:       120                          T:          114  QT:         384  QTc:        506    Interpretive Statements  SINUS TACHYCARDIA  MULTIFORM VENTRICULAR PREMATURE COMPLEXES  NONSPECIFIC IVCD WITH LAD  CONSIDER ANTEROSEPTAL INFARCT  Compared to ECG 10/22/2018 12:18:58  Ventricular premature complex(es) now present  Myocardial infarct finding now present  Sinus rhythm no longer present  Left ventricular hypertrophy no longer present   Early repolarization no longer present     APTT    Collection Time: 18  9:17 PM   Result Value Ref Range    APTT 72.5 (H) 24.7 - 36.0 sec   PROTHROMBIN TIME (INR)    Collection Time: 18  9:17 PM   Result Value Ref Range    PT >120.0 (HH) 12.0 - 14.6 sec    INR >=10.00 (HH) 0.87 - 1.13   LACTIC ACID    Collection Time: 18  9:17 PM    Result Value Ref Range    Lactic Acid 2.7 (H) 0.5 - 2.0 mmol/L   ACCU-CHEK GLUCOSE    Collection Time: 12/27/18  9:26 PM   Result Value Ref Range    Glucose - Accu-Ck 54 (L) 65 - 99 mg/dL   ACCU-CHEK GLUCOSE    Collection Time: 12/27/18 11:47 PM   Result Value Ref Range    Glucose - Accu-Ck 172 (H) 65 - 99 mg/dL       Imaging/Procedures Review:    Independant Imaging Review: Completed  No orders to display          EKG:   EKG Independant Review: Completed  QTc:506, HR: 104, Sinus Tach    Records reviewed and summarized in current documentation :  Yes  [employer] teaching service handout given to patient:  No             Assessment/Plan     Supratherapeutic INR  -INR >10 on arrival, scattered ecchymosis on back likely 2/2 GLF, no signs of active bleeding   -On Coumadin for vascath thrombus and recently missed HD  -Hold coumadin  -Started on PO vit K and given 2 units of FFP  -CTM INR    Sepsis (HCC)  -This is severe sepsis with the following associated acute organ dysfunction(s): acute respiratory failure.   -Recently DC on 12/16 for MSSA sepsis believed to be from vascath   -3/4 SIRS on arrival (HR, WBC, RR) with hypotension and lactic acidosis  -Started on C3 and sepsis appropriate IVF at OSH  -Blood cx and procal pending  -Continue C3 and IVF  -Trend lactate  -C diff testing for diarrhea and recent abx use  -CTM on tele floor with cardiac monitor    Fall from ground level  -Mechanical in description, notes LOC but after laying on floor for considerable time  -Scattered ecchymosis on back/abd with correlating tenderness  -CPK mildly elevated at OSH will order repeat  -PT/OT consult  -SW consult     End stage renal disease (HCC)  -Undergoes HD on MWF schedule  -Missed W apt due to transportation issues  -Lytes wnl aside from slight hyponatremia  -Mild acidosis likely due to lactate elevation, Urea only 55, no signs of AMS,  no signs of severe fluid overload  -Consult nephro in AM for HD  -CTM lytes and renal  function    COPD (chronic obstructive pulmonary disease) (HCC)  -No PFTs but strong smoking hx  -2 lpm O2 at baseline  -RT protocol, O2 therapy, pulse ox      Acute on chronic respiratory failure (HCC)  -Chronic COPD on 2 lpm O2 continuously   -Required up to 10 lpm on admission  -Missed W HD but does not appear overloaded   -CXR from OSH does not suggest PNA but pt was septic with hypotension on arrival  -Not wheezy on exam, pt denies SOB/worsened cough  -RT protocol, O2 therapy, pulse ox  -Started on C3 based on recent MSSA bacteremia     Hypoglycemia  -Presented to OSH with BG <50  -Given D5W and sugar went to 400s but then shortly after went down to <50 once again  -Noted to be low in ED as well and required further D5W  -Hypoglycemia protocol with Q2H Accuchecks      Anticipated Hospital stay:  >2 midnights        Quality Measures  Quality-Core Measures   Reviewed items::  EKG reviewed, Radiology images reviewed, Labs reviewed and Medications reviewed  Velez catheter::  Critically Ill - Requiring Accurate Measurement of Urinary Output  DVT prophylaxis pharmacological::  Contraindicated - High bleeding risk  DVT prophylaxis - mechanical:  SCDs    PCP: Rosa LACEY M.D.

## 2018-12-28 NOTE — ASSESSMENT & PLAN NOTE
-Mechanical in description, notes LOC but after laying on floor for considerable time  -Scattered ecchymosis on back/abd with correlating tenderness  -CPK mildly elevated at OSH will order repeat  -PT/OT consult  -SW consult

## 2018-12-28 NOTE — ASSESSMENT & PLAN NOTE
-INR >10 on arrival, scattered ecchymosis on back likely 2/2 GLF, no signs of active bleeding   -On Coumadin for vascath thrombus and recently missed HD  -Hold coumadin  -Started on PO vit K and given 2 units of FFP  -CTM INR

## 2018-12-28 NOTE — DISCHARGE PLANNING
Outpatient Dialysis Note    Confirmed patient is active at:    [facility]  [address] [place] [address]    Schedule: Monday, Wednesday, Friday  Time: 1:00 pm    Spoke with Genesis at facility who confirmed.    Forwarded records for review.    Dialysis Coordinator, [facility]  Tammy Betts [phone]

## 2018-12-29 ENCOUNTER — APPOINTMENT (OUTPATIENT)
Dept: RADIOLOGY | Facility: MEDICAL CENTER | Age: 73
DRG: 871 | End: 2018-12-29
Attending: INTERNAL MEDICINE
Payer: MEDICARE

## 2018-12-29 LAB
AMMONIA PLAS-SCNC: 28 UMOL/L (ref 11–45)
CRP SERPL HS-MCNC: 12.06 MG/DL (ref 0–0.75)
EST. AVERAGE GLUCOSE BLD GHB EST-MCNC: 85 MG/DL
GLUCOSE BLD-MCNC: 102 MG/DL (ref 65–99)
GLUCOSE BLD-MCNC: 110 MG/DL (ref 65–99)
GLUCOSE BLD-MCNC: 110 MG/DL (ref 65–99)
GLUCOSE BLD-MCNC: 121 MG/DL (ref 65–99)
GLUCOSE BLD-MCNC: 22 MG/DL (ref 65–99)
GLUCOSE BLD-MCNC: 36 MG/DL (ref 65–99)
GLUCOSE BLD-MCNC: 56 MG/DL (ref 65–99)
GLUCOSE BLD-MCNC: 88 MG/DL (ref 65–99)
GLUCOSE BLD-MCNC: 93 MG/DL (ref 65–99)
GLUCOSE BLD-MCNC: 93 MG/DL (ref 65–99)
GLUCOSE BLD-MCNC: 94 MG/DL (ref 65–99)
GLUCOSE BLD-MCNC: 95 MG/DL (ref 65–99)
GLUCOSE BLD-MCNC: 97 MG/DL (ref 65–99)
HAV IGM SERPL QL IA: NEGATIVE
HBA1C MFR BLD: 4.6 % (ref 0–5.6)
HBV CORE IGM SER QL: NEGATIVE
HBV SURFACE AB SERPL IA-ACNC: <3.1 MIU/ML (ref 0–10)
HBV SURFACE AG SER QL: NEGATIVE
HCV AB SER QL: NEGATIVE
INR PPP: 6.65 (ref 0.87–1.13)
PREALB SERPL-MCNC: 6 MG/DL (ref 18–38)
PROCALCITONIN SERPL-MCNC: 4.05 NG/ML
PROTHROMBIN TIME: 57.8 SEC (ref 12–14.6)

## 2018-12-29 PROCEDURE — 86140 C-REACTIVE PROTEIN: CPT

## 2018-12-29 PROCEDURE — 90935 HEMODIALYSIS ONE EVALUATION: CPT | Performed by: INTERNAL MEDICINE

## 2018-12-29 PROCEDURE — 700105 HCHG RX REV CODE 258: Performed by: INTERNAL MEDICINE

## 2018-12-29 PROCEDURE — 700111 HCHG RX REV CODE 636 W/ 250 OVERRIDE (IP): Performed by: STUDENT IN AN ORGANIZED HEALTH CARE EDUCATION/TRAINING PROGRAM

## 2018-12-29 PROCEDURE — 84681 ASSAY OF C-PEPTIDE: CPT

## 2018-12-29 PROCEDURE — 700101 HCHG RX REV CODE 250: Performed by: INTERNAL MEDICINE

## 2018-12-29 PROCEDURE — 700102 HCHG RX REV CODE 250 W/ 637 OVERRIDE(OP): Performed by: INTERNAL MEDICINE

## 2018-12-29 PROCEDURE — 700105 HCHG RX REV CODE 258: Performed by: STUDENT IN AN ORGANIZED HEALTH CARE EDUCATION/TRAINING PROGRAM

## 2018-12-29 PROCEDURE — 85610 PROTHROMBIN TIME: CPT

## 2018-12-29 PROCEDURE — 99291 CRITICAL CARE FIRST HOUR: CPT | Mod: GC | Performed by: INTERNAL MEDICINE

## 2018-12-29 PROCEDURE — A9270 NON-COVERED ITEM OR SERVICE: HCPCS | Performed by: STUDENT IN AN ORGANIZED HEALTH CARE EDUCATION/TRAINING PROGRAM

## 2018-12-29 PROCEDURE — 90935 HEMODIALYSIS ONE EVALUATION: CPT

## 2018-12-29 PROCEDURE — 82962 GLUCOSE BLOOD TEST: CPT | Mod: 91

## 2018-12-29 PROCEDURE — 82140 ASSAY OF AMMONIA: CPT

## 2018-12-29 PROCEDURE — 83036 HEMOGLOBIN GLYCOSYLATED A1C: CPT

## 2018-12-29 PROCEDURE — 700102 HCHG RX REV CODE 250 W/ 637 OVERRIDE(OP): Performed by: STUDENT IN AN ORGANIZED HEALTH CARE EDUCATION/TRAINING PROGRAM

## 2018-12-29 PROCEDURE — 700111 HCHG RX REV CODE 636 W/ 250 OVERRIDE (IP): Performed by: INTERNAL MEDICINE

## 2018-12-29 PROCEDURE — A9270 NON-COVERED ITEM OR SERVICE: HCPCS | Performed by: INTERNAL MEDICINE

## 2018-12-29 PROCEDURE — 770022 HCHG ROOM/CARE - ICU (200)

## 2018-12-29 PROCEDURE — 80074 ACUTE HEPATITIS PANEL: CPT

## 2018-12-29 PROCEDURE — 84134 ASSAY OF PREALBUMIN: CPT

## 2018-12-29 PROCEDURE — 83525 ASSAY OF INSULIN: CPT

## 2018-12-29 PROCEDURE — 86706 HEP B SURFACE ANTIBODY: CPT

## 2018-12-29 RX ORDER — CEFAZOLIN SODIUM 1 G/50ML
1 INJECTION, SOLUTION INTRAVENOUS EVERY 24 HOURS
Status: DISCONTINUED | OUTPATIENT
Start: 2018-12-29 | End: 2019-01-04 | Stop reason: HOSPADM

## 2018-12-29 RX ORDER — LORAZEPAM 1 MG/1
.5-1 TABLET ORAL EVERY 6 HOURS PRN
Status: DISCONTINUED | OUTPATIENT
Start: 2018-12-29 | End: 2019-01-01

## 2018-12-29 RX ADMIN — HEPARIN SODIUM 2000 UNITS: 1000 INJECTION, SOLUTION INTRAVENOUS; SUBCUTANEOUS at 12:10

## 2018-12-29 RX ADMIN — DEXTROSE MONOHYDRATE: 100 INJECTION, SOLUTION INTRAVENOUS at 15:51

## 2018-12-29 RX ADMIN — CEFAZOLIN SODIUM 1 G: 1 INJECTION, SOLUTION INTRAVENOUS at 18:19

## 2018-12-29 RX ADMIN — HYDROMORPHONE HYDROCHLORIDE 0.5 MG: 1 INJECTION, SOLUTION INTRAMUSCULAR; INTRAVENOUS; SUBCUTANEOUS at 02:50

## 2018-12-29 RX ADMIN — VANCOMYCIN HYDROCHLORIDE 125 MG: 10 INJECTION, POWDER, LYOPHILIZED, FOR SOLUTION INTRAVENOUS at 09:05

## 2018-12-29 RX ADMIN — HYDROMORPHONE HYDROCHLORIDE 0.5 MG: 1 INJECTION, SOLUTION INTRAMUSCULAR; INTRAVENOUS; SUBCUTANEOUS at 00:08

## 2018-12-29 RX ADMIN — DEXTROSE MONOHYDRATE 25 ML: 25 INJECTION, SOLUTION INTRAVENOUS at 02:10

## 2018-12-29 RX ADMIN — DEXTROSE MONOHYDRATE 25 ML: 25 INJECTION, SOLUTION INTRAVENOUS at 04:58

## 2018-12-29 RX ADMIN — HYDROMORPHONE HYDROCHLORIDE 0.5 MG: 1 INJECTION, SOLUTION INTRAMUSCULAR; INTRAVENOUS; SUBCUTANEOUS at 15:11

## 2018-12-29 RX ADMIN — VANCOMYCIN HYDROCHLORIDE 125 MG: 10 INJECTION, POWDER, LYOPHILIZED, FOR SOLUTION INTRAVENOUS at 21:25

## 2018-12-29 RX ADMIN — CEFTRIAXONE SODIUM 2 G: 2 INJECTION, POWDER, FOR SOLUTION INTRAMUSCULAR; INTRAVENOUS at 05:47

## 2018-12-29 RX ADMIN — HEPARIN SODIUM 4200 UNITS: 1000 INJECTION, SOLUTION INTRAVENOUS; SUBCUTANEOUS at 15:02

## 2018-12-29 RX ADMIN — DEXTROSE MONOHYDRATE 50 ML: 25 INJECTION, SOLUTION INTRAVENOUS at 06:17

## 2018-12-29 RX ADMIN — LORAZEPAM 1 MG: 1 TABLET ORAL at 23:45

## 2018-12-29 RX ADMIN — HYDROMORPHONE HYDROCHLORIDE 0.5 MG: 1 INJECTION, SOLUTION INTRAMUSCULAR; INTRAVENOUS; SUBCUTANEOUS at 07:12

## 2018-12-29 RX ADMIN — HYDROMORPHONE HYDROCHLORIDE 0.5 MG: 1 INJECTION, SOLUTION INTRAMUSCULAR; INTRAVENOUS; SUBCUTANEOUS at 23:07

## 2018-12-29 ASSESSMENT — PAIN SCALES - GENERAL
PAINLEVEL_OUTOF10: 0
PAINLEVEL_OUTOF10: 4
PAINLEVEL_OUTOF10: 0

## 2018-12-29 NOTE — CARE PLAN
Problem: Safety  Goal: Will remain free from injury  Outcome: PROGRESSING AS EXPECTED  Call light within reach, bed rails up x2, bed alarm in use    Problem: Pain Management  Goal: Pain level will decrease to patient's comfort goal  Outcome: PROGRESSING AS EXPECTED  Pt pain being controlled by IV Dilaudid

## 2018-12-29 NOTE — PROGRESS NOTES
Call from lab received by RN; pt positive for Cdiff. Charge nurse notified. Appropriate precautions in place.    Ella Gambino R.N.

## 2018-12-29 NOTE — CONSULTS
DATE OF SERVICE:  12/28/2018    REQUESTING PHYSICIAN:  Dr. Torres from the [facility] team.    REASON FOR CONSULTATION:  Management of respiratory failure in the setting of   end-stage renal disease, assist in the need for urgent dialysis.    The patient seen and examined, medical records were reviewed.    HISTORY OF PRESENT ILLNESS:  The patient is an unfortunate 73-year-old lady   who is well known to our service.  She has a history of chronic kidney   disease, end-stage renal disease, undergoes hemodialysis on a Monday,   Wednesday, and Friday, presented to the hospital last evening after a ground   level fall.  The patient was diagnosed with possible hypoglycemia-induced   syncope, she also was diagnosed with respiratory failure and was admitted to   the intensive care unit, apparently she missed dialysis on Wednesday, 12/26,   we were called to manage her uremia and respiratory failure.    The patient has no fever, no chills.    PAST MEDICAL HISTORY:  Significant for:  1.  End-stage renal disease.  2.  Anemia.  3.  COPD.    ALLERGIES:  No known drug allergies.    SOCIAL HISTORY:  The patient had a remote history of smoking.  She quit 2   years ago.    FAMILY HISTORY:  Includes heart disease.    MEDICATIONS:  Reviewed.    REVIEW OF SYSTEMS:  The patient feels fatigued.  She has mild shortness of   breath.  All other review of system is negative except as outlined in the   history of present illness.    PHYSICAL EXAMINATION:  GENERAL:  The patient is in mild respiratory distress.  VITAL SIGNS:  Showed blood pressure of 112/62, heart rate was 81, respiratory   rate was 16.  HEENT:  Normocephalic, atraumatic.  The patient has a face mask.  Pupils are   reactive.  Nose is normal.  Mucous membranes are moist.  NECK:  No lymphadenopathy, no JVD, no thyroid mass.  CHEST:  Normal.  LUNGS:  Scattered rhonchi.  HEART:  S1, S2.  ABDOMEN:  Soft, nontender.  No hepatosplenomegaly.  EXTREMITIES:  There is trace lower extremity  edema.  SKIN:  No skin rashes.  NEUROLOGIC:  The patient is alert, oriented x3.  MOOD:  Normal.    LABORATORY DATA:  Her recent labs from today were reviewed, which showed WBC   17.8.  She had an EKG done yesterday, which I reviewed the image myself,   showed sinus tachycardic.    ASSESSMENT AND PLAN:  1.  End-stage renal disease.  2.  Mild respiratory failure, possibilities include pneumonia versus volume   overload.  3.  Uremia as the patient has missed dialysis.  4.  Hyponatremia.  5.  Leukocytosis.    PLAN:  1.  We will plan on urgent dialysis to manage her uremia and volume overload.  2.  We will plan another dialysis tomorrow.  3.  Renal diet.  4.  Daily labs.  5.  Renal dose all medications.  6.  Avoid nephrotoxins.    Plan discussed in detail with Dr. Zhong from the intensive care unit.    Prognosis is guarded.       ____________________________________     FADI NAJJAR, MD FN / ROLAND    DD:  12/28/2018 13:36:49  DT:  12/28/2018 16:02:46    D#:  [id number]  Job#:  [id number]

## 2018-12-29 NOTE — PROGRESS NOTES
HEMODIALYSIS NOTES:     HD today x 3 hours per Dr. Najjar . Initiated at 1202 and ended at 1502. Patient had low BP 70-90's systolic. Unable to meet the UF goal of 2L as ordered. UF limited due to low BP. Patient remain confused, awake mostly on tx. See paper flowsheet for details.    UF Net: 1000 mL    L permacath intact and patent with good flow on reversed during dialysis. No s/sx of infection, no redness nor bleeding noted on the CVC site. CVC dressing clean, dry and intact. Blood returned and CVC port flushed with NS and Heparin 1000 units/mL used  to lock catheter given per designated amount. CVC port clamped and capped.     Report given to AMY HOLDER RN.

## 2018-12-29 NOTE — PROGRESS NOTES
0300 Pt pulled cortrak out. Dr Omalley paged and notified. Verbal order to replace cortrak, with no bridle securement at this time.    Ella Gambino R.N.

## 2018-12-29 NOTE — PROGRESS NOTES
5705 paged and notified hospitalist- pt last blood sugars 36 and 22. Orders to give full amp of D50 and re-check blood sugar.    Ella Gambino R.N.

## 2018-12-29 NOTE — PROGRESS NOTES
0330 attempting to place a new cortrak with 3 RNs at bedside.  Pt unable to tolerate procedure at this time.  Extremely agitated and restless; voluntary/involuntary movements to harm self and others.    Ella Gambino R.N.

## 2018-12-29 NOTE — PROGRESS NOTES
Pt with ESRD, presented with resp failure, GLF.  Pt is doing better.  Seen and examined while getting HD.

## 2018-12-29 NOTE — PROGRESS NOTES
[facility] ICU Progress Note      Admit Date: 12/27/2018  ICU Day: 2  LOS: 2    Resident(s): Shari Torres   Attending: MARK PAGE/ Dr. Zhong     Date & Time:   12/29/2018   11:20 AM       Patient ID:    Name:             Kristyn Gillespie     YOB: 1945  Age:                 73 y.o.  female   MRN:               [id number]      HPI:   A 74 yo F with a PMH of ESRD (HD on MWF), HTN, HLD, Anemia in CKD, COPD (on 2 lpm O2 baseline, no PFTs), Hx of CVA, DVT (on vascath, on coumadin), mitral regurgitation s/p clip, PAD, migraines admitted after GLF. Pt was found to have Hypotension, Hypoxia, Hypoglycemia & supra therapeutic INR > 10. Also 3/4 SIRS (WBC of 30, HR, RR) & elevated lactic acid. She was started on C3, D5W, and given sepsis appropriate IVF and then admitted to the ICU for further mx.      Consultants:  Nephrology   PMA:     Interval Events:  - Admitted on 12/28  for  Hypotension, Hypoxia, Hypoglycemia & supra therapeutic INR > 10( s/p Vit K & FFP 2 units )  - Nephrology consulted for HD ( ESRD on HD MWF), HD on 12/28 , Nephrology following   - Continue Oral Vanco for C Diff Diarrhea ( Day 2 )  - D/C C 3 & started on Ancef for MSSA bacteremia   - low dose Ativan for anxiety   - PT / OT / SLP / Case Mx consulted   - Still having hypoglycemia despite D10% drip , ordered insulin,C peptide, Hb A1C   - Ammonia level ordered , pt's mentation is waxing & weaning   - Ativan for Anxiety , Dilaudid for pain     Physical Exam:  Constitutional: AAO x 2 . Pt's mentation is waxing & weaning.   She appears unhealthy. Temporal muscle wasting noted.   HENT:   Head: Normocephalic and atraumatic.   Eyes: Pupils are equal, round, and reactive to light.   Cardiovascular: Regular rhythm. S1,S2. No murmur, No Gallop.   Pulmonary/Chest: Effort normal. She has decreased breath sounds. She has no wheezes. She has no rhonchi. She has no rales.   Abdominal: Soft. She exhibits no distension. There is tenderness.    Musculoskeletal: She exhibits edema. She exhibits no tenderness.   Neurological: She is AAO x 2. Motor 3/5, sensation & CN 2-12 grossly intact.   Skin: Skin is warm and dry.     Respiratory:     Respiration: 17, Pulse Oximetry: 100 %    HemoDynamics:  Pulse: 77, Heart Rate (Monitored): 85 NIBP: (!) 98/51      Fluids:        Intake/Output Summary (Last 24 hours) at 18 1146  Last data filed at 18 0600   Gross per 24 hour   Intake          1677.33 ml   Output                5 ml   Net          1672.33 ml       Weight: 60.1 kg (132 lb 7.9 oz)  Body mass index is 23.47 kg/m².    Recent Labs      18   0410   SODIUM  132*   POTASSIUM  4.1   CHLORIDE  96   CO2  21   BUN  61*   CREATININE  5.66*   MAGNESIUM  2.0   CALCIUM  8.8       GI/Nutrition:  Recent Labs      18   0410  18   0400   ALTSGPT  <5   --    ASTSGOT  43   --    ALKPHOSPHAT  112*   --    TBILIRUBIN  0.4   --    PREALBUMIN   --   6.0*   GLUCOSE  111*   --        Heme:  Recent Labs      18   2117  18   0200  18   0645  18   0800  18   0400   RBC   --    --   2.75*   --    --    HEMOGLOBIN   --    --   8.6*   --    --    HEMATOCRIT   --    --   26.3*   --    --    PLATELETCT   --    --   162*   --    --    PROTHROMBTM  >120.0*  117.3*   --   42.6*  57.8*   APTT  72.5*   --    --    --    --    INR  >=10.00*  >=10.00*   --   4.49*  6.65*       Infectious Disease:  Monitored Temp 2  Av.7 °C (98 °F)  Min: 36.4 °C (97.5 °F)  Max: 36.9 °C (98.4 °F)  Temp  Av.5 °C (97.7 °F)  Min: 36.3 °C (97.3 °F)  Max: 36.7 °C (98 °F)  Recent Labs      18   0410  18   0645   WBC   --   17.8*   NEUTSPOLYS   --   82.90*   LYMPHOCYTES   --   9.20*   MONOCYTES   --   5.30   EOSINOPHILS   --   0.10   BASOPHILS   --   0.30   ASTSGOT  43   --    ALTSGPT  <5   --    ALKPHOSPHAT  112*   --    TBILIRUBIN  0.4   --        Meds:  • vancomycin 50 mg/mL  125 mg     • LORazepam  0.5-1 mg     • ceFAZolin  1 g     •  Pharmacy  1 Each     • dextrose 10%   50 mL/hr at 12/28/18 1030   • [START ON 12/31/2018] epoetin seven for hemodialysis  50 Units/kg     • heparin  2,000 Units     • Pharmacy       • heparin  4,200 Units     • acetaminophen  650 mg     • glucose 4 g  16 g      And   • dextrose 50%  25 mL     • HYDROmorphone  0.5 mg     • Respiratory Care per Protocol       • hydrALAZINE  10 mg     • NS  500 mL          Imaging:   DX-ABDOMEN FOR TUBE PLACEMENT   Final Result      1.  Feeding tube tip overlies the gastric fundus.      DX-ABDOMEN FOR TUBE PLACEMENT   Final Result      Cortrak nasogastric tube position consistent with intragastric placement.            Procedures:  None    Problem and Plan:  Supratherapeutic INR  -INR >10 on arrival, scattered ecchymosis on back likely 2/2 GLF, no signs of active bleeding   -On Coumadin for vascath thrombus and recently missed HD  -Hold coumadin  -Started on PO vit K and given 2 units of FFP  - INR this AM 6.65  - TF started   - will CTM , no s/s of active bleeding     Sepsis (HCC)  -This is severe sepsis with the following associated acute organ dysfunction(s): acute respiratory failure.   -Recently DC on 12/16 for MSSA sepsis believed to be from vascath   -3/4 SIRS on arrival (HR, WBC, RR) with hypotension and lactic acidosis  -Started on C3 and sepsis appropriate IVF at OSH  - C diff positive ( active infection )  - on oral Vanco   - d/c C3 & started on Ancef for MSSA bacteremia on 12/29       Fall from ground level  -Mechanical in description, notes LOC but after laying on floor for considerable time  -Scattered ecchymosis on back/abd with correlating tenderness  -PT/OT consult  -SW consult      End stage renal disease (HCC)  -Undergoes HD on MWF schedule  -Missed W apt due to transportation issues  -Lytes wnl aside from slight hyponatremia  -Mild acidosis likely due to lactate elevation, Urea only 55, no signs of AMS,  no signs of severe fluid overload  - Nephrology consulted. HD  on 12/28     COPD (chronic obstructive pulmonary disease) (HCC)  -No PFTs but strong smoking hx  -2 lpm O2 at baseline  -RT protocol, O2 therapy, pulse ox        Acute on chronic respiratory failure (HCC)  -Chronic COPD on 2 lpm O2 continuously   -Required up to 10 lpm on admission  -Missed W HD but does not appear overloaded   -CXR from OSH does not suggest PNA but pt was septic with hypotension on arrival  -Not wheezy on exam, pt denies SOB/worsened cough  -RT protocol, O2 therapy, pulse ox  -Started on C3 based on recent MSSA bacteremia      Hypoglycemia  -Presented to OSH with BG <50  -Given D5W and sugar went to 400s but then shortly after went down to <50 once again  -Noted to be low in ED as well and required further D5W  - Still having hypoglycemia despite D10% drip , ordered insulin,C peptide, Hb A1C   - consider switching to D20% if not improving       Malnutrition:   Thin elderly pt , temporal muscle wasting noted   Pre albumin 6   Nutrition on board   TF started        C Diff Diarrhea:   C.Diff Toxin A&B +ve   On oral Vancomycin ( Day 2 )  Contact isolation      Anticipated Hospital stay:  >2 midnights          DISPO: ICU    CODE STATUS: Full Code     Quality Measures:  Velez Catheter: Yes  DVT Prophylaxis: SCDs ( INR > 10 on Admit --- 6 this AM )  Ulcer Prophylaxis:None  Antibiotics: Ancef & Oral Vanco  Lines: PIV

## 2018-12-29 NOTE — PROGRESS NOTES
[facility] Services Progress Note    Hemodialysis treatment ordered today per Dr. Najjar x 3 hours. Treatment initiated at 1400, ended at 1700.     Unable to reach UF goal r/t unstable BP. UF adjusted as BP tolerates; see paper flow sheet for details.     Net UF 1000 mL.     Post tx, CVC flushed with saline then locked with heparin 1000 units/mL per designated amount in each wing then clamped and capped. Aspirate heparin prior to next CVC use.    Report given to Primary RN.

## 2018-12-30 ENCOUNTER — PATIENT OUTREACH (OUTPATIENT)
Dept: HEALTH INFORMATION MANAGEMENT | Facility: OTHER | Age: 73
End: 2018-12-30

## 2018-12-30 PROBLEM — N18.5 STAGE 5 CHRONIC KIDNEY DISEASE (HCC): Status: RESOLVED | Noted: 2017-08-18 | Resolved: 2018-12-30

## 2018-12-30 PROBLEM — A04.72 C. DIFFICILE DIARRHEA: Status: ACTIVE | Noted: 2018-12-30

## 2018-12-30 PROBLEM — E46 MALNUTRITION (HCC): Status: ACTIVE | Noted: 2018-12-30

## 2018-12-30 LAB
ALBUMIN SERPL BCP-MCNC: 2 G/DL (ref 3.2–4.9)
ALBUMIN/GLOB SERPL: 1 G/DL
ALP SERPL-CCNC: 86 U/L (ref 30–99)
ALT SERPL-CCNC: <5 U/L (ref 2–50)
ANION GAP SERPL CALC-SCNC: 5 MMOL/L (ref 0–11.9)
AST SERPL-CCNC: 18 U/L (ref 12–45)
BASOPHILS # BLD AUTO: 0.8 % (ref 0–1.8)
BASOPHILS # BLD: 0.11 K/UL (ref 0–0.12)
BILIRUB SERPL-MCNC: 0.3 MG/DL (ref 0.1–1.5)
BUN SERPL-MCNC: 17 MG/DL (ref 8–22)
CALCIUM SERPL-MCNC: 7.8 MG/DL (ref 8.5–10.5)
CHLORIDE SERPL-SCNC: 99 MMOL/L (ref 96–112)
CO2 SERPL-SCNC: 30 MMOL/L (ref 20–33)
CREAT SERPL-MCNC: 2.39 MG/DL (ref 0.5–1.4)
EOSINOPHIL # BLD AUTO: 0.12 K/UL (ref 0–0.51)
EOSINOPHIL NFR BLD: 0.9 % (ref 0–6.9)
ERYTHROCYTE [DISTWIDTH] IN BLOOD BY AUTOMATED COUNT: 53.1 FL (ref 35.9–50)
GLOBULIN SER CALC-MCNC: 2 G/DL (ref 1.9–3.5)
GLUCOSE BLD-MCNC: 111 MG/DL (ref 65–99)
GLUCOSE BLD-MCNC: 158 MG/DL (ref 65–99)
GLUCOSE BLD-MCNC: 97 MG/DL (ref 65–99)
GLUCOSE SERPL-MCNC: 100 MG/DL (ref 65–99)
HCT VFR BLD AUTO: 26.8 % (ref 37–47)
HGB BLD-MCNC: 8.3 G/DL (ref 12–16)
IMM GRANULOCYTES # BLD AUTO: 0.66 K/UL (ref 0–0.11)
IMM GRANULOCYTES NFR BLD AUTO: 5 % (ref 0–0.9)
INR PPP: 6.24 (ref 0.87–1.13)
LYMPHOCYTES # BLD AUTO: 1.32 K/UL (ref 1–4.8)
LYMPHOCYTES NFR BLD: 9.9 % (ref 22–41)
MAGNESIUM SERPL-MCNC: 1.7 MG/DL (ref 1.5–2.5)
MCH RBC QN AUTO: 30.4 PG (ref 27–33)
MCHC RBC AUTO-ENTMCNC: 31 G/DL (ref 33.6–35)
MCV RBC AUTO: 98.2 FL (ref 81.4–97.8)
MONOCYTES # BLD AUTO: 0.91 K/UL (ref 0–0.85)
MONOCYTES NFR BLD AUTO: 6.9 % (ref 0–13.4)
NEUTROPHILS # BLD AUTO: 10.15 K/UL (ref 2–7.15)
NEUTROPHILS NFR BLD: 76.5 % (ref 44–72)
NRBC # BLD AUTO: 0 K/UL
NRBC BLD-RTO: 0 /100 WBC
PLATELET # BLD AUTO: 129 K/UL (ref 164–446)
PMV BLD AUTO: 10.6 FL (ref 9–12.9)
POTASSIUM SERPL-SCNC: 3 MMOL/L (ref 3.6–5.5)
PROT SERPL-MCNC: 4 G/DL (ref 6–8.2)
PROTHROMBIN TIME: 55 SEC (ref 12–14.6)
RBC # BLD AUTO: 2.73 M/UL (ref 4.2–5.4)
SODIUM SERPL-SCNC: 134 MMOL/L (ref 135–145)
WBC # BLD AUTO: 13.3 K/UL (ref 4.8–10.8)

## 2018-12-30 PROCEDURE — 700111 HCHG RX REV CODE 636 W/ 250 OVERRIDE (IP): Performed by: INTERNAL MEDICINE

## 2018-12-30 PROCEDURE — 306802 CATHETER,INSTAFLOW BOWEL: Performed by: INTERNAL MEDICINE

## 2018-12-30 PROCEDURE — 700102 HCHG RX REV CODE 250 W/ 637 OVERRIDE(OP): Performed by: STUDENT IN AN ORGANIZED HEALTH CARE EDUCATION/TRAINING PROGRAM

## 2018-12-30 PROCEDURE — A9270 NON-COVERED ITEM OR SERVICE: HCPCS | Performed by: INTERNAL MEDICINE

## 2018-12-30 PROCEDURE — 700102 HCHG RX REV CODE 250 W/ 637 OVERRIDE(OP): Performed by: INTERNAL MEDICINE

## 2018-12-30 PROCEDURE — 85025 COMPLETE CBC W/AUTO DIFF WBC: CPT

## 2018-12-30 PROCEDURE — 82962 GLUCOSE BLOOD TEST: CPT | Mod: 91

## 2018-12-30 PROCEDURE — 99232 SBSQ HOSP IP/OBS MODERATE 35: CPT | Performed by: INTERNAL MEDICINE

## 2018-12-30 PROCEDURE — 85610 PROTHROMBIN TIME: CPT

## 2018-12-30 PROCEDURE — 700105 HCHG RX REV CODE 258: Performed by: INTERNAL MEDICINE

## 2018-12-30 PROCEDURE — 83735 ASSAY OF MAGNESIUM: CPT

## 2018-12-30 PROCEDURE — 80053 COMPREHEN METABOLIC PANEL: CPT

## 2018-12-30 PROCEDURE — A9270 NON-COVERED ITEM OR SERVICE: HCPCS | Performed by: STUDENT IN AN ORGANIZED HEALTH CARE EDUCATION/TRAINING PROGRAM

## 2018-12-30 PROCEDURE — 770001 HCHG ROOM/CARE - MED/SURG/GYN PRIV*

## 2018-12-30 RX ORDER — MAGNESIUM SULFATE HEPTAHYDRATE 40 MG/ML
2 INJECTION, SOLUTION INTRAVENOUS ONCE
Status: COMPLETED | OUTPATIENT
Start: 2018-12-30 | End: 2018-12-30

## 2018-12-30 RX ORDER — PHYTONADIONE 10 MG/ML
5 INJECTION, EMULSION INTRAMUSCULAR; INTRAVENOUS; SUBCUTANEOUS ONCE
Status: COMPLETED | OUTPATIENT
Start: 2018-12-30 | End: 2018-12-30

## 2018-12-30 RX ADMIN — HYDROMORPHONE HYDROCHLORIDE 0.5 MG: 1 INJECTION, SOLUTION INTRAMUSCULAR; INTRAVENOUS; SUBCUTANEOUS at 15:49

## 2018-12-30 RX ADMIN — MAGNESIUM SULFATE IN WATER 2 G: 40 INJECTION, SOLUTION INTRAVENOUS at 10:32

## 2018-12-30 RX ADMIN — DEXTROSE MONOHYDRATE: 100 INJECTION, SOLUTION INTRAVENOUS at 10:28

## 2018-12-30 RX ADMIN — VANCOMYCIN HYDROCHLORIDE 125 MG: 10 INJECTION, POWDER, LYOPHILIZED, FOR SOLUTION INTRAVENOUS at 04:00

## 2018-12-30 RX ADMIN — CEFAZOLIN SODIUM 1 G: 1 INJECTION, SOLUTION INTRAVENOUS at 17:40

## 2018-12-30 RX ADMIN — HYDROMORPHONE HYDROCHLORIDE 0.5 MG: 1 INJECTION, SOLUTION INTRAMUSCULAR; INTRAVENOUS; SUBCUTANEOUS at 21:40

## 2018-12-30 RX ADMIN — PHYTONADIONE 5 MG: 10 INJECTION, EMULSION INTRAMUSCULAR; INTRAVENOUS; SUBCUTANEOUS at 17:51

## 2018-12-30 RX ADMIN — VANCOMYCIN HYDROCHLORIDE 125 MG: 10 INJECTION, POWDER, LYOPHILIZED, FOR SOLUTION INTRAVENOUS at 12:14

## 2018-12-30 RX ADMIN — HYDROMORPHONE HYDROCHLORIDE 0.5 MG: 1 INJECTION, SOLUTION INTRAMUSCULAR; INTRAVENOUS; SUBCUTANEOUS at 05:59

## 2018-12-30 RX ADMIN — LORAZEPAM 0.5 MG: 1 TABLET ORAL at 21:39

## 2018-12-30 RX ADMIN — HYDROMORPHONE HYDROCHLORIDE 0.5 MG: 1 INJECTION, SOLUTION INTRAMUSCULAR; INTRAVENOUS; SUBCUTANEOUS at 03:34

## 2018-12-30 RX ADMIN — VANCOMYCIN HYDROCHLORIDE 125 MG: 10 INJECTION, POWDER, LYOPHILIZED, FOR SOLUTION INTRAVENOUS at 17:38

## 2018-12-30 ASSESSMENT — ENCOUNTER SYMPTOMS
COUGH: 0
NAUSEA: 0
FEVER: 0
CHILLS: 0
VOMITING: 0
SHORTNESS OF BREATH: 0

## 2018-12-30 ASSESSMENT — PAIN SCALES - GENERAL
PAINLEVEL_OUTOF10: 0
PAINLEVEL_OUTOF10: 0

## 2018-12-30 NOTE — PROGRESS NOTES
[facility] ICU Progress Note      Admit Date: 12/27/2018  ICU Day: 2  LOS: 3    Resident(s): Margarita Araujo   Attending: MARK PAGE/ Dr. Zhong     Date & Time:   12/30/2018   12:25 PM       Patient ID:    Name:             Kristyn Gillespie     YOB: 1945  Age:                 73 y.o.  female   MRN:               [id number]      HPI:  73 y.o female with ESRD (HD on MWF), HTN, HLD, anemia of chronic disease, COPD (2L O2), hx of CVA, DVT (on vascath, on coumadin), mitral regurgitation s/p clip, PAD, and migraines admitted after GLF. Pt was found to have hypotension, hypoxia, hypoglycemia, as well as supra therapeutic INR > 10. She was 3/4 SIRS (WBC of 30, HR, RR) & had elevated lactic acid; started on C3, D5W, and given sepsis appropriate IVF.Patient  admitted to the ICU for further management.    Consultants:  Nephrology   PMA     Interval Events:  12/30 - no acute overnight events. Hemodynamically stable. Remains on contact precautions for C diff.    Physical Exam:  Constitutional: AAO x 2 . Pt's mentation is waxing & weaning.   She appears unhealthy. Temporal muscle wasting noted.   HENT: Normocephalic and atraumatic. Pupils are equal, round, and reactive to light.   Cardiovascular: Regular rhythm. S1,S2. No murmur, No Gallop.   Pulmonary/Chest: No adventitious sounds appreciated.  Abdominal: Soft. NT. ND.   Musculoskeletal: No peripheral edema. She exhibits no tenderness.   Neurological: She is AAO x 1. Grossly intact.   Skin: Skin is warm and dry.     Respiratory:     Respiration: (!) 22, Pulse Oximetry: 97 %    HemoDynamics:  Pulse: 74, Heart Rate (Monitored): 79 NIBP: 111/49      Fluids:    Date 12/30/18 0700 - 12/31/18 0659   Shift 2917-2542 [id number] 2342-3344 24 Hour Total   I  N  T  A  K  E   I.V. 100   100      Volume (mL) (dextrose 10% infusion) 100   100    NG/   100      Intake (mL) (Enteral Tube 12/30/18 Cortrak - Gastric Right nare) 100   100    Shift Total 200   200    O  U  T  P  U  T   Urine 30   30      Output (mL) (Urethral Catheter 16 Fr.) 30   30    Stool          Number of Times Stooled 0 x   0 x    Shift Total 30   30      170        Intake/Output Summary (Last 24 hours) at 18 1146  Last data filed at 18 0600   Gross per 24 hour   Intake          1677.33 ml   Output                5 ml   Net          1672.33 ml          Body mass index is 23.47 kg/m².    Recent Labs      18   0410  18   0503   SODIUM  132*  134*   POTASSIUM  4.1  3.0*   CHLORIDE  96  99   CO2  21  30   BUN  61*  17   CREATININE  5.66*  2.39*   MAGNESIUM  2.0  1.7   CALCIUM  8.8  7.8*       GI/Nutrition:  Recent Labs      180  18   0400  18   0503   ALTSGPT  <5   --   <5   ASTSGOT  43   --   18   ALKPHOSPHAT  112*   --   86   TBILIRUBIN  0.4   --   0.3   PREALBUMIN   --   6.0*   --    GLUCOSE  111*   --   100*       Heme:  Recent Labs      18   0645  18   0800  18   0400  18   0503   RBC   --    --   2.75*   --    --   2.73*   HEMOGLOBIN   --    --   8.6*   --    --   8.3*   HEMATOCRIT   --    --   26.3*   --    --   26.8*   PLATELETCT   --    --   162*   --    --   129*   PROTHROMBTM  >120.0*   < >   --   42.6*  57.8*  55.0*   APTT  72.5*   --    --    --    --    --    INR  >=10.00*   < >   --   4.49*  6.65*  6.24*    < > = values in this interval not displayed.       Infectious Disease:  Temp  Av.7 °C (98 °F)  Min: 36.3 °C (97.4 °F)  Max: 36.8 °C (98.3 °F)  Recent Labs      18   0645  18   0503   WBC   --   17.8*  13.3*   NEUTSPOLYS   --   82.90*  76.50*   LYMPHOCYTES   --   9.20*  9.90*   MONOCYTES   --   5.30  6.90   EOSINOPHILS   --   0.10  0.90   BASOPHILS   --   0.30  0.80   ASTSGOT  43   --   18   ALTSGPT  <5   --   <5   ALKPHOSPHAT  112*   --   86   TBILIRUBIN  0.4   --   0.3       Meds:  • magnesium sulfate  2 g 2 g (18 1032)   • vancomycin 50 mg/mL  125 mg      • LORazepam  0.5-1 mg     • ceFAZolin  1 g Stopped (12/29/18 1849)   • dextrose 10%   25 mL/hr at 12/30/18 1028   • [START ON 12/31/2018] epoetin seven for hemodialysis  50 Units/kg     • heparin  2,000 Units     • Pharmacy       • heparin  4,200 Units     • acetaminophen  650 mg     • glucose 4 g  16 g      And   • dextrose 50%  25 mL Stopped (12/29/18 1437)   • HYDROmorphone  0.5 mg     • Respiratory Care per Protocol       • hydrALAZINE  10 mg     • NS  500 mL          Imaging:   DX-ABDOMEN FOR TUBE PLACEMENT   Final Result         Feeding tube with tip projecting over the expected area of the stomach.      DX-ABDOMEN FOR TUBE PLACEMENT   Final Result      Feeding tube placement with the tip projecting over the stomach body.      DX-ABDOMEN FOR TUBE PLACEMENT   Final Result      1.  Feeding tube tip overlies the gastric fundus.      DX-ABDOMEN FOR TUBE PLACEMENT   Final Result      Cortrak nasogastric tube position consistent with intragastric placement.            Procedures:  None    Problem and Plan:    COPD (chronic obstructive pulmonary disease) (HCC)  On 2L at home.  No PFTs on file.  Strong hx of smoking.  RT/O2 protocol.    End stage renal disease (HCC)  On HD MWF.  Dialysis 12/28.  Nephrology following, recs appreciated.  Avoid nephrotoxic drugs.    Acute on chronic respiratory failure (HCC)  Resolved.    Sepsis (HCC)  This is severe sepsis with the following associated acute organ dysfunction(s): acute respiratory failure.   Recently DC on 12/16 for MSSA sepsis believed to be from [facility].  3/4 SIRS on arrival (HR, WBC, RR) with hypotension and lactic acidosis.  Started on C3 and sepsis protocol.  C diff positive; on PO Vanco  On Ancef for MSSA bacteremia on 12/29    Fall from ground level  Mechanical in description.   PT/OT when appropriate.    Hypoglycemia  Presented with BG < 50.  Noted that her FSBG does not match her BMP, possibly due to vascular issues.  Glucose checks Q4H from earlobe.  Continue  D10 @ 25mL/h; if BG remains stable, will DC.    Supratherapeutic INR  INR >10 on arrival.  Got PO vitamin K and 2 units of FFP.  No signs of active bleeding, H&H stable.  CTM.    C. difficile diarrhea  Received ABT during recent hospitalization.  C diff positive.  Continue oral Vancomycin.    Malnutrition (HCC)  Patient thin with temporal wasting.  Nutrition consult appreciated.  TF started.       Anticipated Hospital stay:  >2 midnights          DISPO: ICU    CODE STATUS: Full Code     Quality Measures:  Velez Catheter: Yes  DVT Prophylaxis: SCDs ( INR > 10 on Admit --- 6 this AM )  Ulcer Prophylaxis:None  Antibiotics: Ancef & Oral Vanco  Lines: PIV

## 2018-12-30 NOTE — CARE PLAN
Problem: Safety  Goal: Will remain free from injury  Outcome: PROGRESSING AS EXPECTED  Lower bed rails up x2, well lit room, bed alarm in reach, educated on calling when in need of assistance     Problem: Skin Integrity  Goal: Risk for impaired skin integrity will decrease  Outcome: PROGRESSING AS EXPECTED  Pt turns and repositions self, pillows in use to float bony prominances, check under soft wrist restraints q 2 hr.

## 2018-12-30 NOTE — ASSESSMENT & PLAN NOTE
- Received abx during recent hospitalization.  - C diff positive.  Continue PO vancomycin.  Day 7 of 10.

## 2018-12-30 NOTE — PROGRESS NOTES
Received bedside report from SHA Griffith. Assumed care of patient. Patient resting comfortably in bed in no sign of distress. Bed alarm is on.

## 2018-12-30 NOTE — PROGRESS NOTES
Nephrology Daily Progress Note    Date of Service  12/30/2018    Chief Complaint  73 y.o. female admitted 12/27/2018 with ESRD, resp failure, fall.    Interval Problem Update  Pt is doing better, still on face mask.    Review of Systems  Review of Systems   Constitutional: Negative for chills, fever and malaise/fatigue.   Respiratory: Negative for cough and shortness of breath.    Cardiovascular: Negative for chest pain and leg swelling.   Gastrointestinal: Negative for nausea and vomiting.   Genitourinary: Negative for dysuria, frequency and urgency.        Physical Exam  Temp:  [36.2 °C (97.1 °F)-36.8 °C (98.3 °F)] 36.2 °C (97.1 °F)  Pulse:  [69-99] 83  Resp:  [12-22] 18    Physical Exam   Constitutional: She appears ill. No distress. Face mask in place.   HENT:   Nose: Nose normal.   Eyes: Conjunctivae are normal. Right eye exhibits no discharge. Left eye exhibits no discharge. No scleral icterus.   Cardiovascular: Normal rate and regular rhythm.    No murmur heard.  Pulmonary/Chest: She has no wheezes. She has no rales.   Musculoskeletal: She exhibits no edema.   Skin: She is not diaphoretic.   Nursing note and vitals reviewed.      Fluids    Intake/Output Summary (Last 24 hours) at 12/30/18 1407  Last data filed at 12/30/18 1200   Gross per 24 hour   Intake          3017.92 ml   Output             1575 ml   Net          1442.92 ml       Laboratory  Recent Labs      12/28/18   0645  12/30/18   0503   WBC  17.8*  13.3*   RBC  2.75*  2.73*   HEMOGLOBIN  8.6*  8.3*   HEMATOCRIT  26.3*  26.8*   MCV  95.6  98.2*   MCH  31.3  30.4   MCHC  32.7*  31.0*   RDW  51.2*  53.1*   PLATELETCT  162*  129*   MPV  10.7  10.6     Recent Labs      12/28/18   0410  12/30/18   0503   SODIUM  132*  134*   POTASSIUM  4.1  3.0*   CHLORIDE  96  99   CO2  21  30   GLUCOSE  111*  100*   BUN  61*  17   CREATININE  5.66*  2.39*   CALCIUM  8.8  7.8*     Recent Labs      12/27/18   2117   12/28/18   0800  12/29/18   0400  12/30/18   0503    APTT  72.5*   --    --    --    --    INR  >=10.00*   < >  4.49*  6.65*  6.24*    < > = values in this interval not displayed.               Imaging      Assessment/Plan  1 ESRD  2 Resp failure: better  3 Anemia  4 Hyponatremia  Plan  no need for HD today, evaluate daily  Daily labs  epo with HD  Renal dose all meds  Avoid nephrotoxins  Prognosis guarded.

## 2018-12-30 NOTE — ASSESSMENT & PLAN NOTE
- Presented with BG < 50.  - Noted that her FSBG does not match her BMP, possibly due to vascular issues.  - Glucose checks Q4H from earlobe.

## 2018-12-30 NOTE — CARE PLAN
Problem: Bowel/Gastric:  Goal: Normal bowel function is maintained or improved  Pt had one small smear BM. This shift      Problem: Skin Integrity  Goal: Risk for impaired skin integrity will decrease  Pt body covered in bruises.  No skin breakdown noted.

## 2018-12-30 NOTE — CARE PLAN
Problem: Skin Integrity  Goal: Risk for impaired skin integrity will decrease    Intervention: Assess risk factors for impaired skin integrity and/or pressure ulcers  Skin assessment completed.       Problem: Mobility  Goal: Risk for activity intolerance will decrease    Intervention: Encourage patient to increase activity level in collaboration with Interdisciplinary Team  Patient mobilized up to the chair with two assist.

## 2018-12-30 NOTE — ASSESSMENT & PLAN NOTE
- INR >10 on arrival.  1.33 today (1/2) s/p Vitamin K x2 administrations, 2 units FFP.   - No signs of active bleeding, H&H stable.  - Monitor.   - Consider DVT prophylaxis when INR appears stable.

## 2018-12-30 NOTE — ASSESSMENT & PLAN NOTE
- On 2L at home.  - No PFTs on file.  - Strong hx of smoking.  - RT/O2 protocol.   - Titrate O2 to 88-92%.

## 2018-12-30 NOTE — ASSESSMENT & PLAN NOTE
- This is severe sepsis with the following associated acute organ dysfunction(s): acute respiratory failure.   - Recently DC on 12/16 for MSSA sepsis believed to be from [facility].  - 3/4 SIRS on arrival (HR, WBC, RR) with hypotension and lactic acidosis.  - C diff positive; on PO vancomycin.    - On cefazolin for MSSA bacteremia, started 12/29.  Continue until 1/12/18 per ID.

## 2018-12-30 NOTE — ASSESSMENT & PLAN NOTE
- Nephrology onboard, HD MWF.  Patient pulled out HD cath on 12/31.  Per Nephro, ESRD condition worsening overall and rec Pall Med.    - Avoid nephrotoxic drugs.   - Pall Med onboard.  Patient stating she does not want further HD.

## 2018-12-30 NOTE — PROGRESS NOTES
As per discussion with the patient's daughter, Anahy, code status has been discussed with her mother in the past. Her mother's wishes are: DNR, Intubation OK.     Code status updated in chart.

## 2018-12-31 ENCOUNTER — APPOINTMENT (OUTPATIENT)
Dept: RADIOLOGY | Facility: MEDICAL CENTER | Age: 73
DRG: 871 | End: 2018-12-31
Attending: INTERNAL MEDICINE
Payer: MEDICARE

## 2018-12-31 PROBLEM — E83.39 HYPOPHOSPHATEMIA: Status: ACTIVE | Noted: 2018-12-31

## 2018-12-31 PROBLEM — J96.20 ACUTE ON CHRONIC RESPIRATORY FAILURE (HCC): Status: RESOLVED | Noted: 2018-10-21 | Resolved: 2018-12-31

## 2018-12-31 PROBLEM — D69.6 THROMBOCYTOPENIA (HCC): Status: ACTIVE | Noted: 2018-12-31

## 2018-12-31 LAB
ALBUMIN SERPL BCP-MCNC: 2 G/DL (ref 3.2–4.9)
ALBUMIN/GLOB SERPL: 1.1 G/DL
ALP SERPL-CCNC: 82 U/L (ref 30–99)
ALT SERPL-CCNC: <5 U/L (ref 2–50)
AMMONIA PLAS-SCNC: 18 UMOL/L (ref 11–45)
ANION GAP SERPL CALC-SCNC: 6 MMOL/L (ref 0–11.9)
ANISOCYTOSIS BLD QL SMEAR: ABNORMAL
AST SERPL-CCNC: 13 U/L (ref 12–45)
BASOPHILS # BLD AUTO: 0 % (ref 0–1.8)
BASOPHILS # BLD: 0 K/UL (ref 0–0.12)
BILIRUB SERPL-MCNC: 0.3 MG/DL (ref 0.1–1.5)
BUN SERPL-MCNC: 42 MG/DL (ref 8–22)
C PEPTIDE SERPL-MCNC: 7.3 NG/ML (ref 0.8–3.5)
CALCIUM SERPL-MCNC: 7.7 MG/DL (ref 8.5–10.5)
CHLORIDE SERPL-SCNC: 97 MMOL/L (ref 96–112)
CO2 SERPL-SCNC: 28 MMOL/L (ref 20–33)
CREAT SERPL-MCNC: 3.09 MG/DL (ref 0.5–1.4)
EOSINOPHIL # BLD AUTO: 0.21 K/UL (ref 0–0.51)
EOSINOPHIL NFR BLD: 1.8 % (ref 0–6.9)
ERYTHROCYTE [DISTWIDTH] IN BLOOD BY AUTOMATED COUNT: 51.6 FL (ref 35.9–50)
GIANT PLATELETS BLD QL SMEAR: NORMAL
GLOBULIN SER CALC-MCNC: 1.9 G/DL (ref 1.9–3.5)
GLUCOSE SERPL-MCNC: 128 MG/DL (ref 65–99)
HCT VFR BLD AUTO: 26.6 % (ref 37–47)
HGB BLD-MCNC: 8.3 G/DL (ref 12–16)
INR PPP: 5.84 (ref 0.87–1.13)
INSULIN P FAST SERPL-ACNC: 3 UIU/ML (ref 3–19)
LYMPHOCYTES # BLD AUTO: 1.05 K/UL (ref 1–4.8)
LYMPHOCYTES NFR BLD: 9.1 % (ref 22–41)
MACROCYTES BLD QL SMEAR: ABNORMAL
MAGNESIUM SERPL-MCNC: 2.1 MG/DL (ref 1.5–2.5)
MANUAL DIFF BLD: NORMAL
MCH RBC QN AUTO: 30.4 PG (ref 27–33)
MCHC RBC AUTO-ENTMCNC: 31.2 G/DL (ref 33.6–35)
MCV RBC AUTO: 97.4 FL (ref 81.4–97.8)
METAMYELOCYTES NFR BLD MANUAL: 2.7 %
MONOCYTES # BLD AUTO: 0.1 K/UL (ref 0–0.85)
MONOCYTES NFR BLD AUTO: 0.9 % (ref 0–13.4)
MORPHOLOGY BLD-IMP: NORMAL
MYELOCYTES NFR BLD MANUAL: 3.7 %
NEUTROPHILS # BLD AUTO: 9.41 K/UL (ref 2–7.15)
NEUTROPHILS NFR BLD: 81.8 % (ref 44–72)
NRBC # BLD AUTO: 0 K/UL
NRBC BLD-RTO: 0 /100 WBC
OVALOCYTES BLD QL SMEAR: NORMAL
PHOSPHATE SERPL-MCNC: 1.6 MG/DL (ref 2.5–4.5)
PLATELET # BLD AUTO: 124 K/UL (ref 164–446)
PLATELET BLD QL SMEAR: NORMAL
PMV BLD AUTO: 10.9 FL (ref 9–12.9)
POLYCHROMASIA BLD QL SMEAR: NORMAL
POTASSIUM SERPL-SCNC: 3.1 MMOL/L (ref 3.6–5.5)
PROT SERPL-MCNC: 3.9 G/DL (ref 6–8.2)
PROTHROMBIN TIME: 52.2 SEC (ref 12–14.6)
RBC # BLD AUTO: 2.73 M/UL (ref 4.2–5.4)
RBC BLD AUTO: PRESENT
SMUDGE CELLS BLD QL SMEAR: NORMAL
SODIUM SERPL-SCNC: 131 MMOL/L (ref 135–145)
WBC # BLD AUTO: 11.5 K/UL (ref 4.8–10.8)

## 2018-12-31 PROCEDURE — 700111 HCHG RX REV CODE 636 W/ 250 OVERRIDE (IP): Performed by: INTERNAL MEDICINE

## 2018-12-31 PROCEDURE — 99233 SBSQ HOSP IP/OBS HIGH 50: CPT | Performed by: INTERNAL MEDICINE

## 2018-12-31 PROCEDURE — 80053 COMPREHEN METABOLIC PANEL: CPT

## 2018-12-31 PROCEDURE — A9270 NON-COVERED ITEM OR SERVICE: HCPCS | Performed by: STUDENT IN AN ORGANIZED HEALTH CARE EDUCATION/TRAINING PROGRAM

## 2018-12-31 PROCEDURE — 83735 ASSAY OF MAGNESIUM: CPT

## 2018-12-31 PROCEDURE — 85027 COMPLETE CBC AUTOMATED: CPT

## 2018-12-31 PROCEDURE — 700111 HCHG RX REV CODE 636 W/ 250 OVERRIDE (IP): Mod: JG | Performed by: INTERNAL MEDICINE

## 2018-12-31 PROCEDURE — 770001 HCHG ROOM/CARE - MED/SURG/GYN PRIV*

## 2018-12-31 PROCEDURE — 99291 CRITICAL CARE FIRST HOUR: CPT | Performed by: INTERNAL MEDICINE

## 2018-12-31 PROCEDURE — 700105 HCHG RX REV CODE 258: Performed by: INTERNAL MEDICINE

## 2018-12-31 PROCEDURE — 85007 BL SMEAR W/DIFF WBC COUNT: CPT

## 2018-12-31 PROCEDURE — 84100 ASSAY OF PHOSPHORUS: CPT

## 2018-12-31 PROCEDURE — 700101 HCHG RX REV CODE 250: Performed by: INTERNAL MEDICINE

## 2018-12-31 PROCEDURE — A9270 NON-COVERED ITEM OR SERVICE: HCPCS | Performed by: INTERNAL MEDICINE

## 2018-12-31 PROCEDURE — 700102 HCHG RX REV CODE 250 W/ 637 OVERRIDE(OP): Performed by: STUDENT IN AN ORGANIZED HEALTH CARE EDUCATION/TRAINING PROGRAM

## 2018-12-31 PROCEDURE — 85610 PROTHROMBIN TIME: CPT

## 2018-12-31 PROCEDURE — 82140 ASSAY OF AMMONIA: CPT

## 2018-12-31 PROCEDURE — 700102 HCHG RX REV CODE 250 W/ 637 OVERRIDE(OP): Performed by: INTERNAL MEDICINE

## 2018-12-31 RX ORDER — PHYTONADIONE 5 MG/1
10 TABLET ORAL ONCE
Status: DISPENSED | OUTPATIENT
Start: 2018-12-31 | End: 2019-01-01

## 2018-12-31 RX ADMIN — EPOETIN ALFA 2700 UNITS: 3000 SOLUTION INTRAVENOUS; SUBCUTANEOUS at 09:35

## 2018-12-31 RX ADMIN — HYDROMORPHONE HYDROCHLORIDE 0.5 MG: 1 INJECTION, SOLUTION INTRAMUSCULAR; INTRAVENOUS; SUBCUTANEOUS at 17:41

## 2018-12-31 RX ADMIN — HYDROMORPHONE HYDROCHLORIDE 0.5 MG: 1 INJECTION, SOLUTION INTRAMUSCULAR; INTRAVENOUS; SUBCUTANEOUS at 15:15

## 2018-12-31 RX ADMIN — VANCOMYCIN HYDROCHLORIDE 125 MG: 10 INJECTION, POWDER, LYOPHILIZED, FOR SOLUTION INTRAVENOUS at 05:46

## 2018-12-31 RX ADMIN — VANCOMYCIN HYDROCHLORIDE 125 MG: 10 INJECTION, POWDER, LYOPHILIZED, FOR SOLUTION INTRAVENOUS at 00:36

## 2018-12-31 RX ADMIN — HYDROMORPHONE HYDROCHLORIDE 0.5 MG: 1 INJECTION, SOLUTION INTRAMUSCULAR; INTRAVENOUS; SUBCUTANEOUS at 11:10

## 2018-12-31 RX ADMIN — POTASSIUM PHOSPHATE, MONOBASIC AND POTASSIUM PHOSPHATE, DIBASIC 15 MMOL: 224; 236 INJECTION, SOLUTION INTRAVENOUS at 12:19

## 2018-12-31 RX ADMIN — VANCOMYCIN HYDROCHLORIDE 125 MG: 10 INJECTION, POWDER, LYOPHILIZED, FOR SOLUTION INTRAVENOUS at 23:17

## 2018-12-31 RX ADMIN — CEFAZOLIN SODIUM 1 G: 1 INJECTION, SOLUTION INTRAVENOUS at 17:50

## 2018-12-31 RX ADMIN — LORAZEPAM 1 MG: 1 TABLET ORAL at 23:17

## 2018-12-31 RX ADMIN — HYDROMORPHONE HYDROCHLORIDE 0.5 MG: 1 INJECTION, SOLUTION INTRAMUSCULAR; INTRAVENOUS; SUBCUTANEOUS at 01:38

## 2018-12-31 ASSESSMENT — PATIENT HEALTH QUESTIONNAIRE - PHQ9
2. FEELING DOWN, DEPRESSED, IRRITABLE, OR HOPELESS: NOT AT ALL
1. LITTLE INTEREST OR PLEASURE IN DOING THINGS: NOT AT ALL
SUM OF ALL RESPONSES TO PHQ9 QUESTIONS 1 AND 2: 0

## 2018-12-31 ASSESSMENT — PAIN SCALES - GENERAL
PAINLEVEL_OUTOF10: 0

## 2018-12-31 NOTE — PROGRESS NOTES
Received bedside report from Ajay RN. 2 RN skin check completed. Need to take picture of left thigh. Patient out of restraints.

## 2018-12-31 NOTE — THERAPY
Per most recent labs INR 5.84. PT contraindicated at this time. Will continue to monitor and eval as medically appropriate.

## 2018-12-31 NOTE — PROGRESS NOTES
Critical Care Progress Note    Date of admission  12/27/2018    Chief Complaint  73 y.o. female admitted 12/27/2018 with fall, hypoglycemia and hypotension    Hospital Course    73 y.o. female who presented 12/27/2018 with end-stage renal disease hypertension dyslipidemia anemia COPD on chronic 2 L oxygen history of stroke DVT on Coumadin mitral regurg status post clip.  History of MSSA bacteremia requiring hospitalizations from November 30 to December 16 this year.  She recalls the entire events that she slipped over some yarn falling to the ground while her family was out of town she laid on the ground for 1 day.  Denies loss of consciousness. She was transferred to [facility] for further care due to hypoglycemia hypotension and 30,000 white count and an INR greater than 10.  Patient was started on ceftriaxone given p.o. vitamin K dextrose infusion and fluid resuscitation for sepsis protocol.  She describes also that she has had some diarrhea prior to this.  But denies shortness of breath chest pain neck pain numbness tingling or weakness.  She does describe back pain joint pain and fatigue.      Interval Problem Update  Reviewed last 24 hour events:   - improving WBC, AF   - plt count remains low at 124   - oriented x 2, pulled out coretrak and HD cath   - Na down from 134-->131   - low K/phos   - worsening BUN/crt, minimal UOP   - INR remains elevated although slightly lower at 5.8   - NSR   - last BM today   - discussed GOC with nephro and family --> palliative care consulted    Review of Systems  Review of Systems   Unable to perform ROS: Mental status change        Vital Signs for last 24 hours   Temp:  [36.2 °C (97.1 °F)-36.5 °C (97.7 °F)] 36.5 °C (97.7 °F)  Pulse:  [74-83] 82  Resp:  [13-22] 16  Blood pressure /45-55    Hemodynamic parameters for last 24 hours       Respiratory   SaO2 % on room air    Physical Exam   Physical Exam   Constitutional: She appears well-developed. No distress.    Chronically ill appearing, confused, weak   HENT:   Head: Normocephalic and atraumatic.   Nose: Nose normal.   Mouth/Throat: Mucous membranes are not pale and dry.   Eyes: Pupils are equal, round, and reactive to light. Conjunctivae are normal. No scleral icterus.   Neck: Neck supple. No JVD present. No tracheal deviation present.   Cardiovascular: Normal rate, regular rhythm, normal heart sounds and intact distal pulses.    No murmur heard.  Pulmonary/Chest: Effort normal. No respiratory distress. She has no wheezes. She has rhonchi in the right lower field and the left lower field.   Abdominal: Soft. Bowel sounds are normal. She exhibits distension. There is no tenderness. There is no rebound.   Musculoskeletal: She exhibits no edema or tenderness.   Neurological:   Drowsy but arouses with strong voice, generalized weakness, no focal deficits, confused and oriented to person and place only   Skin: Skin is warm and dry. No pallor.   Psychiatric: Her speech is normal. Her affect is blunt. She is slowed. Thought content is delusional. Cognition and memory are impaired. She expresses impulsivity.   Nursing note and vitals reviewed.      Medications  Current Facility-Administered Medications   Medication Dose Route Frequency Provider Last Rate Last Dose   • vancomycin 50 mg/mL oral soln 125 mg  125 mg Feeding Tube Q6HRS Kobe Zhong M.D.   125 mg at 12/31/18 0546   • LORazepam (ATIVAN) tablet 0.5-1 mg  0.5-1 mg Feeding Tube Q6HRS PRN Shari Torres M.D.   0.5 mg at 12/30/18 2139   • ceFAZolin in dextrose (ANCEF) IVPB premix 1 g  1 g Intravenous Q24HRS Kobe Zhong M.D.   Stopped at 12/30/18 1810   • dextrose 10% infusion   Intravenous Continuous Kobe Zhong M.D. 25 mL/hr at 12/30/18 1028     • epoetin seven (EPOGEN,PROCRIT) injection 2,700 Units  50 Units/kg Intravenous MO, WE + FR Fadi Najjar, M.D.   2,700 Units at 12/28/18 1618   • heparin injection 2,000 Units  2,000 Units Intravenous DIALYSIS PRN Fadi Najjar,  M.D.   2,000 Units at 12/29/18 1210   • Pharmacy Consult: Enteral tube feeding - review meds/change route/product selection   Other PRN Kobe Zhong M.D.       • heparin injection 4,200 Units  4,200 Units Intracatheter DIALYSIS PRN Fadi Najjar, M.D.   4,200 Units at 12/29/18 1502   • acetaminophen (TYLENOL) tablet 650 mg  650 mg Feeding Tube Q6HRS PRN Kobe Zhong M.D.       • glucose 4 g chewable tablet 16 g  16 g Feeding Tube Q15 MIN PRN Kobe Zhong M.D.        And   • dextrose 50% (D50W) injection 25 mL  25 mL Intravenous Q15 MIN PRN Kobe Zhong M.D.   Stopped at 12/29/18 1437   • HYDROmorphone pf (DILAUDID) injection 0.5 mg  0.5 mg Intravenous Q2HRS PRN Kobe Zhong M.D.   0.5 mg at 12/31/18 0138   • Respiratory Care per Protocol   Nebulization Continuous RT Declan Pierce M.D.       • hydrALAZINE (APRESOLINE) injection 10 mg  10 mg Intravenous Q4HRS PRN Declan Pierce M.D.       • NS (BOLUS) infusion 500 mL  500 mL Intravenous Once PRN Declan Pierce M.D.           Fluids    Intake/Output Summary (Last 24 hours) at 12/31/18 0730  Last data filed at 12/31/18 0400   Gross per 24 hour   Intake          1682.92 ml   Output              250 ml   Net          1432.92 ml       Laboratory          Recent Labs      12/30/18   0503  12/31/18   0453   SODIUM  134*  131*   POTASSIUM  3.0*  3.1*   CHLORIDE  99  97   CO2  30  28   BUN  17  42*   CREATININE  2.39*  3.09*   MAGNESIUM  1.7  2.1   PHOSPHORUS   --   1.6*   CALCIUM  7.8*  7.7*     Recent Labs      12/29/18   0400  12/30/18   0503  12/31/18   0453   ALTSGPT   --   <5  <5   ASTSGOT   --   18  13   ALKPHOSPHAT   --   86  82   TBILIRUBIN   --   0.3  0.3   PREALBUMIN  6.0*   --    --    GLUCOSE   --   100*  128*     Recent Labs      12/30/18   0503  12/31/18   0453   WBC  13.3*  11.5*   NEUTSPOLYS  76.50*   --    LYMPHOCYTES  9.90*   --    MONOCYTES  6.90   --    EOSINOPHILS  0.90   --    BASOPHILS  0.80   --    ASTSGOT  18  13   ALTSGPT  <5   <5   ALKPHOSPHAT  86  82   TBILIRUBIN  0.3  0.3     Recent Labs      12/29/18   0400  12/30/18   0503  12/31/18   0453   RBC   --   2.73*  2.73*   HEMOGLOBIN   --   8.3*  8.3*   HEMATOCRIT   --   26.8*  26.6*   PLATELETCT   --   129*  124*   PROTHROMBTM  57.8*  55.0*  52.2*   INR  6.65*  6.24*  5.84*       Imaging  X-Ray:  No film today    Assessment/Plan  C. difficile colitis- (present on admission)   Assessment & Plan    Contact isolation  P.o. vancomycin     Sepsis (HCC)- (present on admission)   Assessment & Plan    This is severe sepsis with the following associated acute organ dysfunction(s): disseminated intravascular coagulopathy (DIC).   Source = C. difficile colitis  Status post sepsis protocol and fluid resuscitation  Continue antibiotics  Monitor for perfusion     Malnutrition (HCC)- (present on admission)   Assessment & Plan    Nutritionist consultation     Supratherapeutic INR- (present on admission)   Assessment & Plan    Status post FFP and vitamin K  Additional dose of vitamin K today  Daily INR  Holding anticoagulants  Fall precautions     Hypoglycemia- (present on admission)   Assessment & Plan    Likely from poor oral intake versus infectious driven  Continue dextrose infusion  Encourage diet if aspiration risk low     Fall from ground level- (present on admission)   Assessment & Plan    Mechanical fall  PT eval prior to discharge to evaluate gait and safety     Encephalopathy acute- (present on admission)   Assessment & Plan    Likely metabolic  Correct underlying abnormalities  Limit sedatives  Reorient and maintain sleep/wake cycle     End stage renal disease (HCC)- (present on admission)   Assessment & Plan    HD Monday/Wednesday/Friday  Pulled out hemodialysis catheter on 12/31  We will hold on replacement as no emergent need for dialysis today  Vitamin K orally to decrease INR before consideration of replacement of catheter  Goals of care discussion regarding utility of ongoing dialysis      COPD (chronic obstructive pulmonary disease) (HCC)- (present on admission)   Assessment & Plan    On chronic 2 L/min nasal cannula 24 hours a day without acute exacerbation  RT/O2 protocols with PRN bronchodilators         Hypophosphatemia   Assessment & Plan    Repletion intravenously and monitor     Thrombocytopenia (HCC)   Assessment & Plan    Daily CBC and monitor for bleeding     Hypokalemia- (present on admission)   Assessment & Plan    Repletion and monitor daily     Anemia in CKD (chronic kidney disease)- (present on admission)   Assessment & Plan    Daily CBC with conservative transfusion strategy     DNR, intubation okay    VTE:  Heparin  Ulcer: Not Indicated  Lines: None    I have performed a physical exam and reviewed and updated ROS and Plan today (12/31/2018). In review of yesterday's note (12/30/2018), there are no changes except as documented above.     Patient is critically ill today requiring active management of her worsening encephalopathy with risk for loss of airway protection, C. difficile colitis with borderline hypotension.  Ongoing goals of care discussions.  Poor prognosis. High risk of deterioration and worsening vital organ dysfunction and death without the above critical care interventions.    Discussed patient condition and risk of morbidity and/or mortality with Family, RN, RT, Pharmacy, [employer] [facility] resident, Charge nurse / hot rounds, Patient and nephrology  The patient remains critically ill.  Critical care time = 31 minutes in directly providing and coordinating critical care and extensive data review.  No time overlap and excludes procedures.

## 2018-12-31 NOTE — PROGRESS NOTES
"[facility] ICU Progress Note      Admit Date: 12/27/2018    Date & Time:   12/31/2018   2:06 PM       Patient ID:    Name:             Kristyn Gillespie     YOB: 1945  Age:                 73 y.o.  female   MRN:               [id number]      HPI:  \"73 y.o female with ESRD (HD on MWF), HTN, HLD, anemia of chronic disease, COPD (2L O2), hx of CVA, DVT (on vascath, on coumadin), mitral regurgitation s/p clip, PAD, and migraines admitted after GLF. Pt was found to have hypotension, hypoxia, hypoglycemia, as well as supra therapeutic INR > 10. She was 3/4 SIRS (WBC of 30, HR, RR) & had elevated lactic acid; started on C3, D5W, and given sepsis appropriate IVF.Patient  admitted to the ICU for further management.\"    Consultants:  Nephrology   PMA     Interval Events:  Pulled out Cortrak overnight and then HD catheter this AM.  Oriented to self and place.  Denies pain.      Physical Exam:  Constitutional: AAO x 2 . Pt's mentation is waxing & weaning.   She appears unhealthy. Temporal muscle wasting noted.   HENT: Normocephalic and atraumatic. Pupils are equal, round, and reactive to light.   Cardiovascular: Regular rhythm. S1,S2. No murmur, No Gallop.   Pulmonary/Chest: No adventitious sounds appreciated.  Abdominal: Soft. NT. ND.   Musculoskeletal: No peripheral edema. She exhibits no tenderness.   Neurological: She is AAO x 2.   Skin: Skin is warm and dry.     Respiratory:     Respiration: 14, Pulse Oximetry: 100 %    HemoDynamics:  Pulse: 76 Blood Pressure : (!) 95/45, NIBP: 112/56      Fluids:    Date 12/31/18 0700 - 01/01/19 0659   Shift 6139-8539 0048-0119 8525-3834 24 Hour Total   I  N  T  A  K  E   I.V. 350   350      Volume (mL) (dextrose 10% infusion) 350   350    Shift Total 350   350   O  U  T  P  U  T   Urine 120   120      Output (mL) (Urethral Catheter 16 Fr.) 120   120    Stool          Number of Times Stooled 2 x   2 x    Shift Total 120   120      230        Intake/Output Summary " (Last 24 hours) at 18 1146  Last data filed at 18 0600   Gross per 24 hour   Intake          1677.33 ml   Output                5 ml   Net          1672.33 ml          Body mass index is 23.47 kg/m².    Recent Labs      18   0503  18   SODIUM  134*  131*   POTASSIUM  3.0*  3.1*   CHLORIDE  99  97   CO2  30  28   BUN  17  42*   CREATININE  2.39*  3.09*   MAGNESIUM  1.7  2.1   PHOSPHORUS   --   1.6*   CALCIUM  7.8*  7.7*       GI/Nutrition:  Recent Labs      18   0400  18   0503  18   ALTSGPT   --   <5  <5   ASTSGOT   --   18  13   ALKPHOSPHAT   --   86  82   TBILIRUBIN   --   0.3  0.3   PREALBUMIN  6.0*   --    --    GLUCOSE   --   100*  128*       Heme:  Recent Labs      18   0400  18   0503  12/31/18   0453   RBC   --   2.73*  2.73*   HEMOGLOBIN   --   8.3*  8.3*   HEMATOCRIT   --   26.8*  26.6*   PLATELETCT   --   129*  124*   PROTHROMBTM  57.8*  55.0*  52.2*   INR  6.65*  6.24*  5.84*       Infectious Disease:  Temp  Av.4 °C (97.6 °F)  Min: 36.4 °C (97.5 °F)  Max: 36.5 °C (97.7 °F)  Recent Labs      18   0503  12/31/18   0453   WBC  13.3*  11.5*   NEUTSPOLYS  76.50*  81.80*   LYMPHOCYTES  9.90*  9.10*   MONOCYTES  6.90  0.90   EOSINOPHILS  0.90  1.80   BASOPHILS  0.80  0.00   ASTSGOT  18  13   ALTSGPT  <5  <5   ALKPHOSPHAT  86  82   TBILIRUBIN  0.3  0.3       Meds:  • phytonadione  10 mg     • potassium phosphate ivpb  15 mmol 15 mmol (18 1219)   • vancomycin 50 mg/mL  125 mg     • LORazepam  0.5-1 mg     • ceFAZolin  1 g Stopped (18)   • dextrose 10%   25 mL/hr at 18 1028   • epoetin seven for hemodialysis  50 Units/kg     • heparin  2,000 Units     • Pharmacy       • heparin  4,200 Units     • acetaminophen  650 mg     • glucose 4 g  16 g      And   • dextrose 50%  25 mL Stopped (18 4157)   • HYDROmorphone  0.5 mg     • Respiratory Care per Protocol       • hydrALAZINE  10 mg          Imaging:   DX-ABDOMEN  FOR TUBE PLACEMENT   Final Result         Feeding tube with tip projecting over the expected area of the stomach.      DX-ABDOMEN FOR TUBE PLACEMENT   Final Result      Feeding tube placement with the tip projecting over the stomach body.      DX-ABDOMEN FOR TUBE PLACEMENT   Final Result      1.  Feeding tube tip overlies the gastric fundus.      DX-ABDOMEN FOR TUBE PLACEMENT   Final Result      Cortrak nasogastric tube position consistent with intragastric placement.            Procedures:  None    Problem and Plan:  End stage renal disease ([facility])   Assessment & Plan    - Nephrology onboard, HD MWF.  Patient pulled out HD cath on 12/31.  Will need temp site replaced and will likely need permanent site, but INR still >5.  Per Nephro, ESRD condition worsening overall and rec Pall Med consult.    - Avoid nephrotoxic drugs.   - Pall Med consulted.      COPD (chronic obstructive pulmonary disease) ([facility])   Assessment & Plan    - On 2L at home.  - No PFTs on file.  - Strong hx of smoking.  - RT/O2 protocol.     Malnutrition ([facility])   Assessment & Plan    - Patient thin with temporal wasting.  - Nutrition consult appreciated.  - Continue TF.      C. difficile colitis   Assessment & Plan    - Received abx during recent hospitalization.  - C diff positive.  Continue PO vancomycin.     Supratherapeutic INR   Assessment & Plan    - INR >10 on arrival.  5.84 today (12/31) s/p 10 mg Vitamin K, 2 units FFP.   - No signs of active bleeding, H&H stable.  - Monitor.      Hypoglycemia   Assessment & Plan    - Presented with BG < 50.  - Noted that her FSBG does not match her BMP, possibly due to vascular issues.  - Glucose checks Q4H from earlobe.  - Continue D10 @ 25mL/h; if BG remains stable, will DC.     Fall from ground level   Assessment & Plan    - Mechanical in description.   - PT/OT when appropriate.     Sepsis ([facility])   Assessment & Plan    - This is severe sepsis with the following associated acute organ dysfunction(s): acute  respiratory failure.   - Recently DC on 12/16 for MSSA sepsis believed to be from [facility].  - 3/4 SIRS on arrival (HR, WBC, RR) with hypotension and lactic acidosis.  - C diff positive; on PO vancomycin.    - On cefazolin for MSSA bacteremia, started 12/29.            Anticipated Hospital stay:  >2 midnights    DISPO: ICU.  Transfer orders in.      CODE STATUS: DNAR, Intubation okay.     Quality Measures:  Velez Catheter: Yes  DVT Prophylaxis: SCDs ( INR > 10 on Admit; 5.84 on 12/31.)  Ulcer Prophylaxis:None  Antibiotics: IV cefazolin, PO vancomycin  Lines: PIV

## 2018-12-31 NOTE — PROGRESS NOTES
Patient pulled out Dialysis catheter. Pressure held on site until bleeding stopped. Dressing in place. Patient in restraints. Nephrology paged and updated.

## 2018-12-31 NOTE — PROGRESS NOTES
Nephrology Daily Progress Note    Date of Service  12/31/2018    Chief Complaint  73 y.o. female admitted 12/27/2018 with ESRD, resp failure, fall.    Interval Problem Update  Patient pulled out HD catheter.  Intermittently confused.  Daughter at bedside    Review of Systems  Review of Systems   Unable to perform ROS: Medical condition        Physical Exam  Temp:  [36.4 °C (97.5 °F)-36.5 °C (97.7 °F)] 36.5 °C (97.7 °F)  Pulse:  [82-87] 87  Resp:  [14-18] 14  BP: (101)/(82) 101/82    Physical Exam   Constitutional: She appears ill.   Confused   Cardiovascular: Normal rate and regular rhythm.    Pulmonary/Chest: Effort normal and breath sounds normal.   Skin: Skin is warm and dry.       Fluids    Intake/Output Summary (Last 24 hours) at 12/31/18 1207  Last data filed at 12/31/18 0800   Gross per 24 hour   Intake             1540 ml   Output              280 ml   Net             1260 ml       Laboratory  Recent Labs      12/30/18   0503  12/31/18   0453   WBC  13.3*  11.5*   RBC  2.73*  2.73*   HEMOGLOBIN  8.3*  8.3*   HEMATOCRIT  26.8*  26.6*   MCV  98.2*  97.4   MCH  30.4  30.4   MCHC  31.0*  31.2*   RDW  53.1*  51.6*   PLATELETCT  129*  124*   MPV  10.6  10.9     Recent Labs      12/30/18   0503  12/31/18   0453   SODIUM  134*  131*   POTASSIUM  3.0*  3.1*   CHLORIDE  99  97   CO2  30  28   GLUCOSE  100*  128*   BUN  17  42*   CREATININE  2.39*  3.09*   CALCIUM  7.8*  7.7*     Recent Labs      12/29/18   0400  12/30/18   0503  12/31/18   0453   INR  6.65*  6.24*  5.84*               Imaging      Assessment/Plan  1 ESRD - Doing poorly overall, hospitalized multiple times since October 2 Resp failure - improving  3 Anemia  4 Hyponatremia    -Discussed with ICU team and daughter  -Given overall condition and likely no significant benefit from dialysis I would strongly consider hospice

## 2019-01-01 ENCOUNTER — APPOINTMENT (OUTPATIENT)
Dept: RADIOLOGY | Facility: MEDICAL CENTER | Age: 74
DRG: 871 | End: 2019-01-01
Attending: STUDENT IN AN ORGANIZED HEALTH CARE EDUCATION/TRAINING PROGRAM
Payer: MEDICARE

## 2019-01-01 ENCOUNTER — APPOINTMENT (OUTPATIENT)
Dept: RADIOLOGY | Facility: MEDICAL CENTER | Age: 74
DRG: 871 | End: 2019-01-01
Attending: INTERNAL MEDICINE
Payer: MEDICARE

## 2019-01-01 PROBLEM — R09.02 HYPOXIA: Status: ACTIVE | Noted: 2019-01-01

## 2019-01-01 LAB
ACTION RANGE TRIGGERED IACRT: NO
ALBUMIN SERPL BCP-MCNC: 2.3 G/DL (ref 3.2–4.9)
ALBUMIN/GLOB SERPL: 1.1 G/DL
ALP SERPL-CCNC: 83 U/L (ref 30–99)
ALT SERPL-CCNC: <5 U/L (ref 2–50)
ANION GAP SERPL CALC-SCNC: 11 MMOL/L (ref 0–11.9)
ANION GAP SERPL CALC-SCNC: 7 MMOL/L (ref 0–11.9)
ANISOCYTOSIS BLD QL SMEAR: ABNORMAL
APPEARANCE UR: ABNORMAL
AST SERPL-CCNC: 17 U/L (ref 12–45)
BACTERIA #/AREA URNS HPF: ABNORMAL /HPF
BASE EXCESS BLDA CALC-SCNC: 1 MMOL/L (ref -4–3)
BASOPHILS # BLD AUTO: 0 % (ref 0–1.8)
BASOPHILS # BLD: 0 K/UL (ref 0–0.12)
BILIRUB SERPL-MCNC: 0.4 MG/DL (ref 0.1–1.5)
BILIRUB UR QL STRIP.AUTO: ABNORMAL
BODY TEMPERATURE: ABNORMAL DEGREES
BUN SERPL-MCNC: 50 MG/DL (ref 8–22)
BUN SERPL-MCNC: 50 MG/DL (ref 8–22)
CALCIUM SERPL-MCNC: 7.4 MG/DL (ref 8.5–10.5)
CALCIUM SERPL-MCNC: 8.3 MG/DL (ref 8.5–10.5)
CHLORIDE SERPL-SCNC: 96 MMOL/L (ref 96–112)
CHLORIDE SERPL-SCNC: 99 MMOL/L (ref 96–112)
CO2 BLDA-SCNC: 25 MMOL/L (ref 20–33)
CO2 SERPL-SCNC: 22 MMOL/L (ref 20–33)
CO2 SERPL-SCNC: 27 MMOL/L (ref 20–33)
COLOR UR: ABNORMAL
CREAT SERPL-MCNC: 3.75 MG/DL (ref 0.5–1.4)
CREAT SERPL-MCNC: 3.85 MG/DL (ref 0.5–1.4)
EOSINOPHIL # BLD AUTO: 0.11 K/UL (ref 0–0.51)
EOSINOPHIL NFR BLD: 0.9 % (ref 0–6.9)
EPI CELLS #/AREA URNS HPF: ABNORMAL /HPF
ERYTHROCYTE [DISTWIDTH] IN BLOOD BY AUTOMATED COUNT: 50 FL (ref 35.9–50)
GLOBULIN SER CALC-MCNC: 2.1 G/DL (ref 1.9–3.5)
GLUCOSE SERPL-MCNC: 72 MG/DL (ref 65–99)
GLUCOSE SERPL-MCNC: 82 MG/DL (ref 65–99)
GLUCOSE UR STRIP.AUTO-MCNC: NEGATIVE MG/DL
HCO3 BLDA-SCNC: 24.4 MMOL/L (ref 17–25)
HCT VFR BLD AUTO: 26.7 % (ref 37–47)
HGB BLD-MCNC: 8.6 G/DL (ref 12–16)
HOROWITZ INDEX BLDA+IHG-RTO: 150 MM[HG]
INR PPP: 1.87 (ref 0.87–1.13)
INST. QUALIFIED PATIENT IIQPT: YES
KETONES UR STRIP.AUTO-MCNC: ABNORMAL MG/DL
LACTATE BLD-SCNC: 0.4 MMOL/L (ref 0.5–2)
LEUKOCYTE ESTERASE UR QL STRIP.AUTO: ABNORMAL
LYMPHOCYTES # BLD AUTO: 1.7 K/UL (ref 1–4.8)
LYMPHOCYTES NFR BLD: 14.2 % (ref 22–41)
MACROCYTES BLD QL SMEAR: ABNORMAL
MAGNESIUM SERPL-MCNC: 2.3 MG/DL (ref 1.5–2.5)
MANUAL DIFF BLD: NORMAL
MCH RBC QN AUTO: 30.5 PG (ref 27–33)
MCHC RBC AUTO-ENTMCNC: 32.2 G/DL (ref 33.6–35)
MCV RBC AUTO: 94.7 FL (ref 81.4–97.8)
METAMYELOCYTES NFR BLD MANUAL: 3.5 %
MICRO URNS: ABNORMAL
MICROCYTES BLD QL SMEAR: ABNORMAL
MONOCYTES # BLD AUTO: 0.42 K/UL (ref 0–0.85)
MONOCYTES NFR BLD AUTO: 3.5 % (ref 0–13.4)
MORPHOLOGY BLD-IMP: NORMAL
MYELOCYTES NFR BLD MANUAL: 1.8 %
NEUTROPHILS # BLD AUTO: 9.13 K/UL (ref 2–7.15)
NEUTROPHILS NFR BLD: 66.4 % (ref 44–72)
NEUTS BAND NFR BLD MANUAL: 9.7 % (ref 0–10)
NITRITE UR QL STRIP.AUTO: NEGATIVE
NRBC # BLD AUTO: 0 K/UL
NRBC BLD-RTO: 0 /100 WBC
O2/TOTAL GAS SETTING VFR VENT: 40 %
PCO2 BLDA: 32.9 MMHG (ref 26–37)
PCO2 TEMP ADJ BLDA: 32.5 MMHG (ref 26–37)
PH BLDA: 7.48 [PH] (ref 7.4–7.5)
PH TEMP ADJ BLDA: 7.48 [PH] (ref 7.4–7.5)
PH UR STRIP.AUTO: 6.5 [PH]
PHOSPHATE SERPL-MCNC: 2.9 MG/DL (ref 2.5–4.5)
PLATELET # BLD AUTO: 146 K/UL (ref 164–446)
PLATELET BLD QL SMEAR: NORMAL
PMV BLD AUTO: 11.3 FL (ref 9–12.9)
PO2 BLDA: 60 MMHG (ref 64–87)
PO2 TEMP ADJ BLDA: 58 MMHG (ref 64–87)
POTASSIUM SERPL-SCNC: 3.6 MMOL/L (ref 3.6–5.5)
POTASSIUM SERPL-SCNC: 3.7 MMOL/L (ref 3.6–5.5)
PROT SERPL-MCNC: 4.4 G/DL (ref 6–8.2)
PROT UR QL STRIP: 100 MG/DL
PROTHROMBIN TIME: 21.6 SEC (ref 12–14.6)
RBC # BLD AUTO: 2.82 M/UL (ref 4.2–5.4)
RBC # URNS HPF: >150 /HPF
RBC BLD AUTO: PRESENT
RBC UR QL AUTO: ABNORMAL
SAO2 % BLDA: 92 % (ref 93–99)
SODIUM SERPL-SCNC: 130 MMOL/L (ref 135–145)
SODIUM SERPL-SCNC: 132 MMOL/L (ref 135–145)
SP GR UR STRIP.AUTO: 1.02
SPECIMEN DRAWN FROM PATIENT: ABNORMAL
TOXIC GRANULES BLD QL SMEAR: SLIGHT
UROBILINOGEN UR STRIP.AUTO-MCNC: 0.2 MG/DL
WBC # BLD AUTO: 12 K/UL (ref 4.8–10.8)
WBC #/AREA URNS HPF: ABNORMAL /HPF
YEAST BUDDING URNS QL: PRESENT /HPF
YEAST HYPHAE #/AREA URNS HPF: PRESENT /HPF

## 2019-01-01 PROCEDURE — 85007 BL SMEAR W/DIFF WBC COUNT: CPT

## 2019-01-01 PROCEDURE — 85027 COMPLETE CBC AUTOMATED: CPT

## 2019-01-01 PROCEDURE — 84100 ASSAY OF PHOSPHORUS: CPT

## 2019-01-01 PROCEDURE — A9270 NON-COVERED ITEM OR SERVICE: HCPCS | Performed by: INTERNAL MEDICINE

## 2019-01-01 PROCEDURE — 80053 COMPREHEN METABOLIC PANEL: CPT

## 2019-01-01 PROCEDURE — 80048 BASIC METABOLIC PNL TOTAL CA: CPT

## 2019-01-01 PROCEDURE — 93970 EXTREMITY STUDY: CPT

## 2019-01-01 PROCEDURE — 770022 HCHG ROOM/CARE - ICU (200)

## 2019-01-01 PROCEDURE — 700102 HCHG RX REV CODE 250 W/ 637 OVERRIDE(OP): Performed by: STUDENT IN AN ORGANIZED HEALTH CARE EDUCATION/TRAINING PROGRAM

## 2019-01-01 PROCEDURE — A9270 NON-COVERED ITEM OR SERVICE: HCPCS | Performed by: STUDENT IN AN ORGANIZED HEALTH CARE EDUCATION/TRAINING PROGRAM

## 2019-01-01 PROCEDURE — 99233 SBSQ HOSP IP/OBS HIGH 50: CPT | Performed by: INTERNAL MEDICINE

## 2019-01-01 PROCEDURE — 700102 HCHG RX REV CODE 250 W/ 637 OVERRIDE(OP): Performed by: INTERNAL MEDICINE

## 2019-01-01 PROCEDURE — 99291 CRITICAL CARE FIRST HOUR: CPT | Performed by: INTERNAL MEDICINE

## 2019-01-01 PROCEDURE — 81001 URINALYSIS AUTO W/SCOPE: CPT

## 2019-01-01 PROCEDURE — 700105 HCHG RX REV CODE 258: Performed by: INTERNAL MEDICINE

## 2019-01-01 PROCEDURE — 83735 ASSAY OF MAGNESIUM: CPT

## 2019-01-01 PROCEDURE — 700111 HCHG RX REV CODE 636 W/ 250 OVERRIDE (IP): Performed by: INTERNAL MEDICINE

## 2019-01-01 PROCEDURE — 36600 WITHDRAWAL OF ARTERIAL BLOOD: CPT

## 2019-01-01 PROCEDURE — 71045 X-RAY EXAM CHEST 1 VIEW: CPT

## 2019-01-01 PROCEDURE — 82803 BLOOD GASES ANY COMBINATION: CPT

## 2019-01-01 PROCEDURE — 83605 ASSAY OF LACTIC ACID: CPT

## 2019-01-01 PROCEDURE — 85610 PROTHROMBIN TIME: CPT

## 2019-01-01 RX ORDER — LORAZEPAM 2 MG/ML
.5-1 INJECTION INTRAMUSCULAR EVERY 4 HOURS PRN
Status: DISCONTINUED | OUTPATIENT
Start: 2019-01-01 | End: 2019-01-04 | Stop reason: HOSPADM

## 2019-01-01 RX ORDER — LORAZEPAM 2 MG/ML
1 INJECTION INTRAMUSCULAR EVERY 4 HOURS PRN
Status: DISCONTINUED | OUTPATIENT
Start: 2019-01-01 | End: 2019-01-01

## 2019-01-01 RX ORDER — ACETAMINOPHEN 325 MG/1
650 TABLET ORAL EVERY 6 HOURS PRN
Status: DISCONTINUED | OUTPATIENT
Start: 2019-01-01 | End: 2019-01-04 | Stop reason: HOSPADM

## 2019-01-01 RX ORDER — DEXTROSE MONOHYDRATE 25 G/50ML
25 INJECTION, SOLUTION INTRAVENOUS
Status: DISCONTINUED | OUTPATIENT
Start: 2019-01-01 | End: 2019-01-04 | Stop reason: HOSPADM

## 2019-01-01 RX ORDER — METHYLPREDNISOLONE SODIUM SUCCINATE 40 MG/ML
40 INJECTION, POWDER, LYOPHILIZED, FOR SOLUTION INTRAMUSCULAR; INTRAVENOUS EVERY 6 HOURS
Status: DISCONTINUED | OUTPATIENT
Start: 2019-01-01 | End: 2019-01-02

## 2019-01-01 RX ORDER — LORAZEPAM 1 MG/1
.5-1 TABLET ORAL EVERY 6 HOURS PRN
Status: DISCONTINUED | OUTPATIENT
Start: 2019-01-01 | End: 2019-01-01

## 2019-01-01 RX ADMIN — METHYLPREDNISOLONE SODIUM SUCCINATE 40 MG: 40 INJECTION, POWDER, FOR SOLUTION INTRAMUSCULAR; INTRAVENOUS at 17:56

## 2019-01-01 RX ADMIN — VANCOMYCIN HYDROCHLORIDE 125 MG: 10 INJECTION, POWDER, LYOPHILIZED, FOR SOLUTION INTRAVENOUS at 06:07

## 2019-01-01 RX ADMIN — HYDROMORPHONE HYDROCHLORIDE 0.5 MG: 1 INJECTION, SOLUTION INTRAMUSCULAR; INTRAVENOUS; SUBCUTANEOUS at 04:10

## 2019-01-01 RX ADMIN — HYDROMORPHONE HYDROCHLORIDE 0.5 MG: 1 INJECTION, SOLUTION INTRAMUSCULAR; INTRAVENOUS; SUBCUTANEOUS at 16:28

## 2019-01-01 RX ADMIN — PHYTONADIONE 10 MG: 10 INJECTION, EMULSION INTRAMUSCULAR; INTRAVENOUS; SUBCUTANEOUS at 11:15

## 2019-01-01 RX ADMIN — CEFAZOLIN SODIUM 1 G: 1 INJECTION, SOLUTION INTRAVENOUS at 17:56

## 2019-01-01 RX ADMIN — VANCOMYCIN HYDROCHLORIDE 125 MG: 10 INJECTION, POWDER, LYOPHILIZED, FOR SOLUTION INTRAVENOUS at 13:06

## 2019-01-01 RX ADMIN — VANCOMYCIN HYDROCHLORIDE 125 MG: 10 INJECTION, POWDER, LYOPHILIZED, FOR SOLUTION INTRAVENOUS at 17:56

## 2019-01-01 RX ADMIN — LORAZEPAM 1 MG: 1 TABLET ORAL at 12:41

## 2019-01-01 ASSESSMENT — PAIN SCALES - GENERAL
PAINLEVEL_OUTOF10: 3
PAINLEVEL_OUTOF10: 2
PAINLEVEL_OUTOF10: 2
PAINLEVEL_OUTOF10: 0
PAINLEVEL_OUTOF10: 1
PAINLEVEL_OUTOF10: 0
PAINLEVEL_OUTOF10: 0

## 2019-01-01 NOTE — PROGRESS NOTES
Paged MARK fong.   Dr. Burgess updated on suspicion of hypoxia.   Orders received to obtain ABG.    Patient's ABG shows compensated hypoxia, oxymask titrated to 10 liters.

## 2019-01-01 NOTE — ASSESSMENT & PLAN NOTE
Improving  Continue to titrate oxygen to goal SaO2 between 88 and 92%  Pulmonary toiletry, mobilization

## 2019-01-01 NOTE — ASSESSMENT & PLAN NOTE
- 1/1 early morning, desaturated to 70s.  ABG showed O2 60.  Placed on Oxymask titrated to 10 L.    - Continuous pulse ox.  Oxygen as needed.  B/l LE Doppler ultrasound negative for DVT.  - 1/2 - oxygen requirements back to baseline.  Period of hypoxia likely from not removal of O2 mask on evening hypoxia noted (patient on chronic home 2 L O2).

## 2019-01-01 NOTE — THERAPY
INR continues to be elevated thus PT contraindicated. Additionally pt continues to decline medically. PT order to be completed at this time based on this. Please re-order once stable and with labs that allow for participation in therapy services.

## 2019-01-01 NOTE — CARE PLAN
Problem: Respiratory:  Goal: Respiratory status will improve  Outcome: PROGRESSING SLOWER THAN EXPECTED  Patient became increasingly confused and fingers turning blue.   Intervention: Assess and monitor pulmonary status  Completed and Documented in EPIC flow sheets.   Intervention: Administer and titrate oxygen therapy  Completed and Documented in EPIC flow sheets.   Intervention: Collaborate with respiratory therapist and Interdisciplinary Team on treatment measures to improve respiratory function  Done.       Problem: Skin Integrity  Goal: Risk for impaired skin integrity will decrease  Outcome: PROGRESSING SLOWER THAN EXPECTED  Assess patient's skin at the beginning of the shift. Turn patient every two hours and monitor under all medical devices throughout entire shift. Maintain a clean, dry linen environment. Float heals and elbows. Provide appropriate would prevention interventions as they apply. Implement pertinent wound protocols and document them. Please see wound flow sheet for further details.

## 2019-01-01 NOTE — CARE PLAN
Problem: Skin Integrity  Goal: Risk for impaired skin integrity will decrease  Outcome: PROGRESSING SLOWER THAN EXPECTED  Patient continues to be incontinent. Sacrum is becoming red. Barrier wipes and barrier cream being used  Intervention: Assess risk factors for impaired skin integrity and/or pressure ulcers  Velez catheter temp probe  Intervention: Implement precautions to protect skin integrity in collaboration with the interdisciplinary team  Patient repositions self, restraints adjusted every 2 hours, cleans sheets changed multiple times

## 2019-01-01 NOTE — CONSULTS
Reason for PC Consult: Advance Care Planning    Consulted by: Dr Gonda    Assessment:  General: 73 yr old female admitted on 12/27/2018 for Sepsis. Pt has hx of Anemia, Arthritis, SOB, Bronchitis, COPD-O2 dependant, Dental disorder, Emphysema of lung, Epilepsy, Heart valve disease, Jaundice, Migraines-neuralgic, Osteoarthritis, Peripheral edema, Renal insufficiency, Stroke, Tobacco use disorder, Unspecified essential HTN.   Pt had GLF at home and was down for approx 12 hrs. Pts BS was 36 per EMS. Pt is on dialysis Mon/Wed/Fri.     Dyspnea: No, resp rate 14, 100% on room air.   Last BM: 12/31/18    Pain: Unable to determine   Depression: Unable to determine   Dementia: Unable to Determine       Spiritual:  Is Yazidism or spirituality important for coping with this illness? Yes   Has a  or spiritual provider visit been requested? No    Palliative Performance Scale: 30%    Advance Directive: None  DPOA: No, NOK Anahy Thurston [phone], daughter.    POLST: None on file.      Code Status: DNR/I ok    Outcome:  Met with pts daughter, Kimmy, at the beside, introduced self and the role of Palliative care/support. Moved to conference room. Kimmy was able to contact her spouse, Kuldeep, who is a  to skype in on the conversation. Discussed her mothers admit and current confusion. Kimmy stated that her mother has never had confusion such as this in the past and is unsure how much is related to the infection vs missing her dialysis on last Wednesday.   Pt has been living with her and her  since 1996 and been independent and well up until approx the last two years. Family aware that pt has been declining but was still having a good quality of life and participating in their home life and liked to cool dinner on occasion. Spoke about end of life and quality of life. Pts spouse has lost both of his parents and was very pragmatic and assisted his wife with this discussion.   Kimmy stated that  she would like to continue with the abx to determine if her mother's mentation will clear. Discussed that the pt would have been due for dialysis today and the per Nephrology they feel that the benefit is decreasing for her mother, especially if she is unable to mentally clear.   Spoke about the option of hospice and what that entails. Spoke about stopping dialysis and focusing on comfort. Kuldeep wanted to know what a time frame might be as he is gone with the truck and his wife works night shift for [employer]. Let family know that I would not be able to provide a time frame for them but that if they determined that they would want to transition to hospice, hospice may be able to provide more of an idea for time frame.   Family in agreement at this time that they wish to refrain from deciding to replace dialysis catheter. Family would like to continue with the abx and meet again tomorrow.     Updated: MD, BS RN    Plan: TBD, will meet with family again tomorrow, 1/1/2019, for further discussion.     Recommendations: I do not recommend an ethics or hospice consult at this time because family is still trying to determine GOC. If the pt was to decline hospice will be appropriate. .    Thank you for allowing Palliative Care to participate in this patient's care. Please feel free to call [phone] with any questions or concerns.

## 2019-01-01 NOTE — PROGRESS NOTES
· 2 RN skin check complete with day shift RN.   · Devices in place:   · Cardiac Monitor  · Blood pressure cuff  · Pulse ox probe  · Bilateral wrist restraints  · Velez catheter  · Temp probe  · PIV  · Skin assessed under devices.  · Pressure injury present on left buttock, bruises and excoriations present all over.   · The following interventions in place: Barrier paste, frequent cleaning.

## 2019-01-01 NOTE — PROGRESS NOTES
Nephrology Daily Progress Note    Date of Service  1/1/2019    Chief Complaint  73 y.o. female admitted 12/27/2018 with ESRD, resp failure, fall.    Interval Problem Update  Events reviewed.   Lab parameters stable, in positive balance  Family in room    Review of Systems  Review of Systems   Unable to perform ROS: Medical condition        Physical Exam  Temp:  [36.2 °C (97.2 °F)-36.8 °C (98.2 °F)] 36.2 °C (97.2 °F)  Pulse:  [62-86] 62  Resp:  [18-20] 18  BP: ()/(50-74) 100/56    Physical Exam   Constitutional: She appears ill.   Cardiovascular: Normal rate and regular rhythm.    Pulmonary/Chest: Effort normal and breath sounds normal.   Neurological: She is alert.   Skin: Skin is warm and dry.       Fluids    Intake/Output Summary (Last 24 hours) at 01/01/19 1353  Last data filed at 01/01/19 1215   Gross per 24 hour   Intake              500 ml   Output              510 ml   Net              -10 ml       Laboratory  Recent Labs      12/30/18   0503  12/31/18   0453  01/01/19   0623   WBC  13.3*  11.5*  12.0*   RBC  2.73*  2.73*  2.82*   HEMOGLOBIN  8.3*  8.3*  8.6*   HEMATOCRIT  26.8*  26.6*  26.7*   MCV  98.2*  97.4  94.7   MCH  30.4  30.4  30.5   MCHC  31.0*  31.2*  32.2*   RDW  53.1*  51.6*  50.0   PLATELETCT  129*  124*  146*   MPV  10.6  10.9  11.3     Recent Labs      12/31/18   0453  01/01/19   0623  01/01/19   0841   SODIUM  131*  130*  132*   POTASSIUM  3.1*  3.7  3.6   CHLORIDE  97  96  99   CO2  28  27  22   GLUCOSE  128*  82  72   BUN  42*  50*  50*   CREATININE  3.09*  3.85*  3.75*   CALCIUM  7.7*  8.3*  7.4*     Recent Labs      12/30/18   0503  12/31/18   0453   INR  6.24*  5.84*               Imaging      Assessment/Plan  1 ESRD - overall tolerating HD poorly  2 Resp failure - improving  3 Anemia  4 Hyponatremia    -Discussed at length with the patient and family in the room  -Overall, she has not been improved with dialysis and has been declining at a relatively rapid rate with multiple  hospitalizations  -At this point I would consider hospice, discussed with the family

## 2019-01-01 NOTE — PROGRESS NOTES
"[facility] ICU Progress Note      Admit Date: 12/27/2018    Date & Time:   1/1/2019   6:49 AM       Patient ID:    Name:             Kristyn Gillespie     YOB: 1945  Age:                 73 y.o.  female   MRN:               [id number]      HPI:  \"73 y.o female with ESRD (HD on MWF), HTN, HLD, anemia of chronic disease, COPD (2L O2), hx of CVA, DVT (on vascath, on coumadin), mitral regurgitation s/p clip, PAD, and migraines admitted after GLF. Pt was found to have hypotension, hypoxia, hypoglycemia, as well as supra therapeutic INR > 10. She was 3/4 SIRS (WBC of 30, HR, RR) & had elevated lactic acid; started on C3, D5W, and given sepsis appropriate IVF.Patient  admitted to the ICU for further management.\"    Consultants:  Nephrology   PMA     Interval Events:  On pulse check overnight, patient found to desaturate to 70s.  ABG obtained, which showed O2 60.  Patient placed on Oxymask titrated to 10 L.  Patient remains confused this morning, alert and oriented only to person.  Denies chest pain, abdominal pain.  No lower extremity edema bilaterally. Remains afebrile and without tachycardia.  Chest x-ray this morning showed no acute pulmonary processes.    Physical Exam:  Constitutional: AAO x 1 . Pt's mentation is waxing & weaning.   She appears unhealthy. Temporal muscle wasting noted.   HENT: Normocephalic and atraumatic. Pupils are equal, round, and reactive to light.   Cardiovascular: Regular rhythm. S1,S2. No murmur, No Gallop.   Pulmonary/Chest: No adventitious sounds appreciated.  Abdominal: Soft. NT. ND.   Musculoskeletal: No peripheral edema. She exhibits no tenderness.   Neurological: She is AAO x 2.   Skin: Skin is warm and dry.     Respiratory:     Respiration: 20, Pulse Oximetry: 100 %    HemoDynamics:  Pulse: 68, Heart Rate (Monitored): 78 Blood Pressure : 113/50      Fluids:        Intake/Output Summary (Last 24 hours) at 12/28/18 1146  Last data filed at 12/28/18 0600   Gross per 24 hour "   Intake          1677.33 ml   Output                5 ml   Net          1672.33 ml          Body mass index is 23.47 kg/m².    Recent Labs      18   0503  18   SODIUM  134*  131*   POTASSIUM  3.0*  3.1*   CHLORIDE  99  97   CO2  30  28   BUN  17  42*   CREATININE  2.39*  3.09*   MAGNESIUM  1.7  2.1   PHOSPHORUS   --   1.6*   CALCIUM  7.8*  7.7*       GI/Nutrition:  Recent Labs      18   0503  18   ALTSGPT  <5  <5   ASTSGOT  18  13   ALKPHOSPHAT  86  82   TBILIRUBIN  0.3  0.3   GLUCOSE  100*  128*       Heme:  Recent Labs      18   0503  18   06   RBC  2.73*  2.73*  2.82*   HEMOGLOBIN  8.3*  8.3*  8.6*   HEMATOCRIT  26.8*  26.6*  26.7*   PLATELETCT  129*  124*  146*   PROTHROMBTM  55.0*  52.2*   --    INR  6.24*  5.84*   --        Infectious Disease:  Temp  Av.5 °C (97.7 °F)  Min: 36.4 °C (97.5 °F)  Max: 36.8 °C (98.2 °F)  Recent Labs      18   0503  12/31/18   0453  01/01/19   06   WBC  13.3*  11.5*  12.0*   NEUTSPOLYS  76.50*  81.80*   --    LYMPHOCYTES  9.90*  9.10*   --    MONOCYTES  6.90  0.90   --    EOSINOPHILS  0.90  1.80   --    BASOPHILS  0.80  0.00   --    ASTSGOT  18  13   --    ALTSGPT  <5  <5   --    ALKPHOSPHAT  86  82   --    TBILIRUBIN  0.3  0.3   --        Meds:  • phytonadione  10 mg     • vancomycin 50 mg/mL  125 mg     • LORazepam  0.5-1 mg     • ceFAZolin  1 g Stopped (18 1820)   • dextrose 10%   25 mL/hr at 18 1028   • epoetin seven for hemodialysis  50 Units/kg     • heparin  2,000 Units     • Pharmacy       • heparin  4,200 Units     • acetaminophen  650 mg     • glucose 4 g  16 g      And   • dextrose 50%  25 mL Stopped (18 8509)   • HYDROmorphone  0.5 mg     • Respiratory Care per Protocol       • hydrALAZINE  10 mg          Imaging:   DX-ABDOMEN FOR TUBE PLACEMENT   Final Result      1.  Feeding tube tip projects over the left lower hemithorax and is probably in the left lung. This should be  removed.   2.  This finding was called to the patient's nurse, Lori, at 5:38 PM on 12/31/2018.      DX-ABDOMEN FOR TUBE PLACEMENT   Final Result         Feeding tube with tip projecting over the expected area of the stomach.      DX-ABDOMEN FOR TUBE PLACEMENT   Final Result      Feeding tube placement with the tip projecting over the stomach body.      DX-ABDOMEN FOR TUBE PLACEMENT   Final Result      1.  Feeding tube tip overlies the gastric fundus.      DX-ABDOMEN FOR TUBE PLACEMENT   Final Result      Cortrak nasogastric tube position consistent with intragastric placement.            Procedures:  None    Problem and Plan:  C. difficile colitis   Assessment & Plan    - Received abx during recent hospitalization.  - C diff positive.  Continue PO vancomycin.      Sepsis ([facility])   Assessment & Plan    - This is severe sepsis with the following associated acute organ dysfunction(s): acute respiratory failure.   - Recently DC on 12/16 for MSSA sepsis believed to be from [facility].  - 3/4 SIRS on arrival (HR, WBC, RR) with hypotension and lactic acidosis.  - C diff positive; on PO vancomycin.    - On cefazolin for MSSA bacteremia, started 12/29.  Continue until 1/12/18 per ID.      Malnutrition ([facility])   Assessment & Plan    - Patient thin with temporal wasting.  - Nutrition consult appreciated.  - Continue TF.      Supratherapeutic INR   Assessment & Plan    - INR >10 on arrival.  5.84 today (12/31) s/p Vitamin K, 2 units FFP.   - Additional 10 mg IV Vitamin K to be given today, 1/1.   - No signs of active bleeding, H&H stable.  - Monitor.      Hypoglycemia   Assessment & Plan    - Presented with BG < 50.  - Noted that her FSBG does not match her BMP, possibly due to vascular issues.  - Glucose checks Q4H from earlobe.  - Continue D10 @ 25mL/h; if BG remains stable, will DC.     Fall from ground level   Assessment & Plan    - Mechanical in description.   - PT/OT when appropriate.     End stage renal disease (HCC)    Assessment & Plan    - Nephrology onboard, HD MWF.  Patient pulled out HD cath on 12/31.  Will need temp site replaced and will likely need permanent site, but INR still >5.  Per Nephro, ESRD condition worsening overall and rec Pall Med consult.    - Avoid nephrotoxic drugs.   - Pall Med consulted.  Daughter would like further time to consider HD cath replacement.  Will re-discuss today.      COPD (chronic obstructive pulmonary disease) (HCC)   Assessment & Plan    - On 2L at home.  - No PFTs on file.  - Strong hx of smoking.  - RT/O2 protocol.      Hypoxia   Assessment & Plan    - 1/1 early morning, desaturated to 70s.  ABG showed O2 60.  Placed on Oxymask titrated to 10 L.    - Afebrile, without tachycardia, no LE edema.    - Chest x-ray this morning showed no acute pulmonary processes.   - Continuous pulse ox.  Oxygen as needed.  May need to investigate cause of hypoxia.  Cannot do CTPE given renal dysfunction, but may need LE doppler ultrasound.           Anticipated Hospital stay:  >2 midnights    DISPO: Continue to monitor in ICU given hypoxia overnight.      CODE STATUS: DNAR, Intubation okay.     Quality Measures:  Velez Catheter: Yes  DVT Prophylaxis: SCDs ( INR > 10 on Admit; 5.84 on 12/31.)  Ulcer Prophylaxis:None  Antibiotics: IV cefazolin until 1/12/18.  PO vancomycin for C. diff colitis.   Lines: PIV

## 2019-01-01 NOTE — PALLIATIVE CARE
Palliative Care follow-up  Arrived at the bedside, pts daughter not present. Will follow up with pts daughter, Anahy, tomorrow for further discussion on GOC and possibility for hospice.     Updated: TIMO RN    Plan: Will continue to work with pts daughter on GOC.     Thank you for allowing Palliative Care to participate in this patient's care. Please feel free to call [phone] with any questions or concerns.

## 2019-01-01 NOTE — PROGRESS NOTES
Critical Care Progress Note    Date of admission  12/27/2018    Chief Complaint  73 y.o. female admitted 12/27/2018 with fall, hypoglycemia and hypotension    Hospital Course    73 y.o. female who presented 12/27/2018 with end-stage renal disease hypertension dyslipidemia anemia COPD on chronic 2 L oxygen history of stroke DVT on Coumadin mitral regurg status post clip.  History of MSSA bacteremia requiring hospitalizations from November 30 to December 16 this year.  She recalls the entire events that she slipped over some yarn falling to the ground while her family was out of town she laid on the ground for 1 day.  Denies loss of consciousness. She was transferred to [facility] for further care due to hypoglycemia hypotension and 30,000 white count and an INR greater than 10.  Patient was started on ceftriaxone given p.o. vitamin K dextrose infusion and fluid resuscitation for sepsis protocol.  She describes also that she has had some diarrhea prior to this.  But denies shortness of breath chest pain neck pain numbness tingling or weakness.  She does describe back pain joint pain and fatigue.      Interval Problem Update  Reviewed last 24 hour events:   - increased O2 requirements overnight   - alert but remains confused   - -150   - AF, WBC increased slightly, plts up to 146 from 124   - NPO pending SLP   - ongoing diarrhea   - electrolytes ok without HD still    Review of Systems  Review of Systems   Unable to perform ROS: Mental status change        Vital Signs for last 24 hours   Temp:  [36.4 °C (97.5 °F)-36.8 °C (98.2 °F)] 36.5 °C (97.7 °F)  Pulse:  [62-86] 62  Resp:  [18-20] 18  BP: ()/(45-74) 100/56      Hemodynamic parameters for last 24 hours       Respiratory   SaO2 % on 10 lpm FM    Physical Exam   Physical Exam   Constitutional: She appears distressed.   Frail, weak, deteriorating   HENT:   Head: Normocephalic and atraumatic.   Right Ear: External ear normal.   Left Ear: External ear  normal.   Mouth/Throat: Mucous membranes are not pale and dry. No oropharyngeal exudate.   Eyes: Pupils are equal, round, and reactive to light. Conjunctivae are normal. Right eye exhibits no discharge. Left eye exhibits no discharge.   Neck: Neck supple. No thyromegaly present.   Cardiovascular: Normal rate and regular rhythm.  PMI is not displaced.  Exam reveals distant heart sounds and decreased pulses.    No murmur heard.  Pulmonary/Chest: Effort normal. No accessory muscle usage. Tachypnea noted. She has no decreased breath sounds. She has wheezes. She has rhonchi in the right lower field and the left lower field. She has rales in the right lower field and the left lower field.   Abdominal: Soft. Bowel sounds are normal. She exhibits distension. There is no tenderness. There is no guarding.   Musculoskeletal: She exhibits no edema, tenderness or deformity.   Lymphadenopathy:     She has no cervical adenopathy.   Neurological:   Remains fluctuating between drowsy and confused, no focal deficits   Skin: Skin is warm and dry. No erythema. There is pallor.   Psychiatric: Her speech is normal. Her affect is blunt. She is slowed. Thought content is delusional. Cognition and memory are impaired. She expresses impulsivity.   Nursing note and vitals reviewed.      Medications  Current Facility-Administered Medications   Medication Dose Route Frequency Provider Last Rate Last Dose   • phytonadione (MEPHYTON) tablet 10 mg  10 mg Feeding Tube Once Jeremy M Gonda, M.D.   Stopped at 12/31/18 1030   • vancomycin 50 mg/mL oral soln 125 mg  125 mg Feeding Tube Q6HRS Kobe Zhong M.D.   125 mg at 01/01/19 0607   • LORazepam (ATIVAN) tablet 0.5-1 mg  0.5-1 mg Feeding Tube Q6HRS PRN Shari Torres M.D.   1 mg at 12/31/18 2317   • ceFAZolin in dextrose (ANCEF) IVPB premix 1 g  1 g Intravenous Q24HRS Kobe Zhong M.D.   Stopped at 12/31/18 1820   • dextrose 10% infusion   Intravenous Continuous Kobe Zhong M.D. 25 mL/hr at  12/30/18 1028     • epoetin seven (EPOGEN,PROCRIT) injection 2,700 Units  50 Units/kg Intravenous MO, WE + FR Fadi Najjar, M.D.   2,700 Units at 12/31/18 0935   • heparin injection 2,000 Units  2,000 Units Intravenous DIALYSIS PRN Fadi Najjar, M.D.   2,000 Units at 12/29/18 1210   • Pharmacy Consult: Enteral tube feeding - review meds/change route/product selection   Other PRMARITZA Zhong M.D.       • heparin injection 4,200 Units  4,200 Units Intracatheter DIALYSIS PRN Fadi Najjar, M.D.   4,200 Units at 12/29/18 1502   • acetaminophen (TYLENOL) tablet 650 mg  650 mg Feeding Tube Q6HRS PRN Kobe Zhong M.D.       • glucose 4 g chewable tablet 16 g  16 g Feeding Tube Q15 MIN PRN Kobe Zhong M.D.        And   • dextrose 50% (D50W) injection 25 mL  25 mL Intravenous Q15 MIN PRMARITZA Zhong M.D.   Stopped at 12/29/18 1437   • HYDROmorphone pf (DILAUDID) injection 0.5 mg  0.5 mg Intravenous Q2HRS PRN Kobe Zhong M.D.   0.5 mg at 01/01/19 0410   • Respiratory Care per Protocol   Nebulization Continuous RT Declan Pierce M.D.       • hydrALAZINE (APRESOLINE) injection 10 mg  10 mg Intravenous Q4HRS PRN Declan Pierce M.D.           Fluids    Intake/Output Summary (Last 24 hours) at 01/01/19 0855  Last data filed at 01/01/19 0800   Gross per 24 hour   Intake              400 ml   Output              530 ml   Net             -130 ml       Laboratory  Recent Labs      01/01/19   0311   ISTATAPH  7.478   ISTATAPCO2  32.9   ISTATAPO2  60*   ISTATATCO2  25   [id number]  92*   ISTATARTHCO3  24.4   ISTATARTBE  1   ISTATTEMP  98.0 F   ISTATFIO2  40   ISTATSPEC  Arterial   ISTATAPHTC  7.483   [id number]  58*         Recent Labs      12/30/18   0503  12/31/18   0453  01/01/19   0623   SODIUM  134*  131*  130*   POTASSIUM  3.0*  3.1*  3.7   CHLORIDE  99  97  96   CO2  30  28  27   BUN  17  42*  50*   CREATININE  2.39*  3.09*  3.85*   MAGNESIUM  1.7  2.1  2.3   PHOSPHORUS   --   1.6*   --    CALCIUM  7.8*  7.7*   8.3*     Recent Labs      12/30/18   0503  12/31/18   0453  01/01/19   0623   ALTSGPT  <5  <5  <5   ASTSGOT  18  13  17   ALKPHOSPHAT  86  82  83   TBILIRUBIN  0.3  0.3  0.4   GLUCOSE  100*  128*  82     Recent Labs      12/30/18   0503  12/31/18   0453  01/01/19   0623   WBC  13.3*  11.5*  12.0*   NEUTSPOLYS  76.50*  81.80*   --    LYMPHOCYTES  9.90*  9.10*   --    MONOCYTES  6.90  0.90   --    EOSINOPHILS  0.90  1.80   --    BASOPHILS  0.80  0.00   --    ASTSGOT  18  13  17   ALTSGPT  <5  <5  <5   ALKPHOSPHAT  86  82  83   TBILIRUBIN  0.3  0.3  0.4     Recent Labs      12/30/18   0503  12/31/18   0453  01/01/19   0623   RBC  2.73*  2.73*  2.82*   HEMOGLOBIN  8.3*  8.3*  8.6*   HEMATOCRIT  26.8*  26.6*  26.7*   PLATELETCT  129*  124*  146*   PROTHROMBTM  55.0*  52.2*   --    INR  6.24*  5.84*   --        Imaging  X-Ray:  I have personally reviewed the images and compared with prior images. and My impression is: Fine reticular pattern, large cardiac silhouette, bibasilar atelectasis    Assessment/Plan  C. difficile colitis- (present on admission)   Assessment & Plan    Contact isolation  P.o. Vancomycin  Monitor diarrhea     Sepsis (HCC)- (present on admission)   Assessment & Plan    This is severe sepsis with the following associated acute organ dysfunction(s): disseminated intravascular coagulopathy (DIC).   Source = C. difficile colitis  Status post sepsis protocol and fluid resuscitation  Continue antibiotics       Malnutrition (HCC)- (present on admission)   Assessment & Plan    Nutritionist following     Supratherapeutic INR- (present on admission)   Assessment & Plan    Status post FFP and vitamin K  IV vitamin K 10 mg x1  Recheck INR     Hypoglycemia- (present on admission)   Assessment & Plan    Likely from poor oral intake versus infectious driven  Core tract to initiate enteral nutrition     Fall from ground level- (present on admission)   Assessment & Plan    Mechanical fall  Fall precautions      Encephalopathy acute- (present on admission)   Assessment & Plan    Likely metabolic -worsening  Correct underlying abnormalities  Limit sedatives  Reorient and maintain sleep/wake cycle     End stage renal disease (HCC)- (present on admission)   Assessment & Plan    HD Monday/Wednesday/Friday --> on hold for now, possibly indefinitely  Patient pulled out hemodialysis catheter on 12/31  IV vitamin K, recheck INR in case decision made to replace dialysis catheter  Nephrology following     COPD (chronic obstructive pulmonary disease) (HCC)- (present on admission)   Assessment & Plan    On chronic 2 L/min nasal cannula 24 hours a day --> mild exacerbation 1/1/2019  RT/O2 protocols with scheduled and PRN bronchodilators  Start systemic steroids  Titrate oxygen to goal SaO2 between 88 and 92%         Hypoxia   Assessment & Plan    Worsening  Titrate oxygen to goal SaO2 between 88 and 92%  Pulmonary toiletry, mobilization if tolerates  Lower extremity DVT study     Hypophosphatemia   Assessment & Plan    Repleted, monitor closely     Thrombocytopenia (HCC)   Assessment & Plan    Improving  Daily CBC     Hypokalemia- (present on admission)   Assessment & Plan    Repleted, monitor closely     Anemia in CKD (chronic kidney disease)- (present on admission)   Assessment & Plan    Daily CBC  Conservative transfusion strategy     DNR, intubation okay    VTE:  Heparin  Ulcer: Not Indicated  Lines: None    I have performed a physical exam and reviewed and updated ROS and Plan today (1/1/2019). In review of yesterday's note (12/31/2018), there are no changes except as documented above.     Patient remains critically ill today requiring my active management of her worsening respiratory failure, encephalopathy, metabolic disease.  Ongoing goals of care discussions between family, patient, nephrology, palliative care and myself.  Hospice consult placed today.  High risk for rapid deterioration in the next 24-48 hours. High risk of  deterioration and worsening vital organ dysfunction and death without the above critical care interventions.    Discussed patient condition and risk of morbidity and/or mortality with Family, RN, RT, Pharmacy, [facility] resident, Charge nurse / hot rounds, Patient, nephrology and Palliative care  The patient remains critically ill.  Critical care time = 32 minutes in directly providing and coordinating critical care and extensive data review.  No time overlap and excludes procedures.

## 2019-01-02 ENCOUNTER — TELEPHONE (OUTPATIENT)
Dept: INFECTIOUS DISEASES | Facility: MEDICAL CENTER | Age: 74
End: 2019-01-02

## 2019-01-02 LAB
ALBUMIN SERPL BCP-MCNC: 2.3 G/DL (ref 3.2–4.9)
ALBUMIN/GLOB SERPL: 1 G/DL
ALP SERPL-CCNC: 81 U/L (ref 30–99)
ALT SERPL-CCNC: <5 U/L (ref 2–50)
ANION GAP SERPL CALC-SCNC: 12 MMOL/L (ref 0–11.9)
ANISOCYTOSIS BLD QL SMEAR: ABNORMAL
AST SERPL-CCNC: 17 U/L (ref 12–45)
BACTERIA BLD CULT: NORMAL
BACTERIA BLD CULT: NORMAL
BASOPHILS # BLD AUTO: 0 % (ref 0–1.8)
BASOPHILS # BLD: 0 K/UL (ref 0–0.12)
BILIRUB SERPL-MCNC: 0.4 MG/DL (ref 0.1–1.5)
BUN SERPL-MCNC: 60 MG/DL (ref 8–22)
CALCIUM SERPL-MCNC: 8.5 MG/DL (ref 8.5–10.5)
CHLORIDE SERPL-SCNC: 97 MMOL/L (ref 96–112)
CO2 SERPL-SCNC: 22 MMOL/L (ref 20–33)
CREAT SERPL-MCNC: 4.6 MG/DL (ref 0.5–1.4)
EOSINOPHIL # BLD AUTO: 0.1 K/UL (ref 0–0.51)
EOSINOPHIL NFR BLD: 1 % (ref 0–6.9)
ERYTHROCYTE [DISTWIDTH] IN BLOOD BY AUTOMATED COUNT: 50.4 FL (ref 35.9–50)
GLOBULIN SER CALC-MCNC: 2.4 G/DL (ref 1.9–3.5)
GLUCOSE SERPL-MCNC: 96 MG/DL (ref 65–99)
HCT VFR BLD AUTO: 26.1 % (ref 37–47)
HGB BLD-MCNC: 8.3 G/DL (ref 12–16)
INR PPP: 1.28 (ref 0.87–1.13)
LG PLATELETS BLD QL SMEAR: NORMAL
LYMPHOCYTES # BLD AUTO: 0.89 K/UL (ref 1–4.8)
LYMPHOCYTES NFR BLD: 8.6 % (ref 22–41)
MACROCYTES BLD QL SMEAR: ABNORMAL
MAGNESIUM SERPL-MCNC: 2.5 MG/DL (ref 1.5–2.5)
MANUAL DIFF BLD: NORMAL
MCH RBC QN AUTO: 30.1 PG (ref 27–33)
MCHC RBC AUTO-ENTMCNC: 31.8 G/DL (ref 33.6–35)
MCV RBC AUTO: 94.6 FL (ref 81.4–97.8)
METAMYELOCYTES NFR BLD MANUAL: 2.9 %
MICROCYTES BLD QL SMEAR: ABNORMAL
MONOCYTES # BLD AUTO: 0 K/UL (ref 0–0.85)
MONOCYTES NFR BLD AUTO: 0 % (ref 0–13.4)
MORPHOLOGY BLD-IMP: NORMAL
MYELOCYTES NFR BLD MANUAL: 1.9 %
NEUTROPHILS # BLD AUTO: 8.9 K/UL (ref 2–7.15)
NEUTROPHILS NFR BLD: 85.6 % (ref 44–72)
NRBC # BLD AUTO: 0 K/UL
NRBC BLD-RTO: 0 /100 WBC
PLATELET # BLD AUTO: 168 K/UL (ref 164–446)
PLATELET BLD QL SMEAR: NORMAL
PMV BLD AUTO: 11.3 FL (ref 9–12.9)
POTASSIUM SERPL-SCNC: 4.6 MMOL/L (ref 3.6–5.5)
PROT SERPL-MCNC: 4.7 G/DL (ref 6–8.2)
PROTHROMBIN TIME: 16.1 SEC (ref 12–14.6)
RBC # BLD AUTO: 2.76 M/UL (ref 4.2–5.4)
RBC BLD AUTO: PRESENT
SIGNIFICANT IND 70042: NORMAL
SIGNIFICANT IND 70042: NORMAL
SITE SITE: NORMAL
SITE SITE: NORMAL
SODIUM SERPL-SCNC: 131 MMOL/L (ref 135–145)
SOURCE SOURCE: NORMAL
SOURCE SOURCE: NORMAL
TOXIC GRANULES BLD QL SMEAR: SLIGHT
WBC # BLD AUTO: 10.4 K/UL (ref 4.8–10.8)

## 2019-01-02 PROCEDURE — 770022 HCHG ROOM/CARE - ICU (200)

## 2019-01-02 PROCEDURE — 80053 COMPREHEN METABOLIC PANEL: CPT

## 2019-01-02 PROCEDURE — 700111 HCHG RX REV CODE 636 W/ 250 OVERRIDE (IP): Mod: JG | Performed by: INTERNAL MEDICINE

## 2019-01-02 PROCEDURE — 700102 HCHG RX REV CODE 250 W/ 637 OVERRIDE(OP): Performed by: INTERNAL MEDICINE

## 2019-01-02 PROCEDURE — 85610 PROTHROMBIN TIME: CPT

## 2019-01-02 PROCEDURE — A9270 NON-COVERED ITEM OR SERVICE: HCPCS | Performed by: INTERNAL MEDICINE

## 2019-01-02 PROCEDURE — 700111 HCHG RX REV CODE 636 W/ 250 OVERRIDE (IP): Performed by: INTERNAL MEDICINE

## 2019-01-02 PROCEDURE — 85027 COMPLETE CBC AUTOMATED: CPT

## 2019-01-02 PROCEDURE — 83735 ASSAY OF MAGNESIUM: CPT

## 2019-01-02 PROCEDURE — 99232 SBSQ HOSP IP/OBS MODERATE 35: CPT | Performed by: INTERNAL MEDICINE

## 2019-01-02 PROCEDURE — 85007 BL SMEAR W/DIFF WBC COUNT: CPT

## 2019-01-02 PROCEDURE — 99233 SBSQ HOSP IP/OBS HIGH 50: CPT | Performed by: INTERNAL MEDICINE

## 2019-01-02 PROCEDURE — 306802 CATHETER,INSTAFLOW BOWEL: Performed by: STUDENT IN AN ORGANIZED HEALTH CARE EDUCATION/TRAINING PROGRAM

## 2019-01-02 RX ORDER — METHYLPREDNISOLONE SODIUM SUCCINATE 40 MG/ML
40 INJECTION, POWDER, LYOPHILIZED, FOR SOLUTION INTRAMUSCULAR; INTRAVENOUS EVERY 12 HOURS
Status: DISCONTINUED | OUTPATIENT
Start: 2019-01-02 | End: 2019-01-03

## 2019-01-02 RX ADMIN — VANCOMYCIN HYDROCHLORIDE 125 MG: 10 INJECTION, POWDER, LYOPHILIZED, FOR SOLUTION INTRAVENOUS at 17:56

## 2019-01-02 RX ADMIN — CEFAZOLIN SODIUM 1 G: 1 INJECTION, SOLUTION INTRAVENOUS at 17:56

## 2019-01-02 RX ADMIN — VANCOMYCIN HYDROCHLORIDE 125 MG: 10 INJECTION, POWDER, LYOPHILIZED, FOR SOLUTION INTRAVENOUS at 13:45

## 2019-01-02 RX ADMIN — METHYLPREDNISOLONE SODIUM SUCCINATE 40 MG: 40 INJECTION, POWDER, FOR SOLUTION INTRAMUSCULAR; INTRAVENOUS at 17:56

## 2019-01-02 RX ADMIN — EPOETIN ALFA 2700 UNITS: 3000 SOLUTION INTRAVENOUS; SUBCUTANEOUS at 09:57

## 2019-01-02 RX ADMIN — METHYLPREDNISOLONE SODIUM SUCCINATE 40 MG: 40 INJECTION, POWDER, FOR SOLUTION INTRAMUSCULAR; INTRAVENOUS at 00:57

## 2019-01-02 RX ADMIN — VANCOMYCIN HYDROCHLORIDE 125 MG: 10 INJECTION, POWDER, LYOPHILIZED, FOR SOLUTION INTRAVENOUS at 05:05

## 2019-01-02 RX ADMIN — VANCOMYCIN HYDROCHLORIDE 125 MG: 10 INJECTION, POWDER, LYOPHILIZED, FOR SOLUTION INTRAVENOUS at 00:57

## 2019-01-02 RX ADMIN — VANCOMYCIN HYDROCHLORIDE 125 MG: 10 INJECTION, POWDER, LYOPHILIZED, FOR SOLUTION INTRAVENOUS at 23:33

## 2019-01-02 RX ADMIN — ACETAMINOPHEN 650 MG: 325 TABLET, FILM COATED ORAL at 16:39

## 2019-01-02 RX ADMIN — METHYLPREDNISOLONE SODIUM SUCCINATE 40 MG: 40 INJECTION, POWDER, FOR SOLUTION INTRAMUSCULAR; INTRAVENOUS at 05:05

## 2019-01-02 ASSESSMENT — ENCOUNTER SYMPTOMS
NERVOUS/ANXIOUS: 0
HEADACHES: 0
PALPITATIONS: 0
BRUISES/BLEEDS EASILY: 0
BACK PAIN: 0
SPUTUM PRODUCTION: 1
VOMITING: 0
SHORTNESS OF BREATH: 0
CHILLS: 0
DIZZINESS: 0
FEVER: 0
WEAKNESS: 1
BLURRED VISION: 0
FLANK PAIN: 0
WHEEZING: 0
NAUSEA: 0
MEMORY LOSS: 1
DEPRESSION: 0
COUGH: 1

## 2019-01-02 ASSESSMENT — PAIN SCALES - GENERAL
PAINLEVEL_OUTOF10: 5
PAINLEVEL_OUTOF10: 0

## 2019-01-02 NOTE — DISCHARGE PLANNING
Anticipated Discharge Disposition: home w/ hospice    Action: Spoke to pt's dtr. Advised pt will receive ABX for special contact isolation 10-14 days. Pt is currently on day 6 of ABX.     Dtr has not made a decision for hospice yet. She will call Lsw once she has a decision.    Barriers to Discharge: TBD    Plan: f/u w/ medical team, pt's dtr

## 2019-01-02 NOTE — PROGRESS NOTES
Paged Dr. Torres, MARK Rivera.    Orders for PO ativan PRN changed to IV.    Patient transitioned back  To ICU status due to decompensating Oxygenation status and altered mental status.

## 2019-01-02 NOTE — PROGRESS NOTES
Monitor Summary:     Rhythm: Sinus Rhythm  Ectopy: frequent PACs and PVCs  Rate: 70-80   Measurements: .20/.12/.42

## 2019-01-02 NOTE — WOUND TEAM
Wound team note: T631/00  Pt seen for placement of BMS. MD order verified. Pt assessed, noted to be incontinent of loose brown stool. Sacrum/buttocks pink and intact. Sphincter tone determined to be adequate. BMS placed without difficulty, 25 mL of tap water placed in retention balloon, irrigated with 60 mL warm tap water. Nursing to irrigate q shift with 60 mL-120 mL warm tap water or until patent.     This RN assessed sacrococcygeal area. The area appears intact, photo obtained. Left mid buttock has a small partial thickness skin tear wound. The area should heal quickly with offloading and BMS system in place to manage output.

## 2019-01-02 NOTE — PROGRESS NOTES
Critical Care Progress Note    Date of admission  12/27/2018    Chief Complaint  73 y.o. female admitted 12/27/2018 with fall, hypoglycemia and hypotension    Hospital Course    73 y.o. female who presented 12/27/2018 with end-stage renal disease hypertension dyslipidemia anemia COPD on chronic 2 L oxygen history of stroke DVT on Coumadin mitral regurg status post clip.  History of MSSA bacteremia requiring hospitalizations from November 30 to December 16 this year.  She recalls the entire events that she slipped over some yarn falling to the ground while her family was out of town she laid on the ground for 1 day.  Denies loss of consciousness. She was transferred to [facility] for further care due to hypoglycemia hypotension and 30,000 white count and an INR greater than 10.  Patient was started on ceftriaxone given p.o. vitamin K dextrose infusion and fluid resuscitation for sepsis protocol.  She describes also that she has had some diarrhea prior to this.  But denies shortness of breath chest pain neck pain numbness tingling or weakness.  She does describe back pain joint pain and fatigue.      Interval Problem Update  Reviewed last 24 hour events:   - DVT US neg   - AAOx4 this morning, better overall appearance   - dion PO diet   - AF, improving WBC   - Hgb unchanged low   - frequent ectopy   - BMS for liquid stools   - increasing K to 4.6, increasing BUN/crt   - INR down to 1.28 after vit K    Review of Systems  Review of Systems   Constitutional: Negative for chills, fever and malaise/fatigue.   HENT: Negative for congestion.    Eyes: Negative for blurred vision.   Respiratory: Positive for cough and sputum production. Negative for shortness of breath and wheezing.    Cardiovascular: Negative for chest pain, palpitations and leg swelling.   Gastrointestinal: Negative for nausea and vomiting.   Genitourinary: Negative for flank pain.   Musculoskeletal: Negative for back pain.   Skin: Negative for rash.    Neurological: Positive for weakness. Negative for dizziness and headaches.   Endo/Heme/Allergies: Does not bruise/bleed easily.   Psychiatric/Behavioral: Positive for memory loss. Negative for depression. The patient is not nervous/anxious.    All other systems reviewed and are negative.       Vital Signs for last 24 hours   Temp:  [36.2 °C (97.2 °F)-36.8 °C (98.2 °F)] 36.5 °C (97.7 °F)  Pulse:  [58-87] 66  Resp:  [12-18] 17  BP: (100)/(56) 100/56      Hemodynamic parameters for last 24 hours       Respiratory   SaO2 100% on 8-->1 lpm FM    Physical Exam   Physical Exam   Constitutional: She is oriented to person, place, and time. She appears well-developed. No distress.   Remains frail and weak but improved neurologic status and energy today   HENT:   Head: Normocephalic and atraumatic.   Nose: Nose normal.   Mouth/Throat: Oropharynx is clear and moist. Mucous membranes are not pale and dry.   Eyes: Pupils are equal, round, and reactive to light. Conjunctivae are normal. No scleral icterus.   Neck: Neck supple. No JVD present. No tracheal deviation present.   Cardiovascular: Normal rate, regular rhythm and intact distal pulses.  Frequent extrasystoles are present. PMI is not displaced.  Exam reveals no distant heart sounds and no decreased pulses.    No murmur heard.  Pulmonary/Chest: Effort normal. No accessory muscle usage. No tachypnea. No respiratory distress. She has no decreased breath sounds. She has no wheezes. She has no rhonchi. She has rales in the right lower field and the left lower field.   Moderate strength cough   Abdominal: Soft. Bowel sounds are normal. She exhibits no distension. There is no tenderness. There is no rebound.   Musculoskeletal: She exhibits no edema or tenderness.   Neurological: She is alert and oriented to person, place, and time. No cranial nerve deficit. She exhibits normal muscle tone.   Generalized weakness, forgetful and needs frequent reminders   Skin: Skin is warm and  dry. No pallor.   Psychiatric: Her speech is normal. Her affect is not blunt. She is slowed. Thought content is not delusional. Cognition and memory are impaired. She does not express impulsivity.   Nursing note and vitals reviewed.      Medications  Current Facility-Administered Medications   Medication Dose Route Frequency Provider Last Rate Last Dose   • methylPREDNISolone (SOLU-MEDROL) 40 MG injection 40 mg  40 mg Intravenous Q6HRS Jeremy M Gonda, M.D.   40 mg at 01/02/19 0505   • vancomycin 50 mg/mL oral soln 125 mg  125 mg Oral Q6HRS Jeremy M Gonda, M.D.   125 mg at 01/02/19 0505   • acetaminophen (TYLENOL) tablet 650 mg  650 mg Oral Q6HRS PRN Jeremy M Gonda, M.D.       • glucose 4 g chewable tablet 16 g  16 g Oral Q15 MIN PRN Jeremy M Gonda, M.D.        And   • dextrose 50% (D50W) injection 25 mL  25 mL Intravenous Q15 MIN PRN Jeremy M Gonda, M.D.       • LORazepam (ATIVAN) injection 0.5-1 mg  0.5-1 mg Intravenous Q4HRS PRN Shari Torres M.D.       • ceFAZolin in dextrose (ANCEF) IVPB premix 1 g  1 g Intravenous Q24HRS Kobe Zhong M.D.   Stopped at 01/01/19 1826   • epoetin seven (EPOGEN,PROCRIT) injection 2,700 Units  50 Units/kg Intravenous MO, WE + FR Fadi Najjar, M.D.   2,700 Units at 12/31/18 0935   • heparin injection 2,000 Units  2,000 Units Intravenous DIALYSIS PRN Fadi Najjar, M.D.   2,000 Units at 12/29/18 1210   • heparin injection 4,200 Units  4,200 Units Intracatheter DIALYSIS PRN Fadi Najjar, M.D.   4,200 Units at 12/29/18 1502   • HYDROmorphone pf (DILAUDID) injection 0.5 mg  0.5 mg Intravenous Q2HRS PRN Kobe Zhong M.D.   0.5 mg at 01/01/19 1628   • Respiratory Care per Protocol   Nebulization Continuous RT Declan Pierce M.D.       • hydrALAZINE (APRESOLINE) injection 10 mg  10 mg Intravenous Q4HRS PRN Declan Pierce M.D.           Fluids    Intake/Output Summary (Last 24 hours) at 01/02/19 0721  Last data filed at 01/02/19 0600   Gross per 24 hour   Intake           463.33 ml    Output              180 ml   Net           283.33 ml       Laboratory  Recent Labs      01/01/19   0311   ISTATAPH  7.478   ISTATAPCO2  32.9   ISTATAPO2  60*   ISTATATCO2  25   [id number]  92*   ISTATARTHCO3  24.4   ISTATARTBE  1   ISTATTEMP  98.0 F   ISTATFIO2  40   ISTATSPEC  Arterial   ISTATAPHTC  7.483   [id number]  58*         Recent Labs      12/31/18   0453  01/01/19   0623  01/01/19   0841  01/02/19   0526   SODIUM  131*  130*  132*  131*   POTASSIUM  3.1*  3.7  3.6  4.6   CHLORIDE  97  96  99  97   CO2  28  27  22  22   BUN  42*  50*  50*  60*   CREATININE  3.09*  3.85*  3.75*  4.60*   MAGNESIUM  2.1  2.3   --   2.5   PHOSPHORUS  1.6*   --   2.9   --    CALCIUM  7.7*  8.3*  7.4*  8.5     Recent Labs      12/31/18   0453  01/01/19   0623  01/01/19   0841  01/02/19   0526   ALTSGPT  <5  <5   --   <5   ASTSGOT  13  17   --   17   ALKPHOSPHAT  82  83   --   81   TBILIRUBIN  0.3  0.4   --   0.4   GLUCOSE  128*  82  72  96     Recent Labs      12/31/18   0453  01/01/19   0623  01/02/19   0526   WBC  11.5*  12.0*  10.4   NEUTSPOLYS  81.80*  66.40  85.60*   LYMPHOCYTES  9.10*  14.20*  8.60*   MONOCYTES  0.90  3.50  0.00   EOSINOPHILS  1.80  0.90  1.00   BASOPHILS  0.00  0.00  0.00   ASTSGOT  13  17  17   ALTSGPT  <5  <5  <5   ALKPHOSPHAT  82  83  81   TBILIRUBIN  0.3  0.4  0.4     Recent Labs      12/31/18   0453  01/01/19   0623  01/01/19   1500  01/02/19   0526   RBC  2.73*  2.82*   --   2.76*   HEMOGLOBIN  8.3*  8.6*   --   8.3*   HEMATOCRIT  26.6*  26.7*   --   26.1*   PLATELETCT  124*  146*   --   168   PROTHROMBTM  52.2*   --   21.6*  16.1*   INR  5.84*   --   1.87*  1.28*       Imaging  X-Ray:  No film today    Assessment/Plan  C. difficile colitis- (present on admission)   Assessment & Plan    Contact isolation  P.o. Vancomycin times 10 days  Monitor diarrhea     Sepsis (HCC)- (present on admission)   Assessment & Plan    This is severe sepsis with the following associated acute organ dysfunction(s):  disseminated intravascular coagulopathy (DIC).   Source = C. difficile colitis    Improving status post sepsis protocol and fluid resuscitation  Continue antibiotics       Malnutrition ([facility])- (present on admission)   Assessment & Plan    Nutritionist following, supplements     Supratherapeutic INR- (present on admission)   Assessment & Plan    Status post FFP and vitamin K  Improved, recheck if procedure planned     Hypoglycemia- (present on admission)   Assessment & Plan    Likely from poor oral intake versus infectious driven -improved  Diet     Fall from ground level- (present on admission)   Assessment & Plan    Mechanical fall  Fall precautions     Encephalopathy acute- (present on admission)   Assessment & Plan    Likely metabolic -improving  Limit sedatives  Reorient and maintain sleep/wake cycle, mobilize     End stage renal disease (HCC)- (present on admission)   Assessment & Plan    HD Monday/Wednesday/Friday --> on hold for now, possibly indefinitely  Patient pulled out hemodialysis catheter on 12/31  INR corrected in case need for replacement of dialysis catheter  Nephrology following     COPD (chronic obstructive pulmonary disease) ([facility])- (present on admission)   Assessment & Plan    On chronic 2 L/min nasal cannula 24 hours a day --> mild exacerbation 1/1/2019  RT/O2 protocols with scheduled and PRN bronchodilators  Start to wean systemic steroids  Titrate oxygen to goal SaO2 between 88 and 92%         Hypoxia   Assessment & Plan    Improving  Continue to titrate oxygen to goal SaO2 between 88 and 92%  Pulmonary toiletry, mobilization      Hypophosphatemia   Assessment & Plan    Repleted, monitor closely     Thrombocytopenia ([facility])   Assessment & Plan    Resolved     Hypokalemia- (present on admission)   Assessment & Plan    Resolved for now  Monitor     Anemia in CKD (chronic kidney disease)- (present on admission)   Assessment & Plan    Daily CBC  Conservative transfusion strategy     DNR, intubation  okay, being evaluated for hospice    VTE:  Heparin  Ulcer: Not Indicated  Lines: None    I have performed a physical exam and reviewed and updated ROS and Plan today (1/2/2019). In review of yesterday's note (1/1/2019), there are no changes except as documented above.     Discussed patient condition and risk of morbidity and/or mortality with RN, RT, Pharmacy, , [facility] resident, Charge nurse / hot rounds, Patient, nephrology and Palliative care

## 2019-01-02 NOTE — CARE PLAN
Problem: Pain Management  Goal: Pain level will decrease to patient's comfort goal  Outcome: PROGRESSING AS EXPECTED  Ativan and dilaudid PRN for pain/anxiety. Pt 2+ agitation on Stoll scale. Heat packs and warm blankets given for pt's pain.     Problem: Mobility  Goal: Risk for activity intolerance will decrease  Outcome: NOT MET  Pt unable to mobilize d/t agitation and restlessness. Pt thrashing around in bed and restrained to hospital bed for pt and staff's safety.

## 2019-01-02 NOTE — PROGRESS NOTES
Nephrology Daily Progress Note    Date of Service  1/2/2019    Chief Complaint  73 y.o. female admitted 12/27/2018 with ESRD, resp failure, fall.    Interval Problem Update  Events reviewed. Patient appears comfortable.  She tells me that she is occasionally confused    Review of Systems  Review of Systems   Constitutional: Negative for chills and fever.   Respiratory: Negative for shortness of breath.    Cardiovascular: Negative for chest pain and palpitations.        Physical Exam  Temp:  [36.2 °C (97.2 °F)-36.8 °C (98.2 °F)] 36.2 °C (97.2 °F)  Pulse:  [58-87] 75  Resp:  [12-18] 14    Physical Exam   Constitutional: She appears ill.   Cardiovascular: Normal rate and regular rhythm.    Pulmonary/Chest: Effort normal and breath sounds normal.   Neurological: She is alert.   Skin: Skin is warm and dry.       Fluids    Intake/Output Summary (Last 24 hours) at 01/02/19 1106  Last data filed at 01/02/19 1000   Gross per 24 hour   Intake           763.33 ml   Output              150 ml   Net           613.33 ml       Laboratory  Recent Labs      12/31/18   0453  01/01/19   0623  01/02/19   0526   WBC  11.5*  12.0*  10.4   RBC  2.73*  2.82*  2.76*   HEMOGLOBIN  8.3*  8.6*  8.3*   HEMATOCRIT  26.6*  26.7*  26.1*   MCV  97.4  94.7  94.6   MCH  30.4  30.5  30.1   MCHC  31.2*  32.2*  31.8*   RDW  51.6*  50.0  50.4*   PLATELETCT  124*  146*  168   MPV  10.9  11.3  11.3     Recent Labs      01/01/19   0623  01/01/19   0841  01/02/19   0526   SODIUM  130*  132*  131*   POTASSIUM  3.7  3.6  4.6   CHLORIDE  96  99  97   CO2  27  22  22   GLUCOSE  82  72  96   BUN  50*  50*  60*   CREATININE  3.85*  3.75*  4.60*   CALCIUM  8.3*  7.4*  8.5     Recent Labs      12/31/18   0453  01/01/19   1500  01/02/19   0526   INR  5.84*  1.87*  1.28*               Imaging      Assessment/Plan  1 ESRD - noted plans to transition to hospice, no HD access currently  2 Anemia  3 Hyponatremia    -No HD planned  -Noted transition to hospice

## 2019-01-02 NOTE — DISCHARGE PLANNING
Anticipated Discharge Disposition: d/c home on hospice    Action: Received hospice order, conducted chart review, called palliaitive office to speak w/ palliative RN Nikkie.    She spoke w/ both pt's dtr and .    Lsw spoke to dtr Anahy. She will call the two reported hospice agencies who cover [place]: [facility].    Lsw explained the services and answered all posed questions. LSw provided all contact numbers needed for the agencies and this LSw.    Dtr will call this LSw w/ a choice decision.    Lsw spoke to bedside RN. She will contact pharmacy and determine when pt will not longer be infectious for Cdif. This Lsw will update dtr as soon as information is acquired.      Barriers to Discharge: TBD    Plan: f/u w/ medical team and family

## 2019-01-02 NOTE — CARE PLAN
Problem: Safety  Goal: Will remain free from injury  Outcome: PROGRESSING AS EXPECTED  Bed in locked and in low position, lower bed rails raised, bed alarm in use, call light within reach, treaded slipper socks on patient and patient room near nursing station.       Problem: Skin Integrity  Goal: Risk for impaired skin integrity will decrease  Outcome: PROGRESSING AS EXPECTED  Assess patient's skin at the beginning of the shift. Turn patient every two hours and monitor under all medical devices throughout entire shift. Maintain a clean, dry linen environment. Float heals and elbows. Provide appropriate would prevention interventions as they apply. Implement pertinent wound protocols and document them. Please see wound flow sheet for further details.     Problem: Safety - Medical Restraint  Goal: Remains free of injury from restraints (Restraint for Interference with Medical Device)  INTERVENTIONS:  1. Determine that other, less restrictive measures have been tried or would not be effective before applying the restraint  2. Evaluate the patient's condition at the time of restraint application  3. Inform patient/family regarding the reason for restraint  4. Q2H: Monitor safety, psychosocial status, comfort, nutrition and hydration     Outcome: PROGRESSING AS EXPECTED  Q2H assessments implemented.

## 2019-01-02 NOTE — PROGRESS NOTES
"[facility] ICU Progress Note      Admit Date: 12/27/2018    Date & Time:   1/2/2019   6:56 AM       Patient ID:    Name:             Kristyn Gillespie     YOB: 1945  Age:                 73 y.o.  female   MRN:               [id number]      HPI:  \"73 y.o female with ESRD (HD on MWF), HTN, HLD, anemia of chronic disease, COPD (2L O2), hx of CVA, DVT (on vascath, on coumadin), mitral regurgitation s/p clip, PAD, and migraines admitted after GLF. Pt was found to have hypotension, hypoxia, hypoglycemia, as well as supra therapeutic INR > 10. She was 3/4 SIRS (WBC of 30, HR, RR) & had elevated lactic acid; started on C3, D5W, and given sepsis appropriate IVF.Patient  admitted to the ICU for further management.\"    Consultants:  Nephrology   PMA     Interval Events:  B/l LE ultrasound yesterday negative for DVT.  Oxygenation much improved overnight/today.  Saturating high 90s on 1L NC this morning.  Goal O2 88-92.  Alert, oriented to person, place, somewhat to situation.  Denies chest pain, abd pain, SOB/dyspnea.      Physical Exam:  Constitutional: AAO x 2. Appears more comfortable and alert this morning.   She appears unhealthy. Temporal muscle wasting noted.   HENT: Normocephalic and atraumatic. Pupils are equal, round, and reactive to light.   Cardiovascular: Regular rhythm. S1,S2. No murmur, No Gallop.   Pulmonary/Chest: No adventitious sounds appreciated.  Abdominal: Soft. NT. ND.   Musculoskeletal: No peripheral edema. She exhibits no tenderness.   Neurological: She is AAO x 2.   Skin: Skin is warm and dry.     Respiratory:     Respiration: 17, Pulse Oximetry: 100 %    HemoDynamics:  Pulse: 66, Heart Rate (Monitored): 69 Blood Pressure : 100/56, NIBP: 107/49      Fluids:        Intake/Output Summary (Last 24 hours) at 12/28/18 1146  Last data filed at 12/28/18 0600   Gross per 24 hour   Intake          1677.33 ml   Output                5 ml   Net          1672.33 ml       Weight: 59.5 kg (131 lb 2.8 " oz)  Body mass index is 23.24 kg/m².    Recent Labs      18   0841  19   0526   SODIUM  131*  130*  132*  131*   POTASSIUM  3.1*  3.7  3.6  4.6   CHLORIDE  97  96  99  97   CO2  28  27  22  22   BUN  42*  50*  50*  60*   CREATININE  3.09*  3.85*  3.75*  4.60*   MAGNESIUM  2.1  2.3   --   2.5   PHOSPHORUS  1.6*   --   2.9   --    CALCIUM  7.7*  8.3*  7.4*  8.5       GI/Nutrition:  Recent Labs      18   0841  19   05   ALTSGPT  <5  <5   --   <5   ASTSGOT  13  17   --   17   ALKPHOSPHAT  82  83   --   81   TBILIRUBIN  0.3  0.4   --   0.4   GLUCOSE  128*  82  72  96       Heme:  Recent Labs      18   1500  19   0526   RBC  2.73*  2.82*   --   2.76*   HEMOGLOBIN  8.3*  8.6*   --   8.3*   HEMATOCRIT  26.6*  26.7*   --   26.1*   PLATELETCT  124*  146*   --   168   PROTHROMBTM  52.2*   --   21.6*  16.1*   INR  5.84*   --   1.87*  1.28*       Infectious Disease:  Temp  Av.6 °C (97.8 °F)  Min: 36.2 °C (97.2 °F)  Max: 36.8 °C (98.2 °F)  Recent Labs      18   0526   WBC  11.5*  12.0*  10.4   NEUTSPOLYS  81.80*  66.40   --    LYMPHOCYTES  9.10*  14.20*   --    MONOCYTES  0.90  3.50   --    EOSINOPHILS  1.80  0.90   --    BASOPHILS  0.00  0.00   --    ASTSGOT  13  17  17   ALTSGPT  <5  <5  <5   ALKPHOSPHAT  82  83  81   TBILIRUBIN  0.3  0.4  0.4       Meds:  • methylPREDNISolone  40 mg     • vancomycin 50 mg/mL  125 mg     • acetaminophen  650 mg     • glucose 4 g  16 g      And   • dextrose 50%  25 mL     • LORazepam  0.5-1 mg     • ceFAZolin  1 g Stopped (19 426)   • epoetin seven for hemodialysis  50 Units/kg     • heparin  2,000 Units     • heparin  4,200 Units     • HYDROmorphone  0.5 mg     • Respiratory Care per Protocol       • hydrALAZINE  10 mg          Imaging:   US-EXTREMITY VENOUS LOWER BILAT   Final Result      DX-CHEST-LIMITED  (1 VIEW)   Final Result      No acute cardiopulmonary disease. Stable cardiomegaly.      DX-ABDOMEN FOR TUBE PLACEMENT   Final Result      1.  Feeding tube tip projects over the left lower hemithorax and is probably in the left lung. This should be removed.   2.  This finding was called to the patient's nurse, Lori, at 5:38 PM on 12/31/2018.      DX-ABDOMEN FOR TUBE PLACEMENT   Final Result         Feeding tube with tip projecting over the expected area of the stomach.      DX-ABDOMEN FOR TUBE PLACEMENT   Final Result      Feeding tube placement with the tip projecting over the stomach body.      DX-ABDOMEN FOR TUBE PLACEMENT   Final Result      1.  Feeding tube tip overlies the gastric fundus.      DX-ABDOMEN FOR TUBE PLACEMENT   Final Result      Cortrak nasogastric tube position consistent with intragastric placement.            Procedures:  None    Problem and Plan:  C. difficile colitis   Assessment & Plan    - Received abx during recent hospitalization.  - C diff positive.  Continue PO vancomycin.  Day 6 of 10-14 days.     Sepsis (HCC)   Assessment & Plan    - This is severe sepsis with the following associated acute organ dysfunction(s): acute respiratory failure.   - Recently DC on 12/16 for MSSA sepsis believed to be from [facility].  - 3/4 SIRS on arrival (HR, WBC, RR) with hypotension and lactic acidosis.  - C diff positive; on PO vancomycin.    - On cefazolin for MSSA bacteremia, started 12/29.  Continue until 1/12/18 per ID.      Malnutrition (HCC)   Assessment & Plan    - Patient thin with temporal wasting.  - Nutrition consult appreciated.  - Continue TF.      Supratherapeutic INR   Assessment & Plan    - INR >10 on arrival.  1.28 today (1/2) s/p Vitamin K x2 administrations, 2 units FFP.   - No signs of active bleeding, H&H stable.  - Monitor.   - Consider DVT prophylaxis when INR appears stable.     Hypoglycemia   Assessment & Plan    - Presented with BG < 50.  - Noted that her FSBG does not match her  BMP, possibly due to vascular issues.  - Glucose checks Q4H from earlobe.     Fall from ground level   Assessment & Plan    - Mechanical in description.   - PT/OT when appropriate.     End stage renal disease (HCC)   Assessment & Plan    - Nephrology onboard, HD MWF.  Patient pulled out HD cath on 12/31.  Will need temp site replaced and will likely need permanent site, but INR still >5.  Per Nephro, ESRD condition worsening overall and rec Pall Med consult.    - Avoid nephrotoxic drugs.   - Pall Med consulted.  Daughter would like further time to consider HD cath replacement.       COPD (chronic obstructive pulmonary disease) ([facility])   Assessment & Plan    - On 2L at home.  - No PFTs on file.  - Strong hx of smoking.  - RT/O2 protocol.   - Titrate O2 to 88-92%.       Hypoxia   Assessment & Plan    - 1/1 early morning, desaturated to 70s.  ABG showed O2 60.  Placed on Oxymask titrated to 10 L.    - Continuous pulse ox.  Oxygen as needed.  B/l LE Doppler ultrasound negative for DVT.  - 1/2 - oxygen requirements back to baseline.  Period of hypoxia likely from not removal of O2 mask on evening hypoxia noted (patient on chronic home 2 L O2).            Anticipated Hospital stay:  >2 midnights    DISPO: Continue to monitor in ICU under C. diff precautions.  Daughter discussing with Social Work and team whether she wishes to proceed with home hospice; concern re: patient's C. diff and isolation needs.      CODE STATUS: DNAR, Intubation okay.     Quality Measures:  Velez Catheter: Yes  DVT Prophylaxis: SCDs ( INR > 10 on Admit; 5.84 on 12/31.)  Ulcer Prophylaxis:None  Antibiotics: IV cefazolin until 1/12/18.  PO vancomycin for C. diff colitis - day 6.    Lines: PIV

## 2019-01-02 NOTE — PROGRESS NOTES
12 hour chart check.     · 2 RN skin check complete with day shift RN.   · Devices in place:   ? Cardiac Monitor  ? Blood pressure cuff  ? Pulse ox probe  ? Bilateral wrist restraints  ? Velez catheter  ? Temp probe  ? PIV  · Skin assessed under devices.  · Pressure injury present on left buttock, bruises and excoriations present all over.   · The following interventions in place: Barrier paste, frequent cleaning.

## 2019-01-02 NOTE — THERAPY
Cancel current OT order remains not medically appropriate notes indicate considering hospice, will need re-orders if status changes.

## 2019-01-02 NOTE — DIETARY
Nutrition Services:  Brief Update    Pt previously on tube feedings; pt pulled Cortrak.  Tube feedings are off.  Diet has been advanced to Low Fiber (GI soft).  Per ADL flow sheet, PO >50% for most recent two meals.    Recommendations/Plan:  1. Encourage intake of meals.  2. Document intake of all meals as % taken in ADLs to provide interdisciplinary communication across all shifts.   3. Monitor weight.  4. Nutrition rep will continue to see patient for ongoing meal and snack preferences.   5. Obtain supplement order per RD as needed.

## 2019-01-03 PROBLEM — D69.6 THROMBOCYTOPENIA (HCC): Status: RESOLVED | Noted: 2018-12-31 | Resolved: 2019-01-03

## 2019-01-03 LAB
ALBUMIN SERPL BCP-MCNC: 2.5 G/DL (ref 3.2–4.9)
ALBUMIN/GLOB SERPL: 1 G/DL
ALP SERPL-CCNC: 82 U/L (ref 30–99)
ALT SERPL-CCNC: <5 U/L (ref 2–50)
ANION GAP SERPL CALC-SCNC: 11 MMOL/L (ref 0–11.9)
AST SERPL-CCNC: 19 U/L (ref 12–45)
BASOPHILS # BLD AUTO: 0.4 % (ref 0–1.8)
BASOPHILS # BLD: 0.04 K/UL (ref 0–0.12)
BILIRUB SERPL-MCNC: 0.3 MG/DL (ref 0.1–1.5)
BUN SERPL-MCNC: 75 MG/DL (ref 8–22)
CALCIUM SERPL-MCNC: 8.4 MG/DL (ref 8.5–10.5)
CHLORIDE SERPL-SCNC: 94 MMOL/L (ref 96–112)
CO2 SERPL-SCNC: 23 MMOL/L (ref 20–33)
CREAT SERPL-MCNC: 5.11 MG/DL (ref 0.5–1.4)
EOSINOPHIL # BLD AUTO: 0 K/UL (ref 0–0.51)
EOSINOPHIL NFR BLD: 0 % (ref 0–6.9)
ERYTHROCYTE [DISTWIDTH] IN BLOOD BY AUTOMATED COUNT: 50.9 FL (ref 35.9–50)
GLOBULIN SER CALC-MCNC: 2.4 G/DL (ref 1.9–3.5)
GLUCOSE SERPL-MCNC: 154 MG/DL (ref 65–99)
HCT VFR BLD AUTO: 24.2 % (ref 37–47)
HGB BLD-MCNC: 7.8 G/DL (ref 12–16)
IMM GRANULOCYTES # BLD AUTO: 0.47 K/UL (ref 0–0.11)
IMM GRANULOCYTES NFR BLD AUTO: 4.5 % (ref 0–0.9)
INR PPP: 1.33 (ref 0.87–1.13)
LYMPHOCYTES # BLD AUTO: 0.77 K/UL (ref 1–4.8)
LYMPHOCYTES NFR BLD: 7.4 % (ref 22–41)
MAGNESIUM SERPL-MCNC: 2.5 MG/DL (ref 1.5–2.5)
MCH RBC QN AUTO: 30.8 PG (ref 27–33)
MCHC RBC AUTO-ENTMCNC: 32.2 G/DL (ref 33.6–35)
MCV RBC AUTO: 95.7 FL (ref 81.4–97.8)
MONOCYTES # BLD AUTO: 0.38 K/UL (ref 0–0.85)
MONOCYTES NFR BLD AUTO: 3.6 % (ref 0–13.4)
NEUTROPHILS # BLD AUTO: 8.79 K/UL (ref 2–7.15)
NEUTROPHILS NFR BLD: 84.1 % (ref 44–72)
NRBC # BLD AUTO: 0 K/UL
NRBC BLD-RTO: 0 /100 WBC
PLATELET # BLD AUTO: 177 K/UL (ref 164–446)
PMV BLD AUTO: 11.3 FL (ref 9–12.9)
POTASSIUM SERPL-SCNC: 5.1 MMOL/L (ref 3.6–5.5)
PROT SERPL-MCNC: 4.9 G/DL (ref 6–8.2)
PROTHROMBIN TIME: 16.6 SEC (ref 12–14.6)
RBC # BLD AUTO: 2.53 M/UL (ref 4.2–5.4)
SODIUM SERPL-SCNC: 128 MMOL/L (ref 135–145)
WBC # BLD AUTO: 10.5 K/UL (ref 4.8–10.8)

## 2019-01-03 PROCEDURE — 80053 COMPREHEN METABOLIC PANEL: CPT

## 2019-01-03 PROCEDURE — 700102 HCHG RX REV CODE 250 W/ 637 OVERRIDE(OP): Performed by: STUDENT IN AN ORGANIZED HEALTH CARE EDUCATION/TRAINING PROGRAM

## 2019-01-03 PROCEDURE — 770001 HCHG ROOM/CARE - MED/SURG/GYN PRIV*

## 2019-01-03 PROCEDURE — 99232 SBSQ HOSP IP/OBS MODERATE 35: CPT | Performed by: INTERNAL MEDICINE

## 2019-01-03 PROCEDURE — 85025 COMPLETE CBC W/AUTO DIFF WBC: CPT

## 2019-01-03 PROCEDURE — 700111 HCHG RX REV CODE 636 W/ 250 OVERRIDE (IP): Performed by: INTERNAL MEDICINE

## 2019-01-03 PROCEDURE — 83735 ASSAY OF MAGNESIUM: CPT

## 2019-01-03 PROCEDURE — A9270 NON-COVERED ITEM OR SERVICE: HCPCS | Performed by: INTERNAL MEDICINE

## 2019-01-03 PROCEDURE — 99233 SBSQ HOSP IP/OBS HIGH 50: CPT | Performed by: INTERNAL MEDICINE

## 2019-01-03 PROCEDURE — A9270 NON-COVERED ITEM OR SERVICE: HCPCS | Performed by: STUDENT IN AN ORGANIZED HEALTH CARE EDUCATION/TRAINING PROGRAM

## 2019-01-03 PROCEDURE — 85610 PROTHROMBIN TIME: CPT

## 2019-01-03 PROCEDURE — 700102 HCHG RX REV CODE 250 W/ 637 OVERRIDE(OP): Performed by: INTERNAL MEDICINE

## 2019-01-03 RX ORDER — METHYLPREDNISOLONE SODIUM SUCCINATE 40 MG/ML
40 INJECTION, POWDER, LYOPHILIZED, FOR SOLUTION INTRAMUSCULAR; INTRAVENOUS EVERY 24 HOURS
Status: DISCONTINUED | OUTPATIENT
Start: 2019-01-04 | End: 2019-01-04

## 2019-01-03 RX ADMIN — VANCOMYCIN HYDROCHLORIDE 125 MG: 10 INJECTION, POWDER, LYOPHILIZED, FOR SOLUTION INTRAVENOUS at 05:10

## 2019-01-03 RX ADMIN — VANCOMYCIN HYDROCHLORIDE 125 MG: 10 INJECTION, POWDER, LYOPHILIZED, FOR SOLUTION INTRAVENOUS at 17:34

## 2019-01-03 RX ADMIN — METHYLPREDNISOLONE SODIUM SUCCINATE 40 MG: 40 INJECTION, POWDER, FOR SOLUTION INTRAMUSCULAR; INTRAVENOUS at 05:10

## 2019-01-03 RX ADMIN — VANCOMYCIN HYDROCHLORIDE 125 MG: 10 INJECTION, POWDER, LYOPHILIZED, FOR SOLUTION INTRAVENOUS at 11:47

## 2019-01-03 RX ADMIN — LIDOCAINE HYDROCHLORIDE 5 ML: 20 SOLUTION OROPHARYNGEAL at 10:32

## 2019-01-03 RX ADMIN — LIDOCAINE HYDROCHLORIDE 5 ML: 20 SOLUTION OROPHARYNGEAL at 20:00

## 2019-01-03 RX ADMIN — CEFAZOLIN SODIUM 1 G: 1 INJECTION, SOLUTION INTRAVENOUS at 17:34

## 2019-01-03 RX ADMIN — ACETAMINOPHEN 650 MG: 325 TABLET, FILM COATED ORAL at 04:00

## 2019-01-03 ASSESSMENT — ENCOUNTER SYMPTOMS
CLAUDICATION: 0
DIAPHORESIS: 0
HEMOPTYSIS: 0
WEIGHT LOSS: 0
HEARTBURN: 0
WEAKNESS: 0
SHORTNESS OF BREATH: 0
ORTHOPNEA: 0
CHILLS: 0
MYALGIAS: 0
POLYDIPSIA: 0
SPUTUM PRODUCTION: 0
FEVER: 0
HALLUCINATIONS: 0
SORE THROAT: 0
SENSORY CHANGE: 0
COUGH: 1
MEMORY LOSS: 1
PHOTOPHOBIA: 0

## 2019-01-03 ASSESSMENT — PAIN SCALES - GENERAL
PAINLEVEL_OUTOF10: 1
PAINLEVEL_OUTOF10: 1
PAINLEVEL_OUTOF10: 2
PAINLEVEL_OUTOF10: 1
PAINLEVEL_OUTOF10: 0
PAINLEVEL_OUTOF10: 2
PAINLEVEL_OUTOF10: 1
PAINLEVEL_OUTOF10: 1
PAINLEVEL_OUTOF10: 3
PAINLEVEL_OUTOF10: 2
PAINLEVEL_OUTOF10: 1
PAINLEVEL_OUTOF10: 1
PAINLEVEL_OUTOF10: 0

## 2019-01-03 NOTE — PROGRESS NOTES
Nephrology Daily Progress Note    Date of Service  1/3/2019    Chief Complaint  73 y.o. female admitted 12/27/2018 with ESRD, resp failure, fall.    Interval Problem Update  Have been holding dialysis    Review of Systems  Review of Systems   Constitutional: Negative for chills and fever.   All other systems reviewed and are negative.       Physical Exam  Temp:  [36.4 °C (97.5 °F)-37.3 °C (99.1 °F)] 37.2 °C (98.9 °F)  Pulse:  [59-79] 76  Resp:  [12-23] 20  BP: (100)/(48) 100/48    Physical Exam   Constitutional: She appears ill.   Cardiovascular: Normal rate and regular rhythm.    Pulmonary/Chest: Effort normal and breath sounds normal.   Musculoskeletal: She exhibits no edema or tenderness.   Neurological: She is alert.   Skin: Skin is warm and dry.       Fluids    Intake/Output Summary (Last 24 hours) at 01/03/19 1435  Last data filed at 01/03/19 1000   Gross per 24 hour   Intake           786.66 ml   Output              135 ml   Net           651.66 ml       Laboratory  Recent Labs      01/01/19   0623  01/02/19   0526  01/03/19   0155   WBC  12.0*  10.4  10.5   RBC  2.82*  2.76*  2.53*   HEMOGLOBIN  8.6*  8.3*  7.8*   HEMATOCRIT  26.7*  26.1*  24.2*   MCV  94.7  94.6  95.7   MCH  30.5  30.1  30.8   MCHC  32.2*  31.8*  32.2*   RDW  50.0  50.4*  50.9*   PLATELETCT  146*  168  177   MPV  11.3  11.3  11.3     Recent Labs      01/01/19   0841  01/02/19   0526  01/03/19   0155   SODIUM  132*  131*  128*   POTASSIUM  3.6  4.6  5.1   CHLORIDE  99  97  94*   CO2  22  22  23   GLUCOSE  72  96  154*   BUN  50*  60*  75*   CREATININE  3.75*  4.60*  5.11*   CALCIUM  7.4*  8.5  8.4*     Recent Labs      01/01/19   1500  01/02/19   0526  01/03/19   0155   INR  1.87*  1.28*  1.33*               Imaging      Assessment/Plan  1 ESRD - Ill appearing, states that she does not want dialysis at this time, currently DNR, d/w patient  2 Anemia - Hgb 7.8  3 Hyponatremia    -No HD at this time  -Ongoing discussions regarding hospice

## 2019-01-03 NOTE — PROGRESS NOTES
Critical Care Progress Note    Date of admission  12/27/2018    Chief Complaint  73 y.o. female admitted 12/27/2018 with fall, hypoglycemia and hypotension    Hospital Course    73 y.o. female who presented 12/27/2018 with end-stage renal disease hypertension dyslipidemia anemia COPD on chronic 2 L oxygen history of stroke DVT on Coumadin mitral regurg status post clip.  History of MSSA bacteremia requiring hospitalizations from November 30 to December 16 this year.  She recalls the entire events that she slipped over some yarn falling to the ground while her family was out of town she laid on the ground for 1 day.  Denies loss of consciousness. She was transferred to [facility] for further care due to hypoglycemia hypotension and 30,000 white count and an INR greater than 10.  Patient was started on ceftriaxone given p.o. vitamin K dextrose infusion and fluid resuscitation for sepsis protocol.  She describes also that she has had some diarrhea prior to this.  But denies shortness of breath chest pain neck pain numbness tingling or weakness.  She does describe back pain joint pain and fatigue.      Interval Problem Update  Reviewed last 24 hour events:   - c/o mouth sores   - voiding minimally   - last BM 1/2   - mobilizing to edge of bed     Review of Systems  Review of Systems   Constitutional: Negative for diaphoresis and weight loss.   HENT: Negative for ear pain and sore throat.    Eyes: Negative for photophobia.   Respiratory: Positive for cough. Negative for hemoptysis, sputum production and shortness of breath.    Cardiovascular: Negative for orthopnea and claudication.   Gastrointestinal: Negative for heartburn.   Genitourinary: Negative for dysuria.   Musculoskeletal: Negative for myalgias.   Skin: Negative for itching.   Neurological: Negative for sensory change and weakness.   Endo/Heme/Allergies: Negative for polydipsia.   Psychiatric/Behavioral: Positive for memory loss. Negative for hallucinations.   All  other systems reviewed and are negative.       Vital Signs for last 24 hours   Temp:  [36.4 °C (97.5 °F)-37.3 °C (99.1 °F)] 36.4 °C (97.5 °F)  Pulse:  [59-79] 59  Resp:  [12-22] 17  BP: (100)/(48) 100/48      Respiratory   SaO2 % on 2 lpm FM    Physical Exam   Physical Exam   Constitutional: She is oriented to person, place, and time. She appears well-developed and well-nourished. No distress.   Sitting in bed, reading newspaper today, improved energy and strength   HENT:   Head: Normocephalic and atraumatic.   Right Ear: External ear normal.   Left Ear: External ear normal.   Mouth/Throat: Mucous membranes are not pale and dry. No oropharyngeal exudate.   Dry mouth, no obvious ulcerations   Eyes: Pupils are equal, round, and reactive to light. Conjunctivae are normal. Right eye exhibits no discharge. Left eye exhibits no discharge.   Neck: No thyromegaly present.   Cardiovascular: Normal rate, regular rhythm and normal heart sounds.   No extrasystoles are present. PMI is not displaced.  Exam reveals no distant heart sounds, no friction rub and no decreased pulses.    Pulmonary/Chest: Effort normal. No accessory muscle usage or stridor. She has no decreased breath sounds. She has no wheezes. She has no rhonchi. She has rales in the left lower field. She exhibits no tenderness.   Moderate strength cough   Abdominal: Soft. Bowel sounds are normal. There is no tenderness. There is no rebound and no guarding.   Musculoskeletal: She exhibits no edema or deformity.   Moderate clubbing   Neurological: She is alert and oriented to person, place, and time. She exhibits normal muscle tone. Coordination normal.   Generalized weakness   Skin: Skin is warm and dry. She is not diaphoretic. No erythema.   Psychiatric: She has a normal mood and affect. Her speech is normal and behavior is normal. Judgment and thought content normal. Cognition and memory are not impaired. She does not express impulsivity. She exhibits abnormal  remote memory.   Nursing note and vitals reviewed.      Medications  Current Facility-Administered Medications   Medication Dose Route Frequency Provider Last Rate Last Dose   • methylPREDNISolone (SOLU-MEDROL) 40 MG injection 40 mg  40 mg Intravenous Q12HRS Jeremy M Gonda, M.D.   40 mg at 01/03/19 0510   • vancomycin 50 mg/mL oral soln 125 mg  125 mg Oral Q6HRS Jeremy M Gonda, M.D.   125 mg at 01/03/19 0510   • acetaminophen (TYLENOL) tablet 650 mg  650 mg Oral Q6HRS PRN Jeremy M Gonda, M.D.   650 mg at 01/03/19 0400   • glucose 4 g chewable tablet 16 g  16 g Oral Q15 MIN PRN Jeremy M Gonda, M.D.        And   • dextrose 50% (D50W) injection 25 mL  25 mL Intravenous Q15 MIN PRN Jeremy M Gonda, M.D.       • LORazepam (ATIVAN) injection 0.5-1 mg  0.5-1 mg Intravenous Q4HRS PRN Shari Torres M.D.       • ceFAZolin in dextrose (ANCEF) IVPB premix 1 g  1 g Intravenous Q24HRS Kobe Zhong M.D.   Stopped at 01/02/19 1826   • epoetin seven (EPOGEN,PROCRIT) injection 2,700 Units  50 Units/kg Intravenous MO, WE + FR Fadi Najjar, M.D.   2,700 Units at 01/02/19 0957   • heparin injection 2,000 Units  2,000 Units Intravenous DIALYSIS PRN Fadi Najjar, M.D.   2,000 Units at 12/29/18 1210   • heparin injection 4,200 Units  4,200 Units Intracatheter DIALYSIS PRN Fadi Najjar, M.D.   4,200 Units at 12/29/18 1502   • HYDROmorphone pf (DILAUDID) injection 0.5 mg  0.5 mg Intravenous Q2HRS PRN Kobe Zhong M.D.   0.5 mg at 01/01/19 1628   • Respiratory Care per Protocol   Nebulization Continuous RT Declan Pierce M.D.       • hydrALAZINE (APRESOLINE) injection 10 mg  10 mg Intravenous Q4HRS PRN Declan Pierce M.D.           Fluids    Intake/Output Summary (Last 24 hours) at 01/03/19 0816  Last data filed at 01/03/19 0600   Gross per 24 hour   Intake           976.66 ml   Output              200 ml   Net           776.66 ml       Laboratory  Recent Labs      01/01/19 0311   ISTATAPH  7.478   ISTATAPCO2  32.9   ISTATAPO2  60*    ISTATATCO2  25   [id number]  92*   ISTATARTHCO3  24.4   ISTATARTBE  1   ISTATTEMP  98.0 F   ISTATFIO2  40   ISTATSPEC  Arterial   ISTATAPHTC  7.483   [id number]  58*         Recent Labs      01/01/19   0623 01/01/19   0841  01/02/19   0526 01/03/19   0155   SODIUM  130*  132*  131*  128*   POTASSIUM  3.7  3.6  4.6  5.1   CHLORIDE  96  99  97  94*   CO2  27  22  22  23   BUN  50*  50*  60*  75*   CREATININE  3.85*  3.75*  4.60*  5.11*   MAGNESIUM  2.3   --   2.5  2.5   PHOSPHORUS   --   2.9   --    --    CALCIUM  8.3*  7.4*  8.5  8.4*     Recent Labs      01/01/19 0623 01/01/19   0841  01/02/19   0526 01/03/19   0155   ALTSGPT  <5   --   <5  <5   ASTSGOT  17   --   17  19   ALKPHOSPHAT  83   --   81  82   TBILIRUBIN  0.4   --   0.4  0.3   GLUCOSE  82  72  96  154*     Recent Labs      01/01/19   0623 01/02/19   0526 01/03/19   0155   WBC  12.0*  10.4  10.5   NEUTSPOLYS  66.40  85.60*  84.10*   LYMPHOCYTES  14.20*  8.60*  7.40*   MONOCYTES  3.50  0.00  3.60   EOSINOPHILS  0.90  1.00  0.00   BASOPHILS  0.00  0.00  0.40   ASTSGOT  17  17  19   ALTSGPT  <5  <5  <5   ALKPHOSPHAT  83  81  82   TBILIRUBIN  0.4  0.4  0.3     Recent Labs      01/01/19 0623 01/01/19   1500  01/02/19   0526 01/03/19   0155   RBC  2.82*   --   2.76*  2.53*   HEMOGLOBIN  8.6*   --   8.3*  7.8*   HEMATOCRIT  26.7*   --   26.1*  24.2*   PLATELETCT  146*   --   168  177   PROTHROMBTM   --   21.6*  16.1*  16.6*   INR   --   1.87*  1.28*  1.33*       Imaging  X-Ray:  No film today 1/3/2019    Assessment/Plan  C. difficile colitis- (present on admission)   Assessment & Plan    Contact isolation  P.o. Vancomycin times 10 days  Monitor for recurrence of diarrhea     Sepsis (HCC)- (present on admission)   Assessment & Plan    This is severe sepsis with the following associated acute organ dysfunction(s): disseminated intravascular coagulopathy (DIC).   Source = C. difficile colitis    Improved status post sepsis protocol and fluid  resuscitation  Continue antibiotics       Hypoxia   Assessment & Plan    Improving  Continue to titrate oxygen to goal SaO2 between 88 and 92%  Pulmonary toiletry, mobilization      Malnutrition (HCC)- (present on admission)   Assessment & Plan    Nutritionist following, supplements     Supratherapeutic INR- (present on admission)   Assessment & Plan    Status post FFP and vitamin K  Improved     Hypoglycemia- (present on admission)   Assessment & Plan    Likely from poor oral intake versus infectious driven - resolved  Diet     Fall from ground level- (present on admission)   Assessment & Plan    Mechanical fall  Fall precautions     Encephalopathy acute- (present on admission)   Assessment & Plan    Likely metabolic -improved  Limit sedatives  Reorient and maintain sleep/wake cycle, mobilize     End stage renal disease (HCC)- (present on admission)   Assessment & Plan    HD prior schedule Monday/Wednesday/Friday --> on hold indefinitely  Patient pulled out hemodialysis catheter on 12/31  Nephro following     COPD (chronic obstructive pulmonary disease) ([facility])- (present on admission)   Assessment & Plan    On chronic 2 L/min nasal cannula 24 hours a day --> mild exacerbation 1/1/2019  RT/O2 protocols with scheduled and PRN bronchodilators  Wean systemic steroids again today to daily  Titrate oxygen to goal SaO2 between 88 and 92%         Hypophosphatemia   Assessment & Plan    Repleted, monitor closely     Hypokalemia- (present on admission)   Assessment & Plan    Resolved for now  Monitor     Anemia in CKD (chronic kidney disease)- (present on admission)   Assessment & Plan    Conservative transfusion strategy     DNR/DNI, awaiting decision of which hospice company to use and to complete 10 days of c diff treatment    VTE:  Heparin  Ulcer: Not Indicated  Lines: None    I have performed a physical exam and reviewed and updated ROS and Plan today (1/3/2019). In review of yesterday's note (1/2/2019), there are no  changes except as documented above.     Discussed patient condition and risk of morbidity and/or mortality with Family, RN, RT, Pharmacy, , [employer] [facility] resident, Charge nurse / hot rounds and Patient

## 2019-01-03 NOTE — TELEPHONE ENCOUNTER
"Received a call from pt's daughter (caretaker) who said since pt is currently admitted, she will not \"have the energy\" to come back to [place] for a hospital follow up with Infectious Disease and wants to cancel pt's appt. I advised pt's daughter against not following up with ID and told her to call if there are any issues.  -AMP  "

## 2019-01-03 NOTE — PALLIATIVE CARE
Palliative Care follow-up  PC RN visited pt at bedside, pt and family speaking with Hospice Liaison from [facility]. PC RN will attempt to discuss AD and POLST when they are finished.       Updated:   Cecile BUSCH    Plan:   Return to discuss AD and POLST     Thank you for allowing Palliative Care to participate in this patient's care. Please feel free to call [phone] with any questions or concerns.

## 2019-01-03 NOTE — PROGRESS NOTES
Monitor Summary:     SR 60-70's with frequent PAC's and rare PVC's  .20/.08/.40    12 hour cc complete.  2 RN skin check complete. Pictures documented in EPIC. Multiple scabs and bruising noted.

## 2019-01-03 NOTE — DISCHARGE PLANNING
Anticipated Discharge Disposition: d/c home w/ hospice    Action: Received choice form for [facility]. Faxed to [facility] and left  requesting referral be sent.     Barriers to Discharge: TBD    Plan: f/u w/ [facility] and medical team

## 2019-01-03 NOTE — PROGRESS NOTES
Maria Victoria Rivera re orders for lab draws at 2030.  Spoke with Dr. Torres.  Per MD, hold lab draws until she can speak with ordering provider regarding indication for such.

## 2019-01-03 NOTE — DISCHARGE PLANNING
Anticipated Discharge Disposition: d/c home on hospice    Action: Spoke to admissions counselor, Marni Becerrater w/ [facility] ([phone]). She is aware dtr and pt are to meet w/ [facility] next then they will make a decision. Lsw indicates will fax choice once received from pt and family.    Spoke to dtr who states she will make a decision between [facility] and [facility] once she has completed her second hospice interview w/ [facility] today between 3-30. She does not want her mom, pt to d/c home until she is no longer contagious for Cdif.    Also, she wants to complete an AD today w/ pt at bedside. LSw spoke to palliative RN who will answer questions for pt and family regarding the AD document.    LSw spoke to Dr Dixon to see if a Certificate of Competency can be completed.     Barriers to Discharge: TBD    Plan: f/u w/ family, palliative, medical team, cca

## 2019-01-03 NOTE — PROGRESS NOTES
"[facility] ICU Progress Note      Admit Date: 12/27/2018    Date & Time:   1/3/2019   10:31 AM       Patient ID:    Name:             Kristyn Gillespie     YOB: 1945  Age:                 73 y.o.  female   MRN:               [id number]      HPI:  \"73 y.o female with ESRD (HD on MWF), HTN, HLD, anemia of chronic disease, COPD (2L O2), hx of CVA, DVT (on vascath, on coumadin), mitral regurgitation s/p clip, PAD, and migraines admitted after GLF. Pt was found to have hypotension, hypoxia, hypoglycemia, as well as supra therapeutic INR > 10. She was 3/4 SIRS (WBC of 30, HR, RR) & had elevated lactic acid; started on C3, D5W, and given sepsis appropriate IVF.Patient  admitted to the ICU for further management.\"    Consultants:  Nephrology   PMA     Interval Events:  Alert and oriented x3 (not to time) this morning.  Saturating 94% on room air.  Denies chest pain, abd pain, SOB/dyspnea.  Reports mouth pain/discomfort/dry mouth.  Hungry.  Wants to pursue hospice care.  Has discussed HD with Nephrology; she does not want to continue.      Physical Exam:  Constitutional: AAO x 3. Appears fairly comfortable and alert.    She appears unhealthy. Temporal muscle wasting noted.   HENT: Normocephalic and atraumatic. Pupils are equal, round, and reactive to light.   Cardiovascular: Regular rhythm. S1,S2. No murmur, No Gallop.   Pulmonary/Chest: Regular effort.  No wheezes.  Rales in right lower lobe.    Abdominal: Soft. NT. ND.   Musculoskeletal: No peripheral edema. She exhibits no tenderness.   Neurological: She is AAO x 2.   Skin: Skin is warm and dry.     Respiratory:     Respiration: 17, Pulse Oximetry: 94 %    HemoDynamics:  Pulse: (!) 59, Heart Rate (Monitored): 66 Blood Pressure : 100/48, NIBP: 115/54      Fluids:        Intake/Output Summary (Last 24 hours) at 12/28/18 1146  Last data filed at 12/28/18 0600   Gross per 24 hour   Intake          1677.33 ml   Output                5 ml   Net          1672.33 " ml       Weight: 59.6 kg (131 lb 6.3 oz)  Body mass index is 23.28 kg/m².    Recent Labs      19   SODIUM  130*  132*  131*  128*   POTASSIUM  3.7  3.6  4.6  5.1   CHLORIDE  96  99  97  94*   CO2  27  22  22  23   BUN  50*  50*  60*  75*   CREATININE  3.85*  3.75*  4.60*  5.11*   MAGNESIUM  2.3   --   2.5  2.5   PHOSPHORUS   --   2.9   --    --    CALCIUM  8.3*  7.4*  8.5  8.4*       GI/Nutrition:  Recent Labs      19   ALTSGPT  <5   --   <5  <5   ASTSGOT  17   --   17  19   ALKPHOSPHAT  83   --   81  82   TBILIRUBIN  0.4   --   0.4  0.3   GLUCOSE  82  72  96  154*       Heme:  Recent Labs      19   1500  19   RBC  2.82*   --   2.76*  2.53*   HEMOGLOBIN  8.6*   --   8.3*  7.8*   HEMATOCRIT  26.7*   --   26.1*  24.2*   PLATELETCT  146*   --   168  177   PROTHROMBTM   --   21.6*  16.1*  16.6*   INR   --   1.87*  1.28*  1.33*       Infectious Disease:  Temp  Av.8 °C (98.3 °F)  Min: 36.4 °C (97.5 °F)  Max: 37.3 °C (99.1 °F)  Recent Labs      19   WBC  12.0*  10.4  10.5   NEUTSPOLYS  66.40  85.60*  84.10*   LYMPHOCYTES  14.20*  8.60*  7.40*   MONOCYTES  3.50  0.00  3.60   EOSINOPHILS  0.90  1.00  0.00   BASOPHILS  0.00  0.00  0.40   ASTSGOT  17  17  19   ALTSGPT  <5  <5  <5   ALKPHOSPHAT  83  81  82   TBILIRUBIN  0.4  0.4  0.3       Meds:  • MBX  5 mL     • methylPREDNISolone  40 mg     • vancomycin 50 mg/mL  125 mg     • acetaminophen  650 mg     • glucose 4 g  16 g      And   • dextrose 50%  25 mL     • LORazepam  0.5-1 mg     • ceFAZolin  1 g Stopped (19 567)   • epoetin seven for hemodialysis  50 Units/kg     • heparin  2,000 Units     • heparin  4,200 Units     • HYDROmorphone  0.5 mg     • Respiratory Care per Protocol       • hydrALAZINE  10 mg          Imaging:   US-EXTREMITY  VENOUS LOWER BILAT   Final Result      DX-CHEST-LIMITED (1 VIEW)   Final Result      No acute cardiopulmonary disease. Stable cardiomegaly.      DX-ABDOMEN FOR TUBE PLACEMENT   Final Result      1.  Feeding tube tip projects over the left lower hemithorax and is probably in the left lung. This should be removed.   2.  This finding was called to the patient's nurse, Lori, at 5:38 PM on 12/31/2018.      DX-ABDOMEN FOR TUBE PLACEMENT   Final Result         Feeding tube with tip projecting over the expected area of the stomach.      DX-ABDOMEN FOR TUBE PLACEMENT   Final Result      Feeding tube placement with the tip projecting over the stomach body.      DX-ABDOMEN FOR TUBE PLACEMENT   Final Result      1.  Feeding tube tip overlies the gastric fundus.      DX-ABDOMEN FOR TUBE PLACEMENT   Final Result      Cortrak nasogastric tube position consistent with intragastric placement.          Procedures:  None    Problem and Plan:  C. difficile colitis   Assessment & Plan    - Received abx during recent hospitalization.  - C diff positive.  Continue PO vancomycin.  Day 7 of 10.      Sepsis (HCC)   Assessment & Plan    - This is severe sepsis with the following associated acute organ dysfunction(s): acute respiratory failure.   - Recently DC on 12/16 for MSSA sepsis believed to be from [facility].  - 3/4 SIRS on arrival (HR, WBC, RR) with hypotension and lactic acidosis.  - C diff positive; on PO vancomycin.    - On cefazolin for MSSA bacteremia, started 12/29.  Continue until 1/12/18 per ID.      Malnutrition (HCC)   Assessment & Plan    - Patient thin with temporal wasting.  - Nutrition consult appreciated.  - Continue TF.      Supratherapeutic INR   Assessment & Plan    - INR >10 on arrival.  1.33 today (1/2) s/p Vitamin K x2 administrations, 2 units FFP.   - No signs of active bleeding, H&H stable.  - Monitor.   - Consider DVT prophylaxis when INR appears stable.     Hypoglycemia   Assessment & Plan    - Presented with BG  < 50.  - Noted that her FSBG does not match her BMP, possibly due to vascular issues.  - Glucose checks Q4H from earlobe.     Fall from ground level   Assessment & Plan    - Mechanical in description.   - PT/OT when appropriate.     End stage renal disease (HCC)   Assessment & Plan    - Nephrology onboard, HD MWF.  Patient pulled out HD cath on 12/31.  Per Nephro, ESRD condition worsening overall and rec Pall Med.    - Avoid nephrotoxic drugs.   - Pall Med onboard.  Patient stating she does not want further HD.       COPD (chronic obstructive pulmonary disease) ([facility])   Assessment & Plan    - On 2L at home.  - No PFTs on file.  - Strong hx of smoking.  - RT/O2 protocol.   - Titrate O2 to 88-92%.       Hypoxia   Assessment & Plan    - 1/1 early morning, desaturated to 70s.  ABG showed O2 60.  Placed on Oxymask titrated to 10 L.    - Continuous pulse ox.  Oxygen as needed.  B/l LE Doppler ultrasound negative for DVT.  - 1/2 - oxygen requirements back to baseline.  Period of hypoxia likely from not removal of O2 mask on evening hypoxia noted (patient on chronic home 2 L O2).            Anticipated Hospital stay:  >2 midnights    DISPO: Transfer orders to Medical.  Requires isolation for C. diff treatment. Social Work discussing hospice with daughter.      CODE STATUS: DNAR, Intubation okay.     Quality Measures:  Velez Catheter: Yes  DVT Prophylaxis: SCDs ( INR > 10 on Admit; 1.33 on 1/3; consider prophylaxis when INR stable).  Ulcer Prophylaxis:None  Antibiotics: IV cefazolin until 1/12/18.  PO vancomycin for C. diff colitis - day 7.    Lines: PIV

## 2019-01-03 NOTE — DISCHARGE PLANNING
Anticipated Discharge Disposition: home w/ hospice    Action: Spoke to bedside RN and left VM w/ house Saeid Kayleen ([phone]).     [facility] w/ pt and dtr now. Once [facility] completed w/ interview Palliative RN will come to bedside to complete both AD and POLST. Also, can acquire completed choice from dtr for hospice and fax to [facility].      Barriers to Discharge: TBD    Plan: f/u w/ [facility] and medical team

## 2019-01-03 NOTE — DISCHARGE PLANNING
Anticipated Discharge Disposition: home w/ hospice    Action: Palliative RN Radha is completing AD w/ pt and familyi. Dr Dixon has completed the cert of comp and provided to Radha.    UPDATE: Palliative will wait for hospice interview to be completed then will assist w/ both AD and POLST.    LSw spoke to bedside RN to update: palliative to be called once hospice interview is complete. Also, LSw to acquire choice form once dtr and pt have made a hospice decision.        Barriers to Discharge: TBD    Plan: f/u w/ medical team, family, cca

## 2019-01-03 NOTE — CARE PLAN
Problem: Safety  Goal: Will remain free from falls  Outcome: PROGRESSING AS EXPECTED    Intervention: Assess risk factors for falls  Palak dotson fall risk assessment complete.   Intervention: Implement fall precautions  Bed locked and in lowest position. Bed alarm on. Pt's belongings and call light within reach. Pt educated on importance of pressing call light PRN.         Problem: Mobility  Goal: Risk for activity intolerance will decrease  Outcome: PROGRESSING AS EXPECTED    Intervention: Assess and monitor signs of activity intolerance  Pt stood EOB for 10 minutes this shift with one staff assist. Pt turns by herself.

## 2019-01-03 NOTE — PROGRESS NOTES
Labs order at 20:00 to monitor K level. Hold for now since pt can be agitated if woken up for lab draw. Discussed with RN to draw AM lab early.

## 2019-01-03 NOTE — CARE PLAN
Problem: Skin Integrity  Goal: Risk for impaired skin integrity will decrease  Outcome: PROGRESSING AS EXPECTED  Noted pt turns and moves in bed such that she is frequently lying on her lines.  Assessed status with hourly rounding to ensure pt does not develop a pressure injury from lines. Pillows placed to help prevent skin contact with lines.  All other skin integrity precautions implemented.  Will continue to assess and monitor.    Problem: Mobility  Goal: Risk for activity intolerance will decrease  Outcome: PROGRESSING SLOWER THAN EXPECTED  Pt is decidedly weak and will not attempt movement/ambulation without encouragement and convincing that she is stronger that she thinks she is.

## 2019-01-04 VITALS
SYSTOLIC BLOOD PRESSURE: 137 MMHG | HEART RATE: 88 BPM | DIASTOLIC BLOOD PRESSURE: 73 MMHG | HEIGHT: 63 IN | BODY MASS INDEX: 23.48 KG/M2 | WEIGHT: 132.5 LBS | TEMPERATURE: 97.5 F | RESPIRATION RATE: 20 BRPM | OXYGEN SATURATION: 98 %

## 2019-01-04 LAB
ALBUMIN SERPL BCP-MCNC: 2.6 G/DL (ref 3.2–4.9)
ALBUMIN/GLOB SERPL: 1.3 G/DL
ALP SERPL-CCNC: 72 U/L (ref 30–99)
ALT SERPL-CCNC: <5 U/L (ref 2–50)
ANION GAP SERPL CALC-SCNC: 12 MMOL/L (ref 0–11.9)
AST SERPL-CCNC: 13 U/L (ref 12–45)
BASOPHILS # BLD AUTO: 0.3 % (ref 0–1.8)
BASOPHILS # BLD: 0.03 K/UL (ref 0–0.12)
BILIRUB SERPL-MCNC: 0.3 MG/DL (ref 0.1–1.5)
BUN SERPL-MCNC: 92 MG/DL (ref 8–22)
CALCIUM SERPL-MCNC: 8.1 MG/DL (ref 8.5–10.5)
CHLORIDE SERPL-SCNC: 97 MMOL/L (ref 96–112)
CO2 SERPL-SCNC: 22 MMOL/L (ref 20–33)
CREAT SERPL-MCNC: 5.45 MG/DL (ref 0.5–1.4)
EOSINOPHIL # BLD AUTO: 0 K/UL (ref 0–0.51)
EOSINOPHIL NFR BLD: 0 % (ref 0–6.9)
ERYTHROCYTE [DISTWIDTH] IN BLOOD BY AUTOMATED COUNT: 52.1 FL (ref 35.9–50)
GLOBULIN SER CALC-MCNC: 2 G/DL (ref 1.9–3.5)
GLUCOSE SERPL-MCNC: 110 MG/DL (ref 65–99)
HCT VFR BLD AUTO: 25.2 % (ref 37–47)
HGB BLD-MCNC: 8.1 G/DL (ref 12–16)
IMM GRANULOCYTES # BLD AUTO: 0.19 K/UL (ref 0–0.11)
IMM GRANULOCYTES NFR BLD AUTO: 1.7 % (ref 0–0.9)
INR PPP: 1.54 (ref 0.87–1.13)
LYMPHOCYTES # BLD AUTO: 0.77 K/UL (ref 1–4.8)
LYMPHOCYTES NFR BLD: 6.7 % (ref 22–41)
MAGNESIUM SERPL-MCNC: 2.5 MG/DL (ref 1.5–2.5)
MCH RBC QN AUTO: 30.7 PG (ref 27–33)
MCHC RBC AUTO-ENTMCNC: 32.1 G/DL (ref 33.6–35)
MCV RBC AUTO: 95.5 FL (ref 81.4–97.8)
MONOCYTES # BLD AUTO: 0.53 K/UL (ref 0–0.85)
MONOCYTES NFR BLD AUTO: 4.6 % (ref 0–13.4)
NEUTROPHILS # BLD AUTO: 9.94 K/UL (ref 2–7.15)
NEUTROPHILS NFR BLD: 86.7 % (ref 44–72)
NRBC # BLD AUTO: 0 K/UL
NRBC BLD-RTO: 0 /100 WBC
PLATELET # BLD AUTO: 192 K/UL (ref 164–446)
PMV BLD AUTO: 10.5 FL (ref 9–12.9)
POTASSIUM SERPL-SCNC: 5.4 MMOL/L (ref 3.6–5.5)
PROT SERPL-MCNC: 4.6 G/DL (ref 6–8.2)
PROTHROMBIN TIME: 18.6 SEC (ref 12–14.6)
RBC # BLD AUTO: 2.64 M/UL (ref 4.2–5.4)
SODIUM SERPL-SCNC: 131 MMOL/L (ref 135–145)
WBC # BLD AUTO: 11.5 K/UL (ref 4.8–10.8)

## 2019-01-04 PROCEDURE — 85610 PROTHROMBIN TIME: CPT

## 2019-01-04 PROCEDURE — 99239 HOSP IP/OBS DSCHRG MGMT >30: CPT | Mod: GC | Performed by: INTERNAL MEDICINE

## 2019-01-04 PROCEDURE — 700111 HCHG RX REV CODE 636 W/ 250 OVERRIDE (IP): Performed by: INTERNAL MEDICINE

## 2019-01-04 PROCEDURE — 80053 COMPREHEN METABOLIC PANEL: CPT

## 2019-01-04 PROCEDURE — 700102 HCHG RX REV CODE 250 W/ 637 OVERRIDE(OP): Performed by: INTERNAL MEDICINE

## 2019-01-04 PROCEDURE — 700111 HCHG RX REV CODE 636 W/ 250 OVERRIDE (IP): Mod: JG | Performed by: INTERNAL MEDICINE

## 2019-01-04 PROCEDURE — A9270 NON-COVERED ITEM OR SERVICE: HCPCS | Performed by: STUDENT IN AN ORGANIZED HEALTH CARE EDUCATION/TRAINING PROGRAM

## 2019-01-04 PROCEDURE — A9270 NON-COVERED ITEM OR SERVICE: HCPCS | Performed by: INTERNAL MEDICINE

## 2019-01-04 PROCEDURE — 83735 ASSAY OF MAGNESIUM: CPT

## 2019-01-04 PROCEDURE — 85025 COMPLETE CBC W/AUTO DIFF WBC: CPT

## 2019-01-04 PROCEDURE — 700102 HCHG RX REV CODE 250 W/ 637 OVERRIDE(OP): Performed by: STUDENT IN AN ORGANIZED HEALTH CARE EDUCATION/TRAINING PROGRAM

## 2019-01-04 RX ORDER — PREDNISONE 20 MG/1
40 TABLET ORAL DAILY
Status: DISCONTINUED | OUTPATIENT
Start: 2019-01-05 | End: 2019-01-04 | Stop reason: HOSPADM

## 2019-01-04 RX ADMIN — VANCOMYCIN HYDROCHLORIDE 125 MG: 10 INJECTION, POWDER, LYOPHILIZED, FOR SOLUTION INTRAVENOUS at 04:20

## 2019-01-04 RX ADMIN — EPOETIN ALFA 2700 UNITS: 3000 SOLUTION INTRAVENOUS; SUBCUTANEOUS at 10:50

## 2019-01-04 RX ADMIN — LIDOCAINE HYDROCHLORIDE 5 ML: 20 SOLUTION OROPHARYNGEAL at 02:00

## 2019-01-04 RX ADMIN — METHYLPREDNISOLONE SODIUM SUCCINATE 40 MG: 40 INJECTION, POWDER, FOR SOLUTION INTRAMUSCULAR; INTRAVENOUS at 04:20

## 2019-01-04 RX ADMIN — ACETAMINOPHEN 650 MG: 325 TABLET, FILM COATED ORAL at 03:00

## 2019-01-04 RX ADMIN — VANCOMYCIN HYDROCHLORIDE 125 MG: 10 INJECTION, POWDER, LYOPHILIZED, FOR SOLUTION INTRAVENOUS at 00:00

## 2019-01-04 RX ADMIN — LIDOCAINE HYDROCHLORIDE 5 ML: 20 SOLUTION OROPHARYNGEAL at 09:46

## 2019-01-04 RX ADMIN — NYSTATIN 500000 UNITS: 100000 SUSPENSION ORAL at 12:05

## 2019-01-04 RX ADMIN — VANCOMYCIN HYDROCHLORIDE 125 MG: 10 INJECTION, POWDER, LYOPHILIZED, FOR SOLUTION INTRAVENOUS at 12:07

## 2019-01-04 ASSESSMENT — PAIN SCALES - GENERAL
PAINLEVEL_OUTOF10: 2
PAINLEVEL_OUTOF10: 1
PAINLEVEL_OUTOF10: 2
PAINLEVEL_OUTOF10: 0
PAINLEVEL_OUTOF10: 3
PAINLEVEL_OUTOF10: 2
PAINLEVEL_OUTOF10: 0
PAINLEVEL_OUTOF10: 0

## 2019-01-04 NOTE — DISCHARGE SUMMARY
Internal Medicine Discharge Summary  Note Author: Genevieve Ramos M.D.       Name Kristyn Gillespie 1945   Age/Sex 73 y.o. female   MRN [id number]         Admit Date:  2018       Discharge Date: 19      Service:   [facility] Internal Medicine ICU Gold team  Attending Physician(s): Dr. Gonda       Senior Resident(s): Dr. Dixon, Dr Ramos  PCP: Rosa LACEY M.D.    Primary Diagnosis:   Sepsis due to C. difficile colitis    Secondary Diagnoses:                Active Problems:    Sepsis (HCC) POA: Yes    C. difficile colitis POA: Yes    COPD (chronic obstructive pulmonary disease) (HCC) POA: Yes    End stage renal disease (HCC) POA: Yes    Encephalopathy acute POA: Yes    Fall from ground level POA: Yes    Hypoglycemia POA: Yes    Supratherapeutic INR POA: Yes    Malnutrition (HCC) POA: Yes    Hypoxia POA: Unknown    Anemia in CKD (chronic kidney disease) POA: Yes    Hypokalemia POA: Yes    Hypophosphatemia POA: Unknown  Resolved Problems:    Thrombocytopenia (HCC) POA: Unknown    Acute on chronic respiratory failure (HCC) POA: Yes      Hospital Summary (Brief Narrative):       73 y.o female with end-stage renal disease (hemodialysis on Monday, Wednesday, Friday), hypertension, dyslipidemia, anemia of chronic disease, COPD (2L O2), hx of stroke, DVT (on vascath, on coumadin), mitral regurgitation s/p clip, peripheral arterial disease, and migraines admitted after ground-level fall and. She was found to have hypotension, hypoxia, hypoglycemia, as well as supra therapeutic INR > 10. She had sepsis with 3/4 SIRS (WBC of 30, HR, RR) & had elevated lactic acid; started on ceftriaxone, D5W, and given sepsis appropriate IVF. Had recent hospitalization for MSSA bacteremia and was on Ancef as outpatient. Tested positive for C diff and is was started on oral vancomycin. Had elevated INR on admission- got two units of FFP and oral vitamin K; no signs of active bleeding.  She became hypoxic with SPO2  of 70 on 1/1 and oxygen with oxygen mask was started.  She pulled out hemodialysis catheter on 12/31.  Given the overall decline of health including confusion and less benefit from dialysis, nephrology suggested palliative care consult.  On 1/3/2019 patient decided to go home on hospice.  Discharged home with hospice today.    Consultants:     Nephrology  Palliative care  PMA    Procedures:        None    Imaging/ Testing:      US-EXTREMITY VENOUS LOWER BILAT   Final Result      DX-CHEST-LIMITED (1 VIEW)   Final Result      No acute cardiopulmonary disease. Stable cardiomegaly.      DX-ABDOMEN FOR TUBE PLACEMENT   Final Result      1.  Feeding tube tip projects over the left lower hemithorax and is probably in the left lung. This should be removed.   2.  This finding was called to the patient's nurse, Lori, at 5:38 PM on 12/31/2018.      DX-ABDOMEN FOR TUBE PLACEMENT   Final Result         Feeding tube with tip projecting over the expected area of the stomach.      DX-ABDOMEN FOR TUBE PLACEMENT   Final Result      Feeding tube placement with the tip projecting over the stomach body.      DX-ABDOMEN FOR TUBE PLACEMENT   Final Result      1.  Feeding tube tip overlies the gastric fundus.      DX-ABDOMEN FOR TUBE PLACEMENT   Final Result      Cortrak nasogastric tube position consistent with intragastric placement.            Discharge Medications:         Medication Reconciliation: Completed       Medication List      START taking these medications      Instructions   MBX Susp   Take 5 mL by mouth every 6 hours as needed.  Dose:  5 mL     nystatin 315230 UNIT/ML Susp  Commonly known as:  MYCOSTATIN   Take 2 mL by mouth 4 times a day.  Dose:  752420 Units        STOP taking these medications    asa/apap/caffeine 250-250-65 MG Tabs  Commonly known as:  EXCEDRIN     ceFAZolin in dextrose 1 g/50mL Soln  Commonly known as:  ANCEF     famotidine 20 MG Tabs  Commonly known as:  PEPCID     ferrous sulfate 325 (65 Fe) MG  tablet     lovastatin 10 MG tablet  Commonly known as:  MEVACOR     sucralfate 1 GM Tabs  Commonly known as:  CARAFATE     warfarin 6 MG Tabs  Commonly known as:  COUMADIN          Disposition:   Home with home hospice    Diet:   Regular diet    Activity:   As tolerated    Discharge Time (Minutes) :  35min  Hospital Course Type:  Inpatient Stay >2 midnights      Condition on Discharge    ______________________________________________________________________    Interval history/exam for day of discharge:     No acute events overnight and no acute complaints today.       Physical Exam:  Constitutional: AAO x 3. Appears fairly comfortable and alert.    She appears unhealthy. Temporal muscle wasting noted.   HENT: Normocephalic and atraumatic. Pupils are equal, round, and reactive to light.   Cardiovascular: Regular rhythm. S1,S2. No murmur, No Gallop.   Pulmonary/Chest: Regular effort.  No wheezes.  Rales in right lower lobe.    Abdominal: Soft. NT. ND.   Musculoskeletal: No peripheral edema. She exhibits no tenderness.   Neurological: She is AAO x 2.   Skin: Skin is warm and dry.

## 2019-01-04 NOTE — DISCHARGE PLANNING
Agency/Facility Name: [facility]  Spoke To: Christie  Outcome: They are wanting to wait on DC so that they can get everything set up for the patient, however, per LSW Cecile the patients daughter is wanting to leave asap, talked to Christie at [facility] and she is going to call and talk to the daughter to guarantee a safe DC.

## 2019-01-04 NOTE — CARE PLAN
Problem: Venous Thromboembolism (VTW)/Deep Vein Thrombosis (DVT) Prevention:  Goal: Patient will participate in Venous Thrombosis (VTE)/Deep Vein Thrombosis (DVT)Prevention Measures   01/03/19 0800   Mechanical/VTE Prophylaxis   Mechanical Prophylaxis  SCDs, Sequential Compression Device   SCDs, Sequential Compression Device Refused   OTHER   Risk Assessment Score 1   VTE RISK Moderate   Pharmacologic Prophylaxis Used Contraindicated per MD       Problem: Bowel/Gastric:  Goal: Will not experience complications related to bowel motility   01/03/19 1600 01/03/19 1800   OTHER   Last BM 01/02/19  (Per report) --    Number of Times Stooled --  0

## 2019-01-04 NOTE — DISCHARGE INSTRUCTIONS
Discharge Instructions    Discharged to home by car with relative. Discharged via wheelchair, hospital escort: Yes.  Special equipment needed: Not Applicable    Be sure to schedule a follow-up appointment with your primary care doctor or any specialists as instructed.     Discharge Plan:   Pneumococcal Vaccine Administered/Refused: Not given - Patient refused pneumococcal vaccine  Influenza Vaccine Indication: Not indicated: Previously immunized this influenza season and > 8 years of age    I understand that a diet low in cholesterol, fat, and sodium is recommended for good health. Unless I have been given specific instructions below for another diet, I accept this instruction as my diet prescription.     Special Instructions:     · Is patient discharged on Warfarin / Coumadin?   No    Depression / Suicide Risk    As you are discharged from this [facility] facility, it is important to learn how to keep safe from harming yourself.    Recognize the warning signs:  · Abrupt changes in personality, positive or negative- including increase in energy   · Giving away possessions  · Change in eating patterns- significant weight changes-  positive or negative  · Change in sleeping patterns- unable to sleep or sleeping all the time   · Unwillingness or inability to communicate  · Depression  · Unusual sadness, discouragement and loneliness  · Talk of wanting to die  · Neglect of personal appearance   · Rebelliousness- reckless behavior  · Withdrawal from people/activities they love  · Confusion- inability to concentrate     If you or a loved one observes any of these behaviors or has concerns about self-harm, here's what you can do:  · Talk about it- your feelings and reasons for harming yourself  · Remove any means that you might use to hurt yourself (examples: pills, rope, extension cords, firearm)  · Get professional help from the community (Mental Health, Substance Abuse, psychological counseling)  · Do not be  alone:Call your Safe Contact- someone whom you trust who will be there for you.  · Call your local CRISIS HOTLINE 110-8545 or 358-018-3793  · Call your local Children's Mobile Crisis Response Team [place] (811) 525-2447 or www.bigtincan  · Call the toll free National Suicide Prevention Hotlines   · National Suicide Prevention Lifeline 459-431-HFMD (7496)  · National [employer] Network 800-SUICIDE (680-9383)

## 2019-01-04 NOTE — PALLIATIVE CARE
Palliative Care follow-up  Received msg from daughter, she will be at bedside at 1000 today and would like to complete AD and POLST.      Plan:   Meet pt and family at 1000.    Thank you for allowing Palliative Care to participate in this patient's care. Please feel free to call [phone] with any questions or concerns.

## 2019-01-04 NOTE — DISCHARGE PLANNING
Anticipated Discharge Disposition: d/c home w/ [facility]    Action: Prior to rounds spoke to [facility] who indicates [facility] has called and is requesting d/c time.    At rounds palliative arrived from pt's room and updated medical team that both pt and dtr, who is at bedside, have decided pt to d/c today, w/ transport by dtr to home, and they state the DME bed is already delivered by hospice. Also, they do not want to continue the ABX for CDif which will have two more doses, one tonight and one early in the AM tomorrow.    After rounds spoke to [facility]. [facility] indicates they have not delivered any DME and would prefer pt d/c this evening or even tomorrow.    Lsw asked [facility] to contact pt's dtr, who is at bedside, to discuss this d/c time. It appears pt and dtr want pt to d/c today and [facility] is not complete w/ the DME etc delivery.     Followed up w/ bedside RN. She indicates pt and dtr have stated they are aware DME still needs to be delivered to the home and thought it would happen this evening. RN will discuss DME delivery w/ pt and dtr at bedside.     Barriers to Discharge: set up of DME by hospice prior to pt arriving home    Plan: f/u w/ medical team

## 2019-01-04 NOTE — DISCHARGE PLANNING
Received Choice form at 1/3 [phone]  Agency/Facility Name: [facility] (1) [facility] (2)  Referral sent per Choice form @ 1/4 2745

## 2019-01-04 NOTE — PALLIATIVE CARE
Palliative Care follow-up  PC RN visited pt and daughter Anahy at bedside, discussed pt's plan of discharging to home with [facility]. PC RN discussed AD and POLST, pt agreeable to complete both. PC RN assisted pt in completing both forms. POLST completed, copy scanned into EMR, original placed on hard chart and to be sent with pt upon discharge.     PC RN discussed updates from Dr. Gonda, daughter is agreeable to discharging pt today via private care to home. Per Anahy bed will be delivered today, once she has confirmation of delivery she will let clinical team know so pt can be discharged. Pt verbalized agreement.     AD placed in  office, awaiting notary.    Updated:   IDT rounds, bedside RN    Plan:   Discharge today once bed is delivered at home, daughter will transport pt via private car. POLST complete, AD awaiting notary.     Thank you for allowing Palliative Care to participate in this patient's care. Please feel free to call [phone] with any questions or concerns.

## 2019-01-04 NOTE — CARE PLAN
"Problem: Fluid Volume:  Goal: Will maintain balanced intake and output  Outcome: PROGRESSING AS EXPECTED  Pt is aware that she can decide whether or not she would like the verde to remain in.    Problem: Psychosocial Needs:  Goal: Level of anxiety will decrease  Outcome: PROGRESSING AS EXPECTED  Pt continually repeating that \"he [the HCP] gave up on the dialysis.\"  But will upon questioning affirm that is in fact what she wants.  Pt appears to benefit from talking about the recent decision to withdraw care.  It is this RNs opinion that she is looking for affirmation, but that active listening helps allay any anxiety regarding said decision.      "

## 2019-01-05 NOTE — PROGRESS NOTES
Patient discharged to daughter's home in [place] with daughter in private car via wheelchair with RN and CNA. Patient will begin home hospice at daughters home. Discharge summary discussed with patient and daughter and written prescriptions given to daughter. All questions answered.

## 2019-01-05 NOTE — PROGRESS NOTES
Received notice from patient's daughter Anahy that durable medical equipment has arrived at their home from [facility]. Daughter and patient wishing to discharge now that equipment has been delivered.

## 2019-01-29 ENCOUNTER — TELEPHONE (OUTPATIENT)
Dept: MEDICAL GROUP | Facility: PHYSICIAN GROUP | Age: 74
End: 2019-01-29

## 2019-01-29 NOTE — TELEPHONE ENCOUNTER
1. Caller Name: Neetu                      Call Back Number:     2. Message: [facility] called in stating that Pt passed away on 1/22/19.    3. Patient approves office to leave a detailed voicemail/MyChart message: N\A

## 2020-03-03 NOTE — PROGRESS NOTES
MONITORS  MEDICAL     Consent (Nose)/Introductory Paragraph: The rationale for Mohs was explained to the patient and consent was obtained. The risks, benefits and alternatives to therapy were discussed in detail. Specifically, the risks of nasal deformity, changes in the flow of air through the nose, infection, scarring, bleeding, prolonged wound healing, incomplete removal, allergy to anesthesia, nerve injury and recurrence were addressed. Prior to the procedure, the treatment site was clearly identified and confirmed by the patient. All components of Universal Protocol/PAUSE Rule completed.

## 2020-06-05 NOTE — ADDENDUM NOTE
Encounter addended by: Margarita Araujo M.D. on: 6/5/2020 9:10 AM   Actions taken: Delete clinical note

## 2021-01-11 DIAGNOSIS — Z23 NEED FOR VACCINATION: ICD-10-CM

## 2022-02-09 NOTE — ASSESSMENT & PLAN NOTE
67 y.o. female patient is here for suture removal following Mohs' surgery.    Patient reports no problems with r arm     WOUND PE:  The r arm  sutures intact. Wound healing well. Good skin edges. No signs or symptoms of infection.      IMPRESSION:  Healing operative site from Mohs' surgery SCC r arm s/p Mohs with CLC postop day # 14    PLAN:  Sutures removed today by Yodit Rowell, Surg Tech. Steri-strips applied.  Discontinue wound care.  Keep moist with Aquaphor.  Call if any issues arise    RTC:  In 3-6 months with Josefina Vega M.D. for skin check or sooner if new concern arises.     Nephro Dr. Najjar consulted.    Monitoring dialysis

## 2022-08-11 NOTE — PATIENT INSTRUCTIONS
Consider trying compression socks for a week or so along with leg elevation to help reduce the swelling to your lower extremities.      Follow-up with Dr. Barth as scheduled in January.       If you need further assistance, or have any questions; concerns or lingering symptoms before seeing your Primary Care Provider or specialist.     Do not hesitate to contact us.     Please contact us at the Post-Hospital Follow Up Program at [phone] and ask for the Medical Assistant for Dr Monge.   Our offices hours are Monday-Friday 8 am-5 pm.

## 2023-08-22 NOTE — PROGRESS NOTES
12 Hour Chart Check.    · Refill requested by: Pharmacy Fax  · Last office visit for supply: 6/19/23  · Next office visit: 9/19/23  · Item(s) Requested: Diabetic Supply Lancets and Test strips   · Sig:  Test blood sugar daily  · Quantity requested: 50  Refill if seen within the last 12 months  Filled per protocol        Last office visit date: 6/19/23    Next appointment scheduled?: Yes     Number of refills given: 11

## 2024-01-24 NOTE — ED NOTES
Admitting at bedside.    Jannette Laguna is a 42 year old female presenting to the walk-in clinic today for right leg pain localized to the calf. Onset of pain was Saturday. No known injury. Patient noted redness this morning. Increased SOB    Swabs/Specimens collected during triage process:  None    BP greater than 140/90: Yes   Recheck completed: Yes      Triaged to: room 2    Patient would like communication of their results via:    [facility]    Cell Phone:   Telephone Information:   Mobile [phone]     Okay to leave a message containing results? Yes

## (undated) DEVICE — KIT SURGIFLO W/OUT THROMBIN - (6EA/CA)

## (undated) DEVICE — GLOVE, BIOGEL ECLIPSE, SZ 7.0, PF LTX (50/BX)

## (undated) DEVICE — DRAPE SURGICAL U 77X120 - (10/CA)

## (undated) DEVICE — SYRINGE SAFETY 10 ML 18 GA X 1 1/2 BLUNT LL (100/BX 4BX/CA)

## (undated) DEVICE — COVER PROBE STERILE CONE (12EA/CA)

## (undated) DEVICE — TROCAR 5X100 NON BLADED Z-TH - READ KII (6/BX)

## (undated) DEVICE — SYRINGE SAFETY 5 ML 18 GA X 1-1/2 BLUNT LL (100/BX 4BX/CA)

## (undated) DEVICE — BLADE SURGICAL #15 - (50/BX 3BX/CA)

## (undated) DEVICE — CLIP MED INTNL HRZN TI ESCP - (25/BX)

## (undated) DEVICE — CHLORAPREP 26 ML APPLICATOR - ORANGE TINT(25/CA)

## (undated) DEVICE — SYRINGE DISP. 60 CC LL - (30/BX, 12BX/CA)**WHEN THESE ARE GONE ORDER #500206**

## (undated) DEVICE — SUTURE 4-0 SILK 12 X 18 INCH - (36/BX)

## (undated) DEVICE — SYRINGE 30 ML LL (56/BX)

## (undated) DEVICE — TROCAR 5X100 BLADED ADVANCE - FIXATION (6/BX)

## (undated) DEVICE — PROTECTOR ULNA NERVE - (36PR/CA)

## (undated) DEVICE — ELECTRODE 5MM LHK LAPSCP STERILE DISP- MEGADYNE  (5/CA)

## (undated) DEVICE — SPONGE GAUZESTER 4 X 4 4PLY - (128PK/CA)

## (undated) DEVICE — SUTURE 3-0 VICRYL PLUS SH - 27 INCH (36/BX)

## (undated) DEVICE — SUTURE 4-0 VICRYL PLUS RB-1 - 27 INCH (36/BX)

## (undated) DEVICE — SYRINGE 20 ML LL (50EA/BX 4BX/CA)

## (undated) DEVICE — KIT ROOM DECONTAMINATION

## (undated) DEVICE — PAD PREP 24 X 48 CUFFED - (100/CA)

## (undated) DEVICE — DRAIN PENROSE 1 IN X 18 IN - STERILE (25EA/BX)

## (undated) DEVICE — CANISTER SUCTION 3000ML MECHANICAL FILTER AUTO SHUTOFF MEDI-VAC NONSTERILE LF DISP  (40EA/CA)

## (undated) DEVICE — PACK MINOR BASIN - (2EA/CA)

## (undated) DEVICE — SUTURE 2-0 COATED VICRYL PLUS - 12 X 18 INCH (12/BX)

## (undated) DEVICE — GLOVE BIOGEL ECLIPSE  PF LATEX SIZE 6.5 (50PR/BX)

## (undated) DEVICE — GLOVE BIOGEL PI INDICATOR SZ 7.0 SURGICAL PF LF - (50/BX 4BX/CA)

## (undated) DEVICE — MASK ANESTHESIA ADULT  - (100/CA)

## (undated) DEVICE — BLADE SURGICAL #11 - (50/BX)

## (undated) DEVICE — TOWELS CLOTH SURGICAL - (4/PK 20PK/CA)

## (undated) DEVICE — MASK, LARYNGEAL AIRWAY #4

## (undated) DEVICE — ELECTRODE 850 FOAM ADHESIVE - HYDROGEL RADIOTRNSPRNT (50/PK)

## (undated) DEVICE — SUTURE 7-0 PROLENE 24BV175-6 - EP8735H (36/BX)"

## (undated) DEVICE — SUTURE GENERAL

## (undated) DEVICE — KIT ANESTHESIA W/CIRCUIT & 3/LT BAG W/FILTER (20EA/CA)

## (undated) DEVICE — DRESSING TRANSPARENT FILM TEGADERM 4 X 4.75" (50EA/BX)"

## (undated) DEVICE — SYRINGE 10 ML CONTROL LL (25EA/BX 4BX/CA)

## (undated) DEVICE — SODIUM CHL. INJ. 0.9% 500ML (24EA/CA 50CA/PF)

## (undated) DEVICE — SET EXTENSION WITH 2 PORTS (48EA/CA) ***PART #2C8610 IS A SUBSTITUTE*****

## (undated) DEVICE — TROCAR LAPSCP 100MM 12MM NTHRD - (6/BX)

## (undated) DEVICE — SET LEADWIRE 5 LEAD BEDSIDE DISPOSABLE ECG (1SET OF 5/EA)

## (undated) DEVICE — ELECTRODE DUAL RETURN W/ CORD - (50/PK)

## (undated) DEVICE — GLOVE BIOGEL SZ 7.5 SURGICAL PF LTX - (50PR/BX 4BX/CA)

## (undated) DEVICE — SENSOR SPO2 NEO LNCS ADHESIVE (20/BX) SEE USER NOTES

## (undated) DEVICE — VESSELOOP MINI BLUE STERILE - SURG-I-LOOP (10EA/BX)

## (undated) DEVICE — SUCTION INSTRUMENT YANKAUER BULBOUS TIP W/O VENT (50EA/CA)

## (undated) DEVICE — GOWN SURGEONS X-LARGE - DISP. (30/CA)

## (undated) DEVICE — GLOVE BIOGEL INDICATOR SZ 7.5 SURGICAL PF LTX - (50PR/BX 4BX/CA)

## (undated) DEVICE — SUTURE CV

## (undated) DEVICE — DRAPE LARGE 3 QUARTER - (20/CA)

## (undated) DEVICE — SET IRRIGATION CYSTOSCOPY TUBE L80 IN (20EA/CA)

## (undated) DEVICE — TUBING CLEARLINK DUO-VENT - C-FLO (48EA/CA)

## (undated) DEVICE — DRESSING TRANSPARENT FILM TEGADERM 2.375 X 2.75"  (100EA/BX)"

## (undated) DEVICE — SUTURE 6-0 PROLENE BV-1 D/A 24 (36PK/BX)"

## (undated) DEVICE — BAG DECANTER (50EA/CS)

## (undated) DEVICE — SLEEVE, VASO, THIGH, MED

## (undated) DEVICE — NEPTUNE 4 PORT MANIFOLD - (20/PK)

## (undated) DEVICE — CLIP SM INTNL HRZN TI ESCP LGT - (24EA/PK 25PK/BX)

## (undated) DEVICE — SPONGE GAUZESTER. 2X2 4-PL - (2/PK 50PK/BX 30BX/CS)